# Patient Record
Sex: MALE | Race: WHITE | Employment: OTHER | URBAN - METROPOLITAN AREA
[De-identification: names, ages, dates, MRNs, and addresses within clinical notes are randomized per-mention and may not be internally consistent; named-entity substitution may affect disease eponyms.]

---

## 2018-06-04 RX ORDER — ASPIRIN 81 MG/1
81 TABLET ORAL DAILY
COMMUNITY

## 2018-06-04 NOTE — PERIOP NOTES
[facility]  Ambulatory Surgery Unit  Pre-operative Instructions    Surgery/Procedure Date  6/6/18          Tentative Arrival Time to be called      1. On the day of your surgery/procedure, please report to the Ambulatory Surgery Unit Registration Desk and sign in at your designated time. The Ambulatory Surgery Unit is located in [facility] on the [facility] side of the [facility] across from the [facility] building. Please have all of your health insurance cards and a photo ID. 2. You must have someone with you to drive you home, as you should not drive a car for 24 hours following anesthesia. Please make arrangements for a responsible adult friend or family member to stay with you for at least the first 24 hours after your surgery. 3. Do not have anything to eat or drink (including water, gum, mints, coffee, juice) after midnight. This may not apply to medications prescribed by your physician. (Please note below the special instructions with medications to take the morning of surgery, if applicable.)    4. We recommend you do not drink any alcoholic beverages for 24 hours before and after your surgery. 5. Contact your surgeons office for instructions on the following medications: non-steroidal anti-inflammatory drugs (i.e. Advil, Aleve), vitamins, and supplements. (Some surgeons will want you to stop these medications prior to surgery and others may allow you to take them)   **If you are currently taking Plavix, Coumadin, Aspirin and/or other blood-thinning agents, contact your surgeon for instructions. ** Your surgeon will partner with the physician prescribing these medications to determine if it is safe to stop or if you need to continue taking. Please do not stop taking these medications without instructions from your surgeon.     6. In an effort to help prevent surgical site infection, we ask that you shower with an anti-bacterial soap (i.e. Dial or Safeguard) on the morning of surgery, using a fresh towel after each shower. (Please begin this process with fresh bed linens.) Do not apply any lotions, powders, or deodorants after the shower on the day of your procedure. If applicable, please do not shave the operative site for 48 hours prior to surgery. 7. Wear comfortable clothes. Wear glasses instead of contacts. Do not bring any jewelry or money (other than copays or fees as instructed). Do not wear make-up, particularly mascara, the morning of your surgery. Do not wear nail polish, particularly if you are having foot /hand surgery. Wear your hair loose or down, no ponytails, buns, adan pins or clips. All body piercings must be removed. 8. You should understand that if you do not follow these instructions your surgery may be cancelled. If your physical condition changes (i.e. fever, cold or flu) please contact your surgeon as soon as possible. 9. It is important that you be on time. If a situation occurs where you may be late, or if you have any questions or problems, please call [phone].    10. Your surgery time may be subject to change. You will receive a phone call the day prior to surgery to confirm your arrival time. Special Instructions: Take all medications and inhalers, as prescribed, on the morning of surgery with a sip of water EXCEPT: none      I understand a pre-operative phone call will be made to verify my surgery time. In the event that I am not available, I give permission for a message to be left on my answering service and/or with another person?      Yes    This note was reviewed with patient during PAT phone call and patient verbalized understanding.      ___________________      ___________________      ________________  (Signature of Patient)          (Witness)                   (Date and Time)

## 2018-06-05 ENCOUNTER — ANESTHESIA EVENT (OUTPATIENT)
Dept: SURGERY | Age: 68
End: 2018-06-05
Payer: MEDICARE

## 2018-06-05 PROBLEM — H25.811 COMBINED FORMS OF AGE-RELATED CATARACT OF RIGHT EYE: Status: ACTIVE | Noted: 2018-06-05

## 2018-06-06 ENCOUNTER — HOSPITAL ENCOUNTER (OUTPATIENT)
Age: 68
Setting detail: OUTPATIENT SURGERY
Discharge: HOME | End: 2018-06-06
Attending: OPHTHALMOLOGY | Admitting: OPHTHALMOLOGY
Payer: MEDICARE

## 2018-06-06 ENCOUNTER — ANESTHESIA (OUTPATIENT)
Dept: SURGERY | Age: 68
End: 2018-06-06
Payer: MEDICARE

## 2018-06-06 VITALS
BODY MASS INDEX: 33.4 KG/M2 | HEIGHT: 73 IN | DIASTOLIC BLOOD PRESSURE: 88 MMHG | HEART RATE: 52 BPM | WEIGHT: 252 LBS | SYSTOLIC BLOOD PRESSURE: 141 MMHG | TEMPERATURE: 98.2 F | RESPIRATION RATE: 16 BRPM | OXYGEN SATURATION: 98 %

## 2018-06-06 PROCEDURE — 77030018846 HC SOL IRR STRL H20 ICUM -A: Performed by: OPHTHALMOLOGY

## 2018-06-06 PROCEDURE — 76060000073 HC AMB SURG ANES FIRST 0.5 HR: Performed by: OPHTHALMOLOGY

## 2018-06-06 PROCEDURE — V2632 POST CHMBR INTRAOCULAR LENS: HCPCS | Performed by: OPHTHALMOLOGY

## 2018-06-06 PROCEDURE — 74011250636 HC RX REV CODE- 250/636: Performed by: OPHTHALMOLOGY

## 2018-06-06 PROCEDURE — 74011250636 HC RX REV CODE- 250/636

## 2018-06-06 PROCEDURE — 74011250637 HC RX REV CODE- 250/637: Performed by: OPHTHALMOLOGY

## 2018-06-06 PROCEDURE — 77030021352 HC CBL LD SYS DISP COVD -B: Performed by: OPHTHALMOLOGY

## 2018-06-06 PROCEDURE — 74011000250 HC RX REV CODE- 250: Performed by: OPHTHALMOLOGY

## 2018-06-06 PROCEDURE — 76030000002 HC AMB SURG OR TIME FIRST 0.: Performed by: OPHTHALMOLOGY

## 2018-06-06 PROCEDURE — 74011000250 HC RX REV CODE- 250

## 2018-06-06 PROCEDURE — 76210000046 HC AMBSU PH II REC FIRST 0.5 HR: Performed by: OPHTHALMOLOGY

## 2018-06-06 DEVICE — LENS IOL POST 1-PC 6X13 19.5 -- ACRYSOF: Type: IMPLANTABLE DEVICE | Site: EYE | Status: FUNCTIONAL

## 2018-06-06 RX ORDER — SODIUM CHLORIDE 9 MG/ML
25 INJECTION, SOLUTION INTRAVENOUS CONTINUOUS
Status: DISCONTINUED | OUTPATIENT
Start: 2018-06-06 | End: 2018-06-06 | Stop reason: HOSPADM

## 2018-06-06 RX ORDER — ONDANSETRON 2 MG/ML
4 INJECTION INTRAMUSCULAR; INTRAVENOUS AS NEEDED
Status: DISCONTINUED | OUTPATIENT
Start: 2018-06-06 | End: 2018-06-06 | Stop reason: HOSPADM

## 2018-06-06 RX ORDER — LIDOCAINE HYDROCHLORIDE 10 MG/ML
0.1 INJECTION, SOLUTION EPIDURAL; INFILTRATION; INTRACAUDAL; PERINEURAL AS NEEDED
Status: DISCONTINUED | OUTPATIENT
Start: 2018-06-06 | End: 2018-06-06 | Stop reason: HOSPADM

## 2018-06-06 RX ORDER — ERYTHROMYCIN 5 MG/G
OINTMENT OPHTHALMIC
Status: COMPLETED | OUTPATIENT
Start: 2018-06-06 | End: 2018-06-06

## 2018-06-06 RX ORDER — MIDAZOLAM HYDROCHLORIDE 1 MG/ML
INJECTION, SOLUTION INTRAMUSCULAR; INTRAVENOUS AS NEEDED
Status: DISCONTINUED | OUTPATIENT
Start: 2018-06-06 | End: 2018-06-06 | Stop reason: HOSPADM

## 2018-06-06 RX ORDER — PHENYLEPHRINE HYDROCHLORIDE 25 MG/ML
1 SOLUTION/ DROPS OPHTHALMIC
Status: COMPLETED | OUTPATIENT
Start: 2018-06-06 | End: 2018-06-06

## 2018-06-06 RX ORDER — SODIUM CHLORIDE, SODIUM LACTATE, POTASSIUM CHLORIDE, CALCIUM CHLORIDE 600; 310; 30; 20 MG/100ML; MG/100ML; MG/100ML; MG/100ML
25 INJECTION, SOLUTION INTRAVENOUS CONTINUOUS
Status: DISCONTINUED | OUTPATIENT
Start: 2018-06-06 | End: 2018-06-06 | Stop reason: HOSPADM

## 2018-06-06 RX ORDER — SODIUM CHLORIDE 0.9 % (FLUSH) 0.9 %
5-10 SYRINGE (ML) INJECTION AS NEEDED
Status: DISCONTINUED | OUTPATIENT
Start: 2018-06-06 | End: 2018-06-06 | Stop reason: HOSPADM

## 2018-06-06 RX ORDER — ONDANSETRON 2 MG/ML
INJECTION INTRAMUSCULAR; INTRAVENOUS AS NEEDED
Status: DISCONTINUED | OUTPATIENT
Start: 2018-06-06 | End: 2018-06-06 | Stop reason: HOSPADM

## 2018-06-06 RX ORDER — CYCLOPENTOLATE HYDROCHLORIDE 20 MG/ML
1 SOLUTION/ DROPS OPHTHALMIC
Status: COMPLETED | OUTPATIENT
Start: 2018-06-06 | End: 2018-06-06

## 2018-06-06 RX ORDER — KETOROLAC TROMETHAMINE 5 MG/ML
SOLUTION OPHTHALMIC
Status: COMPLETED
Start: 2018-06-06 | End: 2018-06-06

## 2018-06-06 RX ORDER — SODIUM CHLORIDE 0.9 % (FLUSH) 0.9 %
5-10 SYRINGE (ML) INJECTION EVERY 8 HOURS
Status: DISCONTINUED | OUTPATIENT
Start: 2018-06-06 | End: 2018-06-06 | Stop reason: HOSPADM

## 2018-06-06 RX ORDER — PROPARACAINE HYDROCHLORIDE 5 MG/ML
1 SOLUTION/ DROPS OPHTHALMIC
Status: COMPLETED | OUTPATIENT
Start: 2018-06-06 | End: 2018-06-06

## 2018-06-06 RX ORDER — DIPHENHYDRAMINE HYDROCHLORIDE 50 MG/ML
12.5 INJECTION, SOLUTION INTRAMUSCULAR; INTRAVENOUS AS NEEDED
Status: DISCONTINUED | OUTPATIENT
Start: 2018-06-06 | End: 2018-06-06 | Stop reason: HOSPADM

## 2018-06-06 RX ORDER — LIDOCAINE HYDROCHLORIDE 10 MG/ML
1 INJECTION, SOLUTION EPIDURAL; INFILTRATION; INTRACAUDAL; PERINEURAL ONCE
Status: COMPLETED | OUTPATIENT
Start: 2018-06-06 | End: 2018-06-06

## 2018-06-06 RX ORDER — FENTANYL CITRATE 50 UG/ML
INJECTION, SOLUTION INTRAMUSCULAR; INTRAVENOUS AS NEEDED
Status: DISCONTINUED | OUTPATIENT
Start: 2018-06-06 | End: 2018-06-06 | Stop reason: HOSPADM

## 2018-06-06 RX ORDER — KETOROLAC TROMETHAMINE 5 MG/ML
1 SOLUTION OPHTHALMIC
Status: COMPLETED | OUTPATIENT
Start: 2018-06-06 | End: 2018-06-06

## 2018-06-06 RX ORDER — PREDNISOLONE ACETATE 10 MG/ML
1 SUSPENSION/ DROPS OPHTHALMIC 2 TIMES DAILY
Status: DISCONTINUED | OUTPATIENT
Start: 2018-06-06 | End: 2018-06-06 | Stop reason: HOSPADM

## 2018-06-06 RX ORDER — LIDOCAINE HYDROCHLORIDE 40 MG/ML
3 INJECTION, SOLUTION RETROBULBAR; TOPICAL ONCE
Status: COMPLETED | OUTPATIENT
Start: 2018-06-06 | End: 2018-06-06

## 2018-06-06 RX ORDER — FENTANYL CITRATE 50 UG/ML
25 INJECTION, SOLUTION INTRAMUSCULAR; INTRAVENOUS
Status: DISCONTINUED | OUTPATIENT
Start: 2018-06-06 | End: 2018-06-06 | Stop reason: HOSPADM

## 2018-06-06 RX ADMIN — PROPARACAINE HYDROCHLORIDE 1 DROP: 5 SOLUTION/ DROPS OPHTHALMIC at 07:24

## 2018-06-06 RX ADMIN — PHENYLEPHRINE HYDROCHLORIDE 1 DROP: 25 SOLUTION/ DROPS OPHTHALMIC at 07:39

## 2018-06-06 RX ADMIN — ONDANSETRON 4 MG: 2 INJECTION INTRAMUSCULAR; INTRAVENOUS at 08:54

## 2018-06-06 RX ADMIN — MIDAZOLAM HYDROCHLORIDE 1 MG: 1 INJECTION, SOLUTION INTRAMUSCULAR; INTRAVENOUS at 08:27

## 2018-06-06 RX ADMIN — KETOROLAC TROMETHAMINE 1 DROP: 5 SOLUTION OPHTHALMIC at 07:19

## 2018-06-06 RX ADMIN — PROPARACAINE HYDROCHLORIDE 1 DROP: 5 SOLUTION/ DROPS OPHTHALMIC at 07:18

## 2018-06-06 RX ADMIN — PHENYLEPHRINE HYDROCHLORIDE 1 DROP: 25 SOLUTION/ DROPS OPHTHALMIC at 07:31

## 2018-06-06 RX ADMIN — CYCLOPENTOLATE HYDROCHLORIDE 1 DROP: 20 SOLUTION/ DROPS OPHTHALMIC at 07:18

## 2018-06-06 RX ADMIN — PHENYLEPHRINE HYDROCHLORIDE 1 DROP: 25 SOLUTION/ DROPS OPHTHALMIC at 07:18

## 2018-06-06 RX ADMIN — FENTANYL CITRATE 50 MCG: 50 INJECTION, SOLUTION INTRAMUSCULAR; INTRAVENOUS at 08:54

## 2018-06-06 RX ADMIN — KETOROLAC TROMETHAMINE 1 DROP: 5 SOLUTION OPHTHALMIC at 07:31

## 2018-06-06 RX ADMIN — FENTANYL CITRATE 50 MCG: 50 INJECTION, SOLUTION INTRAMUSCULAR; INTRAVENOUS at 08:51

## 2018-06-06 RX ADMIN — CYCLOPENTOLATE HYDROCHLORIDE 1 DROP: 20 SOLUTION/ DROPS OPHTHALMIC at 07:24

## 2018-06-06 RX ADMIN — PROPARACAINE HYDROCHLORIDE 1 DROP: 5 SOLUTION/ DROPS OPHTHALMIC at 07:49

## 2018-06-06 RX ADMIN — SODIUM CHLORIDE 25 ML/HR: 900 INJECTION, SOLUTION INTRAVENOUS at 07:23

## 2018-06-06 RX ADMIN — PROPARACAINE HYDROCHLORIDE 1 DROP: 5 SOLUTION/ DROPS OPHTHALMIC at 07:31

## 2018-06-06 RX ADMIN — KETOROLAC TROMETHAMINE 1 DROP: 5 SOLUTION/ DROPS OPHTHALMIC at 07:19

## 2018-06-06 RX ADMIN — PROPARACAINE HYDROCHLORIDE 1 DROP: 5 SOLUTION/ DROPS OPHTHALMIC at 07:39

## 2018-06-06 RX ADMIN — PHENYLEPHRINE HYDROCHLORIDE 1 DROP: 25 SOLUTION/ DROPS OPHTHALMIC at 07:24

## 2018-06-06 RX ADMIN — MIDAZOLAM HYDROCHLORIDE 1 MG: 1 INJECTION, SOLUTION INTRAMUSCULAR; INTRAVENOUS at 08:32

## 2018-06-06 RX ADMIN — KETOROLAC TROMETHAMINE 1 DROP: 5 SOLUTION OPHTHALMIC at 07:39

## 2018-06-06 RX ADMIN — KETOROLAC TROMETHAMINE 1 DROP: 5 SOLUTION OPHTHALMIC at 07:24

## 2018-06-06 RX ADMIN — CYCLOPENTOLATE HYDROCHLORIDE 1 DROP: 20 SOLUTION/ DROPS OPHTHALMIC at 07:31

## 2018-06-06 RX ADMIN — CYCLOPENTOLATE HYDROCHLORIDE 1 DROP: 20 SOLUTION/ DROPS OPHTHALMIC at 07:39

## 2018-06-06 NOTE — PERIOP NOTES
Michael Woods  1950  [id number]    Situation:  Verbal report given from: Sandro Wilkes and Cara Sutton  Procedure: Procedure(s):  CATARACT EXTRACTION WITH INTRA OCULAR LENS IMPLANT RIGHT EYE    Background:    Preoperative diagnosis: Combined form of age-related cataract, right eye [H25.811]    Postoperative diagnosis: Combined form of age-related cataract, right eye [H25.811]    :  Dr. Erika Torrez    Assistant(s): Circ-1: Delicia Mart RN  Scrub Tech-1: Tammie Ewing    Specimens: * No specimens in log *    Assessment:  Intra-procedure medications         Anesthesia gave intra-procedure sedation and medications, see anesthesia flow sheet     Intravenous fluids: LR@ KVO     Vital signs stable       Recommendation:    Permission to share finding with wife, Lance Ayala

## 2018-06-06 NOTE — ANESTHESIA POSTPROCEDURE EVALUATION
Post-Anesthesia Evaluation and Assessment    Patient: Agustin Delgado MRN: [id number]  SSN: [id number]    YOB: 1950  Age: 76 y.o. Sex: male       Cardiovascular Function/Vital Signs  Visit Vitals    /88 (BP 1 Location: Right arm, BP Patient Position: At rest)    Pulse (!) 52    Temp 36.8 °C (98.2 °F)    Resp 16    Ht 6' 1\" (1.854 m)    Wt 114.3 kg (252 lb)    SpO2 98%    BMI 33.25 kg/m2       Patient is status post total IV anesthesia, MAC anesthesia for Procedure(s):  CATARACT EXTRACTION WITH INTRA OCULAR LENS IMPLANT RIGHT EYE. Nausea/Vomiting: None    Postoperative hydration reviewed and adequate. Pain:  Pain Scale 1: Numeric (0 - 10) (06/06/18 0901)  Pain Intensity 1: 0 (06/06/18 0901)   Managed    Neurological Status:   Neuro (WDL): Within Defined Limits (06/06/18 0901)   At baseline    Mental Status and Level of Consciousness: Arousable    Pulmonary Status:   O2 Device: Room air (06/06/18 0901)   Adequate oxygenation and airway patent    Complications related to anesthesia: None    Post-anesthesia assessment completed.  No concerns    Signed By: Analisa Olsen MD     June 6, 2018

## 2018-06-06 NOTE — IP AVS SNAPSHOT
[address]. [phone] Patient: Kenzie Chang MRN: [id number] CNL:5[birth year]01[birth year] About your hospitalization You were admitted on:  June 6, 2018 You last received care in the:  [facility] You were discharged on:  June 6, 2018 Why you were hospitalized Your primary diagnosis was:  Combined Forms Of Age-Related Cataract Of Right Eye Follow-up Information Follow up With Details Comments Contact Info Mayte Lopez MD   [facility] [address] 
[phone] Your Scheduled Appointments Wednesday June 06, 2018 CATARACT EXTRACTION WITH INTRA OCULAR LENS IMPLANT with Brett Sweet DO  
[facility] AMB SURGERY UNIT (RI OR PRE ASSESSMENT) [address] [facility] 83. [phone] Discharge Orders None A check john indicates which time of day the medication should be taken. My Medications ASK your doctor about these medications Instructions Each Dose to Equal  
 Morning Noon Evening Bedtime  
 allopurinol 300 mg tablet Commonly known as:  Terrence Massefraín Your last dose was: Your next dose is: Take 300 mg by mouth daily. 300 mg  
    
   
   
   
  
 aspirin delayed-release 81 mg tablet Your last dose was: Your next dose is: Take 81 mg by mouth daily. 81 mg  
    
   
   
   
  
 atenolol 50 mg tablet Commonly known as:  TENORMIN Your last dose was: Your next dose is: Take 25 mg by mouth daily. 25 mg  
    
   
   
   
  
 atorvastatin 20 mg tablet Commonly known as:  LIPITOR Your last dose was: Your next dose is: Take 10 mg by mouth daily. 10 mg Discharge Instructions Joaquina Krishnan D.O[facility] P[facility] 
[facility] 183. 
[phone] Post-Operative Instructions for Cataract Surgery ? Remove your eye shield and bandage at 12 noon the same day as surgery and start your eye drops. THROW AWAY THE GAUZE UNDER THE SHIELD. ? Place the blue eye shield back on for one week while asleep, even if napping in the recliner. Use the tape included in your bag.   
       
 
? DO NOT RUB YOUR EYE EVER! DO NOT WIPE YOUR EYE! ONLY WIPE ON YOUR CHEEK! 
 
            DO NOT WEAR EYE MAKEUP FOR 1 WEEK! 
 
 
? You may take a bath today and you can shower starting tomorrow. ? You may resume your previous diet. ? If you use glaucoma drops in the operative eye, continue them as directed. ? Common symptoms after surgery include a scratchy feeling, slight headache, red eye, and/or blurred vision. You may use Advil or Tylenol for any discomfort. ? Avoid strenuous activities and driving until you see Dr. Sonia Veras tomorrow. Please use care when walking, your depth perception may be altered. ? Bring your bag with your drops to your follow up appointment. Below are Instructions On How To Use Your Eye Drops. ON THE DAY OF SURGERY: 
 
? Use all three eye drops at 12 noon, 4pm, and 8pm.  Wait 10 minutes between drops. THE DAY AFTER SURGERY: 
 
? You will use the drops 4 times a day at 8am, 12 noon, 4pm, and 8pm. 
? Use the drops every day until bottles are empty. Vigamox (tan top) Put 1 drop in at 8am, 12 noon, 4pm, and 8pm. 
 
Pred Acetate (pink top) Put 1 drop in at 8:10am, 12:10pm, 4:10pm, and 8:10pm. Shake before using. Ketorolac Put 1 drop in at 8:20am, 12:20pm, 4:20pm, 8:20pm. 
 
CONTINUE DROPS UNTIL ALL BOTTLES ARE EMPTY! Follow-up appointment tomorrow at Dr. Wilfrido Corrales office:_______10:00_____________ Please call the office at [phone] if you experience severe pain or nausea. The following personal items collected during your admission are returned to you:  
Dental Appliance: Dental Appliances: At home Vision: Visual Aid: Glasses, With patient Hearing Aid: @FLOW DISCHARGE SUMMARY from Nurse 
(1341:LAST)@ Jewelry: Jewelry: None Clothing: Clothing: With patient Other Valuables: Other Valuables: None Valuables sent to safe:   
 
 
PATIENT INSTRUCTIONS: 
 
 
B - Balance E - Eyes F-  Face looks uneven A-  Arms unable to move or move even S-  Speech slurred or non-existent T-  Time-call 911 as soon as signs and symptoms begin-DO NOT go Back to bed or wait to see if you get better-TIME IS BRAIN. If you have not received your influenza and/or pneumococcal vaccine, please follow up with your primary care physician. The discharge information has been reviewed with the patient and family. The patient and family verbalized understanding. Introducing [facility]! [facility] introduces SemEquip patient portal. Now you can access parts of your medical record, email your doctor's office, and request medication refills online. 1. In your internet browser, go to [email or web address]€™[email or web address] 2. Click on the First Time User? Click Here link in the Sign In box. You will see the New Member Sign Up page. 3. Enter your SemEquip Access Code exactly as it appears below. You will not need to use this code after youve completed the sign-up process. If you do not sign up before the expiration date, you must request a new code. · SemEquip Access Code: [email or web address] Expires: 8/29/2018 10:12 AM 
 
4. Enter the last four digits of your Social Security Number (xxxx) and Date of Birth (mm/dd/yyyy) as indicated and click Submit. You will be taken to the next sign-up page. 5. Create a SemEquip ID. This will be your SemEquip login ID and cannot be changed, so think of one that is secure and easy to remember. 6. Create a SemEquip password. You can change your password at any time. 7. Enter your Password Reset Question and Answer. This can be used at a later time if you forget your password. 8. Enter your e-mail address. You will receive e-mail notification when new information is available in [address] 9. Click Sign Up. You can now view and download portions of your medical record. 10. Click the Download Summary menu link to download a portable copy of your medical information. If you have questions, please visit the Frequently Asked Questions section of the SemEquip website. Remember, SemEquip is NOT to be used for urgent needs. For medical emergencies, dial 911. Now available from your iPhone and Android! Introducing Allen Hinds As a [facility] patient, I wanted to make you aware of our electronic visit tool called Allen HortonIfbyphone. [employer]/7 allows you to connect within minutes with a medical provider 24 hours a day, seven days a week via a mobile device or tablet or logging into a secure website from your computer. You can access Allen FUZE Fit For A Kid!haileyfin from anywhere in the [place]. A virtual visit might be right for you when you have a simple condition and feel like you just dont want to get out of bed, or cant get away from work for an appointment, when your regular [facility] provider is not available (evenings, weekends or holidays), or when youre out of town and need minor care. Electronic visits cost only $49 and if the [employer]/7 provider determines a prescription is needed to treat your condition, one can be electronically transmitted to a nearby pharmacy*. Please take a moment to enroll today if you have not already done so. The enrollment process is free and takes just a few minutes. To enroll, please download the [employer] hossein to your tablet or phone, or visit [email or web address] to enroll on your computer. And, as an [address] [place] [address] patient with a DocsInk account, the results of your visits will be scanned into your electronic medical record and your primary care provider will be able to view the scanned results. We urge you to continue to see your regular [facility] provider for your ongoing medical care. And while your primary care provider may not be the one available when you seek a Allen Hinds virtual visit, the peace of mind you get from getting a real diagnosis real time can be priceless. For more information on Allen Hinds, view our Frequently Asked Questions (FAQs) at [email or web address]. Sincerely, 
 
Fiona Weldon MD 
Chief Medical Officer Doron8 Hanna Dominguez *:  certain medications cannot be prescribed via Allen Hinds Providers Seen During Your Hospitalization Provider Specialty Primary office phone Jayne Warner DO Ophthalmology [phone] Your Primary Care Physician (PCP) Primary Care Physician Office Phone Office Fax Cherelle Bueno [phone] You are allergic to the following Allergen Reactions Albumin Products Rash Itching Other (comments) Hypertension, tachycardia Penicillins Rash Recent Documentation Height Weight BMI Smoking Status 1.854 m 114.3 kg 33.25 kg/m2 Former Smoker Emergency Contacts Name Discharge Info Relation Home Work Mobile [address]  CAREGIVER [3] Spouse [3] [phone] Patient Belongings The following personal items are in your possession at time of discharge: 
  Dental Appliances: At home  Visual Aid: Glasses, With patient      Home Medications: None   Jewelry: None  Clothing: With patient    Other Valuables: None Please provide this summary of care documentation to your next provider. Signatures-by signing, you are acknowledging that this After Visit Summary has been reviewed with you and you have received a copy. Patient Signature:  ____________________________________________________________ Date:  ____________________________________________________________  
  
Niko Kothari Provider Signature:  ____________________________________________________________ Date:  ____________________________________________________________

## 2018-06-06 NOTE — ANESTHESIA PREPROCEDURE EVALUATION
Anesthetic History     PONV          Review of Systems / Medical History  Patient summary reviewed, nursing notes reviewed and pertinent labs reviewed    Pulmonary  Within defined limits            Pertinent negatives: No shortness of breath     Neuro/Psych   Within defined limits           Cardiovascular    Hypertension        Dysrhythmias : atrial flutter  CAD and CABG  Pertinent negatives: No angina  Exercise tolerance: >4 METS     GI/Hepatic/Renal         Renal disease: stones       Endo/Other        Arthritis     Other Findings              Physical Exam    Airway  Mallampati: II  TM Distance: 4 - 6 cm  Neck ROM: normal range of motion   Mouth opening: Normal     Cardiovascular  Regular rate and rhythm,  S1 and S2 normal,  no murmur, click, rub, or gallop             Dental    Dentition: Edentulous     Pulmonary  Breath sounds clear to auscultation               Abdominal  GI exam deferred       Other Findings            Anesthetic Plan    ASA: 2  Anesthesia type: total IV anesthesia and MAC          Induction: Intravenous  Anesthetic plan and risks discussed with: Patient

## 2018-06-06 NOTE — PERIOP NOTES
Permission received to review discharge instructions and discuss private health information with wife

## 2018-06-06 NOTE — DISCHARGE INSTRUCTIONS
Joaquina Krishnan D.O., P.CDebbie  [facility] 183.  [phone]    Post-Operative Instructions for Cataract Surgery     Remove your eye shield and bandage at 12 noon the same day as surgery and start your eye drops. THROW AWAY THE GAUZE UNDER THE SHIELD.  Place the blue eye shield back on for one week while asleep, even if napping in the recliner. Use the tape included in your bag.  DO NOT RUB YOUR EYE EVER! DO NOT WIPE YOUR EYE! ONLY WIPE ON YOUR CHEEK!                DO NOT WEAR EYE MAKEUP FOR 1 WEEK!  You may take a bath today and you can shower starting tomorrow.  You may resume your previous diet.  If you use glaucoma drops in the operative eye, continue them as directed.  Common symptoms after surgery include a scratchy feeling, slight headache, red eye, and/or blurred vision. You may use Advil or Tylenol for any discomfort.  Avoid strenuous activities and driving until you see Dr. Ridley Officer tomorrow. Please use care when walking, your depth perception may be altered.  Bring your bag with your drops to your follow up appointment. Below are Instructions On How To Use Your Eye Drops. ON THE DAY OF SURGERY:     Use all three eye drops at 12 noon, 4pm, and 8pm.  Wait 10 minutes between drops. THE DAY AFTER SURGERY:     You will use the drops 4 times a day at 8am, 12 noon, 4pm, and 8pm.   Use the drops every day until bottles are empty. Vigamox (tan top) Put 1 drop in at 8am, 12 noon, 4pm, and 8pm.    Pred Acetate (pink top) Put 1 drop in at 8:10am, 12:10pm, 4:10pm, and 8:10pm. Shake before using. Ketorolac Put 1 drop in at 8:20am, 12:20pm, 4:20pm, 8:20pm.    CONTINUE DROPS UNTIL ALL BOTTLES ARE EMPTY! Follow-up appointment tomorrow at Dr. Rianna Santos office:_______10:00_____________    Please call the office at [phone] if you experience severe pain or nausea.      The following personal items collected during your admission are returned to you:   Dental Appliance: Dental Appliances: At home  Vision: Visual Aid: Glasses, With patient  Hearing Aid: @FLOW  DISCHARGE SUMMARY from Nurse  (1341:LAST)@  Jewelry: Jewelry: None  Clothing: Clothing: With patient  Other Valuables: Other Valuables: None  Valuables sent to safe:        PATIENT INSTRUCTIONS:    After General Anesthesia or Intravenous Sedation, for 24 hours or while taking prescription Narcotics:        Someone should be with you for the next 24 hours. For your own safety, a responsible adult must drive you home. · Limit your activities  · Recommended activity: Rest today, up with assistance today. Do not climb stairs or shower unattended for the next 24 hours. · Please start with a soft bland diet and advance as tolerated (no nausea) to regular diet. · If you have a sore throat you should try the following: fluids, warm salt water gargles, or throat lozenges. If it does not improve after several days please follow up with your primary physician. · Do not drive and operate hazardous machinery  · Do not make important personal or business decisions  · Do  not drink alcoholic beverages  · If you have not urinated within 8 hours after discharge, please contact your surgeon on call. Report the following to your surgeon:  · Excessive pain, swelling, redness or odor of or around the surgical area  · Temperature over 100.5  · Any nausea or vomiting. · You will receive a Post Operative Call from one of the Recovery Room Nurses on the day after your surgery to check on you. It is very important for us to know how you are recovering after your surgery. If you have an issue or need to speak with someone, please call your surgeon, do not wait for the post operative call. · You may receive an e-mail or letter in the mail from [employer] regarding your experience with us in the Ambulatory Surgery Unit.  Your feedback is valuable to us and we appreciate your participation in the survey. · If the above instructions are not adequate, please contact Lexie Morris RN, Samira anesthesia Nurse Manager or our Anesthesiologist, at [phone]. If you are having problems after your surgery, call the physician at their office number. · We wish you a speedy recovery ? What to do at Home:      *  Please give a list of your current medications to your Primary Care Provider. *  Please update this list whenever your medications are discontinued, doses are      changed, or new medications (including over-the-counter products) are added. *  Please carry medication information at all times in case of emergency situations. These are general instructions for a healthy lifestyle:    No smoking/ No tobacco products/ Avoid exposure to second hand smoke    Surgeon General's Warning:  Quitting smoking now greatly reduces serious risk to your health. Obesity, smoking, and sedentary lifestyle greatly increases your risk for illness    A healthy diet, regular physical exercise & weight monitoring are important for maintaining a healthy lifestyle    You may be retaining fluid if you have a history of heart failure or if you experience any of the following symptoms:  Weight gain of 3 pounds or more overnight or 5 pounds in a week, increased swelling in our hands or feet or shortness of breath while lying flat in bed. Please call your doctor as soon as you notice any of these symptoms; do not wait until your next office visit. Recognize signs and symptoms of STROKE:    B - Balance  E - Eyes    F-  Face looks uneven    A-  Arms unable to move or move even    S-  Speech slurred or non-existent    T-  Time-call 911 as soon as signs and symptoms begin-DO NOT go       Back to bed or wait to see if you get better-TIME IS BRAIN. If you have not received your influenza and/or pneumococcal vaccine, please follow up with your primary care physician.       The discharge information has been reviewed with the patient and family. The patient and family verbalized understanding.

## 2018-06-06 NOTE — OP NOTES
Name: Sandy Moran MASC-4        updated 3/1/2013  Description:  Keiry Richardson with intraocular lens implant    PREOPERATIVE DIAGNOSIS: Visually impairing combined cataract, Right eye. POSTOPERATIVE DIAGNOSIS: Visually impairing combined   cataract,Right eye. OPERATION: Procedure(s):  CATARACT EXTRACTION WITH INTRA OCULAR LENS IMPLANT RIGHT EYE    ANESTHESIA: Topical with intravenous sedation    TYPE OF LENS IMPLANT USED:   Implant Name Type Inv. Item Serial No.  Lot No. LRB No. Used Action   LENS IOL POST 1-PC 6X13 19.5 -- [employer] - [id number]   LENS IOL POST 1-PC 6X13 19.5 -- [employer] [id number] [employer] N/A Right 1 Implanted       PHACO TIME:   55 seconds at an average power of 14.7%. Estimated blood loss: None    Complications:None    Specimen removed: None    DESCRIPTION OF PROCEDURE:  The patient was brought to the holding area, where an IV was begun at the keep open rate. The patient received several instillations of Mor-Synephrine 2.5%, Cyclogyl 2%, and tetracaine 0.5% until adequate pupillary dilatation was achieved. The patient was connected to cardiovascular monitoring. Prior to being brought back to the operating suite, the patient received 2 drops of Betadine 5% solution into the inferior cul-de-sac of the operated eye. The patient was then brought back to the operating suite, and prepped and draped in the usual sterile manner after being re-connnected to cardiovascular monitoring. One drop of 4% Xylocaine was then instilled onto the eye. The lid speculum was set into position and the operating microscope was focused on the patient. Two drops of 4% Xylocaine were again instilled onto the eye. Using the fixation ring, the sharp 15-degree blade was used to create a paracentesis site and 1% MPF Xylocaine was instilled into the anterior chamber for anesthesia. Viscoelastic was then instilled into the anterior chamber.  The 2.4 mm keratome was then utilized to create a clear corneal incision, and a circular capsulorrhexis was performed. BSS was utilized to perform careful hydrodissection of the lens. Viscoelastic was then instilled into the anterior chamber to protect the corneal endothelium. Phacoemulsification was then carried out. Any remaining cortical material was removed in irrigation/aspiration mode. Viscoelastic was then instilled into the capsular bag. The lens implant was inspected for any defects. After finding none, the lens was placed in its injector and inserted into the capsular bag. The lens implant was centered on the patient's visual/astigmatic axis. The viscoelastic was then removed from the eye. Miochol was instilled posterior to the iris plane to facilitate pupillary miosis. The wound was checked for any leaks, after finding none, Iopidine and Pred Forte drops were instilled into the inferior cul-de-sac, and gentamicin ointment was placed over the cornea. A semi-pressure dressing and shield were then placed for the patient. The patient tolerated the procedure very well, and was brought to the recovery room in an alert and stable and satisfactory postoperative condition. Instructions were given to the patient and their family for their immediate postoperative care.   Chely Davey DO  6/6/2018

## 2019-05-10 ENCOUNTER — HOSPITAL ENCOUNTER (OUTPATIENT)
Age: 69
Setting detail: OUTPATIENT SURGERY
Discharge: HOME | End: 2019-05-10
Attending: INTERNAL MEDICINE | Admitting: INTERNAL MEDICINE
Payer: MEDICARE

## 2019-05-10 VITALS
RESPIRATION RATE: 20 BRPM | SYSTOLIC BLOOD PRESSURE: 138 MMHG | HEIGHT: 73 IN | OXYGEN SATURATION: 98 % | TEMPERATURE: 98.4 F | WEIGHT: 260 LBS | HEART RATE: 65 BPM | BODY MASS INDEX: 34.46 KG/M2 | DIASTOLIC BLOOD PRESSURE: 79 MMHG

## 2019-05-10 DIAGNOSIS — R94.39 ABNORMAL BALLISTOCARDIOGRAM: ICD-10-CM

## 2019-05-10 LAB
CRD SYSTOLIC BP: 118
END DIASTOLIC PRESSURE: 19

## 2019-05-10 PROCEDURE — C1769 GUIDE WIRE: HCPCS | Performed by: INTERNAL MEDICINE

## 2019-05-10 PROCEDURE — 77030004538 HC CATH ANGI DX MP BSC -A: Performed by: INTERNAL MEDICINE

## 2019-05-10 PROCEDURE — 99152 MOD SED SAME PHYS/QHP 5/>YRS: CPT | Performed by: INTERNAL MEDICINE

## 2019-05-10 PROCEDURE — C1760 CLOSURE DEV, VASC: HCPCS | Performed by: INTERNAL MEDICINE

## 2019-05-10 PROCEDURE — 99153 MOD SED SAME PHYS/QHP EA: CPT | Performed by: INTERNAL MEDICINE

## 2019-05-10 PROCEDURE — 93459 L HRT ART/GRFT ANGIO: CPT | Performed by: INTERNAL MEDICINE

## 2019-05-10 PROCEDURE — 77030004532 HC CATH ANGI DX IMP BSC -A: Performed by: INTERNAL MEDICINE

## 2019-05-10 PROCEDURE — 74011636320 HC RX REV CODE- 636/320: Performed by: INTERNAL MEDICINE

## 2019-05-10 PROCEDURE — 74011250636 HC RX REV CODE- 250/636

## 2019-05-10 PROCEDURE — 74011250636 HC RX REV CODE- 250/636: Performed by: INTERNAL MEDICINE

## 2019-05-10 PROCEDURE — 77030013744: Performed by: INTERNAL MEDICINE

## 2019-05-10 RX ORDER — ACETAMINOPHEN 325 MG/1
650 TABLET ORAL
Status: DISCONTINUED | OUTPATIENT
Start: 2019-05-10 | End: 2019-05-10 | Stop reason: HOSPADM

## 2019-05-10 RX ORDER — SODIUM CHLORIDE 9 MG/ML
3 INJECTION, SOLUTION INTRAVENOUS CONTINUOUS
Status: DISPENSED | OUTPATIENT
Start: 2019-05-10 | End: 2019-05-10

## 2019-05-10 RX ORDER — SODIUM CHLORIDE 0.9 % (FLUSH) 0.9 %
SYRINGE (ML) INJECTION AS NEEDED
Status: DISCONTINUED | OUTPATIENT
Start: 2019-05-10 | End: 2019-05-10 | Stop reason: HOSPADM

## 2019-05-10 RX ORDER — FENTANYL CITRATE 50 UG/ML
INJECTION, SOLUTION INTRAMUSCULAR; INTRAVENOUS AS NEEDED
Status: DISCONTINUED | OUTPATIENT
Start: 2019-05-10 | End: 2019-05-10 | Stop reason: HOSPADM

## 2019-05-10 RX ORDER — SODIUM CHLORIDE 9 MG/ML
1.5 INJECTION, SOLUTION INTRAVENOUS CONTINUOUS
Status: DISPENSED | OUTPATIENT
Start: 2019-05-10 | End: 2019-05-10

## 2019-05-10 RX ORDER — LIDOCAINE HYDROCHLORIDE 10 MG/ML
INJECTION INFILTRATION; PERINEURAL AS NEEDED
Status: DISCONTINUED | OUTPATIENT
Start: 2019-05-10 | End: 2019-05-10 | Stop reason: HOSPADM

## 2019-05-10 RX ORDER — MIDAZOLAM HYDROCHLORIDE 1 MG/ML
INJECTION, SOLUTION INTRAMUSCULAR; INTRAVENOUS AS NEEDED
Status: DISCONTINUED | OUTPATIENT
Start: 2019-05-10 | End: 2019-05-10 | Stop reason: HOSPADM

## 2019-05-10 RX ORDER — HEPARIN SODIUM 200 [USP'U]/100ML
INJECTION, SOLUTION INTRAVENOUS
Status: COMPLETED | OUTPATIENT
Start: 2019-05-10 | End: 2019-05-10

## 2019-05-10 RX ORDER — NITROGLYCERIN 0.4 MG/1
0.4 TABLET SUBLINGUAL AS NEEDED
Status: DISCONTINUED | OUTPATIENT
Start: 2019-05-10 | End: 2019-05-10 | Stop reason: HOSPADM

## 2019-05-10 RX ADMIN — SODIUM CHLORIDE 3 ML/KG/HR: 900 INJECTION, SOLUTION INTRAVENOUS at 08:38

## 2019-05-10 RX ADMIN — SODIUM CHLORIDE 1.5 ML/KG/HR: 900 INJECTION, SOLUTION INTRAVENOUS at 09:41

## 2019-05-10 NOTE — DISCHARGE INSTRUCTIONS
CARDIAC CATHETERIZATION/ CATH STENT PLACEMENT   DISCHARGE INSTRUCTIONS    If possible, have someone stay with you for the first night. It is normal to feel tired for the first couple of days. Take it easy and follow your physicians instructions on activity. CHECK THE CATHETER INSERTION SITE DAILY:    If bleeding at the cath site occurs, take a clean washcloth and apply direct pressure just above the puncture site for at least 15 minutes. Call 911 immediately if the bleeding is not controlled. Continue to apply direct pressure until an ambulance gets to your location. Do not try to drive yourself or have someone else drive you to the hospital.  Have the ambulance bring you to the emergency room. You may shower 24 hours after your procedure. Gently remove the bandage during showering. Wash with soap and water and pat dry. To prevent infection, keep the groin area/insertion site clean and dry. Do not apply powders, creams, lotions, or ointments to the site for 5 days. You may cover the site with a fresh Band-Aid each day until well healed. You may notice a small lump at the site. This is normal and may last up to 6 weeks. CALL THE PHYSICIAN:  ? If the site becomes red, swollen, or feels warm to the touch, or is healing poorly    ? If you note any large/extending bruise, fever >101.0 or chills  ? If your extremity has numbness, tingling, discoloration, abnormal swelling, tightness or pain   ? If you have difficulty with urination or develop nausea, vomiting, or severe abdominal pain    ACTIVITY for the first 24-48 hours, or as instructed by your physician:  ? No lifting, pushing or pulling over 10 pounds and no straining the insertion site. Do not lift grocery bags or the garbage can; do not run the vacuum  or  for 7 days. ? You may start walking short distances the day of your procedure. Gradually increase as tolerated each day.   Current activity recommendations are 30 minutes of exercise at least 5 days a week. Work up to this as you recover. ? Avoid walking outside in extremes of heat or cold. Walk inside (at home, at the mall, or at a large store) when it is cold and windy or hot and humid. THINGS TO KEEP IN MIND:   ? Do not drive, operate any machinery, or sign any legal documents for 24 hours after your procedure, or as directed by your physician. You must have someone drive you home after your procedure. ? Drink plenty of fluids for 24-48 hours after your procedure to flush the contrast dye from your kidneys. ? Limit the number of times you go up and down the stairs  ? Take rests and pace yourself with activity  ? Be careful and do not strain with bowel movements    MEDICATIONS:  ? Take all medications as prescribed  ? Call your physician if you have any questions  ? Keep an updated list of your medications with you at all times and give a list to your primary physician and pharmacist  ? You may use Tylenol 325mg 1-2 tablets every 6 hours as needed for pain or discomfort, unless otherwise instructed. If you have significant discomfort more than 48 hours after your procedure, please call your physicians office. SIGNS AND SYMPTOMS:  ? Notify your physician for new or recurrent symptoms of chest discomfort, unusual shortness of breath or fatigue. These could be signs of a problem and should be discussed with your physician. ? For significant chest pain or symptoms of angina not relieved with rest:  if you have been prescribed Nitroglycerin, take as directed (taken under the tongue, one at a time 5 minutes apart for a total of 3 doses, sitting down). If the discomfort is not relieved after the 3rd Nitroglycerin, call 911. If you have not been prescribed Nitroglycerin and your chest discomfort is significant, call 911. Take the ambulance, do not try to drive yourself or have someone else drive you to the hospital.     AFTER CARE:  ?  Follow up with your physician as instructed  ? Follow a heart healthy diet with proper portion control, daily stress management, daily exercise, blood pressure and cholesterol control, and smoking cessation. The success of your stent, if you had one placed, and the prevention of future catheterizations heavily depends on your lifestyle changes you make now! ? You may start walking short distances the day of your procedure. Gradually increase as tolerated each day. Current activity recommendations are 30 minutes of exercise at least 5 days a week. Work up to this as you recover. Walk, ride a bike, or choose any other activity you enjoy to reach this activity goal.   ? Avoid walking outside in extremes of heat or cold. Walk inside (at home, at the mall, or at a large store) when it is cold and windy or hot and humid. ? If you had a stent placed, consider [facility] as a resource to help you make the needed lifestyle changes to live a heart healthy lifestyle. Discuss your candidacy with your physician. If you have questions, call your physicians office or the Cardiac Cath Lab at [phone]. The Cath Lab is operational from 6:30 a.m. to 5:00 p.m., Monday through Friday. After hours, notify your physician. [address] [place] [address] can be reached at [phone]. [facility] is operational Monday-Thursday 8:30 a.m. to 5:00 p.m. and Friday 8:30 a.m. to 12:00 p.m.       Remember:  IN CASE OF BLEEDING: KEEP FIRM PRESSURE ON THE PROCEDURE SITE AND CALL 911 IF NOT CONTROLLED

## 2019-05-10 NOTE — PROGRESS NOTES
TRANSFER - IN REPORT:    Verbal report received from Sheryl(name) on Norah Benoit  being received from cath lab procedure(unit) for routine progression of care      Report consisted of patients Situation, Background, Assessment and   Recommendations(SBAR). Information from the following report(s) Procedure Summary and MAR was reviewed with the receiving nurse. Opportunity for questions and clarification was provided. Assessment completed upon patients arrival to unit and care assumed.

## 2019-05-10 NOTE — PROGRESS NOTES
Cardiac Cath Lab Procedure Area Arrival Note:    Jillian Storey arrived to Cardiac Cath Lab, Procedure Area. Patient identifiers verified with NAME and DATE OF BIRTH. Procedure verified with patient. Consent forms verified. Allergies verified. Patient informed of procedure and plan of care. Questions answered with review. Patient voiced understanding of procedure and plan of care. Patient on cardiac monitor, non-invasive blood pressure, SPO2 monitor. On RA and placed on O2 @ 2 lpm via NC.  IV of NSS on pump at 354 ml/hr per DOLLY protocol. Patient status doing well without problems. Patient is A&Ox 4. Patient reports no complaints of pain or shortness of breath. Patient medicated during procedure with orders obtained and verified by Dr. Marisa Hartmann. Refer to patients Cardiac Cath Lab PROCEDURE REPORT for vital signs, assessment, status, and response during procedure, printed at end of case. Printed report on chart or scanned into chart. 1005 Transfer to [address] [place] [address] from Procedure Area    Verbal report given to Ezekiel Durant RN on Jillian Storey being transferred to Cardiac Cath Lab  for routine progression of care   Patient is post [facility] procedure. Patient stable upon transfer to . Report consisted of patients Situation, Background, Assessment and   Recommendations(SBAR). Information from the following report(s) SBAR, Procedure Summary and MAR was reviewed with the receiving nurse. Opportunity for questions and clarification was provided. Patient medicated during procedure with orders obtained and verified by Dr. Marisa Hartmann. Refer to patient PROCEDURE REPORT for vital signs, assessment, status, and response during procedure.

## 2019-05-10 NOTE — PROCEDURES
4Cardiac Catheterization Procedure Note   Patient: Jameel Caldera  MRN: [id number]  SSN: [id number]   YOB: 1950 Age: 71 y.o. Sex: male    Date of Procedure: 5/10/2019   Pre-procedure Diagnosis: Typical Angina, Coronary Artery Disease and +EST/nuclear  Post-procedure Diagnosis: Coronary Artery Disease  Procedure: Left Heart Cath with Bypass Grafts  :  Dr. Anamika Drake MD    Assistant(s):  None  Anesthesia: Moderate Sedation   Estimated Blood Loss: Less than 10 mL   Specimens Removed: None  Findings: Open LIMA to LAD and open SVG to distal PL with chronic occlusions of LADand Cx with diffuse disease in dominant RCA. LVEF 45%.   Complications: None   Implants:  None  Signed by:  Anamika Drake MD  5/10/2019  12:37 PM

## 2019-05-10 NOTE — ROUTINE PROCESS
Cardiac Cath Lab Procedure Area Arrival Note:    Davis Rounds arrived to Cardiac Cath Lab, Procedure Area. Patient identifiers verified with NAME and DATE OF BIRTH. Procedure verified with patient. Consent forms verified. Allergies verified. Patient informed of procedure and plan of care. Questions answered with review. Patient voiced understanding of procedure and plan of care.

## 2019-05-10 NOTE — Clinical Note
Right femoral artery. Accessed successfully. using fluoro and ultrasound guidance. Number of attempts =  3.

## 2021-01-01 ENCOUNTER — APPOINTMENT (OUTPATIENT)
Dept: GENERAL RADIOLOGY | Age: 71
DRG: 870 | End: 2021-01-01
Attending: INTERNAL MEDICINE
Payer: MEDICARE

## 2021-01-01 ENCOUNTER — HOSPITAL ENCOUNTER (INPATIENT)
Age: 71
LOS: 1 days | DRG: 951 | End: 2021-10-09
Attending: FAMILY MEDICINE | Admitting: FAMILY MEDICINE
Payer: OTHER MISCELLANEOUS

## 2021-01-01 ENCOUNTER — APPOINTMENT (OUTPATIENT)
Dept: GENERAL RADIOLOGY | Age: 71
DRG: 870 | End: 2021-01-01
Attending: EMERGENCY MEDICINE
Payer: MEDICARE

## 2021-01-01 ENCOUNTER — HOSPITAL ENCOUNTER (INPATIENT)
Age: 71
LOS: 16 days | Discharge: HOSPICE | DRG: 870 | End: 2021-10-08
Attending: STUDENT IN AN ORGANIZED HEALTH CARE EDUCATION/TRAINING PROGRAM | Admitting: FAMILY MEDICINE
Payer: MEDICARE

## 2021-01-01 ENCOUNTER — APPOINTMENT (OUTPATIENT)
Dept: ULTRASOUND IMAGING | Age: 71
DRG: 870 | End: 2021-01-01
Attending: FAMILY MEDICINE
Payer: MEDICARE

## 2021-01-01 ENCOUNTER — APPOINTMENT (OUTPATIENT)
Dept: MRI IMAGING | Age: 71
DRG: 870 | End: 2021-01-01
Attending: PHYSICIAN ASSISTANT
Payer: MEDICARE

## 2021-01-01 ENCOUNTER — APPOINTMENT (OUTPATIENT)
Dept: INTERVENTIONAL RADIOLOGY/VASCULAR | Age: 71
DRG: 870 | End: 2021-01-01
Attending: STUDENT IN AN ORGANIZED HEALTH CARE EDUCATION/TRAINING PROGRAM
Payer: MEDICARE

## 2021-01-01 ENCOUNTER — HOME CARE VISIT (OUTPATIENT)
Dept: HOSPICE | Facility: HOSPICE | Age: 71
End: 2021-01-01
Payer: MEDICARE

## 2021-01-01 ENCOUNTER — APPOINTMENT (OUTPATIENT)
Dept: GENERAL RADIOLOGY | Age: 71
DRG: 870 | End: 2021-01-01
Attending: NURSE PRACTITIONER
Payer: MEDICARE

## 2021-01-01 ENCOUNTER — APPOINTMENT (OUTPATIENT)
Dept: GENERAL RADIOLOGY | Age: 71
DRG: 870 | End: 2021-01-01
Attending: PHYSICIAN ASSISTANT
Payer: MEDICARE

## 2021-01-01 ENCOUNTER — HOSPITAL ENCOUNTER (INPATIENT)
Dept: INTERVENTIONAL RADIOLOGY/VASCULAR | Age: 71
Discharge: HOME | DRG: 870 | End: 2021-09-24
Attending: INTERNAL MEDICINE | Admitting: FAMILY MEDICINE
Payer: MEDICARE

## 2021-01-01 ENCOUNTER — APPOINTMENT (OUTPATIENT)
Dept: NON INVASIVE DIAGNOSTICS | Age: 71
DRG: 870 | End: 2021-01-01
Attending: INTERNAL MEDICINE
Payer: MEDICARE

## 2021-01-01 ENCOUNTER — APPOINTMENT (OUTPATIENT)
Dept: GENERAL RADIOLOGY | Age: 71
DRG: 870 | End: 2021-01-01
Attending: FAMILY MEDICINE
Payer: MEDICARE

## 2021-01-01 ENCOUNTER — APPOINTMENT (OUTPATIENT)
Dept: INTERVENTIONAL RADIOLOGY/VASCULAR | Age: 71
DRG: 870 | End: 2021-01-01
Attending: INTERNAL MEDICINE
Payer: MEDICARE

## 2021-01-01 ENCOUNTER — APPOINTMENT (OUTPATIENT)
Dept: GENERAL RADIOLOGY | Age: 71
DRG: 870 | End: 2021-01-01
Attending: STUDENT IN AN ORGANIZED HEALTH CARE EDUCATION/TRAINING PROGRAM
Payer: MEDICARE

## 2021-01-01 ENCOUNTER — APPOINTMENT (OUTPATIENT)
Dept: CT IMAGING | Age: 71
DRG: 870 | End: 2021-01-01
Attending: EMERGENCY MEDICINE
Payer: MEDICARE

## 2021-01-01 ENCOUNTER — APPOINTMENT (OUTPATIENT)
Dept: CT IMAGING | Age: 71
DRG: 870 | End: 2021-01-01
Attending: STUDENT IN AN ORGANIZED HEALTH CARE EDUCATION/TRAINING PROGRAM
Payer: MEDICARE

## 2021-01-01 ENCOUNTER — HOSPICE ADMISSION (OUTPATIENT)
Dept: HOSPICE | Facility: HOSPICE | Age: 71
End: 2021-01-01
Payer: MEDICARE

## 2021-01-01 VITALS
OXYGEN SATURATION: 94 % | SYSTOLIC BLOOD PRESSURE: 83 MMHG | HEIGHT: 73 IN | HEART RATE: 110 BPM | RESPIRATION RATE: 20 BRPM | BODY MASS INDEX: 36.23 KG/M2 | TEMPERATURE: 99.4 F | WEIGHT: 273.37 LBS | DIASTOLIC BLOOD PRESSURE: 52 MMHG

## 2021-01-01 VITALS
RESPIRATION RATE: 18 BRPM | HEIGHT: 73 IN | BODY MASS INDEX: 36.26 KG/M2 | SYSTOLIC BLOOD PRESSURE: 111 MMHG | OXYGEN SATURATION: 91 % | TEMPERATURE: 97.7 F | HEART RATE: 137 BPM | DIASTOLIC BLOOD PRESSURE: 50 MMHG | WEIGHT: 273.59 LBS

## 2021-01-01 DIAGNOSIS — Z51.5 PALLIATIVE CARE ENCOUNTER: ICD-10-CM

## 2021-01-01 DIAGNOSIS — K85.90 ACUTE PANCREATITIS, UNSPECIFIED COMPLICATION STATUS, UNSPECIFIED PANCREATITIS TYPE: ICD-10-CM

## 2021-01-01 DIAGNOSIS — R06.02 SHORTNESS OF BREATH: ICD-10-CM

## 2021-01-01 DIAGNOSIS — K85.90 ACUTE PANCREATITIS, UNSPECIFIED COMPLICATION STATUS, UNSPECIFIED PANCREATITIS TYPE: Primary | ICD-10-CM

## 2021-01-01 DIAGNOSIS — R10.84 GENERALIZED ABDOMINAL PAIN: ICD-10-CM

## 2021-01-01 DIAGNOSIS — R06.03 ACUTE RESPIRATORY DISTRESS: ICD-10-CM

## 2021-01-01 DIAGNOSIS — Z71.89 GOALS OF CARE, COUNSELING/DISCUSSION: ICD-10-CM

## 2021-01-01 DIAGNOSIS — Z51.5 HOSPICE CARE: ICD-10-CM

## 2021-01-01 DIAGNOSIS — Z66 DO NOT RESUSCITATE: ICD-10-CM

## 2021-01-01 LAB
25(OH)D3 SERPL-MCNC: 12.2 NG/ML (ref 30–100)
ABO + RH BLD: NORMAL
ALBUMIN SERPL-MCNC: 1.8 G/DL (ref 3.5–5)
ALBUMIN SERPL-MCNC: 1.9 G/DL (ref 3.5–5)
ALBUMIN SERPL-MCNC: 2 G/DL (ref 3.5–5)
ALBUMIN SERPL-MCNC: 2.1 G/DL (ref 3.5–5)
ALBUMIN SERPL-MCNC: 2.1 G/DL (ref 3.5–5)
ALBUMIN SERPL-MCNC: 2.2 G/DL (ref 3.5–5)
ALBUMIN SERPL-MCNC: 2.3 G/DL (ref 3.5–5)
ALBUMIN SERPL-MCNC: 2.4 G/DL (ref 3.5–5)
ALBUMIN SERPL-MCNC: 2.5 G/DL (ref 3.5–5)
ALBUMIN SERPL-MCNC: 2.5 G/DL (ref 3.5–5)
ALBUMIN SERPL-MCNC: 2.6 G/DL (ref 3.5–5)
ALBUMIN SERPL-MCNC: 2.6 G/DL (ref 3.5–5)
ALBUMIN SERPL-MCNC: 2.7 G/DL (ref 3.5–5)
ALBUMIN SERPL-MCNC: 2.8 G/DL (ref 3.5–5)
ALBUMIN SERPL-MCNC: 2.8 G/DL (ref 3.5–5)
ALBUMIN SERPL-MCNC: 2.9 G/DL (ref 3.5–5)
ALBUMIN SERPL-MCNC: 2.9 G/DL (ref 3.5–5)
ALBUMIN SERPL-MCNC: 3 G/DL (ref 3.5–5)
ALBUMIN SERPL-MCNC: 3.2 G/DL (ref 3.5–5)
ALBUMIN SERPL-MCNC: 3.5 G/DL (ref 3.5–5)
ALBUMIN SERPL-MCNC: 3.5 G/DL (ref 3.5–5)
ALBUMIN/GLOB SERPL: 0.5 {RATIO} (ref 1.1–2.2)
ALBUMIN/GLOB SERPL: 0.6 {RATIO} (ref 1.1–2.2)
ALBUMIN/GLOB SERPL: 0.7 {RATIO} (ref 1.1–2.2)
ALBUMIN/GLOB SERPL: 0.8 {RATIO} (ref 1.1–2.2)
ALBUMIN/GLOB SERPL: 0.8 {RATIO} (ref 1.1–2.2)
ALBUMIN/GLOB SERPL: 1 {RATIO} (ref 1.1–2.2)
ALBUMIN/GLOB SERPL: 1.1 {RATIO} (ref 1.1–2.2)
ALP SERPL-CCNC: 110 U/L (ref 45–117)
ALP SERPL-CCNC: 117 U/L (ref 45–117)
ALP SERPL-CCNC: 119 U/L (ref 45–117)
ALP SERPL-CCNC: 122 U/L (ref 45–117)
ALP SERPL-CCNC: 123 U/L (ref 45–117)
ALP SERPL-CCNC: 137 U/L (ref 45–117)
ALP SERPL-CCNC: 137 U/L (ref 45–117)
ALP SERPL-CCNC: 140 U/L (ref 45–117)
ALP SERPL-CCNC: 141 U/L (ref 45–117)
ALP SERPL-CCNC: 144 U/L (ref 45–117)
ALP SERPL-CCNC: 145 U/L (ref 45–117)
ALP SERPL-CCNC: 149 U/L (ref 45–117)
ALP SERPL-CCNC: 157 U/L (ref 45–117)
ALP SERPL-CCNC: 165 U/L (ref 45–117)
ALP SERPL-CCNC: 169 U/L (ref 45–117)
ALP SERPL-CCNC: 192 U/L (ref 45–117)
ALP SERPL-CCNC: 58 U/L (ref 45–117)
ALP SERPL-CCNC: 63 U/L (ref 45–117)
ALP SERPL-CCNC: 63 U/L (ref 45–117)
ALP SERPL-CCNC: 73 U/L (ref 45–117)
ALP SERPL-CCNC: 85 U/L (ref 45–117)
ALP SERPL-CCNC: 89 U/L (ref 45–117)
ALP SERPL-CCNC: 92 U/L (ref 45–117)
ALP SERPL-CCNC: 97 U/L (ref 45–117)
ALT SERPL-CCNC: 111 U/L (ref 12–78)
ALT SERPL-CCNC: 145 U/L (ref 12–78)
ALT SERPL-CCNC: 153 U/L (ref 12–78)
ALT SERPL-CCNC: 25 U/L (ref 12–78)
ALT SERPL-CCNC: 25 U/L (ref 12–78)
ALT SERPL-CCNC: 26 U/L (ref 12–78)
ALT SERPL-CCNC: 27 U/L (ref 12–78)
ALT SERPL-CCNC: 278 U/L (ref 12–78)
ALT SERPL-CCNC: 28 U/L (ref 12–78)
ALT SERPL-CCNC: 29 U/L (ref 12–78)
ALT SERPL-CCNC: 30 U/L (ref 12–78)
ALT SERPL-CCNC: 30 U/L (ref 12–78)
ALT SERPL-CCNC: 32 U/L (ref 12–78)
ALT SERPL-CCNC: 33 U/L (ref 12–78)
ALT SERPL-CCNC: 35 U/L (ref 12–78)
ALT SERPL-CCNC: 35 U/L (ref 12–78)
ALT SERPL-CCNC: 45 U/L (ref 12–78)
ALT SERPL-CCNC: 47 U/L (ref 12–78)
ALT SERPL-CCNC: 48 U/L (ref 12–78)
ALT SERPL-CCNC: 62 U/L (ref 12–78)
ALT SERPL-CCNC: 73 U/L (ref 12–78)
ALT SERPL-CCNC: 77 U/L (ref 12–78)
ANION GAP SERPL CALC-SCNC: 10 MMOL/L (ref 5–15)
ANION GAP SERPL CALC-SCNC: 10 MMOL/L (ref 5–15)
ANION GAP SERPL CALC-SCNC: 11 MMOL/L (ref 5–15)
ANION GAP SERPL CALC-SCNC: 12 MMOL/L (ref 5–15)
ANION GAP SERPL CALC-SCNC: 14 MMOL/L (ref 5–15)
ANION GAP SERPL CALC-SCNC: 5 MMOL/L (ref 5–15)
ANION GAP SERPL CALC-SCNC: 6 MMOL/L (ref 5–15)
ANION GAP SERPL CALC-SCNC: 7 MMOL/L (ref 5–15)
ANION GAP SERPL CALC-SCNC: 8 MMOL/L (ref 5–15)
ANION GAP SERPL CALC-SCNC: 9 MMOL/L (ref 5–15)
APPEARANCE UR: CLEAR
APTT PPP: 29.6 SEC (ref 22.1–31)
ARTERIAL PATENCY WRIST A: ABNORMAL
ARTERIAL PATENCY WRIST A: NEGATIVE
ARTERIAL PATENCY WRIST A: NORMAL
ARTERIAL PATENCY WRIST A: POSITIVE
AST SERPL-CCNC: 117 U/L (ref 15–37)
AST SERPL-CCNC: 127 U/L (ref 15–37)
AST SERPL-CCNC: 206 U/L (ref 15–37)
AST SERPL-CCNC: 267 U/L (ref 15–37)
AST SERPL-CCNC: 36 U/L (ref 15–37)
AST SERPL-CCNC: 38 U/L (ref 15–37)
AST SERPL-CCNC: 40 U/L (ref 15–37)
AST SERPL-CCNC: 42 U/L (ref 15–37)
AST SERPL-CCNC: 42 U/L (ref 15–37)
AST SERPL-CCNC: 43 U/L (ref 15–37)
AST SERPL-CCNC: 45 U/L (ref 15–37)
AST SERPL-CCNC: 46 U/L (ref 15–37)
AST SERPL-CCNC: 49 U/L (ref 15–37)
AST SERPL-CCNC: 52 U/L (ref 15–37)
AST SERPL-CCNC: 54 U/L (ref 15–37)
AST SERPL-CCNC: 59 U/L (ref 15–37)
AST SERPL-CCNC: 59 U/L (ref 15–37)
AST SERPL-CCNC: 72 U/L (ref 15–37)
AST SERPL-CCNC: 73 U/L (ref 15–37)
AST SERPL-CCNC: 74 U/L (ref 15–37)
AST SERPL-CCNC: 77 U/L (ref 15–37)
AST SERPL-CCNC: 84 U/L (ref 15–37)
AST SERPL-CCNC: 90 U/L (ref 15–37)
AST SERPL-CCNC: 93 U/L (ref 15–37)
ATRIAL RATE: 102 BPM
ATRIAL RATE: 110 BPM
ATRIAL RATE: 123 BPM
ATRIAL RATE: 51 BPM
ATRIAL RATE: 51 BPM
ATRIAL RATE: 81 BPM
B PERT DNA SPEC QL NAA+PROBE: NOT DETECTED
BACTERIA SPEC CULT: ABNORMAL
BACTERIA SPEC CULT: NORMAL
BACTERIA SPEC CULT: NORMAL
BACTERIA URNS QL MICRO: NEGATIVE /HPF
BASE DEFICIT BLD-SCNC: 0.3 MMOL/L
BASE DEFICIT BLD-SCNC: 0.9 MMOL/L
BASE DEFICIT BLD-SCNC: 1.1 MMOL/L
BASE DEFICIT BLD-SCNC: 1.7 MMOL/L
BASE DEFICIT BLD-SCNC: 2 MMOL/L
BASE DEFICIT BLD-SCNC: 2.7 MMOL/L
BASE DEFICIT BLD-SCNC: 5 MMOL/L
BASE DEFICIT BLD-SCNC: 5.3 MMOL/L
BASE DEFICIT BLD-SCNC: 6.4 MMOL/L
BASE DEFICIT BLD-SCNC: 6.5 MMOL/L
BASE DEFICIT BLD-SCNC: 7.8 MMOL/L
BASE DEFICIT BLDA-SCNC: 4.4 MMOL/L
BASE DEFICIT BLDV-SCNC: 4.5 MMOL/L
BASOPHILS # BLD: 0 K/UL (ref 0–0.1)
BASOPHILS # BLD: 0.1 K/UL (ref 0–0.1)
BASOPHILS NFR BLD: 0 % (ref 0–1)
BASOPHILS NFR BLD: 1 % (ref 0–1)
BDY SITE: ABNORMAL
BDY SITE: NORMAL
BDY SITE: NORMAL
BILIRUB SERPL-MCNC: 0.5 MG/DL (ref 0.2–1)
BILIRUB SERPL-MCNC: 0.6 MG/DL (ref 0.2–1)
BILIRUB SERPL-MCNC: 0.7 MG/DL (ref 0.2–1)
BILIRUB SERPL-MCNC: 0.9 MG/DL (ref 0.2–1)
BILIRUB SERPL-MCNC: 0.9 MG/DL (ref 0.2–1)
BILIRUB SERPL-MCNC: 1 MG/DL (ref 0.2–1)
BILIRUB SERPL-MCNC: 1.1 MG/DL (ref 0.2–1)
BILIRUB SERPL-MCNC: 1.2 MG/DL (ref 0.2–1)
BILIRUB SERPL-MCNC: 1.4 MG/DL (ref 0.2–1)
BILIRUB SERPL-MCNC: 1.7 MG/DL (ref 0.2–1)
BILIRUB UR QL: NEGATIVE
BLD PROD TYP BPU: NORMAL
BLOOD GROUP ANTIBODIES SERPL: NORMAL
BORDETELLA PARAPERTUSSIS PCR, BORPAR: NOT DETECTED
BPU ID: NORMAL
BUN SERPL-MCNC: 101 MG/DL (ref 6–20)
BUN SERPL-MCNC: 102 MG/DL (ref 6–20)
BUN SERPL-MCNC: 105 MG/DL (ref 6–20)
BUN SERPL-MCNC: 107 MG/DL (ref 6–20)
BUN SERPL-MCNC: 117 MG/DL (ref 6–20)
BUN SERPL-MCNC: 121 MG/DL (ref 6–20)
BUN SERPL-MCNC: 16 MG/DL (ref 6–20)
BUN SERPL-MCNC: 27 MG/DL (ref 6–20)
BUN SERPL-MCNC: 32 MG/DL (ref 6–20)
BUN SERPL-MCNC: 34 MG/DL (ref 6–20)
BUN SERPL-MCNC: 34 MG/DL (ref 6–20)
BUN SERPL-MCNC: 35 MG/DL (ref 6–20)
BUN SERPL-MCNC: 36 MG/DL (ref 6–20)
BUN SERPL-MCNC: 37 MG/DL (ref 6–20)
BUN SERPL-MCNC: 38 MG/DL (ref 6–20)
BUN SERPL-MCNC: 39 MG/DL (ref 6–20)
BUN SERPL-MCNC: 41 MG/DL (ref 6–20)
BUN SERPL-MCNC: 42 MG/DL (ref 6–20)
BUN SERPL-MCNC: 48 MG/DL (ref 6–20)
BUN SERPL-MCNC: 48 MG/DL (ref 6–20)
BUN SERPL-MCNC: 53 MG/DL (ref 6–20)
BUN SERPL-MCNC: 54 MG/DL (ref 6–20)
BUN SERPL-MCNC: 56 MG/DL (ref 6–20)
BUN SERPL-MCNC: 58 MG/DL (ref 6–20)
BUN SERPL-MCNC: 62 MG/DL (ref 6–20)
BUN SERPL-MCNC: 64 MG/DL (ref 6–20)
BUN SERPL-MCNC: 65 MG/DL (ref 6–20)
BUN SERPL-MCNC: 66 MG/DL (ref 6–20)
BUN SERPL-MCNC: 68 MG/DL (ref 6–20)
BUN SERPL-MCNC: 70 MG/DL (ref 6–20)
BUN SERPL-MCNC: 71 MG/DL (ref 6–20)
BUN SERPL-MCNC: 76 MG/DL (ref 6–20)
BUN SERPL-MCNC: 86 MG/DL (ref 6–20)
BUN SERPL-MCNC: 89 MG/DL (ref 6–20)
BUN/CREAT SERPL: 11 (ref 12–20)
BUN/CREAT SERPL: 13 (ref 12–20)
BUN/CREAT SERPL: 13 (ref 12–20)
BUN/CREAT SERPL: 14 (ref 12–20)
BUN/CREAT SERPL: 15 (ref 12–20)
BUN/CREAT SERPL: 16 (ref 12–20)
BUN/CREAT SERPL: 17 (ref 12–20)
BUN/CREAT SERPL: 17 (ref 12–20)
BUN/CREAT SERPL: 19 (ref 12–20)
BUN/CREAT SERPL: 20 (ref 12–20)
BUN/CREAT SERPL: 22 (ref 12–20)
BUN/CREAT SERPL: 22 (ref 12–20)
BUN/CREAT SERPL: 23 (ref 12–20)
BUN/CREAT SERPL: 25 (ref 12–20)
BUN/CREAT SERPL: 26 (ref 12–20)
BUN/CREAT SERPL: 27 (ref 12–20)
BUN/CREAT SERPL: 28 (ref 12–20)
BUN/CREAT SERPL: 30 (ref 12–20)
BUN/CREAT SERPL: 31 (ref 12–20)
BUN/CREAT SERPL: 31 (ref 12–20)
BUN/CREAT SERPL: 32 (ref 12–20)
C DIFF GDH STL QL: NEGATIVE
C DIFF TOX A+B STL QL IA: NEGATIVE
C PNEUM DNA SPEC QL NAA+PROBE: NOT DETECTED
CA-I BLD-MCNC: 1.08 MMOL/L (ref 1.12–1.32)
CA-I BLD-MCNC: 1.1 MMOL/L (ref 1.12–1.32)
CA-I BLD-SCNC: 0.79 MMOL/L (ref 1.12–1.32)
CA-I BLD-SCNC: 1.03 MMOL/L (ref 1.12–1.32)
CA-I BLD-SCNC: 1.09 MMOL/L (ref 1.12–1.32)
CALCIUM SERPL-MCNC: 5.3 MG/DL (ref 8.5–10.1)
CALCIUM SERPL-MCNC: 6.1 MG/DL (ref 8.5–10.1)
CALCIUM SERPL-MCNC: 6.5 MG/DL (ref 8.5–10.1)
CALCIUM SERPL-MCNC: 7 MG/DL (ref 8.5–10.1)
CALCIUM SERPL-MCNC: 7.1 MG/DL (ref 8.5–10.1)
CALCIUM SERPL-MCNC: 7.2 MG/DL (ref 8.5–10.1)
CALCIUM SERPL-MCNC: 7.4 MG/DL (ref 8.5–10.1)
CALCIUM SERPL-MCNC: 7.5 MG/DL (ref 8.5–10.1)
CALCIUM SERPL-MCNC: 7.6 MG/DL (ref 8.5–10.1)
CALCIUM SERPL-MCNC: 7.7 MG/DL (ref 8.5–10.1)
CALCIUM SERPL-MCNC: 7.8 MG/DL (ref 8.5–10.1)
CALCIUM SERPL-MCNC: 7.8 MG/DL (ref 8.5–10.1)
CALCIUM SERPL-MCNC: 7.9 MG/DL (ref 8.5–10.1)
CALCIUM SERPL-MCNC: 8 MG/DL (ref 8.5–10.1)
CALCIUM SERPL-MCNC: 8.1 MG/DL (ref 8.5–10.1)
CALCIUM SERPL-MCNC: 8.2 MG/DL (ref 8.5–10.1)
CALCIUM SERPL-MCNC: 8.3 MG/DL (ref 8.5–10.1)
CALCIUM SERPL-MCNC: 8.3 MG/DL (ref 8.5–10.1)
CALCIUM SERPL-MCNC: 8.4 MG/DL (ref 8.5–10.1)
CALCIUM SERPL-MCNC: 8.6 MG/DL (ref 8.5–10.1)
CALCIUM SERPL-MCNC: 8.8 MG/DL (ref 8.5–10.1)
CALCIUM SERPL-MCNC: 9 MG/DL (ref 8.5–10.1)
CALCULATED P AXIS, ECG09: 44 DEGREES
CALCULATED P AXIS, ECG09: 47 DEGREES
CALCULATED P AXIS, ECG09: 47 DEGREES
CALCULATED P AXIS, ECG09: 52 DEGREES
CALCULATED P AXIS, ECG09: 80 DEGREES
CALCULATED R AXIS, ECG10: 110 DEGREES
CALCULATED R AXIS, ECG10: 130 DEGREES
CALCULATED R AXIS, ECG10: 48 DEGREES
CALCULATED R AXIS, ECG10: 81 DEGREES
CALCULATED R AXIS, ECG10: 85 DEGREES
CALCULATED R AXIS, ECG10: 90 DEGREES
CALCULATED T AXIS, ECG11: -57 DEGREES
CALCULATED T AXIS, ECG11: 11 DEGREES
CALCULATED T AXIS, ECG11: 28 DEGREES
CALCULATED T AXIS, ECG11: 28 DEGREES
CALCULATED T AXIS, ECG11: 82 DEGREES
CALCULATED T AXIS, ECG11: 85 DEGREES
CHLORIDE BLD-SCNC: 105 MMOL/L (ref 100–108)
CHLORIDE BLD-SCNC: 107 MMOL/L (ref 100–108)
CHLORIDE SERPL-SCNC: 100 MMOL/L (ref 97–108)
CHLORIDE SERPL-SCNC: 101 MMOL/L (ref 97–108)
CHLORIDE SERPL-SCNC: 102 MMOL/L (ref 97–108)
CHLORIDE SERPL-SCNC: 103 MMOL/L (ref 97–108)
CHLORIDE SERPL-SCNC: 104 MMOL/L (ref 97–108)
CHLORIDE SERPL-SCNC: 105 MMOL/L (ref 97–108)
CHLORIDE SERPL-SCNC: 106 MMOL/L (ref 97–108)
CHLORIDE SERPL-SCNC: 107 MMOL/L (ref 97–108)
CHLORIDE SERPL-SCNC: 108 MMOL/L (ref 97–108)
CHLORIDE SERPL-SCNC: 109 MMOL/L (ref 97–108)
CHLORIDE SERPL-SCNC: 110 MMOL/L (ref 97–108)
CHLORIDE SERPL-SCNC: 111 MMOL/L (ref 97–108)
CHLORIDE SERPL-SCNC: 113 MMOL/L (ref 97–108)
CHLORIDE SERPL-SCNC: 99 MMOL/L (ref 97–108)
CHOLEST SERPL-MCNC: 97 MG/DL
CO2 BLD-SCNC: 22 MMOL/L (ref 19–24)
CO2 SERPL-SCNC: 17 MMOL/L (ref 21–32)
CO2 SERPL-SCNC: 18 MMOL/L (ref 21–32)
CO2 SERPL-SCNC: 19 MMOL/L (ref 21–32)
CO2 SERPL-SCNC: 20 MMOL/L (ref 21–32)
CO2 SERPL-SCNC: 20 MMOL/L (ref 21–32)
CO2 SERPL-SCNC: 21 MMOL/L (ref 21–32)
CO2 SERPL-SCNC: 22 MMOL/L (ref 21–32)
CO2 SERPL-SCNC: 23 MMOL/L (ref 21–32)
CO2 SERPL-SCNC: 24 MMOL/L (ref 21–32)
CO2 SERPL-SCNC: 25 MMOL/L (ref 21–32)
COLOR UR: ABNORMAL
COMMENT, HOLDF: NORMAL
COMMENT, HOLDF: NORMAL
COVID-19 RAPID TEST, COVR: NOT DETECTED
CREAT SERPL-MCNC: 1.47 MG/DL (ref 0.7–1.3)
CREAT SERPL-MCNC: 1.79 MG/DL (ref 0.7–1.3)
CREAT SERPL-MCNC: 1.82 MG/DL (ref 0.7–1.3)
CREAT SERPL-MCNC: 1.84 MG/DL (ref 0.7–1.3)
CREAT SERPL-MCNC: 1.9 MG/DL (ref 0.7–1.3)
CREAT SERPL-MCNC: 1.9 MG/DL (ref 0.7–1.3)
CREAT SERPL-MCNC: 1.92 MG/DL (ref 0.7–1.3)
CREAT SERPL-MCNC: 1.93 MG/DL (ref 0.7–1.3)
CREAT SERPL-MCNC: 1.97 MG/DL (ref 0.7–1.3)
CREAT SERPL-MCNC: 2 MG/DL (ref 0.7–1.3)
CREAT SERPL-MCNC: 2.07 MG/DL (ref 0.7–1.3)
CREAT SERPL-MCNC: 2.11 MG/DL (ref 0.7–1.3)
CREAT SERPL-MCNC: 2.15 MG/DL (ref 0.7–1.3)
CREAT SERPL-MCNC: 2.16 MG/DL (ref 0.7–1.3)
CREAT SERPL-MCNC: 2.19 MG/DL (ref 0.7–1.3)
CREAT SERPL-MCNC: 2.22 MG/DL (ref 0.7–1.3)
CREAT SERPL-MCNC: 2.27 MG/DL (ref 0.7–1.3)
CREAT SERPL-MCNC: 2.3 MG/DL (ref 0.7–1.3)
CREAT SERPL-MCNC: 2.36 MG/DL (ref 0.7–1.3)
CREAT SERPL-MCNC: 2.66 MG/DL (ref 0.7–1.3)
CREAT SERPL-MCNC: 2.69 MG/DL (ref 0.7–1.3)
CREAT SERPL-MCNC: 2.79 MG/DL (ref 0.7–1.3)
CREAT SERPL-MCNC: 2.89 MG/DL (ref 0.7–1.3)
CREAT SERPL-MCNC: 3.04 MG/DL (ref 0.7–1.3)
CREAT SERPL-MCNC: 3.07 MG/DL (ref 0.7–1.3)
CREAT SERPL-MCNC: 3.36 MG/DL (ref 0.7–1.3)
CREAT SERPL-MCNC: 3.78 MG/DL (ref 0.7–1.3)
CREAT SERPL-MCNC: 3.89 MG/DL (ref 0.7–1.3)
CREAT SERPL-MCNC: 3.94 MG/DL (ref 0.7–1.3)
CREAT SERPL-MCNC: 3.96 MG/DL (ref 0.7–1.3)
CREAT SERPL-MCNC: 3.96 MG/DL (ref 0.7–1.3)
CREAT SERPL-MCNC: 4.02 MG/DL (ref 0.7–1.3)
CREAT SERPL-MCNC: 4.05 MG/DL (ref 0.7–1.3)
CREAT SERPL-MCNC: 4.17 MG/DL (ref 0.7–1.3)
CREAT SERPL-MCNC: 4.46 MG/DL (ref 0.7–1.3)
CREAT SERPL-MCNC: 4.53 MG/DL (ref 0.7–1.3)
CREAT SERPL-MCNC: 4.77 MG/DL (ref 0.7–1.3)
CREAT SERPL-MCNC: 4.86 MG/DL (ref 0.7–1.3)
CREAT UR-MCNC: 1.1 MG/DL (ref 0.6–1.3)
CREAT UR-MCNC: 2.9 MG/DL (ref 0.6–1.3)
CROSSMATCH RESULT,%XM: NORMAL
DIAGNOSIS, 93000: NORMAL
DIFFERENTIAL METHOD BLD: ABNORMAL
EOSINOPHIL # BLD: 0 K/UL (ref 0–0.4)
EOSINOPHIL # BLD: 0.1 K/UL (ref 0–0.4)
EOSINOPHIL # BLD: 0.2 K/UL (ref 0–0.4)
EOSINOPHIL # BLD: 0.4 K/UL (ref 0–0.4)
EOSINOPHIL NFR BLD: 0 % (ref 0–7)
EOSINOPHIL NFR BLD: 1 % (ref 0–7)
EOSINOPHIL NFR BLD: 1 % (ref 0–7)
EOSINOPHIL NFR BLD: 2 % (ref 0–7)
EPITH CASTS URNS QL MICRO: ABNORMAL /LPF
ERYTHROCYTE [DISTWIDTH] IN BLOOD BY AUTOMATED COUNT: 13.8 % (ref 11.5–14.5)
ERYTHROCYTE [DISTWIDTH] IN BLOOD BY AUTOMATED COUNT: 13.9 % (ref 11.5–14.5)
ERYTHROCYTE [DISTWIDTH] IN BLOOD BY AUTOMATED COUNT: 14.5 % (ref 11.5–14.5)
ERYTHROCYTE [DISTWIDTH] IN BLOOD BY AUTOMATED COUNT: 14.5 % (ref 11.5–14.5)
ERYTHROCYTE [DISTWIDTH] IN BLOOD BY AUTOMATED COUNT: 14.6 % (ref 11.5–14.5)
ERYTHROCYTE [DISTWIDTH] IN BLOOD BY AUTOMATED COUNT: 14.6 % (ref 11.5–14.5)
ERYTHROCYTE [DISTWIDTH] IN BLOOD BY AUTOMATED COUNT: 14.7 % (ref 11.5–14.5)
ERYTHROCYTE [DISTWIDTH] IN BLOOD BY AUTOMATED COUNT: 14.8 % (ref 11.5–14.5)
ERYTHROCYTE [DISTWIDTH] IN BLOOD BY AUTOMATED COUNT: 14.9 % (ref 11.5–14.5)
ERYTHROCYTE [DISTWIDTH] IN BLOOD BY AUTOMATED COUNT: 14.9 % (ref 11.5–14.5)
ERYTHROCYTE [DISTWIDTH] IN BLOOD BY AUTOMATED COUNT: 15 % (ref 11.5–14.5)
ERYTHROCYTE [DISTWIDTH] IN BLOOD BY AUTOMATED COUNT: 15.1 % (ref 11.5–14.5)
ERYTHROCYTE [DISTWIDTH] IN BLOOD BY AUTOMATED COUNT: 15.2 % (ref 11.5–14.5)
ERYTHROCYTE [DISTWIDTH] IN BLOOD BY AUTOMATED COUNT: 15.2 % (ref 11.5–14.5)
ERYTHROCYTE [DISTWIDTH] IN BLOOD BY AUTOMATED COUNT: 15.3 % (ref 11.5–14.5)
ERYTHROCYTE [DISTWIDTH] IN BLOOD BY AUTOMATED COUNT: 15.3 % (ref 11.5–14.5)
ERYTHROCYTE [DISTWIDTH] IN BLOOD BY AUTOMATED COUNT: 15.4 % (ref 11.5–14.5)
ERYTHROCYTE [DISTWIDTH] IN BLOOD BY AUTOMATED COUNT: 15.5 % (ref 11.5–14.5)
ERYTHROCYTE [DISTWIDTH] IN BLOOD BY AUTOMATED COUNT: 15.5 % (ref 11.5–14.5)
EST. AVERAGE GLUCOSE BLD GHB EST-MCNC: 120 MG/DL
FIO2 ON VENT: 50 %
FLUAV H1 2009 PAND RNA SPEC QL NAA+PROBE: NOT DETECTED
FLUAV H1 RNA SPEC QL NAA+PROBE: NOT DETECTED
FLUAV H3 RNA SPEC QL NAA+PROBE: NOT DETECTED
FLUAV SUBTYP SPEC NAA+PROBE: NOT DETECTED
FLUBV RNA SPEC QL NAA+PROBE: NOT DETECTED
GAS FLOW.O2 O2 DELIVERY SYS: ABNORMAL L/MIN
GAS FLOW.O2 O2 DELIVERY SYS: NORMAL L/MIN
GAS FLOW.O2 SETTING OXYMISER: 15 BPM
GAS FLOW.O2 SETTING OXYMISER: 18 BPM
GAS FLOW.O2 SETTING OXYMISER: 18 L/MIN
GAS FLOW.O2 SETTING OXYMISER: 20 BPM
GAS FLOW.O2 SETTING OXYMISER: 24 BPM
GAS FLOW.O2 SETTING OXYMISER: 28 BPM
GAS FLOW.O2 SETTING OXYMISER: 28 BPM
GLOBULIN SER CALC-MCNC: 2.7 G/DL (ref 2–4)
GLOBULIN SER CALC-MCNC: 2.7 G/DL (ref 2–4)
GLOBULIN SER CALC-MCNC: 3.3 G/DL (ref 2–4)
GLOBULIN SER CALC-MCNC: 3.4 G/DL (ref 2–4)
GLOBULIN SER CALC-MCNC: 3.5 G/DL (ref 2–4)
GLOBULIN SER CALC-MCNC: 3.7 G/DL (ref 2–4)
GLOBULIN SER CALC-MCNC: 3.7 G/DL (ref 2–4)
GLOBULIN SER CALC-MCNC: 3.8 G/DL (ref 2–4)
GLOBULIN SER CALC-MCNC: 3.9 G/DL (ref 2–4)
GLOBULIN SER CALC-MCNC: 4 G/DL (ref 2–4)
GLOBULIN SER CALC-MCNC: 4.2 G/DL (ref 2–4)
GLOBULIN SER CALC-MCNC: 4.4 G/DL (ref 2–4)
GLUCOSE BLD STRIP.AUTO-MCNC: 114 MG/DL (ref 65–117)
GLUCOSE BLD STRIP.AUTO-MCNC: 121 MG/DL (ref 65–117)
GLUCOSE BLD STRIP.AUTO-MCNC: 130 MG/DL (ref 65–117)
GLUCOSE BLD STRIP.AUTO-MCNC: 133 MG/DL (ref 65–117)
GLUCOSE BLD STRIP.AUTO-MCNC: 140 MG/DL (ref 65–117)
GLUCOSE BLD STRIP.AUTO-MCNC: 153 MG/DL (ref 65–117)
GLUCOSE BLD STRIP.AUTO-MCNC: 153 MG/DL (ref 65–117)
GLUCOSE BLD STRIP.AUTO-MCNC: 156 MG/DL (ref 65–117)
GLUCOSE BLD STRIP.AUTO-MCNC: 158 MG/DL (ref 65–117)
GLUCOSE BLD STRIP.AUTO-MCNC: 158 MG/DL (ref 65–117)
GLUCOSE BLD STRIP.AUTO-MCNC: 160 MG/DL (ref 65–117)
GLUCOSE BLD STRIP.AUTO-MCNC: 163 MG/DL (ref 65–117)
GLUCOSE BLD STRIP.AUTO-MCNC: 163 MG/DL (ref 65–117)
GLUCOSE BLD STRIP.AUTO-MCNC: 164 MG/DL (ref 65–117)
GLUCOSE BLD STRIP.AUTO-MCNC: 165 MG/DL (ref 65–117)
GLUCOSE BLD STRIP.AUTO-MCNC: 166 MG/DL (ref 65–117)
GLUCOSE BLD STRIP.AUTO-MCNC: 166 MG/DL (ref 74–106)
GLUCOSE BLD STRIP.AUTO-MCNC: 176 MG/DL (ref 65–117)
GLUCOSE BLD STRIP.AUTO-MCNC: 182 MG/DL (ref 65–117)
GLUCOSE BLD STRIP.AUTO-MCNC: 183 MG/DL (ref 65–117)
GLUCOSE BLD STRIP.AUTO-MCNC: 183 MG/DL (ref 65–117)
GLUCOSE BLD STRIP.AUTO-MCNC: 184 MG/DL (ref 65–117)
GLUCOSE BLD STRIP.AUTO-MCNC: 186 MG/DL (ref 65–117)
GLUCOSE BLD STRIP.AUTO-MCNC: 187 MG/DL (ref 65–117)
GLUCOSE BLD STRIP.AUTO-MCNC: 192 MG/DL (ref 65–117)
GLUCOSE BLD STRIP.AUTO-MCNC: 194 MG/DL (ref 65–117)
GLUCOSE BLD STRIP.AUTO-MCNC: 195 MG/DL (ref 65–117)
GLUCOSE BLD STRIP.AUTO-MCNC: 196 MG/DL (ref 65–117)
GLUCOSE BLD STRIP.AUTO-MCNC: 197 MG/DL (ref 65–117)
GLUCOSE BLD STRIP.AUTO-MCNC: 201 MG/DL (ref 65–117)
GLUCOSE BLD STRIP.AUTO-MCNC: 203 MG/DL (ref 74–106)
GLUCOSE BLD STRIP.AUTO-MCNC: 205 MG/DL (ref 65–117)
GLUCOSE BLD STRIP.AUTO-MCNC: 226 MG/DL (ref 65–117)
GLUCOSE BLD STRIP.AUTO-MCNC: 227 MG/DL (ref 65–117)
GLUCOSE BLD STRIP.AUTO-MCNC: 227 MG/DL (ref 65–117)
GLUCOSE BLD STRIP.AUTO-MCNC: 228 MG/DL (ref 65–117)
GLUCOSE BLD STRIP.AUTO-MCNC: 229 MG/DL (ref 65–117)
GLUCOSE BLD STRIP.AUTO-MCNC: 236 MG/DL (ref 65–117)
GLUCOSE BLD STRIP.AUTO-MCNC: 253 MG/DL (ref 65–117)
GLUCOSE BLD STRIP.AUTO-MCNC: 259 MG/DL (ref 65–117)
GLUCOSE BLD STRIP.AUTO-MCNC: 262 MG/DL (ref 65–117)
GLUCOSE BLD STRIP.AUTO-MCNC: 267 MG/DL (ref 65–117)
GLUCOSE BLD STRIP.AUTO-MCNC: 293 MG/DL (ref 65–117)
GLUCOSE BLD STRIP.AUTO-MCNC: 304 MG/DL (ref 65–117)
GLUCOSE SERPL-MCNC: 101 MG/DL (ref 65–100)
GLUCOSE SERPL-MCNC: 106 MG/DL (ref 65–100)
GLUCOSE SERPL-MCNC: 115 MG/DL (ref 65–100)
GLUCOSE SERPL-MCNC: 137 MG/DL (ref 65–100)
GLUCOSE SERPL-MCNC: 141 MG/DL (ref 65–100)
GLUCOSE SERPL-MCNC: 146 MG/DL (ref 65–100)
GLUCOSE SERPL-MCNC: 161 MG/DL (ref 65–100)
GLUCOSE SERPL-MCNC: 165 MG/DL (ref 65–100)
GLUCOSE SERPL-MCNC: 167 MG/DL (ref 65–100)
GLUCOSE SERPL-MCNC: 167 MG/DL (ref 65–100)
GLUCOSE SERPL-MCNC: 168 MG/DL (ref 65–100)
GLUCOSE SERPL-MCNC: 169 MG/DL (ref 65–100)
GLUCOSE SERPL-MCNC: 173 MG/DL (ref 65–100)
GLUCOSE SERPL-MCNC: 180 MG/DL (ref 65–100)
GLUCOSE SERPL-MCNC: 185 MG/DL (ref 65–100)
GLUCOSE SERPL-MCNC: 187 MG/DL (ref 65–100)
GLUCOSE SERPL-MCNC: 187 MG/DL (ref 65–100)
GLUCOSE SERPL-MCNC: 189 MG/DL (ref 65–100)
GLUCOSE SERPL-MCNC: 190 MG/DL (ref 65–100)
GLUCOSE SERPL-MCNC: 195 MG/DL (ref 65–100)
GLUCOSE SERPL-MCNC: 200 MG/DL (ref 65–100)
GLUCOSE SERPL-MCNC: 201 MG/DL (ref 65–100)
GLUCOSE SERPL-MCNC: 202 MG/DL (ref 65–100)
GLUCOSE SERPL-MCNC: 202 MG/DL (ref 65–100)
GLUCOSE SERPL-MCNC: 203 MG/DL (ref 65–100)
GLUCOSE SERPL-MCNC: 209 MG/DL (ref 65–100)
GLUCOSE SERPL-MCNC: 217 MG/DL (ref 65–100)
GLUCOSE SERPL-MCNC: 220 MG/DL (ref 65–100)
GLUCOSE SERPL-MCNC: 221 MG/DL (ref 65–100)
GLUCOSE SERPL-MCNC: 236 MG/DL (ref 65–100)
GLUCOSE SERPL-MCNC: 240 MG/DL (ref 65–100)
GLUCOSE SERPL-MCNC: 243 MG/DL (ref 65–100)
GLUCOSE SERPL-MCNC: 249 MG/DL (ref 65–100)
GLUCOSE SERPL-MCNC: 262 MG/DL (ref 65–100)
GLUCOSE SERPL-MCNC: 281 MG/DL (ref 65–100)
GLUCOSE SERPL-MCNC: 290 MG/DL (ref 65–100)
GLUCOSE SERPL-MCNC: 87 MG/DL (ref 65–100)
GLUCOSE SERPL-MCNC: 90 MG/DL (ref 65–100)
GLUCOSE UR STRIP.AUTO-MCNC: NEGATIVE MG/DL
GRAM STN SPEC: ABNORMAL
HADV DNA SPEC QL NAA+PROBE: NOT DETECTED
HBA1C MFR BLD: 5.8 % (ref 4–5.6)
HBV SURFACE AB SER QL: NONREACTIVE
HBV SURFACE AB SER-ACNC: <3.1 MIU/ML
HBV SURFACE AG SER QL: <0.1 INDEX
HBV SURFACE AG SER QL: NEGATIVE
HCO3 BLD-SCNC: 17.9 MMOL/L (ref 22–26)
HCO3 BLD-SCNC: 19.6 MMOL/L (ref 22–26)
HCO3 BLD-SCNC: 19.9 MMOL/L (ref 22–26)
HCO3 BLD-SCNC: 20.6 MMOL/L (ref 22–26)
HCO3 BLD-SCNC: 21.5 MMOL/L (ref 22–26)
HCO3 BLD-SCNC: 22.7 MMOL/L (ref 22–26)
HCO3 BLD-SCNC: 22.9 MMOL/L (ref 22–26)
HCO3 BLD-SCNC: 23 MMOL/L (ref 22–26)
HCO3 BLD-SCNC: 23.5 MMOL/L (ref 22–26)
HCO3 BLD-SCNC: 23.8 MMOL/L (ref 22–26)
HCO3 BLDA-SCNC: 20 MMOL/L (ref 22–26)
HCO3 BLDA-SCNC: 21 MMOL/L
HCO3 BLDV-SCNC: 19.5 MMOL/L (ref 23–28)
HCOV 229E RNA SPEC QL NAA+PROBE: NOT DETECTED
HCOV HKU1 RNA SPEC QL NAA+PROBE: NOT DETECTED
HCOV NL63 RNA SPEC QL NAA+PROBE: NOT DETECTED
HCOV OC43 RNA SPEC QL NAA+PROBE: NOT DETECTED
HCT VFR BLD AUTO: 21 % (ref 36.6–50.3)
HCT VFR BLD AUTO: 21.2 % (ref 36.6–50.3)
HCT VFR BLD AUTO: 28.8 % (ref 36.6–50.3)
HCT VFR BLD AUTO: 29.1 % (ref 36.6–50.3)
HCT VFR BLD AUTO: 30.6 % (ref 36.6–50.3)
HCT VFR BLD AUTO: 30.6 % (ref 36.6–50.3)
HCT VFR BLD AUTO: 30.8 % (ref 36.6–50.3)
HCT VFR BLD AUTO: 31.4 % (ref 36.6–50.3)
HCT VFR BLD AUTO: 31.6 % (ref 36.6–50.3)
HCT VFR BLD AUTO: 31.8 % (ref 36.6–50.3)
HCT VFR BLD AUTO: 32 % (ref 36.6–50.3)
HCT VFR BLD AUTO: 32.6 % (ref 36.6–50.3)
HCT VFR BLD AUTO: 32.8 % (ref 36.6–50.3)
HCT VFR BLD AUTO: 33.6 % (ref 36.6–50.3)
HCT VFR BLD AUTO: 46.2 % (ref 36.6–50.3)
HCT VFR BLD AUTO: 48.9 % (ref 36.6–50.3)
HCT VFR BLD AUTO: 50.4 % (ref 36.6–50.3)
HCT VFR BLD AUTO: 54.7 % (ref 36.6–50.3)
HCT VFR BLD AUTO: 55.4 % (ref 36.6–50.3)
HDLC SERPL-MCNC: 39 MG/DL
HDLC SERPL: 2.5 {RATIO} (ref 0–5)
HEMOCCULT STL QL: POSITIVE
HGB BLD-MCNC: 10 G/DL (ref 12.1–17)
HGB BLD-MCNC: 10.2 G/DL (ref 12.1–17)
HGB BLD-MCNC: 10.2 G/DL (ref 12.1–17)
HGB BLD-MCNC: 10.4 G/DL (ref 12.1–17)
HGB BLD-MCNC: 11 G/DL (ref 12.1–17)
HGB BLD-MCNC: 15.3 G/DL (ref 12.1–17)
HGB BLD-MCNC: 16.8 G/DL (ref 12.1–17)
HGB BLD-MCNC: 16.8 G/DL (ref 12.1–17)
HGB BLD-MCNC: 17.9 G/DL (ref 12.1–17)
HGB BLD-MCNC: 18 G/DL (ref 12.1–17)
HGB BLD-MCNC: 6.9 G/DL (ref 12.1–17)
HGB BLD-MCNC: 7 G/DL (ref 12.1–17)
HGB BLD-MCNC: 9.2 G/DL (ref 12.1–17)
HGB BLD-MCNC: 9.5 G/DL (ref 12.1–17)
HGB BLD-MCNC: 9.8 G/DL (ref 12.1–17)
HGB BLD-MCNC: 9.8 G/DL (ref 12.1–17)
HGB BLD-MCNC: 9.9 G/DL (ref 12.1–17)
HGB UR QL STRIP: NEGATIVE
HISTORY CHECKED?,CKHIST: NORMAL
HMPV RNA SPEC QL NAA+PROBE: NOT DETECTED
HPIV1 RNA SPEC QL NAA+PROBE: NOT DETECTED
HPIV2 RNA SPEC QL NAA+PROBE: NOT DETECTED
HPIV3 RNA SPEC QL NAA+PROBE: NOT DETECTED
HPIV4 RNA SPEC QL NAA+PROBE: NOT DETECTED
HYALINE CASTS URNS QL MICRO: ABNORMAL /LPF (ref 0–5)
IMM GRANULOCYTES # BLD AUTO: 0 K/UL
IMM GRANULOCYTES # BLD AUTO: 0.1 K/UL (ref 0–0.04)
IMM GRANULOCYTES # BLD AUTO: 0.2 K/UL (ref 0–0.04)
IMM GRANULOCYTES # BLD AUTO: 0.2 K/UL (ref 0–0.04)
IMM GRANULOCYTES NFR BLD AUTO: 0 %
IMM GRANULOCYTES NFR BLD AUTO: 1 % (ref 0–0.5)
INR PPP: 1 (ref 0.9–1.1)
INR PPP: 1.2 (ref 0.9–1.1)
INTERPRETATION: NORMAL
KETONES UR QL STRIP.AUTO: NEGATIVE MG/DL
LACTATE BLD-SCNC: 1.9 MMOL/L (ref 0.4–2)
LACTATE BLD-SCNC: 2.13 MMOL/L (ref 0.4–2)
LACTATE SERPL-SCNC: 1.4 MMOL/L (ref 0.4–2)
LACTATE SERPL-SCNC: 1.5 MMOL/L (ref 0.4–2)
LACTATE SERPL-SCNC: 1.5 MMOL/L (ref 0.4–2)
LACTATE SERPL-SCNC: 1.6 MMOL/L (ref 0.4–2)
LACTATE SERPL-SCNC: 1.6 MMOL/L (ref 0.4–2)
LACTATE SERPL-SCNC: 1.7 MMOL/L (ref 0.4–2)
LACTATE SERPL-SCNC: 1.8 MMOL/L (ref 0.4–2)
LACTATE SERPL-SCNC: 1.9 MMOL/L (ref 0.4–2)
LACTATE SERPL-SCNC: 2 MMOL/L (ref 0.4–2)
LACTATE SERPL-SCNC: 2.1 MMOL/L (ref 0.4–2)
LACTATE SERPL-SCNC: 2.2 MMOL/L (ref 0.4–2)
LACTATE SERPL-SCNC: 2.4 MMOL/L (ref 0.4–2)
LACTATE SERPL-SCNC: 2.4 MMOL/L (ref 0.4–2)
LACTATE SERPL-SCNC: 2.6 MMOL/L (ref 0.4–2)
LACTATE SERPL-SCNC: 2.7 MMOL/L (ref 0.4–2)
LACTATE SERPL-SCNC: 2.8 MMOL/L (ref 0.4–2)
LACTATE SERPL-SCNC: 2.9 MMOL/L (ref 0.4–2)
LACTATE SERPL-SCNC: 2.9 MMOL/L (ref 0.4–2)
LACTATE SERPL-SCNC: 3 MMOL/L (ref 0.4–2)
LACTATE SERPL-SCNC: 3 MMOL/L (ref 0.4–2)
LACTATE SERPL-SCNC: 3.3 MMOL/L (ref 0.4–2)
LACTATE SERPL-SCNC: 3.5 MMOL/L (ref 0.4–2)
LACTATE SERPL-SCNC: 3.6 MMOL/L (ref 0.4–2)
LACTATE SERPL-SCNC: 4.1 MMOL/L (ref 0.4–2)
LACTATE SERPL-SCNC: 4.4 MMOL/L (ref 0.4–2)
LACTATE SERPL-SCNC: 5.1 MMOL/L (ref 0.4–2)
LDLC SERPL CALC-MCNC: 42.8 MG/DL (ref 0–100)
LEUKOCYTE ESTERASE UR QL STRIP.AUTO: NEGATIVE
LIPASE SERPL-CCNC: 212 U/L (ref 73–393)
LIPASE SERPL-CCNC: 369 U/L (ref 73–393)
LIPASE SERPL-CCNC: 437 U/L (ref 73–393)
LIPASE SERPL-CCNC: 508 U/L (ref 73–393)
LIPASE SERPL-CCNC: >3000 U/L (ref 73–393)
LIPASE SERPL-CCNC: >3000 U/L (ref 73–393)
LYMPHOCYTES # BLD: 0 K/UL (ref 0.8–3.5)
LYMPHOCYTES # BLD: 0.2 K/UL (ref 0.8–3.5)
LYMPHOCYTES # BLD: 0.3 K/UL (ref 0.8–3.5)
LYMPHOCYTES # BLD: 0.4 K/UL (ref 0.8–3.5)
LYMPHOCYTES # BLD: 0.4 K/UL (ref 0.8–3.5)
LYMPHOCYTES # BLD: 0.5 K/UL (ref 0.8–3.5)
LYMPHOCYTES # BLD: 0.5 K/UL (ref 0.8–3.5)
LYMPHOCYTES # BLD: 0.7 K/UL (ref 0.8–3.5)
LYMPHOCYTES # BLD: 0.8 K/UL (ref 0.8–3.5)
LYMPHOCYTES # BLD: 0.9 K/UL (ref 0.8–3.5)
LYMPHOCYTES # BLD: 1 K/UL (ref 0.8–3.5)
LYMPHOCYTES # BLD: 1.2 K/UL (ref 0.8–3.5)
LYMPHOCYTES # BLD: 1.8 K/UL (ref 0.8–3.5)
LYMPHOCYTES NFR BLD: 0 % (ref 12–49)
LYMPHOCYTES NFR BLD: 1 % (ref 12–49)
LYMPHOCYTES NFR BLD: 13 % (ref 12–49)
LYMPHOCYTES NFR BLD: 2 % (ref 12–49)
LYMPHOCYTES NFR BLD: 3 % (ref 12–49)
LYMPHOCYTES NFR BLD: 4 % (ref 12–49)
LYMPHOCYTES NFR BLD: 5 % (ref 12–49)
LYMPHOCYTES NFR BLD: 6 % (ref 12–49)
LYMPHOCYTES NFR BLD: 6 % (ref 12–49)
M PNEUMO DNA SPEC QL NAA+PROBE: NOT DETECTED
MAGNESIUM SERPL-MCNC: 1.8 MG/DL (ref 1.6–2.4)
MAGNESIUM SERPL-MCNC: 1.9 MG/DL (ref 1.6–2.4)
MAGNESIUM SERPL-MCNC: 2 MG/DL (ref 1.6–2.4)
MAGNESIUM SERPL-MCNC: 2.1 MG/DL (ref 1.6–2.4)
MAGNESIUM SERPL-MCNC: 2.3 MG/DL (ref 1.6–2.4)
MAGNESIUM SERPL-MCNC: 2.4 MG/DL (ref 1.6–2.4)
MAGNESIUM SERPL-MCNC: 2.4 MG/DL (ref 1.6–2.4)
MAGNESIUM SERPL-MCNC: 2.5 MG/DL (ref 1.6–2.4)
MAGNESIUM SERPL-MCNC: 2.5 MG/DL (ref 1.6–2.4)
MAGNESIUM SERPL-MCNC: 2.6 MG/DL (ref 1.6–2.4)
MAGNESIUM SERPL-MCNC: 2.6 MG/DL (ref 1.6–2.4)
MAGNESIUM SERPL-MCNC: 2.7 MG/DL (ref 1.6–2.4)
MAGNESIUM SERPL-MCNC: 2.8 MG/DL (ref 1.6–2.4)
MAGNESIUM SERPL-MCNC: 2.9 MG/DL (ref 1.6–2.4)
MAGNESIUM SERPL-MCNC: 2.9 MG/DL (ref 1.6–2.4)
MAGNESIUM SERPL-MCNC: 3 MG/DL (ref 1.6–2.4)
MAGNESIUM SERPL-MCNC: 3 MG/DL (ref 1.6–2.4)
MAGNESIUM SERPL-MCNC: 3.1 MG/DL (ref 1.6–2.4)
MCH RBC QN AUTO: 30.2 PG (ref 26–34)
MCH RBC QN AUTO: 30.4 PG (ref 26–34)
MCH RBC QN AUTO: 30.5 PG (ref 26–34)
MCH RBC QN AUTO: 30.5 PG (ref 26–34)
MCH RBC QN AUTO: 30.6 PG (ref 26–34)
MCH RBC QN AUTO: 30.9 PG (ref 26–34)
MCH RBC QN AUTO: 31 PG (ref 26–34)
MCH RBC QN AUTO: 31 PG (ref 26–34)
MCH RBC QN AUTO: 31.1 PG (ref 26–34)
MCH RBC QN AUTO: 31.2 PG (ref 26–34)
MCH RBC QN AUTO: 31.2 PG (ref 26–34)
MCH RBC QN AUTO: 31.5 PG (ref 26–34)
MCH RBC QN AUTO: 31.5 PG (ref 26–34)
MCHC RBC AUTO-ENTMCNC: 31.3 G/DL (ref 30–36.5)
MCHC RBC AUTO-ENTMCNC: 31.3 G/DL (ref 30–36.5)
MCHC RBC AUTO-ENTMCNC: 31.5 G/DL (ref 30–36.5)
MCHC RBC AUTO-ENTMCNC: 31.6 G/DL (ref 30–36.5)
MCHC RBC AUTO-ENTMCNC: 31.7 G/DL (ref 30–36.5)
MCHC RBC AUTO-ENTMCNC: 31.8 G/DL (ref 30–36.5)
MCHC RBC AUTO-ENTMCNC: 31.9 G/DL (ref 30–36.5)
MCHC RBC AUTO-ENTMCNC: 32 G/DL (ref 30–36.5)
MCHC RBC AUTO-ENTMCNC: 32.1 G/DL (ref 30–36.5)
MCHC RBC AUTO-ENTMCNC: 32.3 G/DL (ref 30–36.5)
MCHC RBC AUTO-ENTMCNC: 32.5 G/DL (ref 30–36.5)
MCHC RBC AUTO-ENTMCNC: 32.6 G/DL (ref 30–36.5)
MCHC RBC AUTO-ENTMCNC: 32.7 G/DL (ref 30–36.5)
MCHC RBC AUTO-ENTMCNC: 32.7 G/DL (ref 30–36.5)
MCHC RBC AUTO-ENTMCNC: 32.9 G/DL (ref 30–36.5)
MCHC RBC AUTO-ENTMCNC: 33.1 G/DL (ref 30–36.5)
MCHC RBC AUTO-ENTMCNC: 33.3 G/DL (ref 30–36.5)
MCHC RBC AUTO-ENTMCNC: 33.3 G/DL (ref 30–36.5)
MCHC RBC AUTO-ENTMCNC: 34.4 G/DL (ref 30–36.5)
MCV RBC AUTO: 91.6 FL (ref 80–99)
MCV RBC AUTO: 92.9 FL (ref 80–99)
MCV RBC AUTO: 93.3 FL (ref 80–99)
MCV RBC AUTO: 93.4 FL (ref 80–99)
MCV RBC AUTO: 93.5 FL (ref 80–99)
MCV RBC AUTO: 93.8 FL (ref 80–99)
MCV RBC AUTO: 94.4 FL (ref 80–99)
MCV RBC AUTO: 94.8 FL (ref 80–99)
MCV RBC AUTO: 95 FL (ref 80–99)
MCV RBC AUTO: 95.2 FL (ref 80–99)
MCV RBC AUTO: 96 FL (ref 80–99)
MCV RBC AUTO: 96.2 FL (ref 80–99)
MCV RBC AUTO: 96.2 FL (ref 80–99)
MCV RBC AUTO: 96.6 FL (ref 80–99)
MCV RBC AUTO: 97.5 FL (ref 80–99)
MCV RBC AUTO: 97.9 FL (ref 80–99)
MCV RBC AUTO: 98.1 FL (ref 80–99)
MCV RBC AUTO: 98.7 FL (ref 80–99)
MCV RBC AUTO: 99.7 FL (ref 80–99)
METAMYELOCYTES NFR BLD MANUAL: 1 %
METAMYELOCYTES NFR BLD MANUAL: 1 %
METAMYELOCYTES NFR BLD MANUAL: 2 %
METAMYELOCYTES NFR BLD MANUAL: 4 %
MONOCYTES # BLD: 0.2 K/UL (ref 0–1)
MONOCYTES # BLD: 0.2 K/UL (ref 0–1)
MONOCYTES # BLD: 0.3 K/UL (ref 0–1)
MONOCYTES # BLD: 0.5 K/UL (ref 0–1)
MONOCYTES # BLD: 0.6 K/UL (ref 0–1)
MONOCYTES # BLD: 0.7 K/UL (ref 0–1)
MONOCYTES # BLD: 0.9 K/UL (ref 0–1)
MONOCYTES # BLD: 1 K/UL (ref 0–1)
MONOCYTES # BLD: 1 K/UL (ref 0–1)
MONOCYTES # BLD: 1.1 K/UL (ref 0–1)
MONOCYTES # BLD: 1.2 K/UL (ref 0–1)
MONOCYTES # BLD: 1.2 K/UL (ref 0–1)
MONOCYTES # BLD: 1.3 K/UL (ref 0–1)
MONOCYTES # BLD: 1.3 K/UL (ref 0–1)
MONOCYTES NFR BLD: 1 % (ref 5–13)
MONOCYTES NFR BLD: 1 % (ref 5–13)
MONOCYTES NFR BLD: 2 % (ref 5–13)
MONOCYTES NFR BLD: 3 % (ref 5–13)
MONOCYTES NFR BLD: 4 % (ref 5–13)
MONOCYTES NFR BLD: 5 % (ref 5–13)
MONOCYTES NFR BLD: 7 % (ref 5–13)
MONOCYTES NFR BLD: 9 % (ref 5–13)
MYELOCYTES NFR BLD MANUAL: 1 %
MYELOCYTES NFR BLD MANUAL: 2 %
MYELOCYTES NFR BLD MANUAL: 3 %
MYELOCYTES NFR BLD MANUAL: 3 %
NEUTS BAND NFR BLD MANUAL: 1 % (ref 0–6)
NEUTS BAND NFR BLD MANUAL: 15 % (ref 0–6)
NEUTS BAND NFR BLD MANUAL: 18 % (ref 0–6)
NEUTS BAND NFR BLD MANUAL: 2 % (ref 0–6)
NEUTS BAND NFR BLD MANUAL: 2 % (ref 0–6)
NEUTS BAND NFR BLD MANUAL: 3 % (ref 0–6)
NEUTS BAND NFR BLD MANUAL: 4 % (ref 0–6)
NEUTS BAND NFR BLD MANUAL: 6 % (ref 0–6)
NEUTS BAND NFR BLD MANUAL: 6 % (ref 0–6)
NEUTS BAND NFR BLD MANUAL: 8 % (ref 0–6)
NEUTS SEG # BLD: 10.4 K/UL (ref 1.8–8)
NEUTS SEG # BLD: 11.3 K/UL (ref 1.8–8)
NEUTS SEG # BLD: 15.4 K/UL (ref 1.8–8)
NEUTS SEG # BLD: 15.8 K/UL (ref 1.8–8)
NEUTS SEG # BLD: 16.7 K/UL (ref 1.8–8)
NEUTS SEG # BLD: 17.3 K/UL (ref 1.8–8)
NEUTS SEG # BLD: 17.3 K/UL (ref 1.8–8)
NEUTS SEG # BLD: 20.1 K/UL (ref 1.8–8)
NEUTS SEG # BLD: 20.2 K/UL (ref 1.8–8)
NEUTS SEG # BLD: 20.6 K/UL (ref 1.8–8)
NEUTS SEG # BLD: 20.9 K/UL (ref 1.8–8)
NEUTS SEG # BLD: 21 K/UL (ref 1.8–8)
NEUTS SEG # BLD: 21.3 K/UL (ref 1.8–8)
NEUTS SEG # BLD: 21.6 K/UL (ref 1.8–8)
NEUTS SEG # BLD: 21.9 K/UL (ref 1.8–8)
NEUTS SEG # BLD: 22.6 K/UL (ref 1.8–8)
NEUTS SEG # BLD: 23.5 K/UL (ref 1.8–8)
NEUTS SEG NFR BLD: 69 % (ref 32–75)
NEUTS SEG NFR BLD: 72 % (ref 32–75)
NEUTS SEG NFR BLD: 77 % (ref 32–75)
NEUTS SEG NFR BLD: 83 % (ref 32–75)
NEUTS SEG NFR BLD: 85 % (ref 32–75)
NEUTS SEG NFR BLD: 87 % (ref 32–75)
NEUTS SEG NFR BLD: 87 % (ref 32–75)
NEUTS SEG NFR BLD: 88 % (ref 32–75)
NEUTS SEG NFR BLD: 89 % (ref 32–75)
NEUTS SEG NFR BLD: 90 % (ref 32–75)
NEUTS SEG NFR BLD: 91 % (ref 32–75)
NEUTS SEG NFR BLD: 91 % (ref 32–75)
NEUTS SEG NFR BLD: 92 % (ref 32–75)
NEUTS SEG NFR BLD: 93 % (ref 32–75)
NEUTS SEG NFR BLD: 93 % (ref 32–75)
NITRITE UR QL STRIP.AUTO: NEGATIVE
NRBC # BLD: 0 K/UL (ref 0–0.01)
NRBC # BLD: 0.02 K/UL (ref 0–0.01)
NRBC # BLD: 0.04 K/UL (ref 0–0.01)
NRBC # BLD: 0.04 K/UL (ref 0–0.01)
NRBC # BLD: 0.06 K/UL (ref 0–0.01)
NRBC # BLD: 0.06 K/UL (ref 0–0.01)
NRBC # BLD: 0.09 K/UL (ref 0–0.01)
NRBC # BLD: 0.14 K/UL (ref 0–0.01)
NRBC # BLD: 0.17 K/UL (ref 0–0.01)
NRBC # BLD: 0.17 K/UL (ref 0–0.01)
NRBC # BLD: 0.18 K/UL (ref 0–0.01)
NRBC # BLD: 0.24 K/UL (ref 0–0.01)
NRBC BLD-RTO: 0 PER 100 WBC
NRBC BLD-RTO: 0.1 PER 100 WBC
NRBC BLD-RTO: 0.2 PER 100 WBC
NRBC BLD-RTO: 0.3 PER 100 WBC
NRBC BLD-RTO: 0.4 PER 100 WBC
NRBC BLD-RTO: 0.6 PER 100 WBC
NRBC BLD-RTO: 0.7 PER 100 WBC
NRBC BLD-RTO: 0.9 PER 100 WBC
O2/TOTAL GAS SETTING VFR VENT: 100 %
O2/TOTAL GAS SETTING VFR VENT: 30 %
O2/TOTAL GAS SETTING VFR VENT: 40 %
O2/TOTAL GAS SETTING VFR VENT: 40 %
O2/TOTAL GAS SETTING VFR VENT: 50 %
O2/TOTAL GAS SETTING VFR VENT: 60 %
P-R INTERVAL, ECG05: 192 MS
P-R INTERVAL, ECG05: 194 MS
P-R INTERVAL, ECG05: 244 MS
P-R INTERVAL, ECG05: 256 MS
PCO2 BLD: 34.1 MMHG (ref 35–45)
PCO2 BLD: 35 MMHG (ref 35–45)
PCO2 BLD: 35.6 MMHG (ref 35–45)
PCO2 BLD: 36 MMHG (ref 35–45)
PCO2 BLD: 36.3 MMHG (ref 35–45)
PCO2 BLD: 36.7 MMHG (ref 35–45)
PCO2 BLD: 42.4 MMHG (ref 35–45)
PCO2 BLD: 42.5 MMHG (ref 35–45)
PCO2 BLD: 47.3 MMHG (ref 35–45)
PCO2 BLD: 52.8 MMHG (ref 35–45)
PCO2 BLDA: 37 MMHG (ref 35–45)
PCO2 BLDV: 29.5 MMHG (ref 41–51)
PCO2 BLDV: 31.4 MMHG (ref 41–51)
PEEP RESPIRATORY: 12 CMH2O
PEEP RESPIRATORY: 15 CMH2O
PEEP RESPIRATORY: 5 CMH2O
PEEP RESPIRATORY: 5 CM[H2O]
PEEP RESPIRATORY: 8 CMH2O
PH BLD: 7.22 [PH] (ref 7.35–7.45)
PH BLD: 7.25 [PH] (ref 7.35–7.45)
PH BLD: 7.3 [PH] (ref 7.35–7.45)
PH BLD: 7.34 [PH] (ref 7.35–7.45)
PH BLD: 7.35 [PH] (ref 7.35–7.45)
PH BLD: 7.36 [PH] (ref 7.35–7.45)
PH BLD: 7.36 [PH] (ref 7.35–7.45)
PH BLD: 7.41 [PH] (ref 7.35–7.45)
PH BLD: 7.43 [PH] (ref 7.35–7.45)
PH BLD: 7.44 [PH] (ref 7.35–7.45)
PH BLDA: 7.36 [PH] (ref 7.35–7.45)
PH BLDV: 7.4 [PH] (ref 7.32–7.42)
PH BLDV: 7.47 [PH] (ref 7.32–7.42)
PH UR STRIP: 5 [PH] (ref 5–8)
PHOSPHATE SERPL-MCNC: 1.8 MG/DL (ref 2.6–4.7)
PHOSPHATE SERPL-MCNC: 1.8 MG/DL (ref 2.6–4.7)
PHOSPHATE SERPL-MCNC: 1.9 MG/DL (ref 2.6–4.7)
PHOSPHATE SERPL-MCNC: 10.1 MG/DL (ref 2.6–4.7)
PHOSPHATE SERPL-MCNC: 10.2 MG/DL (ref 2.6–4.7)
PHOSPHATE SERPL-MCNC: 2 MG/DL (ref 2.6–4.7)
PHOSPHATE SERPL-MCNC: 2.1 MG/DL (ref 2.6–4.7)
PHOSPHATE SERPL-MCNC: 2.2 MG/DL (ref 2.6–4.7)
PHOSPHATE SERPL-MCNC: 2.6 MG/DL (ref 2.6–4.7)
PHOSPHATE SERPL-MCNC: 2.7 MG/DL (ref 2.6–4.7)
PHOSPHATE SERPL-MCNC: 2.8 MG/DL (ref 2.6–4.7)
PHOSPHATE SERPL-MCNC: 2.8 MG/DL (ref 2.6–4.7)
PHOSPHATE SERPL-MCNC: 3.1 MG/DL (ref 2.6–4.7)
PHOSPHATE SERPL-MCNC: 3.1 MG/DL (ref 2.6–4.7)
PHOSPHATE SERPL-MCNC: 3.2 MG/DL (ref 2.6–4.7)
PHOSPHATE SERPL-MCNC: 3.2 MG/DL (ref 2.6–4.7)
PHOSPHATE SERPL-MCNC: 3.3 MG/DL (ref 2.6–4.7)
PHOSPHATE SERPL-MCNC: 3.9 MG/DL (ref 2.6–4.7)
PHOSPHATE SERPL-MCNC: 3.9 MG/DL (ref 2.6–4.7)
PHOSPHATE SERPL-MCNC: 4 MG/DL (ref 2.6–4.7)
PHOSPHATE SERPL-MCNC: 4.2 MG/DL (ref 2.6–4.7)
PHOSPHATE SERPL-MCNC: 4.8 MG/DL (ref 2.6–4.7)
PHOSPHATE SERPL-MCNC: 5 MG/DL (ref 2.6–4.7)
PHOSPHATE SERPL-MCNC: 5.1 MG/DL (ref 2.6–4.7)
PHOSPHATE SERPL-MCNC: 5.8 MG/DL (ref 2.6–4.7)
PHOSPHATE SERPL-MCNC: 5.9 MG/DL (ref 2.6–4.7)
PHOSPHATE SERPL-MCNC: 7 MG/DL (ref 2.6–4.7)
PHOSPHATE SERPL-MCNC: 7.2 MG/DL (ref 2.6–4.7)
PHOSPHATE SERPL-MCNC: 8.2 MG/DL (ref 2.6–4.7)
PHOSPHATE SERPL-MCNC: 8.9 MG/DL (ref 2.6–4.7)
PHOSPHATE SERPL-MCNC: 9.1 MG/DL (ref 2.6–4.7)
PLATELET # BLD AUTO: 108 K/UL (ref 150–400)
PLATELET # BLD AUTO: 110 K/UL (ref 150–400)
PLATELET # BLD AUTO: 135 K/UL (ref 150–400)
PLATELET # BLD AUTO: 139 K/UL (ref 150–400)
PLATELET # BLD AUTO: 161 K/UL (ref 150–400)
PLATELET # BLD AUTO: 166 K/UL (ref 150–400)
PLATELET # BLD AUTO: 176 K/UL (ref 150–400)
PLATELET # BLD AUTO: 181 K/UL (ref 150–400)
PLATELET # BLD AUTO: 183 K/UL (ref 150–400)
PLATELET # BLD AUTO: 184 K/UL (ref 150–400)
PLATELET # BLD AUTO: 184 K/UL (ref 150–400)
PLATELET # BLD AUTO: 203 K/UL (ref 150–400)
PLATELET # BLD AUTO: 205 K/UL (ref 150–400)
PLATELET # BLD AUTO: 257 K/UL (ref 150–400)
PLATELET # BLD AUTO: 279 K/UL (ref 150–400)
PLATELET # BLD AUTO: 312 K/UL (ref 150–400)
PLATELET # BLD AUTO: 322 K/UL (ref 150–400)
PLATELET # BLD AUTO: 85 K/UL (ref 150–400)
PLATELET # BLD AUTO: 95 K/UL (ref 150–400)
PLATELET COMMENTS,PCOM: ABNORMAL
PMV BLD AUTO: 11.2 FL (ref 8.9–12.9)
PMV BLD AUTO: 11.3 FL (ref 8.9–12.9)
PMV BLD AUTO: 11.5 FL (ref 8.9–12.9)
PMV BLD AUTO: 11.8 FL (ref 8.9–12.9)
PMV BLD AUTO: 11.8 FL (ref 8.9–12.9)
PMV BLD AUTO: 11.9 FL (ref 8.9–12.9)
PMV BLD AUTO: 12.1 FL (ref 8.9–12.9)
PMV BLD AUTO: 12.1 FL (ref 8.9–12.9)
PMV BLD AUTO: 12.3 FL (ref 8.9–12.9)
PMV BLD AUTO: 12.4 FL (ref 8.9–12.9)
PMV BLD AUTO: 12.5 FL (ref 8.9–12.9)
PMV BLD AUTO: 12.6 FL (ref 8.9–12.9)
PMV BLD AUTO: 12.6 FL (ref 8.9–12.9)
PMV BLD AUTO: 12.7 FL (ref 8.9–12.9)
PO2 BLD: 100 MMHG (ref 80–100)
PO2 BLD: 126 MMHG (ref 80–100)
PO2 BLD: 164 MMHG (ref 80–100)
PO2 BLD: 329 MMHG (ref 80–100)
PO2 BLD: 427 MMHG (ref 80–100)
PO2 BLD: 73 MMHG (ref 80–100)
PO2 BLD: 82 MMHG (ref 80–100)
PO2 BLD: 83 MMHG (ref 80–100)
PO2 BLD: 87 MMHG (ref 80–100)
PO2 BLD: 95 MMHG (ref 80–100)
PO2 BLDA: 122 MMHG (ref 80–100)
PO2 BLDV: 104 MMHG (ref 25–40)
PO2 BLDV: 47 MMHG (ref 25–40)
POTASSIUM BLD-SCNC: 3.6 MMOL/L (ref 3.5–5.5)
POTASSIUM BLD-SCNC: 3.7 MMOL/L (ref 3.5–5.5)
POTASSIUM SERPL-SCNC: 3.5 MMOL/L (ref 3.5–5.1)
POTASSIUM SERPL-SCNC: 3.6 MMOL/L (ref 3.5–5.1)
POTASSIUM SERPL-SCNC: 3.7 MMOL/L (ref 3.5–5.1)
POTASSIUM SERPL-SCNC: 3.9 MMOL/L (ref 3.5–5.1)
POTASSIUM SERPL-SCNC: 4 MMOL/L (ref 3.5–5.1)
POTASSIUM SERPL-SCNC: 4.3 MMOL/L (ref 3.5–5.1)
POTASSIUM SERPL-SCNC: 4.3 MMOL/L (ref 3.5–5.1)
POTASSIUM SERPL-SCNC: 4.4 MMOL/L (ref 3.5–5.1)
POTASSIUM SERPL-SCNC: 4.5 MMOL/L (ref 3.5–5.1)
POTASSIUM SERPL-SCNC: 4.6 MMOL/L (ref 3.5–5.1)
POTASSIUM SERPL-SCNC: 4.7 MMOL/L (ref 3.5–5.1)
POTASSIUM SERPL-SCNC: 4.8 MMOL/L (ref 3.5–5.1)
POTASSIUM SERPL-SCNC: 4.8 MMOL/L (ref 3.5–5.1)
POTASSIUM SERPL-SCNC: 4.9 MMOL/L (ref 3.5–5.1)
POTASSIUM SERPL-SCNC: 5 MMOL/L (ref 3.5–5.1)
POTASSIUM SERPL-SCNC: 5.3 MMOL/L (ref 3.5–5.1)
POTASSIUM SERPL-SCNC: 5.3 MMOL/L (ref 3.5–5.1)
POTASSIUM SERPL-SCNC: 5.4 MMOL/L (ref 3.5–5.1)
POTASSIUM SERPL-SCNC: 6.3 MMOL/L (ref 3.5–5.1)
POTASSIUM SERPL-SCNC: 6.3 MMOL/L (ref 3.5–5.1)
PROCALCITONIN SERPL-MCNC: 17.3 NG/ML
PROCALCITONIN SERPL-MCNC: 4.86 NG/ML
PROT SERPL-MCNC: 5.4 G/DL (ref 6.4–8.2)
PROT SERPL-MCNC: 5.6 G/DL (ref 6.4–8.2)
PROT SERPL-MCNC: 5.6 G/DL (ref 6.4–8.2)
PROT SERPL-MCNC: 5.8 G/DL (ref 6.4–8.2)
PROT SERPL-MCNC: 5.9 G/DL (ref 6.4–8.2)
PROT SERPL-MCNC: 6 G/DL (ref 6.4–8.2)
PROT SERPL-MCNC: 6.1 G/DL (ref 6.4–8.2)
PROT SERPL-MCNC: 6.1 G/DL (ref 6.4–8.2)
PROT SERPL-MCNC: 6.2 G/DL (ref 6.4–8.2)
PROT SERPL-MCNC: 6.4 G/DL (ref 6.4–8.2)
PROT SERPL-MCNC: 6.4 G/DL (ref 6.4–8.2)
PROT SERPL-MCNC: 6.6 G/DL (ref 6.4–8.2)
PROT SERPL-MCNC: 6.8 G/DL (ref 6.4–8.2)
PROT SERPL-MCNC: 6.8 G/DL (ref 6.4–8.2)
PROT SERPL-MCNC: 7 G/DL (ref 6.4–8.2)
PROT UR STRIP-MCNC: 30 MG/DL
PROTHROMBIN TIME: 10.5 SEC (ref 9–11.1)
PROTHROMBIN TIME: 12.9 SEC (ref 9–11.1)
Q-T INTERVAL, ECG07: 346 MS
Q-T INTERVAL, ECG07: 350 MS
Q-T INTERVAL, ECG07: 424 MS
Q-T INTERVAL, ECG07: 480 MS
Q-T INTERVAL, ECG07: 486 MS
Q-T INTERVAL, ECG07: 554 MS
QRS DURATION, ECG06: 110 MS
QRS DURATION, ECG06: 110 MS
QRS DURATION, ECG06: 120 MS
QRS DURATION, ECG06: 128 MS
QRS DURATION, ECG06: 128 MS
QRS DURATION, ECG06: 94 MS
QTC CALCULATION (BEZET), ECG08: 442 MS
QTC CALCULATION (BEZET), ECG08: 447 MS
QTC CALCULATION (BEZET), ECG08: 456 MS
QTC CALCULATION (BEZET), ECG08: 483 MS
QTC CALCULATION (BEZET), ECG08: 495 MS
QTC CALCULATION (BEZET), ECG08: 647 MS
RBC # BLD AUTO: 2.26 M/UL (ref 4.1–5.7)
RBC # BLD AUTO: 2.27 M/UL (ref 4.1–5.7)
RBC # BLD AUTO: 2.98 M/UL (ref 4.1–5.7)
RBC # BLD AUTO: 3.07 M/UL (ref 4.1–5.7)
RBC # BLD AUTO: 3.18 M/UL (ref 4.1–5.7)
RBC # BLD AUTO: 3.21 M/UL (ref 4.1–5.7)
RBC # BLD AUTO: 3.21 M/UL (ref 4.1–5.7)
RBC # BLD AUTO: 3.22 M/UL (ref 4.1–5.7)
RBC # BLD AUTO: 3.24 M/UL (ref 4.1–5.7)
RBC # BLD AUTO: 3.33 M/UL (ref 4.1–5.7)
RBC # BLD AUTO: 3.41 M/UL (ref 4.1–5.7)
RBC # BLD AUTO: 3.53 M/UL (ref 4.1–5.7)
RBC # BLD AUTO: 4.94 M/UL (ref 4.1–5.7)
RBC # BLD AUTO: 5.34 M/UL (ref 4.1–5.7)
RBC # BLD AUTO: 5.4 M/UL (ref 4.1–5.7)
RBC # BLD AUTO: 5.76 M/UL (ref 4.1–5.7)
RBC # BLD AUTO: 5.83 M/UL (ref 4.1–5.7)
RBC #/AREA URNS HPF: ABNORMAL /HPF (ref 0–5)
RBC MORPH BLD: ABNORMAL
RSV RNA SPEC QL NAA+PROBE: NOT DETECTED
RV+EV RNA SPEC QL NAA+PROBE: NOT DETECTED
SAMPLES BEING HELD,HOLD: NORMAL
SAMPLES BEING HELD,HOLD: NORMAL
SAO2 % BLD: 100 % (ref 92–97)
SAO2 % BLD: 90.7 % (ref 92–97)
SAO2 % BLD: 95.7 % (ref 92–97)
SAO2 % BLD: 96.1 % (ref 92–97)
SAO2 % BLD: 96.6 % (ref 92–97)
SAO2 % BLD: 97.1 % (ref 92–97)
SAO2 % BLD: 97.8 % (ref 92–97)
SAO2 % BLD: 98 % (ref 92–97)
SAO2 % BLD: 98.7 % (ref 92–97)
SAO2 % BLD: 99.5 % (ref 92–97)
SAO2 % BLD: 99.9 % (ref 92–97)
SAO2 % BLDV: 98.1 % (ref 65–88)
SAO2% DEVICE SAO2% SENSOR NAME: ABNORMAL
SARS-COV-2 PCR, COVPCR: NOT DETECTED
SERVICE CMNT-IMP: ABNORMAL
SERVICE CMNT-IMP: NORMAL
SODIUM BLD-SCNC: 137 MMOL/L (ref 136–145)
SODIUM BLD-SCNC: 139 MMOL/L (ref 136–145)
SODIUM SERPL-SCNC: 132 MMOL/L (ref 136–145)
SODIUM SERPL-SCNC: 133 MMOL/L (ref 136–145)
SODIUM SERPL-SCNC: 134 MMOL/L (ref 136–145)
SODIUM SERPL-SCNC: 135 MMOL/L (ref 136–145)
SODIUM SERPL-SCNC: 136 MMOL/L (ref 136–145)
SODIUM SERPL-SCNC: 137 MMOL/L (ref 136–145)
SODIUM SERPL-SCNC: 138 MMOL/L (ref 136–145)
SODIUM SERPL-SCNC: 139 MMOL/L (ref 136–145)
SODIUM SERPL-SCNC: 139 MMOL/L (ref 136–145)
SODIUM SERPL-SCNC: 144 MMOL/L (ref 136–145)
SOURCE, COVRS: NORMAL
SP GR UR REFRACTOMETRY: 1.01 (ref 1–1.03)
SPECIMEN EXP DATE BLD: NORMAL
SPECIMEN SITE: ABNORMAL
SPECIMEN SITE: ABNORMAL
SPECIMEN TYPE: ABNORMAL
SPECIMEN TYPE: NORMAL
SPECIMEN TYPE: NORMAL
STATUS OF UNIT,%ST: NORMAL
THERAPEUTIC RANGE,PTTT: NORMAL SECS (ref 58–77)
TRIGL SERPL-MCNC: 329 MG/DL (ref ?–150)
TRIGL SERPL-MCNC: 76 MG/DL (ref ?–150)
TROPONIN I SERPL-MCNC: <0.05 NG/ML
TSH SERPL DL<=0.05 MIU/L-ACNC: 2.13 UIU/ML (ref 0.36–3.74)
TSH SERPL DL<=0.05 MIU/L-ACNC: 3.23 UIU/ML (ref 0.36–3.74)
UNIT DIVISION, %UDIV: 0
UROBILINOGEN UR QL STRIP.AUTO: 1 EU/DL (ref 0.2–1)
VANCOMYCIN SERPL-MCNC: 11.8 UG/ML
VANCOMYCIN SERPL-MCNC: 17.9 UG/ML
VENTILATION MODE VENT: ABNORMAL
VENTILATION MODE VENT: NORMAL
VENTILATION MODE VENT: NORMAL
VENTRICULAR RATE, ECG03: 102 BPM
VENTRICULAR RATE, ECG03: 123 BPM
VENTRICULAR RATE, ECG03: 51 BPM
VENTRICULAR RATE, ECG03: 51 BPM
VENTRICULAR RATE, ECG03: 78 BPM
VENTRICULAR RATE, ECG03: 82 BPM
VLDLC SERPL CALC-MCNC: 15.2 MG/DL
VT SETTING VENT: 450 ML
VT SETTING VENT: 450 ML
VT SETTING VENT: 480 ML
VT SETTING VENT: 500 ML
WBC # BLD AUTO: 13.3 K/UL (ref 4.1–11.1)
WBC # BLD AUTO: 13.4 K/UL (ref 4.1–11.1)
WBC # BLD AUTO: 17 K/UL (ref 4.1–11.1)
WBC # BLD AUTO: 17.1 K/UL (ref 4.1–11.1)
WBC # BLD AUTO: 17.5 K/UL (ref 4.1–11.1)
WBC # BLD AUTO: 18.4 K/UL (ref 4.1–11.1)
WBC # BLD AUTO: 18.6 K/UL (ref 4.1–11.1)
WBC # BLD AUTO: 20.1 K/UL (ref 4.1–11.1)
WBC # BLD AUTO: 21.5 K/UL (ref 4.1–11.1)
WBC # BLD AUTO: 21.6 K/UL (ref 4.1–11.1)
WBC # BLD AUTO: 21.9 K/UL (ref 4.1–11.1)
WBC # BLD AUTO: 22.6 K/UL (ref 4.1–11.1)
WBC # BLD AUTO: 23 K/UL (ref 4.1–11.1)
WBC # BLD AUTO: 23.1 K/UL (ref 4.1–11.1)
WBC # BLD AUTO: 24 K/UL (ref 4.1–11.1)
WBC # BLD AUTO: 24.1 K/UL (ref 4.1–11.1)
WBC # BLD AUTO: 24.2 K/UL (ref 4.1–11.1)
WBC # BLD AUTO: 25.3 K/UL (ref 4.1–11.1)
WBC # BLD AUTO: 25.4 K/UL (ref 4.1–11.1)
WBC URNS QL MICRO: ABNORMAL /HPF (ref 0–4)

## 2021-01-01 PROCEDURE — 82962 GLUCOSE BLOOD TEST: CPT

## 2021-01-01 PROCEDURE — 74011250636 HC RX REV CODE- 250/636: Performed by: INTERNAL MEDICINE

## 2021-01-01 PROCEDURE — 83735 ASSAY OF MAGNESIUM: CPT

## 2021-01-01 PROCEDURE — 36556 INSERT NON-TUNNEL CV CATH: CPT

## 2021-01-01 PROCEDURE — C9113 INJ PANTOPRAZOLE SODIUM, VIA: HCPCS | Performed by: STUDENT IN AN ORGANIZED HEALTH CARE EDUCATION/TRAINING PROGRAM

## 2021-01-01 PROCEDURE — 84100 ASSAY OF PHOSPHORUS: CPT

## 2021-01-01 PROCEDURE — 74011250636 HC RX REV CODE- 250/636: Performed by: FAMILY MEDICINE

## 2021-01-01 PROCEDURE — 74011000250 HC RX REV CODE- 250: Performed by: INTERNAL MEDICINE

## 2021-01-01 PROCEDURE — 74011250637 HC RX REV CODE- 250/637: Performed by: ANESTHESIOLOGY

## 2021-01-01 PROCEDURE — 36415 COLL VENOUS BLD VENIPUNCTURE: CPT

## 2021-01-01 PROCEDURE — 80053 COMPREHEN METABOLIC PANEL: CPT

## 2021-01-01 PROCEDURE — 74011636637 HC RX REV CODE- 636/637: Performed by: ANESTHESIOLOGY

## 2021-01-01 PROCEDURE — 74011250636 HC RX REV CODE- 250/636: Performed by: STUDENT IN AN ORGANIZED HEALTH CARE EDUCATION/TRAINING PROGRAM

## 2021-01-01 PROCEDURE — 74011250637 HC RX REV CODE- 250/637: Performed by: FAMILY MEDICINE

## 2021-01-01 PROCEDURE — 77010033678 HC OXYGEN DAILY

## 2021-01-01 PROCEDURE — C1752 CATH,HEMODIALYSIS,SHORT-TERM: HCPCS

## 2021-01-01 PROCEDURE — 82803 BLOOD GASES ANY COMBINATION: CPT

## 2021-01-01 PROCEDURE — P9045 ALBUMIN (HUMAN), 5%, 250 ML: HCPCS

## 2021-01-01 PROCEDURE — 80202 ASSAY OF VANCOMYCIN: CPT

## 2021-01-01 PROCEDURE — 74011250637 HC RX REV CODE- 250/637: Performed by: INTERNAL MEDICINE

## 2021-01-01 PROCEDURE — 80069 RENAL FUNCTION PANEL: CPT

## 2021-01-01 PROCEDURE — 74011250637 HC RX REV CODE- 250/637: Performed by: EMERGENCY MEDICINE

## 2021-01-01 PROCEDURE — 74011250636 HC RX REV CODE- 250/636: Performed by: NURSE PRACTITIONER

## 2021-01-01 PROCEDURE — 83605 ASSAY OF LACTIC ACID: CPT

## 2021-01-01 PROCEDURE — 85025 COMPLETE CBC W/AUTO DIFF WBC: CPT

## 2021-01-01 PROCEDURE — 74011000258 HC RX REV CODE- 258: Performed by: FAMILY MEDICINE

## 2021-01-01 PROCEDURE — 77030013797 HC KT TRNSDUC PRSSR EDWD -A

## 2021-01-01 PROCEDURE — 84443 ASSAY THYROID STIM HORMONE: CPT

## 2021-01-01 PROCEDURE — 71045 X-RAY EXAM CHEST 1 VIEW: CPT

## 2021-01-01 PROCEDURE — 83690 ASSAY OF LIPASE: CPT

## 2021-01-01 PROCEDURE — 74011000258 HC RX REV CODE- 258: Performed by: INTERNAL MEDICINE

## 2021-01-01 PROCEDURE — 51798 US URINE CAPACITY MEASURE: CPT

## 2021-01-01 PROCEDURE — 74011000250 HC RX REV CODE- 250: Performed by: EMERGENCY MEDICINE

## 2021-01-01 PROCEDURE — 85027 COMPLETE CBC AUTOMATED: CPT

## 2021-01-01 PROCEDURE — 0BH17EZ INSERTION OF ENDOTRACHEAL AIRWAY INTO TRACHEA, VIA NATURAL OR ARTIFICIAL OPENING: ICD-10-PCS | Performed by: STUDENT IN AN ORGANIZED HEALTH CARE EDUCATION/TRAINING PROGRAM

## 2021-01-01 PROCEDURE — 74011000250 HC RX REV CODE- 250: Performed by: FAMILY MEDICINE

## 2021-01-01 PROCEDURE — C8929 TTE W OR WO FOL WCON,DOPPLER: HCPCS

## 2021-01-01 PROCEDURE — 65620000000 HC RM CCU GENERAL

## 2021-01-01 PROCEDURE — 74011000250 HC RX REV CODE- 250: Performed by: STUDENT IN AN ORGANIZED HEALTH CARE EDUCATION/TRAINING PROGRAM

## 2021-01-01 PROCEDURE — 74011250636 HC RX REV CODE- 250/636

## 2021-01-01 PROCEDURE — 74011250636 HC RX REV CODE- 250/636: Performed by: ANESTHESIOLOGY

## 2021-01-01 PROCEDURE — 87040 BLOOD CULTURE FOR BACTERIA: CPT

## 2021-01-01 PROCEDURE — 36600 WITHDRAWAL OF ARTERIAL BLOOD: CPT

## 2021-01-01 PROCEDURE — 51702 INSERT TEMP BLADDER CATH: CPT

## 2021-01-01 PROCEDURE — 94003 VENT MGMT INPAT SUBQ DAY: CPT

## 2021-01-01 PROCEDURE — 84145 PROCALCITONIN (PCT): CPT

## 2021-01-01 PROCEDURE — 93005 ELECTROCARDIOGRAM TRACING: CPT

## 2021-01-01 PROCEDURE — 99223 1ST HOSP IP/OBS HIGH 75: CPT | Performed by: PHYSICAL MEDICINE & REHABILITATION

## 2021-01-01 PROCEDURE — 74011636637 HC RX REV CODE- 636/637: Performed by: EMERGENCY MEDICINE

## 2021-01-01 PROCEDURE — 74011000258 HC RX REV CODE- 258: Performed by: STUDENT IN AN ORGANIZED HEALTH CARE EDUCATION/TRAINING PROGRAM

## 2021-01-01 PROCEDURE — P9045 ALBUMIN (HUMAN), 5%, 250 ML: HCPCS | Performed by: INTERNAL MEDICINE

## 2021-01-01 PROCEDURE — 90945 DIALYSIS ONE EVALUATION: CPT

## 2021-01-01 PROCEDURE — 77030018729 HC ELECTRD DEFIB PAD CARD -B

## 2021-01-01 PROCEDURE — 84478 ASSAY OF TRIGLYCERIDES: CPT

## 2021-01-01 PROCEDURE — 65270000029 HC RM PRIVATE

## 2021-01-01 PROCEDURE — 74011000250 HC RX REV CODE- 250: Performed by: ANESTHESIOLOGY

## 2021-01-01 PROCEDURE — 74011250636 HC RX REV CODE- 250/636: Performed by: EMERGENCY MEDICINE

## 2021-01-01 PROCEDURE — 80061 LIPID PANEL: CPT

## 2021-01-01 PROCEDURE — 3336500001 HSPC ELECTION

## 2021-01-01 PROCEDURE — 84484 ASSAY OF TROPONIN QUANT: CPT

## 2021-01-01 PROCEDURE — 74011000636 HC RX REV CODE- 636: Performed by: RADIOLOGY

## 2021-01-01 PROCEDURE — 2709999900 HC NON-CHARGEABLE SUPPLY

## 2021-01-01 PROCEDURE — 74011000258 HC RX REV CODE- 258: Performed by: ANESTHESIOLOGY

## 2021-01-01 PROCEDURE — P9047 ALBUMIN (HUMAN), 25%, 50ML: HCPCS | Performed by: INTERNAL MEDICINE

## 2021-01-01 PROCEDURE — 77030008771 HC TU NG SALEM SUMP -A

## 2021-01-01 PROCEDURE — 77030040361 HC SLV COMPR DVT MDII -B

## 2021-01-01 PROCEDURE — 5A1D90Z PERFORMANCE OF URINARY FILTRATION, CONTINUOUS, GREATER THAN 18 HOURS PER DAY: ICD-10-PCS | Performed by: INTERNAL MEDICINE

## 2021-01-01 PROCEDURE — 5A1955Z RESPIRATORY VENTILATION, GREATER THAN 96 CONSECUTIVE HOURS: ICD-10-PCS | Performed by: STUDENT IN AN ORGANIZED HEALTH CARE EDUCATION/TRAINING PROGRAM

## 2021-01-01 PROCEDURE — 36580 REPLACE CVAD CATH: CPT

## 2021-01-01 PROCEDURE — 3E0436Z INTRODUCTION OF NUTRITIONAL SUBSTANCE INTO CENTRAL VEIN, PERCUTANEOUS APPROACH: ICD-10-PCS | Performed by: STUDENT IN AN ORGANIZED HEALTH CARE EDUCATION/TRAINING PROGRAM

## 2021-01-01 PROCEDURE — 85610 PROTHROMBIN TIME: CPT

## 2021-01-01 PROCEDURE — 83036 HEMOGLOBIN GLYCOSYLATED A1C: CPT

## 2021-01-01 PROCEDURE — 74011250637 HC RX REV CODE- 250/637: Performed by: PHYSICAL MEDICINE & REHABILITATION

## 2021-01-01 PROCEDURE — 90935 HEMODIALYSIS ONE EVALUATION: CPT

## 2021-01-01 PROCEDURE — 87077 CULTURE AEROBIC IDENTIFY: CPT

## 2021-01-01 PROCEDURE — 77030040922 HC BLNKT HYPOTHRM STRY -A

## 2021-01-01 PROCEDURE — 77030019905 HC CATH URETH INTMIT MDII -A

## 2021-01-01 PROCEDURE — 77030002996 HC SUT SLK J&J -A

## 2021-01-01 PROCEDURE — 0656 HSPC GENERAL INPATIENT

## 2021-01-01 PROCEDURE — C8923 2D TTE W OR W/O FOL W/CON,CO: HCPCS

## 2021-01-01 PROCEDURE — P9045 ALBUMIN (HUMAN), 5%, 250 ML: HCPCS | Performed by: STUDENT IN AN ORGANIZED HEALTH CARE EDUCATION/TRAINING PROGRAM

## 2021-01-01 PROCEDURE — 74177 CT ABD & PELVIS W/CONTRAST: CPT

## 2021-01-01 PROCEDURE — 03HY32Z INSERTION OF MONITORING DEVICE INTO UPPER ARTERY, PERCUTANEOUS APPROACH: ICD-10-PCS | Performed by: INTERNAL MEDICINE

## 2021-01-01 PROCEDURE — 74018 RADEX ABDOMEN 1 VIEW: CPT

## 2021-01-01 PROCEDURE — 87340 HEPATITIS B SURFACE AG IA: CPT

## 2021-01-01 PROCEDURE — 77030018798 HC PMP KT ENTRL FED COVD -A

## 2021-01-01 PROCEDURE — 99285 EMERGENCY DEPT VISIT HI MDM: CPT

## 2021-01-01 PROCEDURE — 94002 VENT MGMT INPAT INIT DAY: CPT

## 2021-01-01 PROCEDURE — 0202U NFCT DS 22 TRGT SARS-COV-2: CPT

## 2021-01-01 PROCEDURE — 82306 VITAMIN D 25 HYDROXY: CPT

## 2021-01-01 PROCEDURE — 76937 US GUIDE VASCULAR ACCESS: CPT

## 2021-01-01 PROCEDURE — 74011000250 HC RX REV CODE- 250: Performed by: PHYSICIAN ASSISTANT

## 2021-01-01 PROCEDURE — 36430 TRANSFUSION BLD/BLD COMPNT: CPT

## 2021-01-01 PROCEDURE — 85730 THROMBOPLASTIN TIME PARTIAL: CPT

## 2021-01-01 PROCEDURE — 77030005401 HC CATH RAD ARRO -A

## 2021-01-01 PROCEDURE — 76705 ECHO EXAM OF ABDOMEN: CPT

## 2021-01-01 PROCEDURE — 86923 COMPATIBILITY TEST ELECTRIC: CPT

## 2021-01-01 PROCEDURE — 99233 SBSQ HOSP IP/OBS HIGH 50: CPT | Performed by: PHYSICAL MEDICINE & REHABILITATION

## 2021-01-01 PROCEDURE — 74011000250 HC RX REV CODE- 250

## 2021-01-01 PROCEDURE — 02H633Z INSERTION OF INFUSION DEVICE INTO RIGHT ATRIUM, PERCUTANEOUS APPROACH: ICD-10-PCS | Performed by: STUDENT IN AN ORGANIZED HEALTH CARE EDUCATION/TRAINING PROGRAM

## 2021-01-01 PROCEDURE — 77030005513 HC CATH URETH FOL11 MDII -B

## 2021-01-01 PROCEDURE — 74011000258 HC RX REV CODE- 258: Performed by: NURSE PRACTITIONER

## 2021-01-01 PROCEDURE — 99232 SBSQ HOSP IP/OBS MODERATE 35: CPT | Performed by: PHYSICAL MEDICINE & REHABILITATION

## 2021-01-01 PROCEDURE — 65210000002 HC RM PRIVATE GYN

## 2021-01-01 PROCEDURE — 77030037878 HC DRSG MEPILEX >48IN BORD MOLN -B

## 2021-01-01 PROCEDURE — 71275 CT ANGIOGRAPHY CHEST: CPT

## 2021-01-01 PROCEDURE — 0JH63XZ INSERTION OF TUNNELED VASCULAR ACCESS DEVICE INTO CHEST SUBCUTANEOUS TISSUE AND FASCIA, PERCUTANEOUS APPROACH: ICD-10-PCS | Performed by: STUDENT IN AN ORGANIZED HEALTH CARE EDUCATION/TRAINING PROGRAM

## 2021-01-01 PROCEDURE — 02HV33Z INSERTION OF INFUSION DEVICE INTO SUPERIOR VENA CAVA, PERCUTANEOUS APPROACH: ICD-10-PCS | Performed by: INTERNAL MEDICINE

## 2021-01-01 PROCEDURE — 87324 CLOSTRIDIUM AG IA: CPT

## 2021-01-01 PROCEDURE — 0D9670Z DRAINAGE OF STOMACH WITH DRAINAGE DEVICE, VIA NATURAL OR ARTIFICIAL OPENING: ICD-10-PCS | Performed by: STUDENT IN AN ORGANIZED HEALTH CARE EDUCATION/TRAINING PROGRAM

## 2021-01-01 PROCEDURE — 99223 1ST HOSP IP/OBS HIGH 75: CPT | Performed by: FAMILY MEDICINE

## 2021-01-01 PROCEDURE — 86706 HEP B SURFACE ANTIBODY: CPT

## 2021-01-01 PROCEDURE — 74011250637 HC RX REV CODE- 250/637: Performed by: STUDENT IN AN ORGANIZED HEALTH CARE EDUCATION/TRAINING PROGRAM

## 2021-01-01 PROCEDURE — 74174 CTA ABD&PLVS W/CONTRAST: CPT

## 2021-01-01 PROCEDURE — 74011250636 HC RX REV CODE- 250/636: Performed by: PHYSICAL MEDICINE & REHABILITATION

## 2021-01-01 PROCEDURE — 74011000636 HC RX REV CODE- 636: Performed by: EMERGENCY MEDICINE

## 2021-01-01 PROCEDURE — 87070 CULTURE OTHR SPECIMN AEROBIC: CPT

## 2021-01-01 PROCEDURE — 81001 URINALYSIS AUTO W/SCOPE: CPT

## 2021-01-01 PROCEDURE — 84295 ASSAY OF SERUM SODIUM: CPT

## 2021-01-01 PROCEDURE — 96374 THER/PROPH/DIAG INJ IV PUSH: CPT

## 2021-01-01 PROCEDURE — 87205 SMEAR GRAM STAIN: CPT

## 2021-01-01 PROCEDURE — 74011000250 HC RX REV CODE- 250: Performed by: PHYSICAL MEDICINE & REHABILITATION

## 2021-01-01 PROCEDURE — 65660000000 HC RM CCU STEPDOWN

## 2021-01-01 PROCEDURE — 31500 INSERT EMERGENCY AIRWAY: CPT

## 2021-01-01 PROCEDURE — 87186 SC STD MICRODIL/AGAR DIL: CPT

## 2021-01-01 PROCEDURE — 02HV33Z INSERTION OF INFUSION DEVICE INTO SUPERIOR VENA CAVA, PERCUTANEOUS APPROACH: ICD-10-PCS | Performed by: STUDENT IN AN ORGANIZED HEALTH CARE EDUCATION/TRAINING PROGRAM

## 2021-01-01 PROCEDURE — 86901 BLOOD TYPING SEROLOGIC RH(D): CPT

## 2021-01-01 PROCEDURE — 74011250636 HC RX REV CODE- 250/636: Performed by: PHYSICIAN ASSISTANT

## 2021-01-01 PROCEDURE — 82270 OCCULT BLOOD FECES: CPT

## 2021-01-01 PROCEDURE — 87635 SARS-COV-2 COVID-19 AMP PRB: CPT

## 2021-01-01 PROCEDURE — C1751 CATH, INF, PER/CENT/MIDLINE: HCPCS

## 2021-01-01 PROCEDURE — P9016 RBC LEUKOCYTES REDUCED: HCPCS

## 2021-01-01 PROCEDURE — 80047 BASIC METABLC PNL IONIZED CA: CPT

## 2021-01-01 RX ORDER — SCOLOPAMINE TRANSDERMAL SYSTEM 1 MG/1
1 PATCH, EXTENDED RELEASE TRANSDERMAL
Status: DISCONTINUED | OUTPATIENT
Start: 2021-01-01 | End: 2021-01-01 | Stop reason: HOSPADM

## 2021-01-01 RX ORDER — EPINEPHRINE 0.1 MG/ML
INJECTION INTRACARDIAC; INTRAVENOUS
Status: DISPENSED
Start: 2021-01-01 | End: 2021-01-01

## 2021-01-01 RX ORDER — CHOLECALCIFEROL (VITAMIN D3) 10(400)/ML
100 DROPS ORAL DAILY
Status: DISCONTINUED | OUTPATIENT
Start: 2021-01-01 | End: 2021-01-01

## 2021-01-01 RX ORDER — HEPARIN SODIUM 5000 [USP'U]/ML
5000 INJECTION, SOLUTION INTRAVENOUS; SUBCUTANEOUS EVERY 8 HOURS
Status: DISCONTINUED | OUTPATIENT
Start: 2021-01-01 | End: 2021-01-01

## 2021-01-01 RX ORDER — GUAIFENESIN 100 MG/5ML
81 LIQUID (ML) ORAL
Status: DISPENSED | OUTPATIENT
Start: 2021-01-01 | End: 2021-01-01

## 2021-01-01 RX ORDER — SODIUM CHLORIDE 0.9 % (FLUSH) 0.9 %
5-40 SYRINGE (ML) INJECTION AS NEEDED
Status: DISCONTINUED | OUTPATIENT
Start: 2021-01-01 | End: 2021-01-01

## 2021-01-01 RX ORDER — LORAZEPAM 2 MG/ML
2 INJECTION INTRAMUSCULAR
Status: DISCONTINUED | OUTPATIENT
Start: 2021-01-01 | End: 2021-01-01 | Stop reason: HOSPADM

## 2021-01-01 RX ORDER — LORAZEPAM 2 MG/ML
0.5 INJECTION INTRAMUSCULAR ONCE
Status: COMPLETED | OUTPATIENT
Start: 2021-01-01 | End: 2021-01-01

## 2021-01-01 RX ORDER — PEPPERMINT OIL
SPIRIT ORAL ONCE
Status: DISCONTINUED | OUTPATIENT
Start: 2021-01-01 | End: 2021-01-01

## 2021-01-01 RX ORDER — CALCIUM GLUCONATE 20 MG/ML
1 INJECTION, SOLUTION INTRAVENOUS ONCE
Status: COMPLETED | OUTPATIENT
Start: 2021-01-01 | End: 2021-01-01

## 2021-01-01 RX ORDER — MORPHINE SULFATE 4 MG/ML
4 INJECTION INTRAVENOUS
Status: ACTIVE | OUTPATIENT
Start: 2021-01-01 | End: 2021-01-01

## 2021-01-01 RX ORDER — ALBUMIN HUMAN 50 G/1000ML
25 SOLUTION INTRAVENOUS ONCE
Status: COMPLETED | OUTPATIENT
Start: 2021-01-01 | End: 2021-01-01

## 2021-01-01 RX ORDER — MIDAZOLAM IN 0.9 % SOD.CHLORID 1 MG/ML
0-10 PLASTIC BAG, INJECTION (ML) INTRAVENOUS
Status: DISCONTINUED | OUTPATIENT
Start: 2021-01-01 | End: 2021-01-01 | Stop reason: SDUPTHER

## 2021-01-01 RX ORDER — ALBUMIN HUMAN 50 G/1000ML
12.5 SOLUTION INTRAVENOUS ONCE
Status: COMPLETED | OUTPATIENT
Start: 2021-01-01 | End: 2021-01-01

## 2021-01-01 RX ORDER — SODIUM CHLORIDE 9 MG/ML
3 INJECTION, SOLUTION INTRAVENOUS CONTINUOUS
Status: DISCONTINUED | OUTPATIENT
Start: 2021-01-01 | End: 2021-01-01

## 2021-01-01 RX ORDER — ONDANSETRON 2 MG/ML
4 INJECTION INTRAMUSCULAR; INTRAVENOUS
Status: COMPLETED | OUTPATIENT
Start: 2021-01-01 | End: 2021-01-01

## 2021-01-01 RX ORDER — DEXMEDETOMIDINE HYDROCHLORIDE 4 UG/ML
.2-1.4 INJECTION, SOLUTION INTRAVENOUS
Status: DISCONTINUED | OUTPATIENT
Start: 2021-01-01 | End: 2021-01-01

## 2021-01-01 RX ORDER — ONDANSETRON 2 MG/ML
INJECTION INTRAMUSCULAR; INTRAVENOUS
Status: COMPLETED
Start: 2021-01-01 | End: 2021-01-01

## 2021-01-01 RX ORDER — MORPHINE SULFATE 10 MG/ML
INJECTION, SOLUTION INTRAMUSCULAR; INTRAVENOUS
Status: COMPLETED
Start: 2021-01-01 | End: 2021-01-01

## 2021-01-01 RX ORDER — ATORVASTATIN CALCIUM 10 MG/1
10 TABLET, FILM COATED ORAL DAILY
Status: DISCONTINUED | OUTPATIENT
Start: 2021-01-01 | End: 2021-01-01

## 2021-01-01 RX ORDER — ALBUMIN HUMAN 50 G/1000ML
SOLUTION INTRAVENOUS
Status: COMPLETED
Start: 2021-01-01 | End: 2021-01-01

## 2021-01-01 RX ORDER — KETOROLAC TROMETHAMINE 30 MG/ML
30 INJECTION, SOLUTION INTRAMUSCULAR; INTRAVENOUS
Status: DISCONTINUED | OUTPATIENT
Start: 2021-01-01 | End: 2021-01-01 | Stop reason: HOSPADM

## 2021-01-01 RX ORDER — MIDODRINE HYDROCHLORIDE 5 MG/1
20 TABLET ORAL EVERY 8 HOURS
Status: DISCONTINUED | OUTPATIENT
Start: 2021-01-01 | End: 2021-01-01

## 2021-01-01 RX ORDER — MIDODRINE HYDROCHLORIDE 5 MG/1
10 TABLET ORAL ONCE
Status: COMPLETED | OUTPATIENT
Start: 2021-01-01 | End: 2021-01-01

## 2021-01-01 RX ORDER — MIDAZOLAM HYDROCHLORIDE 1 MG/ML
4 INJECTION, SOLUTION INTRAMUSCULAR; INTRAVENOUS ONCE
Status: COMPLETED | OUTPATIENT
Start: 2021-01-01 | End: 2021-01-01

## 2021-01-01 RX ORDER — DEXMEDETOMIDINE HYDROCHLORIDE 4 UG/ML
.1-1.5 INJECTION, SOLUTION INTRAVENOUS
Status: DISCONTINUED | OUTPATIENT
Start: 2021-01-01 | End: 2021-01-01

## 2021-01-01 RX ORDER — ONDANSETRON 2 MG/ML
4 INJECTION INTRAMUSCULAR; INTRAVENOUS
Status: DISCONTINUED | OUTPATIENT
Start: 2021-01-01 | End: 2021-01-01 | Stop reason: HOSPADM

## 2021-01-01 RX ORDER — HYDROCORTISONE SODIUM SUCCINATE 100 MG/2ML
50 INJECTION, POWDER, FOR SOLUTION INTRAMUSCULAR; INTRAVENOUS EVERY 8 HOURS
Status: DISCONTINUED | OUTPATIENT
Start: 2021-01-01 | End: 2021-01-01

## 2021-01-01 RX ORDER — HYDROMORPHONE HYDROCHLORIDE 2 MG/ML
2 INJECTION, SOLUTION INTRAMUSCULAR; INTRAVENOUS; SUBCUTANEOUS
Status: DISCONTINUED | OUTPATIENT
Start: 2021-01-01 | End: 2021-01-01 | Stop reason: HOSPADM

## 2021-01-01 RX ORDER — NITROGLYCERIN 0.4 MG/1
0.4 TABLET SUBLINGUAL
Status: COMPLETED | OUTPATIENT
Start: 2021-01-01 | End: 2021-01-01

## 2021-01-01 RX ORDER — SODIUM CHLORIDE 9 MG/ML
5 INJECTION, SOLUTION INTRAVENOUS CONTINUOUS
Status: DISCONTINUED | OUTPATIENT
Start: 2021-01-01 | End: 2021-01-01

## 2021-01-01 RX ORDER — ALBUMIN HUMAN 50 G/1000ML
25 SOLUTION INTRAVENOUS
Status: COMPLETED | OUTPATIENT
Start: 2021-01-01 | End: 2021-01-01

## 2021-01-01 RX ORDER — ALBUMIN HUMAN 50 G/1000ML
SOLUTION INTRAVENOUS
Status: DISPENSED
Start: 2021-01-01 | End: 2021-01-01

## 2021-01-01 RX ORDER — GLYCOPYRROLATE 0.2 MG/ML
0.2 INJECTION INTRAMUSCULAR; INTRAVENOUS
Status: DISCONTINUED | OUTPATIENT
Start: 2021-01-01 | End: 2021-01-01 | Stop reason: HOSPADM

## 2021-01-01 RX ORDER — KETAMINE HYDROCHLORIDE 10 MG/ML
INJECTION, SOLUTION INTRAMUSCULAR; INTRAVENOUS
Status: COMPLETED
Start: 2021-01-01 | End: 2021-01-01

## 2021-01-01 RX ORDER — HYDROMORPHONE HYDROCHLORIDE 1 MG/ML
1 INJECTION, SOLUTION INTRAMUSCULAR; INTRAVENOUS; SUBCUTANEOUS
Status: DISCONTINUED | OUTPATIENT
Start: 2021-01-01 | End: 2021-01-01 | Stop reason: HOSPADM

## 2021-01-01 RX ORDER — HEPARIN SODIUM 10000 [USP'U]/100ML
700 INJECTION, SOLUTION INTRAVENOUS
Status: DISCONTINUED | OUTPATIENT
Start: 2021-01-01 | End: 2021-01-01

## 2021-01-01 RX ORDER — CALCIUM GLUCONATE 20 MG/ML
2 INJECTION, SOLUTION INTRAVENOUS ONCE
Status: COMPLETED | OUTPATIENT
Start: 2021-01-01 | End: 2021-01-01

## 2021-01-01 RX ORDER — CHLORHEXIDINE GLUCONATE 0.12 MG/ML
15 RINSE ORAL EVERY 12 HOURS
Status: DISCONTINUED | OUTPATIENT
Start: 2021-01-01 | End: 2021-01-01

## 2021-01-01 RX ORDER — HYDROCORTISONE SODIUM SUCCINATE 100 MG/2ML
100 INJECTION, POWDER, FOR SOLUTION INTRAMUSCULAR; INTRAVENOUS EVERY 8 HOURS
Status: DISCONTINUED | OUTPATIENT
Start: 2021-01-01 | End: 2021-01-01

## 2021-01-01 RX ORDER — NITROGLYCERIN 0.4 MG/1
TABLET SUBLINGUAL
Status: DISPENSED
Start: 2021-01-01 | End: 2021-01-01

## 2021-01-01 RX ORDER — HEPARIN SODIUM 1000 [USP'U]/ML
10000 INJECTION, SOLUTION INTRAVENOUS; SUBCUTANEOUS
Status: COMPLETED | OUTPATIENT
Start: 2021-01-01 | End: 2021-01-01

## 2021-01-01 RX ORDER — POTASSIUM CHLORIDE 29.8 MG/ML
20 INJECTION INTRAVENOUS ONCE
Status: COMPLETED | OUTPATIENT
Start: 2021-01-01 | End: 2021-01-01

## 2021-01-01 RX ORDER — MORPHINE SULFATE 4 MG/ML
4 INJECTION INTRAVENOUS
Status: COMPLETED | OUTPATIENT
Start: 2021-01-01 | End: 2021-01-01

## 2021-01-01 RX ORDER — DEXTROSE 50 % IN WATER (D50W) INTRAVENOUS SYRINGE
25-50 AS NEEDED
Status: DISCONTINUED | OUTPATIENT
Start: 2021-01-01 | End: 2021-01-01 | Stop reason: HOSPADM

## 2021-01-01 RX ORDER — LORAZEPAM 2 MG/ML
2 INJECTION INTRAMUSCULAR ONCE
Status: DISCONTINUED | OUTPATIENT
Start: 2021-01-01 | End: 2021-01-01 | Stop reason: HOSPADM

## 2021-01-01 RX ORDER — MIDAZOLAM HYDROCHLORIDE 1 MG/ML
INJECTION, SOLUTION INTRAMUSCULAR; INTRAVENOUS
Status: COMPLETED
Start: 2021-01-01 | End: 2021-01-01

## 2021-01-01 RX ORDER — INSULIN GLARGINE 100 [IU]/ML
15 INJECTION, SOLUTION SUBCUTANEOUS DAILY
Status: DISCONTINUED | OUTPATIENT
Start: 2021-01-01 | End: 2021-01-01

## 2021-01-01 RX ORDER — NOREPINEPHRINE BITARTRATE/D5W 8 MG/250ML
.5-3 PLASTIC BAG, INJECTION (ML) INTRAVENOUS
Status: DISCONTINUED | OUTPATIENT
Start: 2021-01-01 | End: 2021-01-01 | Stop reason: SDUPTHER

## 2021-01-01 RX ORDER — HALOPERIDOL 5 MG/ML
5 INJECTION INTRAMUSCULAR ONCE
Status: COMPLETED | OUTPATIENT
Start: 2021-01-01 | End: 2021-01-01

## 2021-01-01 RX ORDER — MAGNESIUM SULFATE 100 %
4 CRYSTALS MISCELLANEOUS AS NEEDED
Status: DISCONTINUED | OUTPATIENT
Start: 2021-01-01 | End: 2021-01-01

## 2021-01-01 RX ORDER — METRONIDAZOLE 500 MG/100ML
500 INJECTION, SOLUTION INTRAVENOUS EVERY 12 HOURS
Status: DISCONTINUED | OUTPATIENT
Start: 2021-01-01 | End: 2021-01-01

## 2021-01-01 RX ORDER — HEPARIN SODIUM 1000 [USP'U]/ML
1400 INJECTION, SOLUTION INTRAVENOUS; SUBCUTANEOUS ONCE
Status: COMPLETED | OUTPATIENT
Start: 2021-01-01 | End: 2021-01-01

## 2021-01-01 RX ORDER — MIDODRINE HYDROCHLORIDE 5 MG/1
10 TABLET ORAL EVERY 8 HOURS
Status: DISCONTINUED | OUTPATIENT
Start: 2021-01-01 | End: 2021-01-01

## 2021-01-01 RX ORDER — HEPARIN SODIUM 1000 [USP'U]/ML
1400 INJECTION, SOLUTION INTRAVENOUS; SUBCUTANEOUS AS NEEDED
Status: DISCONTINUED | OUTPATIENT
Start: 2021-01-01 | End: 2021-01-01

## 2021-01-01 RX ORDER — SODIUM CHLORIDE 9 MG/ML
1000 INJECTION, SOLUTION INTRAVENOUS ONCE
Status: COMPLETED | OUTPATIENT
Start: 2021-01-01 | End: 2021-01-01

## 2021-01-01 RX ORDER — SODIUM CHLORIDE, SODIUM LACTATE, POTASSIUM CHLORIDE, CALCIUM CHLORIDE 600; 310; 30; 20 MG/100ML; MG/100ML; MG/100ML; MG/100ML
500 INJECTION, SOLUTION INTRAVENOUS CONTINUOUS
Status: DISCONTINUED | OUTPATIENT
Start: 2021-01-01 | End: 2021-01-01

## 2021-01-01 RX ORDER — KETAMINE HYDROCHLORIDE 10 MG/ML
200 INJECTION, SOLUTION INTRAMUSCULAR; INTRAVENOUS ONCE
Status: ACTIVE | OUTPATIENT
Start: 2021-01-01 | End: 2021-01-01

## 2021-01-01 RX ORDER — ACETAMINOPHEN 325 MG/1
650 TABLET ORAL
Status: DISCONTINUED | OUTPATIENT
Start: 2021-01-01 | End: 2021-01-01

## 2021-01-01 RX ORDER — BUMETANIDE 0.25 MG/ML
2 INJECTION INTRAMUSCULAR; INTRAVENOUS DAILY
Status: DISCONTINUED | OUTPATIENT
Start: 2021-01-01 | End: 2021-01-01

## 2021-01-01 RX ORDER — ALBUMIN HUMAN 250 G/1000ML
12.5 SOLUTION INTRAVENOUS
Status: DISCONTINUED | OUTPATIENT
Start: 2021-01-01 | End: 2021-01-01

## 2021-01-01 RX ORDER — VANCOMYCIN HYDROCHLORIDE
1250 EVERY 24 HOURS
Status: DISCONTINUED | OUTPATIENT
Start: 2021-01-01 | End: 2021-01-01

## 2021-01-01 RX ORDER — BUMETANIDE 0.25 MG/ML
2 INJECTION INTRAMUSCULAR; INTRAVENOUS EVERY 12 HOURS
Status: DISCONTINUED | OUTPATIENT
Start: 2021-01-01 | End: 2021-01-01

## 2021-01-01 RX ORDER — PROPOFOL 10 MG/ML
0-50 VIAL (ML) INTRAVENOUS
Status: DISCONTINUED | OUTPATIENT
Start: 2021-01-01 | End: 2021-01-01

## 2021-01-01 RX ORDER — HYDROMORPHONE HYDROCHLORIDE 2 MG/ML
2 INJECTION, SOLUTION INTRAMUSCULAR; INTRAVENOUS; SUBCUTANEOUS ONCE
Status: COMPLETED | OUTPATIENT
Start: 2021-01-01 | End: 2021-01-01

## 2021-01-01 RX ORDER — HEPARIN SODIUM 1000 [USP'U]/ML
1100 INJECTION, SOLUTION INTRAVENOUS; SUBCUTANEOUS ONCE
Status: COMPLETED | OUTPATIENT
Start: 2021-01-01 | End: 2021-01-01

## 2021-01-01 RX ORDER — GUAIFENESIN 100 MG/5ML
81 LIQUID (ML) ORAL DAILY
Status: DISCONTINUED | OUTPATIENT
Start: 2021-01-01 | End: 2021-01-01

## 2021-01-01 RX ORDER — PROCHLORPERAZINE EDISYLATE 5 MG/ML
10 INJECTION INTRAMUSCULAR; INTRAVENOUS
Status: DISCONTINUED | OUTPATIENT
Start: 2021-01-01 | End: 2021-01-01 | Stop reason: HOSPADM

## 2021-01-01 RX ORDER — INSULIN GLARGINE 100 [IU]/ML
25 INJECTION, SOLUTION SUBCUTANEOUS DAILY
Status: DISCONTINUED | OUTPATIENT
Start: 2021-01-01 | End: 2021-01-01

## 2021-01-01 RX ORDER — BALSAM PERU/CASTOR OIL
OINTMENT (GRAM) TOPICAL 3 TIMES DAILY
Status: DISCONTINUED | OUTPATIENT
Start: 2021-01-01 | End: 2021-01-01

## 2021-01-01 RX ORDER — GLYCOPYRROLATE 0.2 MG/ML
0.2 INJECTION INTRAMUSCULAR; INTRAVENOUS ONCE
Status: DISCONTINUED | OUTPATIENT
Start: 2021-01-01 | End: 2021-01-01 | Stop reason: HOSPADM

## 2021-01-01 RX ORDER — SODIUM CHLORIDE 9 MG/ML
250 INJECTION, SOLUTION INTRAVENOUS AS NEEDED
Status: DISCONTINUED | OUTPATIENT
Start: 2021-01-01 | End: 2021-01-01

## 2021-01-01 RX ORDER — ALBUMIN HUMAN 250 G/1000ML
25 SOLUTION INTRAVENOUS EVERY 6 HOURS
Status: DISCONTINUED | OUTPATIENT
Start: 2021-01-01 | End: 2021-01-01

## 2021-01-01 RX ORDER — SODIUM BICARBONATE IN D5W 150/1000ML
PLASTIC BAG, INJECTION (ML) INTRAVENOUS CONTINUOUS
Status: DISCONTINUED | OUTPATIENT
Start: 2021-01-01 | End: 2021-01-01

## 2021-01-01 RX ORDER — LIDOCAINE HYDROCHLORIDE 20 MG/ML
20 INJECTION, SOLUTION INFILTRATION; PERINEURAL
Status: COMPLETED | OUTPATIENT
Start: 2021-01-01 | End: 2021-01-01

## 2021-01-01 RX ORDER — HEPARIN SODIUM 1000 [USP'U]/ML
1100 INJECTION, SOLUTION INTRAVENOUS; SUBCUTANEOUS AS NEEDED
Status: DISCONTINUED | OUTPATIENT
Start: 2021-01-01 | End: 2021-01-01

## 2021-01-01 RX ORDER — SODIUM CHLORIDE, SODIUM LACTATE, POTASSIUM CHLORIDE, CALCIUM CHLORIDE 600; 310; 30; 20 MG/100ML; MG/100ML; MG/100ML; MG/100ML
125 INJECTION, SOLUTION INTRAVENOUS CONTINUOUS
Status: DISCONTINUED | OUTPATIENT
Start: 2021-01-01 | End: 2021-01-01

## 2021-01-01 RX ORDER — HEPARIN 100 UNIT/ML
300 SYRINGE INTRAVENOUS AS NEEDED
Status: DISCONTINUED | OUTPATIENT
Start: 2021-01-01 | End: 2021-01-01

## 2021-01-01 RX ORDER — ATENOLOL 25 MG/1
50 TABLET ORAL DAILY
Status: DISCONTINUED | OUTPATIENT
Start: 2021-01-01 | End: 2021-01-01

## 2021-01-01 RX ORDER — ASPIRIN 81 MG/1
81 TABLET ORAL DAILY
Status: DISCONTINUED | OUTPATIENT
Start: 2021-01-01 | End: 2021-01-01

## 2021-01-01 RX ORDER — MIDAZOLAM IN 0.9 % SOD.CHLORID 1 MG/ML
0-10 PLASTIC BAG, INJECTION (ML) INTRAVENOUS
Status: DISCONTINUED | OUTPATIENT
Start: 2021-01-01 | End: 2021-01-01 | Stop reason: HOSPADM

## 2021-01-01 RX ORDER — ROCURONIUM BROMIDE 10 MG/ML
100 INJECTION, SOLUTION INTRAVENOUS
Status: DISPENSED | OUTPATIENT
Start: 2021-01-01 | End: 2021-01-01

## 2021-01-01 RX ORDER — INSULIN LISPRO 100 [IU]/ML
INJECTION, SOLUTION INTRAVENOUS; SUBCUTANEOUS EVERY 6 HOURS
Status: DISCONTINUED | OUTPATIENT
Start: 2021-01-01 | End: 2021-01-01

## 2021-01-01 RX ORDER — SODIUM CHLORIDE 0.9 % (FLUSH) 0.9 %
5-40 SYRINGE (ML) INJECTION EVERY 8 HOURS
Status: DISCONTINUED | OUTPATIENT
Start: 2021-01-01 | End: 2021-01-01

## 2021-01-01 RX ORDER — ROCURONIUM BROMIDE 10 MG/ML
INJECTION, SOLUTION INTRAVENOUS
Status: DISPENSED
Start: 2021-01-01 | End: 2021-01-01

## 2021-01-01 RX ORDER — ALBUMIN HUMAN 50 G/1000ML
12.5 SOLUTION INTRAVENOUS
Status: COMPLETED | OUTPATIENT
Start: 2021-01-01 | End: 2021-01-01

## 2021-01-01 RX ORDER — INSULIN GLARGINE 100 [IU]/ML
10 INJECTION, SOLUTION SUBCUTANEOUS DAILY
Status: DISCONTINUED | OUTPATIENT
Start: 2021-01-01 | End: 2021-01-01

## 2021-01-01 RX ORDER — DOPAMINE HYDROCHLORIDE 320 MG/100ML
0-20 INJECTION, SOLUTION INTRAVENOUS
Status: DISCONTINUED | OUTPATIENT
Start: 2021-01-01 | End: 2021-01-01

## 2021-01-01 RX ORDER — MIDAZOLAM IN 0.9 % SOD.CHLORID 1 MG/ML
10 PLASTIC BAG, INJECTION (ML) INTRAVENOUS CONTINUOUS
Status: DISCONTINUED | OUTPATIENT
Start: 2021-01-01 | End: 2021-01-01 | Stop reason: HOSPADM

## 2021-01-01 RX ORDER — SODIUM CHLORIDE, SODIUM LACTATE, POTASSIUM CHLORIDE, CALCIUM CHLORIDE 600; 310; 30; 20 MG/100ML; MG/100ML; MG/100ML; MG/100ML
100 INJECTION, SOLUTION INTRAVENOUS CONTINUOUS
Status: DISPENSED | OUTPATIENT
Start: 2021-01-01 | End: 2021-01-01

## 2021-01-01 RX ORDER — ONDANSETRON 2 MG/ML
4 INJECTION INTRAMUSCULAR; INTRAVENOUS
Status: DISCONTINUED | OUTPATIENT
Start: 2021-01-01 | End: 2021-01-01

## 2021-01-01 RX ORDER — OLANZAPINE 5 MG/1
10 TABLET ORAL
Status: DISCONTINUED | OUTPATIENT
Start: 2021-01-01 | End: 2021-01-01 | Stop reason: HOSPADM

## 2021-01-01 RX ORDER — HEPARIN SODIUM 1000 [USP'U]/ML
INJECTION, SOLUTION INTRAVENOUS; SUBCUTANEOUS
Status: COMPLETED
Start: 2021-01-01 | End: 2021-01-01

## 2021-01-01 RX ORDER — HYDROMORPHONE HYDROCHLORIDE 1 MG/ML
1 INJECTION, SOLUTION INTRAMUSCULAR; INTRAVENOUS; SUBCUTANEOUS
Status: DISCONTINUED | OUTPATIENT
Start: 2021-01-01 | End: 2021-01-01

## 2021-01-01 RX ADMIN — HEPARIN SODIUM 5000 UNITS: 5000 INJECTION INTRAVENOUS; SUBCUTANEOUS at 22:00

## 2021-01-01 RX ADMIN — HYDROCORTISONE SODIUM SUCCINATE 100 MG: 100 INJECTION, POWDER, FOR SOLUTION INTRAMUSCULAR; INTRAVENOUS at 08:50

## 2021-01-01 RX ADMIN — CALCIUM CHLORIDE, MAGNESIUM CHLORIDE, DEXTROSE MONOHYDRATE, LACTIC ACID, SODIUM CHLORIDE, SODIUM BICARBONATE AND POTASSIUM CHLORIDE: 5.15; 2.03; 22; 5.4; 6.46; 3.09; .157 INJECTION INTRAVENOUS at 09:08

## 2021-01-01 RX ADMIN — ASPIRIN 81 MG: 81 TABLET, COATED ORAL at 08:39

## 2021-01-01 RX ADMIN — CALCIUM CHLORIDE, MAGNESIUM CHLORIDE, DEXTROSE MONOHYDRATE, LACTIC ACID, SODIUM CHLORIDE, SODIUM BICARBONATE AND POTASSIUM CHLORIDE: 3.68; 3.05; 22; 5.4; 6.46; 3.09; .314 INJECTION INTRAVENOUS at 20:39

## 2021-01-01 RX ADMIN — CALCIUM CHLORIDE, MAGNESIUM CHLORIDE, DEXTROSE MONOHYDRATE, LACTIC ACID, SODIUM CHLORIDE, SODIUM BICARBONATE AND POTASSIUM CHLORIDE: 5.15; 2.03; 22; 5.4; 6.46; 3.09; .157 INJECTION INTRAVENOUS at 15:09

## 2021-01-01 RX ADMIN — MEROPENEM 500 MG: 500 INJECTION, POWDER, FOR SOLUTION INTRAVENOUS at 02:28

## 2021-01-01 RX ADMIN — SODIUM CHLORIDE 40 MG: 9 INJECTION INTRAMUSCULAR; INTRAVENOUS; SUBCUTANEOUS at 08:06

## 2021-01-01 RX ADMIN — HYDROMORPHONE HYDROCHLORIDE 2 MG: 2 INJECTION INTRAMUSCULAR; INTRAVENOUS; SUBCUTANEOUS at 16:15

## 2021-01-01 RX ADMIN — CHLORHEXIDINE GLUCONATE 15 ML: 0.12 RINSE ORAL at 22:57

## 2021-01-01 RX ADMIN — HYDROMORPHONE HYDROCHLORIDE 1 MG: 1 INJECTION, SOLUTION INTRAMUSCULAR; INTRAVENOUS; SUBCUTANEOUS at 00:02

## 2021-01-01 RX ADMIN — GLYCOPYRROLATE 0.2 MG: 0.2 INJECTION INTRAMUSCULAR; INTRAVENOUS at 02:32

## 2021-01-01 RX ADMIN — CALCIUM CHLORIDE, MAGNESIUM CHLORIDE, DEXTROSE MONOHYDRATE, LACTIC ACID, SODIUM CHLORIDE, SODIUM BICARBONATE AND POTASSIUM CHLORIDE: 3.68; 3.05; 22; 5.4; 6.46; 3.09; .314 INJECTION INTRAVENOUS at 17:46

## 2021-01-01 RX ADMIN — HEPARIN SODIUM 2500 UNITS: 1000 INJECTION, SOLUTION INTRAVENOUS; SUBCUTANEOUS at 12:40

## 2021-01-01 RX ADMIN — SODIUM CHLORIDE 5 ML/HR: 9 INJECTION, SOLUTION INTRAVENOUS at 08:05

## 2021-01-01 RX ADMIN — SODIUM CHLORIDE 3 ML/HR: 9 INJECTION, SOLUTION INTRAVENOUS at 00:00

## 2021-01-01 RX ADMIN — Medication 10 ML: at 05:02

## 2021-01-01 RX ADMIN — HEPARIN SODIUM 5000 UNITS: 5000 INJECTION INTRAVENOUS; SUBCUTANEOUS at 14:25

## 2021-01-01 RX ADMIN — VASOPRESSIN 0.03 UNITS/MIN: 20 INJECTION INTRAVENOUS at 06:02

## 2021-01-01 RX ADMIN — SODIUM CHLORIDE 40 MG: 9 INJECTION INTRAMUSCULAR; INTRAVENOUS; SUBCUTANEOUS at 08:25

## 2021-01-01 RX ADMIN — CALCIUM CHLORIDE, MAGNESIUM CHLORIDE, DEXTROSE MONOHYDRATE, LACTIC ACID, SODIUM CHLORIDE, SODIUM BICARBONATE AND POTASSIUM CHLORIDE: 3.68; 3.05; 22; 5.4; 6.46; 3.09; .314 INJECTION INTRAVENOUS at 12:34

## 2021-01-01 RX ADMIN — PROPOFOL 25 MCG/KG/MIN: 10 INJECTION, EMULSION INTRAVENOUS at 16:26

## 2021-01-01 RX ADMIN — HEPARIN SODIUM 5000 UNITS: 5000 INJECTION INTRAVENOUS; SUBCUTANEOUS at 07:00

## 2021-01-01 RX ADMIN — CALCIUM CHLORIDE, MAGNESIUM CHLORIDE, DEXTROSE MONOHYDRATE, LACTIC ACID, SODIUM CHLORIDE, SODIUM BICARBONATE AND POTASSIUM CHLORIDE: 3.68; 3.05; 22; 5.4; 6.46; 3.09; .314 INJECTION INTRAVENOUS at 19:20

## 2021-01-01 RX ADMIN — CALCIUM CHLORIDE, MAGNESIUM CHLORIDE, DEXTROSE MONOHYDRATE, LACTIC ACID, SODIUM CHLORIDE, SODIUM BICARBONATE AND POTASSIUM CHLORIDE: 5.15; 2.03; 22; 5.4; 6.46; 3.09; .157 INJECTION INTRAVENOUS at 07:15

## 2021-01-01 RX ADMIN — ASPIRIN 81 MG: 81 TABLET, COATED ORAL at 09:08

## 2021-01-01 RX ADMIN — VASOPRESSIN 0.04 UNITS/MIN: 20 INJECTION INTRAVENOUS at 11:41

## 2021-01-01 RX ADMIN — FOLIC ACID: 5 INJECTION, SOLUTION INTRAMUSCULAR; INTRAVENOUS; SUBCUTANEOUS at 18:26

## 2021-01-01 RX ADMIN — INSULIN LISPRO 2 UNITS: 100 INJECTION, SOLUTION INTRAVENOUS; SUBCUTANEOUS at 06:30

## 2021-01-01 RX ADMIN — MIDODRINE HYDROCHLORIDE 20 MG: 5 TABLET ORAL at 14:02

## 2021-01-01 RX ADMIN — CALCIUM CHLORIDE, MAGNESIUM CHLORIDE, DEXTROSE MONOHYDRATE, LACTIC ACID, SODIUM CHLORIDE, SODIUM BICARBONATE AND POTASSIUM CHLORIDE: 3.68; 3.05; 22; 5.4; 6.46; 3.09; .314 INJECTION INTRAVENOUS at 13:07

## 2021-01-01 RX ADMIN — CALCIUM CHLORIDE, MAGNESIUM CHLORIDE, DEXTROSE MONOHYDRATE, LACTIC ACID, SODIUM CHLORIDE, SODIUM BICARBONATE AND POTASSIUM CHLORIDE: 3.68; 3.05; 22; 5.4; 6.46; 3.09; .314 INJECTION INTRAVENOUS at 00:00

## 2021-01-01 RX ADMIN — HYDROMORPHONE HYDROCHLORIDE 1 MG: 1 INJECTION, SOLUTION INTRAMUSCULAR; INTRAVENOUS; SUBCUTANEOUS at 23:09

## 2021-01-01 RX ADMIN — DEXMEDETOMIDINE HYDROCHLORIDE 1.4 MCG/KG/HR: 4 INJECTION, SOLUTION INTRAVENOUS at 22:15

## 2021-01-01 RX ADMIN — CALCIUM CHLORIDE, MAGNESIUM CHLORIDE, DEXTROSE MONOHYDRATE, LACTIC ACID, SODIUM CHLORIDE, SODIUM BICARBONATE AND POTASSIUM CHLORIDE: 5.15; 2.03; 22; 5.4; 6.46; 3.09; .157 INJECTION INTRAVENOUS at 19:05

## 2021-01-01 RX ADMIN — HEPARIN SODIUM 5000 UNITS: 5000 INJECTION INTRAVENOUS; SUBCUTANEOUS at 06:01

## 2021-01-01 RX ADMIN — CALCIUM CHLORIDE, MAGNESIUM CHLORIDE, DEXTROSE MONOHYDRATE, LACTIC ACID, SODIUM CHLORIDE, SODIUM BICARBONATE AND POTASSIUM CHLORIDE: 5.15; 2.03; 22; 5.4; 6.46; 3.09; .157 INJECTION INTRAVENOUS at 16:48

## 2021-01-01 RX ADMIN — CALCIUM CHLORIDE, MAGNESIUM CHLORIDE, DEXTROSE MONOHYDRATE, LACTIC ACID, SODIUM CHLORIDE, SODIUM BICARBONATE AND POTASSIUM CHLORIDE: 3.68; 3.05; 22; 5.4; 6.46; 3.09; .314 INJECTION INTRAVENOUS at 08:41

## 2021-01-01 RX ADMIN — FENTANYL CITRATE 150 MCG/HR: 50 INJECTION, SOLUTION INTRAMUSCULAR; INTRAVENOUS at 16:30

## 2021-01-01 RX ADMIN — Medication 10 ML: at 21:04

## 2021-01-01 RX ADMIN — CALCIUM CHLORIDE, MAGNESIUM CHLORIDE, DEXTROSE MONOHYDRATE, LACTIC ACID, SODIUM CHLORIDE, SODIUM BICARBONATE AND POTASSIUM CHLORIDE: 3.68; 3.05; 22; 5.4; 6.46; 3.09; .314 INJECTION INTRAVENOUS at 12:25

## 2021-01-01 RX ADMIN — SODIUM CHLORIDE 40 MG: 9 INJECTION INTRAMUSCULAR; INTRAVENOUS; SUBCUTANEOUS at 08:52

## 2021-01-01 RX ADMIN — HYDROCORTISONE SODIUM SUCCINATE 50 MG: 100 INJECTION, POWDER, FOR SOLUTION INTRAMUSCULAR; INTRAVENOUS at 17:57

## 2021-01-01 RX ADMIN — INSULIN LISPRO 2 UNITS: 100 INJECTION, SOLUTION INTRAVENOUS; SUBCUTANEOUS at 18:21

## 2021-01-01 RX ADMIN — HYDROMORPHONE HYDROCHLORIDE 1 MG: 1 INJECTION, SOLUTION INTRAMUSCULAR; INTRAVENOUS; SUBCUTANEOUS at 03:02

## 2021-01-01 RX ADMIN — ALTEPLASE 2 MG: 2.2 INJECTION, POWDER, LYOPHILIZED, FOR SOLUTION INTRAVENOUS at 23:37

## 2021-01-01 RX ADMIN — THIAMINE HYDROCHLORIDE: 100 INJECTION, SOLUTION INTRAMUSCULAR; INTRAVENOUS at 18:17

## 2021-01-01 RX ADMIN — INSULIN LISPRO 2 UNITS: 100 INJECTION, SOLUTION INTRAVENOUS; SUBCUTANEOUS at 18:33

## 2021-01-01 RX ADMIN — INSULIN LISPRO 2 UNITS: 100 INJECTION, SOLUTION INTRAVENOUS; SUBCUTANEOUS at 12:31

## 2021-01-01 RX ADMIN — CALCIUM GLUCONATE 4 G: 98 INJECTION, SOLUTION INTRAVENOUS at 19:42

## 2021-01-01 RX ADMIN — MIDAZOLAM HYDROCHLORIDE 4 MG: 1 INJECTION, SOLUTION INTRAMUSCULAR; INTRAVENOUS at 23:18

## 2021-01-01 RX ADMIN — MIDODRINE HYDROCHLORIDE 20 MG: 5 TABLET ORAL at 06:08

## 2021-01-01 RX ADMIN — SODIUM CHLORIDE 10 MG/HR: 900 INJECTION, SOLUTION INTRAVENOUS at 15:18

## 2021-01-01 RX ADMIN — METRONIDAZOLE 500 MG: 500 INJECTION, SOLUTION INTRAVENOUS at 09:58

## 2021-01-01 RX ADMIN — INSULIN LISPRO 5 UNITS: 100 INJECTION, SOLUTION INTRAVENOUS; SUBCUTANEOUS at 12:07

## 2021-01-01 RX ADMIN — ALBUMIN (HUMAN) 25 G: 0.25 INJECTION, SOLUTION INTRAVENOUS at 17:15

## 2021-01-01 RX ADMIN — CALCIUM CHLORIDE, MAGNESIUM CHLORIDE, DEXTROSE MONOHYDRATE, LACTIC ACID, SODIUM CHLORIDE, SODIUM BICARBONATE AND POTASSIUM CHLORIDE: 3.68; 3.05; 22; 5.4; 6.46; 3.09; .314 INJECTION INTRAVENOUS at 17:38

## 2021-01-01 RX ADMIN — CALCIUM CHLORIDE, MAGNESIUM CHLORIDE, DEXTROSE MONOHYDRATE, LACTIC ACID, SODIUM CHLORIDE, SODIUM BICARBONATE AND POTASSIUM CHLORIDE: 3.68; 3.05; 22; 5.4; 6.46; 3.09; .314 INJECTION INTRAVENOUS at 17:30

## 2021-01-01 RX ADMIN — NOREPINEPHRINE BITARTRATE 6 MCG/MIN: 1 SOLUTION INTRAVENOUS at 09:42

## 2021-01-01 RX ADMIN — INSULIN LISPRO 3 UNITS: 100 INJECTION, SOLUTION INTRAVENOUS; SUBCUTANEOUS at 00:14

## 2021-01-01 RX ADMIN — MIDODRINE HYDROCHLORIDE 20 MG: 5 TABLET ORAL at 21:17

## 2021-01-01 RX ADMIN — Medication 10 ML: at 05:05

## 2021-01-01 RX ADMIN — DEXMEDETOMIDINE HYDROCHLORIDE 0.8 MCG/KG/HR: 4 INJECTION, SOLUTION INTRAVENOUS at 18:21

## 2021-01-01 RX ADMIN — MIDODRINE HYDROCHLORIDE 20 MG: 5 TABLET ORAL at 14:25

## 2021-01-01 RX ADMIN — MIDAZOLAM HYDROCHLORIDE 9 MG/HR: 5 INJECTION, SOLUTION INTRAMUSCULAR; INTRAVENOUS at 08:51

## 2021-01-01 RX ADMIN — CHLORHEXIDINE GLUCONATE 15 ML: 0.12 RINSE ORAL at 20:20

## 2021-01-01 RX ADMIN — Medication 10 ML: at 05:22

## 2021-01-01 RX ADMIN — HEPARIN SODIUM 5000 UNITS: 5000 INJECTION INTRAVENOUS; SUBCUTANEOUS at 23:17

## 2021-01-01 RX ADMIN — INSULIN LISPRO 2 UNITS: 100 INJECTION, SOLUTION INTRAVENOUS; SUBCUTANEOUS at 05:59

## 2021-01-01 RX ADMIN — HEPARIN SODIUM 1400 UNITS: 1000 INJECTION INTRAVENOUS; SUBCUTANEOUS at 19:09

## 2021-01-01 RX ADMIN — DEXMEDETOMIDINE HYDROCHLORIDE 1.4 MCG/KG/HR: 4 INJECTION, SOLUTION INTRAVENOUS at 03:00

## 2021-01-01 RX ADMIN — HYDROCORTISONE SODIUM SUCCINATE 100 MG: 100 INJECTION, POWDER, FOR SOLUTION INTRAMUSCULAR; INTRAVENOUS at 08:41

## 2021-01-01 RX ADMIN — HYDROCORTISONE SODIUM SUCCINATE 100 MG: 100 INJECTION, POWDER, FOR SOLUTION INTRAMUSCULAR; INTRAVENOUS at 00:00

## 2021-01-01 RX ADMIN — ASPIRIN 81 MG CHEWABLE TABLET 81 MG: 81 TABLET CHEWABLE at 08:56

## 2021-01-01 RX ADMIN — VASOPRESSIN 0.04 UNITS/MIN: 20 INJECTION INTRAVENOUS at 05:32

## 2021-01-01 RX ADMIN — INSULIN LISPRO 2 UNITS: 100 INJECTION, SOLUTION INTRAVENOUS; SUBCUTANEOUS at 23:41

## 2021-01-01 RX ADMIN — CALCIUM CHLORIDE, MAGNESIUM CHLORIDE, DEXTROSE MONOHYDRATE, LACTIC ACID, SODIUM CHLORIDE, SODIUM BICARBONATE AND POTASSIUM CHLORIDE: 5.15; 2.03; 22; 5.4; 6.46; 3.09; .157 INJECTION INTRAVENOUS at 08:21

## 2021-01-01 RX ADMIN — CALCIUM CHLORIDE, MAGNESIUM CHLORIDE, DEXTROSE MONOHYDRATE, LACTIC ACID, SODIUM CHLORIDE, SODIUM BICARBONATE AND POTASSIUM CHLORIDE: 3.68; 3.05; 22; 5.4; 6.46; 3.09; .314 INJECTION INTRAVENOUS at 03:34

## 2021-01-01 RX ADMIN — DEXMEDETOMIDINE HYDROCHLORIDE 1.4 MCG/KG/HR: 4 INJECTION, SOLUTION INTRAVENOUS at 15:07

## 2021-01-01 RX ADMIN — SODIUM CHLORIDE, POTASSIUM CHLORIDE, SODIUM LACTATE AND CALCIUM CHLORIDE 500 ML/HR: 600; 310; 30; 20 INJECTION, SOLUTION INTRAVENOUS at 23:00

## 2021-01-01 RX ADMIN — FENTANYL CITRATE 150 MCG/HR: 0.05 INJECTION, SOLUTION INTRAMUSCULAR; INTRAVENOUS at 06:54

## 2021-01-01 RX ADMIN — SODIUM CHLORIDE 40 MG: 9 INJECTION INTRAMUSCULAR; INTRAVENOUS; SUBCUTANEOUS at 11:13

## 2021-01-01 RX ADMIN — Medication 10 ML: at 14:00

## 2021-01-01 RX ADMIN — SODIUM BICARBONATE: 84 INJECTION, SOLUTION INTRAVENOUS at 10:24

## 2021-01-01 RX ADMIN — CALCIUM CHLORIDE, MAGNESIUM CHLORIDE, DEXTROSE MONOHYDRATE, LACTIC ACID, SODIUM CHLORIDE, SODIUM BICARBONATE AND POTASSIUM CHLORIDE: 3.68; 3.05; 22; 5.4; 6.46; 3.09; .314 INJECTION INTRAVENOUS at 16:00

## 2021-01-01 RX ADMIN — CALCIUM CHLORIDE, MAGNESIUM CHLORIDE, DEXTROSE MONOHYDRATE, LACTIC ACID, SODIUM CHLORIDE, SODIUM BICARBONATE AND POTASSIUM CHLORIDE: 3.68; 3.05; 22; 5.4; 6.46; 3.09; .314 INJECTION INTRAVENOUS at 07:08

## 2021-01-01 RX ADMIN — CHLORHEXIDINE GLUCONATE 15 ML: 0.12 RINSE ORAL at 09:00

## 2021-01-01 RX ADMIN — INSULIN LISPRO 2 UNITS: 100 INJECTION, SOLUTION INTRAVENOUS; SUBCUTANEOUS at 23:36

## 2021-01-01 RX ADMIN — Medication 10 ML: at 05:11

## 2021-01-01 RX ADMIN — Medication 10 ML: at 16:45

## 2021-01-01 RX ADMIN — VASOPRESSIN 0.03 UNITS/MIN: 20 INJECTION INTRAVENOUS at 06:29

## 2021-01-01 RX ADMIN — CALCIUM CHLORIDE, MAGNESIUM CHLORIDE, DEXTROSE MONOHYDRATE, LACTIC ACID, SODIUM CHLORIDE, SODIUM BICARBONATE AND POTASSIUM CHLORIDE: 3.68; 3.05; 22; 5.4; 6.46; 3.09; .314 INJECTION INTRAVENOUS at 00:08

## 2021-01-01 RX ADMIN — HEPARIN SODIUM 5000 UNITS: 5000 INJECTION INTRAVENOUS; SUBCUTANEOUS at 22:59

## 2021-01-01 RX ADMIN — CALCIUM CHLORIDE, MAGNESIUM CHLORIDE, DEXTROSE MONOHYDRATE, LACTIC ACID, SODIUM CHLORIDE, SODIUM BICARBONATE AND POTASSIUM CHLORIDE: 5.15; 2.03; 22; 5.4; 6.46; 3.09; .157 INJECTION INTRAVENOUS at 07:23

## 2021-01-01 RX ADMIN — MIDODRINE HYDROCHLORIDE 20 MG: 5 TABLET ORAL at 21:01

## 2021-01-01 RX ADMIN — CHLORHEXIDINE GLUCONATE 15 ML: 0.12 RINSE ORAL at 20:41

## 2021-01-01 RX ADMIN — HEPARIN SODIUM 5000 UNITS: 5000 INJECTION INTRAVENOUS; SUBCUTANEOUS at 15:05

## 2021-01-01 RX ADMIN — HEPARIN SODIUM 5000 UNITS: 5000 INJECTION INTRAVENOUS; SUBCUTANEOUS at 06:42

## 2021-01-01 RX ADMIN — CEFEPIME HYDROCHLORIDE 2 G: 2 INJECTION, POWDER, FOR SOLUTION INTRAVENOUS at 08:45

## 2021-01-01 RX ADMIN — EPINEPHRINE 5 MCG/MIN: 1 INJECTION INTRAMUSCULAR; INTRAVENOUS; SUBCUTANEOUS at 09:42

## 2021-01-01 RX ADMIN — HYDROCORTISONE SODIUM SUCCINATE 100 MG: 100 INJECTION, POWDER, FOR SOLUTION INTRAMUSCULAR; INTRAVENOUS at 11:13

## 2021-01-01 RX ADMIN — SODIUM BICARBONATE: 84 INJECTION, SOLUTION INTRAVENOUS at 00:54

## 2021-01-01 RX ADMIN — VASOPRESSIN 0.04 UNITS/MIN: 20 INJECTION INTRAVENOUS at 09:36

## 2021-01-01 RX ADMIN — DEXMEDETOMIDINE HYDROCHLORIDE 1.4 MCG/KG/HR: 4 INJECTION, SOLUTION INTRAVENOUS at 04:40

## 2021-01-01 RX ADMIN — DEXMEDETOMIDINE HYDROCHLORIDE 1.4 MCG/KG/HR: 4 INJECTION, SOLUTION INTRAVENOUS at 05:58

## 2021-01-01 RX ADMIN — FENTANYL CITRATE 75 MCG/HR: 0.05 INJECTION, SOLUTION INTRAMUSCULAR; INTRAVENOUS at 18:08

## 2021-01-01 RX ADMIN — Medication: at 18:21

## 2021-01-01 RX ADMIN — Medication: at 17:04

## 2021-01-01 RX ADMIN — ASPIRIN 81 MG CHEWABLE TABLET 81 MG: 81 TABLET CHEWABLE at 08:12

## 2021-01-01 RX ADMIN — ALBUMIN (HUMAN) 25 G: 12.5 INJECTION, SOLUTION INTRAVENOUS at 06:40

## 2021-01-01 RX ADMIN — ONDANSETRON 4 MG: 2 INJECTION INTRAMUSCULAR; INTRAVENOUS at 08:25

## 2021-01-01 RX ADMIN — MEROPENEM 500 MG: 500 INJECTION, POWDER, FOR SOLUTION INTRAVENOUS at 19:06

## 2021-01-01 RX ADMIN — VASOPRESSIN 0.03 UNITS/MIN: 20 INJECTION INTRAVENOUS at 11:12

## 2021-01-01 RX ADMIN — Medication 10 ML: at 21:31

## 2021-01-01 RX ADMIN — HEPARIN SODIUM 5000 UNITS: 5000 INJECTION INTRAVENOUS; SUBCUTANEOUS at 06:10

## 2021-01-01 RX ADMIN — DEXMEDETOMIDINE HYDROCHLORIDE 1.4 MCG/KG/HR: 4 INJECTION, SOLUTION INTRAVENOUS at 12:01

## 2021-01-01 RX ADMIN — ONDANSETRON 4 MG: 2 INJECTION INTRAMUSCULAR; INTRAVENOUS at 10:48

## 2021-01-01 RX ADMIN — CALCIUM GLUCONATE: 94 INJECTION, SOLUTION INTRAVENOUS at 18:48

## 2021-01-01 RX ADMIN — ASPIRIN 81 MG CHEWABLE TABLET 81 MG: 81 TABLET CHEWABLE at 08:19

## 2021-01-01 RX ADMIN — HYDROMORPHONE HYDROCHLORIDE 1 MG: 1 INJECTION, SOLUTION INTRAMUSCULAR; INTRAVENOUS; SUBCUTANEOUS at 04:46

## 2021-01-01 RX ADMIN — PHENYLEPHRINE HYDROCHLORIDE 20 MCG/MIN: 10 INJECTION INTRAVENOUS at 08:17

## 2021-01-01 RX ADMIN — MEROPENEM 500 MG: 500 INJECTION, POWDER, FOR SOLUTION INTRAVENOUS at 09:08

## 2021-01-01 RX ADMIN — ALBUMIN (HUMAN) 25 G: 12.5 INJECTION, SOLUTION INTRAVENOUS at 04:20

## 2021-01-01 RX ADMIN — NOREPINEPHRINE BITARTRATE 4 MCG/MIN: 1 SOLUTION INTRAVENOUS at 17:52

## 2021-01-01 RX ADMIN — ALBUMIN (HUMAN) 25 G: 0.25 INJECTION, SOLUTION INTRAVENOUS at 06:54

## 2021-01-01 RX ADMIN — HYDROMORPHONE HYDROCHLORIDE 1 MG: 1 INJECTION, SOLUTION INTRAMUSCULAR; INTRAVENOUS; SUBCUTANEOUS at 03:27

## 2021-01-01 RX ADMIN — LORAZEPAM 0.5 MG: 2 INJECTION INTRAMUSCULAR; INTRAVENOUS at 19:33

## 2021-01-01 RX ADMIN — MIDODRINE HYDROCHLORIDE 10 MG: 5 TABLET ORAL at 08:41

## 2021-01-01 RX ADMIN — MIDODRINE HYDROCHLORIDE 20 MG: 5 TABLET ORAL at 13:38

## 2021-01-01 RX ADMIN — CALCIUM CHLORIDE, MAGNESIUM CHLORIDE, DEXTROSE MONOHYDRATE, LACTIC ACID, SODIUM CHLORIDE, SODIUM BICARBONATE AND POTASSIUM CHLORIDE: 3.68; 3.05; 22; 5.4; 6.46; 3.09; .314 INJECTION INTRAVENOUS at 22:00

## 2021-01-01 RX ADMIN — Medication 10 ML: at 17:32

## 2021-01-01 RX ADMIN — EPINEPHRINE 3 MCG/MIN: 1 INJECTION INTRAMUSCULAR; INTRAVENOUS; SUBCUTANEOUS at 01:51

## 2021-01-01 RX ADMIN — INSULIN LISPRO 2 UNITS: 100 INJECTION, SOLUTION INTRAVENOUS; SUBCUTANEOUS at 11:35

## 2021-01-01 RX ADMIN — HEPARIN SODIUM 5000 UNITS: 5000 INJECTION INTRAVENOUS; SUBCUTANEOUS at 22:54

## 2021-01-01 RX ADMIN — CALCIUM CHLORIDE, MAGNESIUM CHLORIDE, DEXTROSE MONOHYDRATE, LACTIC ACID, SODIUM CHLORIDE, SODIUM BICARBONATE AND POTASSIUM CHLORIDE: 3.68; 3.05; 22; 5.4; 6.46; 3.09; .314 INJECTION INTRAVENOUS at 07:43

## 2021-01-01 RX ADMIN — HYDROMORPHONE HYDROCHLORIDE 1 MG: 1 INJECTION, SOLUTION INTRAMUSCULAR; INTRAVENOUS; SUBCUTANEOUS at 16:10

## 2021-01-01 RX ADMIN — DEXMEDETOMIDINE HYDROCHLORIDE 1.4 MCG/KG/HR: 4 INJECTION, SOLUTION INTRAVENOUS at 07:07

## 2021-01-01 RX ADMIN — VASOPRESSIN 0.04 UNITS/MIN: 20 INJECTION INTRAVENOUS at 19:15

## 2021-01-01 RX ADMIN — HYDROMORPHONE HYDROCHLORIDE 1 MG: 1 INJECTION, SOLUTION INTRAMUSCULAR; INTRAVENOUS; SUBCUTANEOUS at 16:32

## 2021-01-01 RX ADMIN — PROPOFOL 25 MCG/KG/MIN: 10 INJECTION, EMULSION INTRAVENOUS at 20:46

## 2021-01-01 RX ADMIN — DEXMEDETOMIDINE HYDROCHLORIDE 1.3 MCG/KG/HR: 4 INJECTION, SOLUTION INTRAVENOUS at 05:13

## 2021-01-01 RX ADMIN — METRONIDAZOLE 500 MG: 500 INJECTION, SOLUTION INTRAVENOUS at 13:45

## 2021-01-01 RX ADMIN — ACETAMINOPHEN 650 MG: 325 TABLET ORAL at 18:50

## 2021-01-01 RX ADMIN — INSULIN GLARGINE 10 UNITS: 100 INJECTION, SOLUTION SUBCUTANEOUS at 08:12

## 2021-01-01 RX ADMIN — METRONIDAZOLE 500 MG: 500 INJECTION, SOLUTION INTRAVENOUS at 21:49

## 2021-01-01 RX ADMIN — EPINEPHRINE 5 MCG/MIN: 1 INJECTION INTRAMUSCULAR; INTRAVENOUS; SUBCUTANEOUS at 11:43

## 2021-01-01 RX ADMIN — FENTANYL CITRATE 200 MCG/HR: 50 INJECTION, SOLUTION INTRAMUSCULAR; INTRAVENOUS at 05:07

## 2021-01-01 RX ADMIN — CALCIUM GLUCONATE: 94 INJECTION, SOLUTION INTRAVENOUS at 19:05

## 2021-01-01 RX ADMIN — HYDROCORTISONE SODIUM SUCCINATE 100 MG: 100 INJECTION, POWDER, FOR SOLUTION INTRAMUSCULAR; INTRAVENOUS at 08:56

## 2021-01-01 RX ADMIN — MEROPENEM 500 MG: 500 INJECTION, POWDER, FOR SOLUTION INTRAVENOUS at 08:25

## 2021-01-01 RX ADMIN — CALCIUM CHLORIDE, MAGNESIUM CHLORIDE, DEXTROSE MONOHYDRATE, LACTIC ACID, SODIUM CHLORIDE, SODIUM BICARBONATE AND POTASSIUM CHLORIDE: 5.15; 2.03; 22; 5.4; 6.46; 3.09; .157 INJECTION INTRAVENOUS at 01:56

## 2021-01-01 RX ADMIN — HYDROMORPHONE HYDROCHLORIDE 1 MG: 1 INJECTION, SOLUTION INTRAMUSCULAR; INTRAVENOUS; SUBCUTANEOUS at 23:42

## 2021-01-01 RX ADMIN — INSULIN GLARGINE 10 UNITS: 100 INJECTION, SOLUTION SUBCUTANEOUS at 08:13

## 2021-01-01 RX ADMIN — MEROPENEM 500 MG: 500 INJECTION, POWDER, FOR SOLUTION INTRAVENOUS at 03:02

## 2021-01-01 RX ADMIN — HYDROCORTISONE SODIUM SUCCINATE 100 MG: 100 INJECTION, POWDER, FOR SOLUTION INTRAMUSCULAR; INTRAVENOUS at 23:38

## 2021-01-01 RX ADMIN — HEPARIN SODIUM 5000 UNITS: 5000 INJECTION INTRAVENOUS; SUBCUTANEOUS at 17:57

## 2021-01-01 RX ADMIN — MIDODRINE HYDROCHLORIDE 20 MG: 5 TABLET ORAL at 18:36

## 2021-01-01 RX ADMIN — HEPARIN SODIUM 5000 UNITS: 5000 INJECTION INTRAVENOUS; SUBCUTANEOUS at 15:00

## 2021-01-01 RX ADMIN — ALTEPLASE 2 MG: 2.2 INJECTION, POWDER, LYOPHILIZED, FOR SOLUTION INTRAVENOUS at 22:52

## 2021-01-01 RX ADMIN — ALBUMIN (HUMAN) 12.5 G: 12.5 INJECTION, SOLUTION INTRAVENOUS at 02:29

## 2021-01-01 RX ADMIN — MIDODRINE HYDROCHLORIDE 20 MG: 5 TABLET ORAL at 05:11

## 2021-01-01 RX ADMIN — CEFEPIME HYDROCHLORIDE 2 G: 2 INJECTION, POWDER, FOR SOLUTION INTRAVENOUS at 20:30

## 2021-01-01 RX ADMIN — INSULIN LISPRO 3 UNITS: 100 INJECTION, SOLUTION INTRAVENOUS; SUBCUTANEOUS at 17:11

## 2021-01-01 RX ADMIN — CHLORHEXIDINE GLUCONATE 15 ML: 0.12 RINSE ORAL at 20:33

## 2021-01-01 RX ADMIN — Medication 100 MCG: at 10:23

## 2021-01-01 RX ADMIN — HEPARIN SODIUM 5000 UNITS: 5000 INJECTION INTRAVENOUS; SUBCUTANEOUS at 14:02

## 2021-01-01 RX ADMIN — SODIUM CHLORIDE 3 ML/HR: 9 INJECTION, SOLUTION INTRAVENOUS at 00:04

## 2021-01-01 RX ADMIN — SODIUM CHLORIDE 10 MG/HR: 900 INJECTION, SOLUTION INTRAVENOUS at 05:15

## 2021-01-01 RX ADMIN — CHLORHEXIDINE GLUCONATE 15 ML: 0.12 RINSE ORAL at 21:00

## 2021-01-01 RX ADMIN — CEFTRIAXONE SODIUM 1 G: 1 INJECTION, POWDER, FOR SOLUTION INTRAMUSCULAR; INTRAVENOUS at 19:45

## 2021-01-01 RX ADMIN — MIDAZOLAM HYDROCHLORIDE 4 MG/HR: 5 INJECTION, SOLUTION INTRAMUSCULAR; INTRAVENOUS at 02:35

## 2021-01-01 RX ADMIN — Medication: at 22:54

## 2021-01-01 RX ADMIN — CALCIUM CHLORIDE, MAGNESIUM CHLORIDE, DEXTROSE MONOHYDRATE, LACTIC ACID, SODIUM CHLORIDE, SODIUM BICARBONATE AND POTASSIUM CHLORIDE: 5.15; 2.03; 22; 5.4; 6.46; 3.09; .157 INJECTION INTRAVENOUS at 17:15

## 2021-01-01 RX ADMIN — SODIUM CHLORIDE, POTASSIUM CHLORIDE, SODIUM LACTATE AND CALCIUM CHLORIDE 125 ML/HR: 600; 310; 30; 20 INJECTION, SOLUTION INTRAVENOUS at 16:08

## 2021-01-01 RX ADMIN — MEROPENEM 500 MG: 500 INJECTION, POWDER, FOR SOLUTION INTRAVENOUS at 01:30

## 2021-01-01 RX ADMIN — INSULIN LISPRO 2 UNITS: 100 INJECTION, SOLUTION INTRAVENOUS; SUBCUTANEOUS at 23:38

## 2021-01-01 RX ADMIN — Medication 10 ML: at 23:17

## 2021-01-01 RX ADMIN — Medication: at 08:12

## 2021-01-01 RX ADMIN — INSULIN LISPRO 7 UNITS: 100 INJECTION, SOLUTION INTRAVENOUS; SUBCUTANEOUS at 23:40

## 2021-01-01 RX ADMIN — HYDROMORPHONE HYDROCHLORIDE 1 MG: 1 INJECTION, SOLUTION INTRAMUSCULAR; INTRAVENOUS; SUBCUTANEOUS at 13:05

## 2021-01-01 RX ADMIN — HYDROMORPHONE HYDROCHLORIDE 1 MG: 1 INJECTION, SOLUTION INTRAMUSCULAR; INTRAVENOUS; SUBCUTANEOUS at 21:09

## 2021-01-01 RX ADMIN — ASCORBIC ACID: 500 INJECTION, SOLUTION INTRAMUSCULAR; INTRAVENOUS; SUBCUTANEOUS at 18:39

## 2021-01-01 RX ADMIN — SODIUM CHLORIDE 10 MG/HR: 900 INJECTION, SOLUTION INTRAVENOUS at 19:30

## 2021-01-01 RX ADMIN — MIDODRINE HYDROCHLORIDE 20 MG: 5 TABLET ORAL at 06:27

## 2021-01-01 RX ADMIN — CALCIUM CHLORIDE, MAGNESIUM CHLORIDE, DEXTROSE MONOHYDRATE, LACTIC ACID, SODIUM CHLORIDE, SODIUM BICARBONATE AND POTASSIUM CHLORIDE: 3.68; 3.05; 22; 5.4; 6.46; 3.09; .314 INJECTION INTRAVENOUS at 15:23

## 2021-01-01 RX ADMIN — CEFEPIME HYDROCHLORIDE 2 G: 2 INJECTION, POWDER, FOR SOLUTION INTRAVENOUS at 20:29

## 2021-01-01 RX ADMIN — INSULIN LISPRO 2 UNITS: 100 INJECTION, SOLUTION INTRAVENOUS; SUBCUTANEOUS at 05:06

## 2021-01-01 RX ADMIN — METRONIDAZOLE 500 MG: 500 INJECTION, SOLUTION INTRAVENOUS at 10:16

## 2021-01-01 RX ADMIN — INSULIN LISPRO 5 UNITS: 100 INJECTION, SOLUTION INTRAVENOUS; SUBCUTANEOUS at 05:32

## 2021-01-01 RX ADMIN — Medication 10 ML: at 22:00

## 2021-01-01 RX ADMIN — HYDROCORTISONE SODIUM SUCCINATE 50 MG: 100 INJECTION, POWDER, FOR SOLUTION INTRAMUSCULAR; INTRAVENOUS at 08:13

## 2021-01-01 RX ADMIN — Medication 10 ML: at 15:15

## 2021-01-01 RX ADMIN — DEXMEDETOMIDINE HYDROCHLORIDE 1.4 MCG/KG/HR: 4 INJECTION, SOLUTION INTRAVENOUS at 14:17

## 2021-01-01 RX ADMIN — HEPARIN SODIUM 1400 UNITS: 1000 INJECTION INTRAVENOUS; SUBCUTANEOUS at 21:18

## 2021-01-01 RX ADMIN — SODIUM CHLORIDE 5 ML/HR: 9 INJECTION, SOLUTION INTRAVENOUS at 00:00

## 2021-01-01 RX ADMIN — MIDAZOLAM HYDROCHLORIDE 5 MG/HR: 5 INJECTION, SOLUTION INTRAMUSCULAR; INTRAVENOUS at 07:00

## 2021-01-01 RX ADMIN — HEPARIN SODIUM 5000 UNITS: 5000 INJECTION INTRAVENOUS; SUBCUTANEOUS at 18:36

## 2021-01-01 RX ADMIN — CALCIUM CHLORIDE, MAGNESIUM CHLORIDE, DEXTROSE MONOHYDRATE, LACTIC ACID, SODIUM CHLORIDE, SODIUM BICARBONATE AND POTASSIUM CHLORIDE: 5.15; 2.03; 22; 5.4; 6.46; 3.09; .157 INJECTION INTRAVENOUS at 07:01

## 2021-01-01 RX ADMIN — THIAMINE HYDROCHLORIDE: 100 INJECTION, SOLUTION INTRAMUSCULAR; INTRAVENOUS at 18:51

## 2021-01-01 RX ADMIN — MIDODRINE HYDROCHLORIDE 20 MG: 5 TABLET ORAL at 05:02

## 2021-01-01 RX ADMIN — HYDROMORPHONE HYDROCHLORIDE 1 MG: 1 INJECTION, SOLUTION INTRAMUSCULAR; INTRAVENOUS; SUBCUTANEOUS at 08:25

## 2021-01-01 RX ADMIN — Medication 10 ML: at 13:39

## 2021-01-01 RX ADMIN — CALCIUM CHLORIDE, MAGNESIUM CHLORIDE, DEXTROSE MONOHYDRATE, LACTIC ACID, SODIUM CHLORIDE, SODIUM BICARBONATE AND POTASSIUM CHLORIDE: 3.68; 3.05; 22; 5.4; 6.46; 3.09; .314 INJECTION INTRAVENOUS at 06:31

## 2021-01-01 RX ADMIN — HEPARIN SODIUM 5000 UNITS: 5000 INJECTION INTRAVENOUS; SUBCUTANEOUS at 22:06

## 2021-01-01 RX ADMIN — ASPIRIN 81 MG CHEWABLE TABLET 81 MG: 81 TABLET CHEWABLE at 08:45

## 2021-01-01 RX ADMIN — CHLORHEXIDINE GLUCONATE 15 ML: 0.12 RINSE ORAL at 20:40

## 2021-01-01 RX ADMIN — PROPOFOL 25 MCG/KG/MIN: 10 INJECTION, EMULSION INTRAVENOUS at 02:21

## 2021-01-01 RX ADMIN — CALCIUM CHLORIDE, MAGNESIUM CHLORIDE, DEXTROSE MONOHYDRATE, LACTIC ACID, SODIUM CHLORIDE, SODIUM BICARBONATE AND POTASSIUM CHLORIDE: 5.15; 2.03; 22; 5.4; 6.46; 3.09; .157 INJECTION INTRAVENOUS at 22:09

## 2021-01-01 RX ADMIN — CALCIUM CHLORIDE, MAGNESIUM CHLORIDE, DEXTROSE MONOHYDRATE, LACTIC ACID, SODIUM CHLORIDE, SODIUM BICARBONATE AND POTASSIUM CHLORIDE: 3.68; 3.05; 22; 5.4; 6.46; 3.09; .314 INJECTION INTRAVENOUS at 22:31

## 2021-01-01 RX ADMIN — FENTANYL CITRATE 150 MCG/HR: 0.05 INJECTION, SOLUTION INTRAMUSCULAR; INTRAVENOUS at 23:24

## 2021-01-01 RX ADMIN — CALCIUM CHLORIDE, MAGNESIUM CHLORIDE, DEXTROSE MONOHYDRATE, LACTIC ACID, SODIUM CHLORIDE, SODIUM BICARBONATE AND POTASSIUM CHLORIDE: 3.68; 3.05; 22; 5.4; 6.46; 3.09; .314 INJECTION INTRAVENOUS at 12:15

## 2021-01-01 RX ADMIN — IOPAMIDOL 100 ML: 755 INJECTION, SOLUTION INTRAVENOUS at 11:17

## 2021-01-01 RX ADMIN — DEXMEDETOMIDINE HYDROCHLORIDE 1.4 MCG/KG/HR: 4 INJECTION, SOLUTION INTRAVENOUS at 09:54

## 2021-01-01 RX ADMIN — MIDODRINE HYDROCHLORIDE 20 MG: 5 TABLET ORAL at 17:37

## 2021-01-01 RX ADMIN — PHENYLEPHRINE HYDROCHLORIDE 75 MCG/MIN: 10 INJECTION INTRAVENOUS at 23:25

## 2021-01-01 RX ADMIN — HEPARIN SODIUM 5000 UNITS: 5000 INJECTION INTRAVENOUS; SUBCUTANEOUS at 14:01

## 2021-01-01 RX ADMIN — CHLORHEXIDINE GLUCONATE 15 ML: 0.12 RINSE ORAL at 20:12

## 2021-01-01 RX ADMIN — HEPARIN SODIUM 5000 UNITS: 5000 INJECTION INTRAVENOUS; SUBCUTANEOUS at 22:03

## 2021-01-01 RX ADMIN — CHLORHEXIDINE GLUCONATE 15 ML: 0.12 RINSE ORAL at 08:33

## 2021-01-01 RX ADMIN — INSULIN LISPRO 3 UNITS: 100 INJECTION, SOLUTION INTRAVENOUS; SUBCUTANEOUS at 18:00

## 2021-01-01 RX ADMIN — SODIUM CHLORIDE 40 MG: 9 INJECTION INTRAMUSCULAR; INTRAVENOUS; SUBCUTANEOUS at 08:45

## 2021-01-01 RX ADMIN — CHLORHEXIDINE GLUCONATE 15 ML: 0.12 RINSE ORAL at 08:35

## 2021-01-01 RX ADMIN — FENTANYL CITRATE 125 MCG/HR: 0.05 INJECTION, SOLUTION INTRAMUSCULAR; INTRAVENOUS at 06:35

## 2021-01-01 RX ADMIN — MIDODRINE HYDROCHLORIDE 20 MG: 5 TABLET ORAL at 06:00

## 2021-01-01 RX ADMIN — CALCIUM CHLORIDE, MAGNESIUM CHLORIDE, DEXTROSE MONOHYDRATE, LACTIC ACID, SODIUM CHLORIDE, SODIUM BICARBONATE AND POTASSIUM CHLORIDE: 3.68; 3.05; 22; 5.4; 6.46; 3.09; .314 INJECTION INTRAVENOUS at 20:57

## 2021-01-01 RX ADMIN — SODIUM CHLORIDE 3 ML/HR: 9 INJECTION, SOLUTION INTRAVENOUS at 20:00

## 2021-01-01 RX ADMIN — PROPOFOL 25 MCG/KG/MIN: 10 INJECTION, EMULSION INTRAVENOUS at 15:18

## 2021-01-01 RX ADMIN — CALCIUM CHLORIDE, MAGNESIUM CHLORIDE, DEXTROSE MONOHYDRATE, LACTIC ACID, SODIUM CHLORIDE, SODIUM BICARBONATE AND POTASSIUM CHLORIDE: 3.68; 3.05; 22; 5.4; 6.46; 3.09; .314 INJECTION INTRAVENOUS at 12:13

## 2021-01-01 RX ADMIN — MIDODRINE HYDROCHLORIDE 20 MG: 5 TABLET ORAL at 21:00

## 2021-01-01 RX ADMIN — SODIUM CHLORIDE 40 MG: 9 INJECTION INTRAMUSCULAR; INTRAVENOUS; SUBCUTANEOUS at 08:13

## 2021-01-01 RX ADMIN — CALCIUM CHLORIDE, MAGNESIUM CHLORIDE, DEXTROSE MONOHYDRATE, LACTIC ACID, SODIUM CHLORIDE, SODIUM BICARBONATE AND POTASSIUM CHLORIDE: 3.68; 3.05; 22; 5.4; 6.46; 3.09; .314 INJECTION INTRAVENOUS at 15:49

## 2021-01-01 RX ADMIN — HYDROCORTISONE SODIUM SUCCINATE 100 MG: 100 INJECTION, POWDER, FOR SOLUTION INTRAMUSCULAR; INTRAVENOUS at 08:25

## 2021-01-01 RX ADMIN — FENTANYL CITRATE 50 MCG/HR: 0.05 INJECTION, SOLUTION INTRAMUSCULAR; INTRAVENOUS at 00:25

## 2021-01-01 RX ADMIN — THIAMINE HYDROCHLORIDE: 100 INJECTION, SOLUTION INTRAMUSCULAR; INTRAVENOUS at 07:51

## 2021-01-01 RX ADMIN — HEPARIN SODIUM 5000 UNITS: 5000 INJECTION INTRAVENOUS; SUBCUTANEOUS at 14:36

## 2021-01-01 RX ADMIN — CALCIUM CHLORIDE, MAGNESIUM CHLORIDE, DEXTROSE MONOHYDRATE, LACTIC ACID, SODIUM CHLORIDE, SODIUM BICARBONATE AND POTASSIUM CHLORIDE: 3.68; 3.05; 22; 5.4; 6.46; 3.09; .314 INJECTION INTRAVENOUS at 04:30

## 2021-01-01 RX ADMIN — ASPIRIN 81 MG: 81 TABLET, COATED ORAL at 08:41

## 2021-01-01 RX ADMIN — Medication 10 ML: at 21:22

## 2021-01-01 RX ADMIN — INSULIN LISPRO 5 UNITS: 100 INJECTION, SOLUTION INTRAVENOUS; SUBCUTANEOUS at 11:18

## 2021-01-01 RX ADMIN — CEFEPIME HYDROCHLORIDE 2 G: 2 INJECTION, POWDER, FOR SOLUTION INTRAVENOUS at 19:06

## 2021-01-01 RX ADMIN — CEFEPIME HYDROCHLORIDE 2 G: 2 INJECTION, POWDER, FOR SOLUTION INTRAVENOUS at 08:07

## 2021-01-01 RX ADMIN — ALBUMIN (HUMAN) 25 G: 12.5 INJECTION, SOLUTION INTRAVENOUS at 02:29

## 2021-01-01 RX ADMIN — HEPARIN SODIUM 5000 UNITS: 5000 INJECTION INTRAVENOUS; SUBCUTANEOUS at 06:30

## 2021-01-01 RX ADMIN — CALCIUM CHLORIDE, MAGNESIUM CHLORIDE, DEXTROSE MONOHYDRATE, LACTIC ACID, SODIUM CHLORIDE, SODIUM BICARBONATE AND POTASSIUM CHLORIDE: 5.15; 2.03; 22; 5.4; 6.46; 3.09; .157 INJECTION INTRAVENOUS at 20:30

## 2021-01-01 RX ADMIN — CALCIUM CHLORIDE, MAGNESIUM CHLORIDE, DEXTROSE MONOHYDRATE, LACTIC ACID, SODIUM CHLORIDE, SODIUM BICARBONATE AND POTASSIUM CHLORIDE: 5.15; 2.03; 22; 5.4; 6.46; 3.09; .157 INJECTION INTRAVENOUS at 11:41

## 2021-01-01 RX ADMIN — VASOPRESSIN 0.02 UNITS/MIN: 20 INJECTION INTRAVENOUS at 03:19

## 2021-01-01 RX ADMIN — VASOPRESSIN 0.04 UNITS/MIN: 20 INJECTION INTRAVENOUS at 05:10

## 2021-01-01 RX ADMIN — CEFEPIME HYDROCHLORIDE 2 G: 2 INJECTION, POWDER, FOR SOLUTION INTRAVENOUS at 08:40

## 2021-01-01 RX ADMIN — LORAZEPAM 2 MG: 2 INJECTION INTRAMUSCULAR; INTRAVENOUS at 17:09

## 2021-01-01 RX ADMIN — Medication 10 ML: at 07:01

## 2021-01-01 RX ADMIN — FENTANYL CITRATE 150 MCG/HR: 0.05 INJECTION, SOLUTION INTRAMUSCULAR; INTRAVENOUS at 17:11

## 2021-01-01 RX ADMIN — VANCOMYCIN HYDROCHLORIDE 2250 MG: 10 INJECTION, POWDER, LYOPHILIZED, FOR SOLUTION INTRAVENOUS at 08:30

## 2021-01-01 RX ADMIN — ONDANSETRON 4 MG: 2 INJECTION INTRAMUSCULAR; INTRAVENOUS at 20:29

## 2021-01-01 RX ADMIN — HEPARIN SODIUM 5000 UNITS: 5000 INJECTION INTRAVENOUS; SUBCUTANEOUS at 23:56

## 2021-01-01 RX ADMIN — CALCIUM CHLORIDE, MAGNESIUM CHLORIDE, DEXTROSE MONOHYDRATE, LACTIC ACID, SODIUM CHLORIDE, SODIUM BICARBONATE AND POTASSIUM CHLORIDE: 5.15; 2.03; 22; 5.4; 6.46; 3.09; .157 INJECTION INTRAVENOUS at 05:07

## 2021-01-01 RX ADMIN — MORPHINE SULFATE 4 MG: 4 INJECTION, SOLUTION INTRAMUSCULAR; INTRAVENOUS at 12:33

## 2021-01-01 RX ADMIN — METRONIDAZOLE 500 MG: 500 INJECTION, SOLUTION INTRAVENOUS at 21:20

## 2021-01-01 RX ADMIN — FENTANYL CITRATE 200 MCG/HR: 50 INJECTION, SOLUTION INTRAMUSCULAR; INTRAVENOUS at 14:23

## 2021-01-01 RX ADMIN — CALCIUM CHLORIDE, MAGNESIUM CHLORIDE, DEXTROSE MONOHYDRATE, LACTIC ACID, SODIUM CHLORIDE, SODIUM BICARBONATE AND POTASSIUM CHLORIDE: 3.68; 3.05; 22; 5.4; 6.46; 3.09; .314 INJECTION INTRAVENOUS at 13:58

## 2021-01-01 RX ADMIN — ALBUMIN HUMAN 25 G: 50 SOLUTION INTRAVENOUS at 04:20

## 2021-01-01 RX ADMIN — VASOPRESSIN 0.04 UNITS/MIN: 20 INJECTION INTRAVENOUS at 12:02

## 2021-01-01 RX ADMIN — DEXMEDETOMIDINE HYDROCHLORIDE 1.4 MCG/KG/HR: 4 INJECTION, SOLUTION INTRAVENOUS at 02:58

## 2021-01-01 RX ADMIN — Medication 10 ML: at 05:53

## 2021-01-01 RX ADMIN — ASPIRIN 81 MG CHEWABLE TABLET 81 MG: 81 TABLET CHEWABLE at 08:05

## 2021-01-01 RX ADMIN — FENTANYL CITRATE 200 MCG/HR: 50 INJECTION, SOLUTION INTRAMUSCULAR; INTRAVENOUS at 15:09

## 2021-01-01 RX ADMIN — NOREPINEPHRINE BITARTRATE 6 MCG/MIN: 1 SOLUTION INTRAVENOUS at 15:14

## 2021-01-01 RX ADMIN — HEPARIN SODIUM 700 UNITS/HR: 10000 INJECTION, SOLUTION INTRAVENOUS at 09:34

## 2021-01-01 RX ADMIN — VANCOMYCIN HYDROCHLORIDE 2500 MG: 10 INJECTION, POWDER, LYOPHILIZED, FOR SOLUTION INTRAVENOUS at 08:00

## 2021-01-01 RX ADMIN — Medication: at 06:46

## 2021-01-01 RX ADMIN — HYDROCORTISONE SODIUM SUCCINATE 100 MG: 100 INJECTION, POWDER, FOR SOLUTION INTRAMUSCULAR; INTRAVENOUS at 00:06

## 2021-01-01 RX ADMIN — Medication: at 21:01

## 2021-01-01 RX ADMIN — SODIUM CHLORIDE 5 ML/HR: 9 INJECTION, SOLUTION INTRAVENOUS at 20:00

## 2021-01-01 RX ADMIN — Medication: at 08:14

## 2021-01-01 RX ADMIN — PHENYLEPHRINE HYDROCHLORIDE 20 MCG/MIN: 10 INJECTION INTRAVENOUS at 16:42

## 2021-01-01 RX ADMIN — HEPARIN SODIUM 5000 UNITS: 5000 INJECTION INTRAVENOUS; SUBCUTANEOUS at 22:02

## 2021-01-01 RX ADMIN — CALCIUM CHLORIDE, MAGNESIUM CHLORIDE, DEXTROSE MONOHYDRATE, LACTIC ACID, SODIUM CHLORIDE, SODIUM BICARBONATE AND POTASSIUM CHLORIDE: 3.68; 3.05; 22; 5.4; 6.46; 3.09; .314 INJECTION INTRAVENOUS at 04:43

## 2021-01-01 RX ADMIN — NOREPINEPHRINE BITARTRATE 5 MCG/MIN: 1 SOLUTION INTRAVENOUS at 20:48

## 2021-01-01 RX ADMIN — CALCIUM CHLORIDE, MAGNESIUM CHLORIDE, DEXTROSE MONOHYDRATE, LACTIC ACID, SODIUM CHLORIDE, SODIUM BICARBONATE AND POTASSIUM CHLORIDE: 5.15; 2.03; 22; 5.4; 6.46; 3.09; .157 INJECTION INTRAVENOUS at 11:22

## 2021-01-01 RX ADMIN — Medication 10 ML: at 06:04

## 2021-01-01 RX ADMIN — Medication 10 ML: at 08:17

## 2021-01-01 RX ADMIN — NITROGLYCERIN 0.4 MG: 0.4 TABLET, ORALLY DISINTEGRATING SUBLINGUAL at 10:44

## 2021-01-01 RX ADMIN — Medication: at 09:20

## 2021-01-01 RX ADMIN — METRONIDAZOLE 500 MG: 500 INJECTION, SOLUTION INTRAVENOUS at 09:06

## 2021-01-01 RX ADMIN — CALCIUM CHLORIDE, MAGNESIUM CHLORIDE, DEXTROSE MONOHYDRATE, LACTIC ACID, SODIUM CHLORIDE, SODIUM BICARBONATE AND POTASSIUM CHLORIDE: 5.15; 2.03; 22; 5.4; 6.46; 3.09; .157 INJECTION INTRAVENOUS at 18:31

## 2021-01-01 RX ADMIN — VANCOMYCIN HYDROCHLORIDE 1250 MG: 10 INJECTION, POWDER, LYOPHILIZED, FOR SOLUTION INTRAVENOUS at 11:01

## 2021-01-01 RX ADMIN — HYDROCORTISONE SODIUM SUCCINATE 100 MG: 100 INJECTION, POWDER, FOR SOLUTION INTRAMUSCULAR; INTRAVENOUS at 02:52

## 2021-01-01 RX ADMIN — CALCIUM CHLORIDE, MAGNESIUM CHLORIDE, DEXTROSE MONOHYDRATE, LACTIC ACID, SODIUM CHLORIDE, SODIUM BICARBONATE AND POTASSIUM CHLORIDE: 3.68; 3.05; 22; 5.4; 6.46; 3.09; .314 INJECTION INTRAVENOUS at 02:44

## 2021-01-01 RX ADMIN — MEROPENEM 500 MG: 500 INJECTION, POWDER, FOR SOLUTION INTRAVENOUS at 20:57

## 2021-01-01 RX ADMIN — FENTANYL CITRATE 150 MCG/HR: 50 INJECTION, SOLUTION INTRAMUSCULAR; INTRAVENOUS at 06:37

## 2021-01-01 RX ADMIN — HYDROMORPHONE HYDROCHLORIDE 1 MG: 1 INJECTION, SOLUTION INTRAMUSCULAR; INTRAVENOUS; SUBCUTANEOUS at 16:51

## 2021-01-01 RX ADMIN — ALBUMIN (HUMAN) 25 G: 12.5 INJECTION, SOLUTION INTRAVENOUS at 12:08

## 2021-01-01 RX ADMIN — HEPARIN SODIUM 5000 UNITS: 5000 INJECTION INTRAVENOUS; SUBCUTANEOUS at 06:16

## 2021-01-01 RX ADMIN — CEFEPIME HYDROCHLORIDE 2 G: 2 INJECTION, POWDER, FOR SOLUTION INTRAVENOUS at 07:46

## 2021-01-01 RX ADMIN — Medication 100 MCG: at 08:13

## 2021-01-01 RX ADMIN — ALTEPLASE 2 MG: 2.2 INJECTION, POWDER, LYOPHILIZED, FOR SOLUTION INTRAVENOUS at 14:39

## 2021-01-01 RX ADMIN — ASPIRIN 81 MG CHEWABLE TABLET 81 MG: 81 TABLET CHEWABLE at 11:13

## 2021-01-01 RX ADMIN — CALCIUM CHLORIDE, MAGNESIUM CHLORIDE, DEXTROSE MONOHYDRATE, LACTIC ACID, SODIUM CHLORIDE, SODIUM BICARBONATE AND POTASSIUM CHLORIDE: 3.68; 3.05; 22; 5.4; 6.46; 3.09; .314 INJECTION INTRAVENOUS at 06:08

## 2021-01-01 RX ADMIN — CALCIUM CHLORIDE, MAGNESIUM CHLORIDE, DEXTROSE MONOHYDRATE, LACTIC ACID, SODIUM CHLORIDE, SODIUM BICARBONATE AND POTASSIUM CHLORIDE: 5.15; 2.03; 22; 5.4; 6.46; 3.09; .157 INJECTION INTRAVENOUS at 13:32

## 2021-01-01 RX ADMIN — DEXMEDETOMIDINE HYDROCHLORIDE 0.4 MCG/KG/HR: 4 INJECTION, SOLUTION INTRAVENOUS at 09:06

## 2021-01-01 RX ADMIN — FENTANYL CITRATE 175 MCG/HR: 0.05 INJECTION, SOLUTION INTRAMUSCULAR; INTRAVENOUS at 17:16

## 2021-01-01 RX ADMIN — FENTANYL CITRATE 150 MCG/HR: 0.05 INJECTION, SOLUTION INTRAMUSCULAR; INTRAVENOUS at 03:45

## 2021-01-01 RX ADMIN — Medication 10 ML: at 16:09

## 2021-01-01 RX ADMIN — CHLORHEXIDINE GLUCONATE 15 ML: 0.12 RINSE ORAL at 08:14

## 2021-01-01 RX ADMIN — VASOPRESSIN 0.04 UNITS/MIN: 20 INJECTION INTRAVENOUS at 22:15

## 2021-01-01 RX ADMIN — NOREPINEPHRINE BITARTRATE 30 MCG/MIN: 1 SOLUTION INTRAVENOUS at 23:00

## 2021-01-01 RX ADMIN — HEPARIN SODIUM 5000 UNITS: 5000 INJECTION INTRAVENOUS; SUBCUTANEOUS at 22:32

## 2021-01-01 RX ADMIN — CALCIUM CHLORIDE, MAGNESIUM CHLORIDE, DEXTROSE MONOHYDRATE, LACTIC ACID, SODIUM CHLORIDE, SODIUM BICARBONATE AND POTASSIUM CHLORIDE: 3.68; 3.05; 22; 5.4; 6.46; 3.09; .314 INJECTION INTRAVENOUS at 02:45

## 2021-01-01 RX ADMIN — DEXMEDETOMIDINE HYDROCHLORIDE 1.4 MCG/KG/HR: 4 INJECTION, SOLUTION INTRAVENOUS at 19:28

## 2021-01-01 RX ADMIN — ONDANSETRON 4 MG: 2 INJECTION INTRAMUSCULAR; INTRAVENOUS at 12:44

## 2021-01-01 RX ADMIN — CHLORHEXIDINE GLUCONATE 15 ML: 0.12 RINSE ORAL at 21:06

## 2021-01-01 RX ADMIN — Medication: at 16:49

## 2021-01-01 RX ADMIN — CALCIUM CHLORIDE, MAGNESIUM CHLORIDE, DEXTROSE MONOHYDRATE, LACTIC ACID, SODIUM CHLORIDE, SODIUM BICARBONATE AND POTASSIUM CHLORIDE: 3.68; 3.05; 22; 5.4; 6.46; 3.09; .314 INJECTION INTRAVENOUS at 14:13

## 2021-01-01 RX ADMIN — Medication 10 ML: at 05:06

## 2021-01-01 RX ADMIN — FENTANYL CITRATE 125 MCG/HR: 0.05 INJECTION, SOLUTION INTRAMUSCULAR; INTRAVENOUS at 06:27

## 2021-01-01 RX ADMIN — SODIUM CHLORIDE 40 MG: 9 INJECTION INTRAMUSCULAR; INTRAVENOUS; SUBCUTANEOUS at 08:56

## 2021-01-01 RX ADMIN — METRONIDAZOLE 500 MG: 500 INJECTION, SOLUTION INTRAVENOUS at 22:02

## 2021-01-01 RX ADMIN — EPINEPHRINE 1 MCG/MIN: 1 INJECTION INTRAMUSCULAR; INTRAVENOUS; SUBCUTANEOUS at 05:55

## 2021-01-01 RX ADMIN — IOPAMIDOL 100 ML: 755 INJECTION, SOLUTION INTRAVENOUS at 16:08

## 2021-01-01 RX ADMIN — HYDROMORPHONE HYDROCHLORIDE 1 MG: 1 INJECTION, SOLUTION INTRAMUSCULAR; INTRAVENOUS; SUBCUTANEOUS at 20:38

## 2021-01-01 RX ADMIN — FENTANYL CITRATE 150 MCG/HR: 0.05 INJECTION, SOLUTION INTRAMUSCULAR; INTRAVENOUS at 13:38

## 2021-01-01 RX ADMIN — INSULIN LISPRO 2 UNITS: 100 INJECTION, SOLUTION INTRAVENOUS; SUBCUTANEOUS at 06:02

## 2021-01-01 RX ADMIN — CALCIUM CHLORIDE, MAGNESIUM CHLORIDE, DEXTROSE MONOHYDRATE, LACTIC ACID, SODIUM CHLORIDE, SODIUM BICARBONATE AND POTASSIUM CHLORIDE: 3.68; 3.05; 22; 5.4; 6.46; 3.09; .314 INJECTION INTRAVENOUS at 19:04

## 2021-01-01 RX ADMIN — INSULIN LISPRO 3 UNITS: 100 INJECTION, SOLUTION INTRAVENOUS; SUBCUTANEOUS at 05:10

## 2021-01-01 RX ADMIN — HYDROCORTISONE SODIUM SUCCINATE 100 MG: 100 INJECTION, POWDER, FOR SOLUTION INTRAMUSCULAR; INTRAVENOUS at 00:32

## 2021-01-01 RX ADMIN — ALBUMIN (HUMAN) 25 G: 12.5 INJECTION, SOLUTION INTRAVENOUS at 07:10

## 2021-01-01 RX ADMIN — INSULIN LISPRO 3 UNITS: 100 INJECTION, SOLUTION INTRAVENOUS; SUBCUTANEOUS at 05:30

## 2021-01-01 RX ADMIN — Medication 10 ML: at 21:06

## 2021-01-01 RX ADMIN — CALCIUM CHLORIDE, MAGNESIUM CHLORIDE, DEXTROSE MONOHYDRATE, LACTIC ACID, SODIUM CHLORIDE, SODIUM BICARBONATE AND POTASSIUM CHLORIDE: 3.68; 3.05; 22; 5.4; 6.46; 3.09; .314 INJECTION INTRAVENOUS at 08:00

## 2021-01-01 RX ADMIN — INSULIN LISPRO 2 UNITS: 100 INJECTION, SOLUTION INTRAVENOUS; SUBCUTANEOUS at 18:09

## 2021-01-01 RX ADMIN — FOLIC ACID: 5 INJECTION, SOLUTION INTRAMUSCULAR; INTRAVENOUS; SUBCUTANEOUS at 19:03

## 2021-01-01 RX ADMIN — ALBUMIN (HUMAN) 12.5 G: 0.25 INJECTION, SOLUTION INTRAVENOUS at 18:45

## 2021-01-01 RX ADMIN — MEROPENEM 500 MG: 500 INJECTION, POWDER, FOR SOLUTION INTRAVENOUS at 17:02

## 2021-01-01 RX ADMIN — HYDROCORTISONE SODIUM SUCCINATE 100 MG: 100 INJECTION, POWDER, FOR SOLUTION INTRAMUSCULAR; INTRAVENOUS at 16:08

## 2021-01-01 RX ADMIN — MIDODRINE HYDROCHLORIDE 10 MG: 5 TABLET ORAL at 13:39

## 2021-01-01 RX ADMIN — METRONIDAZOLE 500 MG: 500 INJECTION, SOLUTION INTRAVENOUS at 09:00

## 2021-01-01 RX ADMIN — MIDODRINE HYDROCHLORIDE 20 MG: 5 TABLET ORAL at 21:04

## 2021-01-01 RX ADMIN — DEXMEDETOMIDINE HYDROCHLORIDE 1.4 MCG/KG/HR: 4 INJECTION, SOLUTION INTRAVENOUS at 00:30

## 2021-01-01 RX ADMIN — BUMETANIDE 2 MG: 0.25 INJECTION INTRAMUSCULAR; INTRAVENOUS at 08:13

## 2021-01-01 RX ADMIN — SODIUM PHOSPHATE, MONOBASIC, MONOHYDRATE: 276; 142 INJECTION, SOLUTION INTRAVENOUS at 11:48

## 2021-01-01 RX ADMIN — SODIUM CHLORIDE 40 MG: 9 INJECTION INTRAMUSCULAR; INTRAVENOUS; SUBCUTANEOUS at 08:08

## 2021-01-01 RX ADMIN — ASPIRIN 81 MG CHEWABLE TABLET 81 MG: 81 TABLET CHEWABLE at 08:03

## 2021-01-01 RX ADMIN — ACETAMINOPHEN 650 MG: 325 TABLET ORAL at 11:13

## 2021-01-01 RX ADMIN — ONDANSETRON 4 MG: 2 INJECTION INTRAMUSCULAR; INTRAVENOUS at 13:38

## 2021-01-01 RX ADMIN — INSULIN LISPRO 2 UNITS: 100 INJECTION, SOLUTION INTRAVENOUS; SUBCUTANEOUS at 02:51

## 2021-01-01 RX ADMIN — ALBUMIN HUMAN 12.5 G: 50 SOLUTION INTRAVENOUS at 22:10

## 2021-01-01 RX ADMIN — MIDAZOLAM HYDROCHLORIDE 5 MG/HR: 5 INJECTION, SOLUTION INTRAMUSCULAR; INTRAVENOUS at 22:47

## 2021-01-01 RX ADMIN — MEROPENEM 500 MG: 500 INJECTION, POWDER, FOR SOLUTION INTRAVENOUS at 08:46

## 2021-01-01 RX ADMIN — HYDROMORPHONE HYDROCHLORIDE 1 MG: 1 INJECTION, SOLUTION INTRAMUSCULAR; INTRAVENOUS; SUBCUTANEOUS at 19:39

## 2021-01-01 RX ADMIN — ASPIRIN 81 MG CHEWABLE TABLET 81 MG: 81 TABLET CHEWABLE at 08:24

## 2021-01-01 RX ADMIN — MEROPENEM 500 MG: 500 INJECTION, POWDER, FOR SOLUTION INTRAVENOUS at 15:05

## 2021-01-01 RX ADMIN — MIDODRINE HYDROCHLORIDE 20 MG: 5 TABLET ORAL at 13:32

## 2021-01-01 RX ADMIN — PHENYLEPHRINE HYDROCHLORIDE 90 MCG/MIN: 10 INJECTION INTRAVENOUS at 19:19

## 2021-01-01 RX ADMIN — SODIUM CHLORIDE 10 MG/HR: 900 INJECTION, SOLUTION INTRAVENOUS at 10:02

## 2021-01-01 RX ADMIN — PHENYLEPHRINE HYDROCHLORIDE 30 MCG/MIN: 10 INJECTION INTRAVENOUS at 14:07

## 2021-01-01 RX ADMIN — DEXMEDETOMIDINE HYDROCHLORIDE 1.4 MCG/KG/HR: 4 INJECTION, SOLUTION INTRAVENOUS at 00:33

## 2021-01-01 RX ADMIN — CALCIUM CHLORIDE, MAGNESIUM CHLORIDE, DEXTROSE MONOHYDRATE, LACTIC ACID, SODIUM CHLORIDE, SODIUM BICARBONATE AND POTASSIUM CHLORIDE: 5.15; 2.03; 22; 5.4; 6.46; 3.09; .157 INJECTION INTRAVENOUS at 18:24

## 2021-01-01 RX ADMIN — OLANZAPINE 10 MG: 5 TABLET, FILM COATED ORAL at 23:16

## 2021-01-01 RX ADMIN — CALCIUM GLUCONATE 2 G: 20 INJECTION, SOLUTION INTRAVENOUS at 18:01

## 2021-01-01 RX ADMIN — CALCIUM CHLORIDE, MAGNESIUM CHLORIDE, DEXTROSE MONOHYDRATE, LACTIC ACID, SODIUM CHLORIDE, SODIUM BICARBONATE AND POTASSIUM CHLORIDE: 3.68; 3.05; 22; 5.4; 6.46; 3.09; .314 INJECTION INTRAVENOUS at 05:13

## 2021-01-01 RX ADMIN — CALCIUM CHLORIDE, MAGNESIUM CHLORIDE, DEXTROSE MONOHYDRATE, LACTIC ACID, SODIUM CHLORIDE, SODIUM BICARBONATE AND POTASSIUM CHLORIDE: 3.68; 3.05; 22; 5.4; 6.46; 3.09; .314 INJECTION INTRAVENOUS at 12:20

## 2021-01-01 RX ADMIN — LIDOCAINE HYDROCHLORIDE 100 MG: 20 INJECTION, SOLUTION INFILTRATION; PERINEURAL at 12:52

## 2021-01-01 RX ADMIN — VASOPRESSIN 0.04 UNITS/MIN: 20 INJECTION INTRAVENOUS at 14:52

## 2021-01-01 RX ADMIN — HYDROCORTISONE SODIUM SUCCINATE 100 MG: 100 INJECTION, POWDER, FOR SOLUTION INTRAMUSCULAR; INTRAVENOUS at 17:02

## 2021-01-01 RX ADMIN — CHLORHEXIDINE GLUCONATE 15 ML: 0.12 RINSE ORAL at 08:55

## 2021-01-01 RX ADMIN — HEPARIN SODIUM 1400 UNITS: 1000 INJECTION INTRAVENOUS; SUBCUTANEOUS at 11:28

## 2021-01-01 RX ADMIN — MIDAZOLAM HYDROCHLORIDE 2 MG/HR: 5 INJECTION, SOLUTION INTRAMUSCULAR; INTRAVENOUS at 03:46

## 2021-01-01 RX ADMIN — DOPAMINE HYDROCHLORIDE 5 MCG/KG/MIN: 320 INJECTION, SOLUTION INTRAVENOUS at 10:02

## 2021-01-01 RX ADMIN — METRONIDAZOLE 500 MG: 500 INJECTION, SOLUTION INTRAVENOUS at 21:36

## 2021-01-01 RX ADMIN — CALCIUM CHLORIDE, MAGNESIUM CHLORIDE, DEXTROSE MONOHYDRATE, LACTIC ACID, SODIUM CHLORIDE, SODIUM BICARBONATE AND POTASSIUM CHLORIDE: 3.68; 3.05; 22; 5.4; 6.46; 3.09; .314 INJECTION INTRAVENOUS at 13:40

## 2021-01-01 RX ADMIN — HYDROCORTISONE SODIUM SUCCINATE 50 MG: 100 INJECTION, POWDER, FOR SOLUTION INTRAMUSCULAR; INTRAVENOUS at 01:40

## 2021-01-01 RX ADMIN — MIDODRINE HYDROCHLORIDE 20 MG: 5 TABLET ORAL at 05:39

## 2021-01-01 RX ADMIN — HYDROMORPHONE HYDROCHLORIDE 1 MG: 1 INJECTION, SOLUTION INTRAMUSCULAR; INTRAVENOUS; SUBCUTANEOUS at 16:44

## 2021-01-01 RX ADMIN — CALCIUM CHLORIDE, MAGNESIUM CHLORIDE, DEXTROSE MONOHYDRATE, LACTIC ACID, SODIUM CHLORIDE, SODIUM BICARBONATE AND POTASSIUM CHLORIDE: 5.15; 2.03; 22; 5.4; 6.46; 3.09; .157 INJECTION INTRAVENOUS at 21:02

## 2021-01-01 RX ADMIN — VASOPRESSIN 0.02 UNITS/MIN: 20 INJECTION INTRAVENOUS at 14:28

## 2021-01-01 RX ADMIN — CALCIUM CHLORIDE, MAGNESIUM CHLORIDE, DEXTROSE MONOHYDRATE, LACTIC ACID, SODIUM CHLORIDE, SODIUM BICARBONATE AND POTASSIUM CHLORIDE: 5.15; 2.03; 22; 5.4; 6.46; 3.09; .157 INJECTION INTRAVENOUS at 03:04

## 2021-01-01 RX ADMIN — ALBUMIN (HUMAN) 12.5 G: 12.5 INJECTION, SOLUTION INTRAVENOUS at 23:27

## 2021-01-01 RX ADMIN — DEXMEDETOMIDINE HYDROCHLORIDE 1.4 MCG/KG/HR: 4 INJECTION, SOLUTION INTRAVENOUS at 17:03

## 2021-01-01 RX ADMIN — POTASSIUM CHLORIDE 20 MEQ: 29.8 INJECTION INTRAVENOUS at 08:48

## 2021-01-01 RX ADMIN — ALBUMIN (HUMAN) 25 G: 0.25 INJECTION, SOLUTION INTRAVENOUS at 12:39

## 2021-01-01 RX ADMIN — CALCIUM CHLORIDE, MAGNESIUM CHLORIDE, DEXTROSE MONOHYDRATE, LACTIC ACID, SODIUM CHLORIDE, SODIUM BICARBONATE AND POTASSIUM CHLORIDE: 3.68; 3.05; 22; 5.4; 6.46; 3.09; .314 INJECTION INTRAVENOUS at 17:33

## 2021-01-01 RX ADMIN — ALBUMIN (HUMAN) 12.5 G: 0.25 INJECTION, SOLUTION INTRAVENOUS at 19:31

## 2021-01-01 RX ADMIN — HYDROMORPHONE HYDROCHLORIDE 1 MG: 1 INJECTION, SOLUTION INTRAMUSCULAR; INTRAVENOUS; SUBCUTANEOUS at 03:41

## 2021-01-01 RX ADMIN — Medication: at 15:05

## 2021-01-01 RX ADMIN — FENTANYL CITRATE 200 MCG/HR: 50 INJECTION, SOLUTION INTRAMUSCULAR; INTRAVENOUS at 21:45

## 2021-01-01 RX ADMIN — MIDAZOLAM HYDROCHLORIDE 4 MG/HR: 5 INJECTION, SOLUTION INTRAMUSCULAR; INTRAVENOUS at 08:40

## 2021-01-01 RX ADMIN — HYDROMORPHONE HYDROCHLORIDE 1 MG: 1 INJECTION, SOLUTION INTRAMUSCULAR; INTRAVENOUS; SUBCUTANEOUS at 23:19

## 2021-01-01 RX ADMIN — VANCOMYCIN HYDROCHLORIDE 1000 MG: 1 INJECTION, POWDER, LYOPHILIZED, FOR SOLUTION INTRAVENOUS at 08:51

## 2021-01-01 RX ADMIN — CALCIUM CHLORIDE, MAGNESIUM CHLORIDE, DEXTROSE MONOHYDRATE, LACTIC ACID, SODIUM CHLORIDE, SODIUM BICARBONATE AND POTASSIUM CHLORIDE: 3.68; 3.05; 22; 5.4; 6.46; 3.09; .314 INJECTION INTRAVENOUS at 01:51

## 2021-01-01 RX ADMIN — HEPARIN SODIUM 1400 UNITS: 1000 INJECTION INTRAVENOUS; SUBCUTANEOUS at 17:52

## 2021-01-01 RX ADMIN — SODIUM CHLORIDE, POTASSIUM CHLORIDE, SODIUM LACTATE AND CALCIUM CHLORIDE 1000 ML: 600; 310; 30; 20 INJECTION, SOLUTION INTRAVENOUS at 00:05

## 2021-01-01 RX ADMIN — HEPARIN SODIUM 5000 UNITS: 5000 INJECTION INTRAVENOUS; SUBCUTANEOUS at 16:08

## 2021-01-01 RX ADMIN — ACETAMINOPHEN ORAL SOLUTION 650 MG: 650 SOLUTION ORAL at 08:20

## 2021-01-01 RX ADMIN — SODIUM CHLORIDE 5 ML/HR: 9 INJECTION, SOLUTION INTRAVENOUS at 01:30

## 2021-01-01 RX ADMIN — PROPOFOL 25 MCG/KG/MIN: 10 INJECTION, EMULSION INTRAVENOUS at 10:44

## 2021-01-01 RX ADMIN — HYDROCORTISONE SODIUM SUCCINATE 100 MG: 100 INJECTION, POWDER, FOR SOLUTION INTRAMUSCULAR; INTRAVENOUS at 16:04

## 2021-01-01 RX ADMIN — Medication 10 ML: at 14:40

## 2021-01-01 RX ADMIN — MEROPENEM 500 MG: 500 INJECTION, POWDER, FOR SOLUTION INTRAVENOUS at 21:00

## 2021-01-01 RX ADMIN — VASOPRESSIN 0.04 UNITS/MIN: 20 INJECTION INTRAVENOUS at 23:18

## 2021-01-01 RX ADMIN — CALCIUM CHLORIDE, MAGNESIUM CHLORIDE, DEXTROSE MONOHYDRATE, LACTIC ACID, SODIUM CHLORIDE, SODIUM BICARBONATE AND POTASSIUM CHLORIDE: 3.68; 3.05; 22; 5.4; 6.46; 3.09; .314 INJECTION INTRAVENOUS at 03:39

## 2021-01-01 RX ADMIN — SODIUM CHLORIDE 10 MG/HR: 900 INJECTION, SOLUTION INTRAVENOUS at 01:26

## 2021-01-01 RX ADMIN — INSULIN LISPRO 2 UNITS: 100 INJECTION, SOLUTION INTRAVENOUS; SUBCUTANEOUS at 17:25

## 2021-01-01 RX ADMIN — POTASSIUM PHOSPHATE, MONOBASIC POTASSIUM PHOSPHATE, DIBASIC: 224; 236 INJECTION, SOLUTION, CONCENTRATE INTRAVENOUS at 09:22

## 2021-01-01 RX ADMIN — HYDROCORTISONE SODIUM SUCCINATE 100 MG: 100 INJECTION, POWDER, FOR SOLUTION INTRAMUSCULAR; INTRAVENOUS at 08:45

## 2021-01-01 RX ADMIN — ALBUMIN (HUMAN) 25 G: 0.25 INJECTION, SOLUTION INTRAVENOUS at 23:29

## 2021-01-01 RX ADMIN — INSULIN GLARGINE 15 UNITS: 100 INJECTION, SOLUTION SUBCUTANEOUS at 08:49

## 2021-01-01 RX ADMIN — HYDROCORTISONE SODIUM SUCCINATE 100 MG: 100 INJECTION, POWDER, FOR SOLUTION INTRAMUSCULAR; INTRAVENOUS at 17:31

## 2021-01-01 RX ADMIN — Medication: at 08:29

## 2021-01-01 RX ADMIN — CALCIUM CHLORIDE, MAGNESIUM CHLORIDE, DEXTROSE MONOHYDRATE, LACTIC ACID, SODIUM CHLORIDE, SODIUM BICARBONATE AND POTASSIUM CHLORIDE: 3.68; 3.05; 22; 5.4; 6.46; 3.09; .314 INJECTION INTRAVENOUS at 16:32

## 2021-01-01 RX ADMIN — SODIUM CHLORIDE 3 ML/HR: 9 INJECTION, SOLUTION INTRAVENOUS at 01:30

## 2021-01-01 RX ADMIN — HEPARIN SODIUM 1400 UNITS: 1000 INJECTION INTRAVENOUS; SUBCUTANEOUS at 04:19

## 2021-01-01 RX ADMIN — CALCIUM CHLORIDE, MAGNESIUM CHLORIDE, DEXTROSE MONOHYDRATE, LACTIC ACID, SODIUM CHLORIDE, SODIUM BICARBONATE AND POTASSIUM CHLORIDE: 3.68; 3.05; 22; 5.4; 6.46; 3.09; .314 INJECTION INTRAVENOUS at 00:57

## 2021-01-01 RX ADMIN — DEXMEDETOMIDINE HYDROCHLORIDE 1.4 MCG/KG/HR: 4 INJECTION, SOLUTION INTRAVENOUS at 20:27

## 2021-01-01 RX ADMIN — THIAMINE HYDROCHLORIDE: 100 INJECTION, SOLUTION INTRAMUSCULAR; INTRAVENOUS at 19:09

## 2021-01-01 RX ADMIN — ALBUMIN (HUMAN) 12.5 G: 12.5 INJECTION, SOLUTION INTRAVENOUS at 22:10

## 2021-01-01 RX ADMIN — DEXMEDETOMIDINE HYDROCHLORIDE 0.4 MCG/KG/HR: 4 INJECTION, SOLUTION INTRAVENOUS at 22:17

## 2021-01-01 RX ADMIN — PROPOFOL 25 MCG/KG/MIN: 10 INJECTION, EMULSION INTRAVENOUS at 11:19

## 2021-01-01 RX ADMIN — CALCIUM CHLORIDE, MAGNESIUM CHLORIDE, DEXTROSE MONOHYDRATE, LACTIC ACID, SODIUM CHLORIDE, SODIUM BICARBONATE AND POTASSIUM CHLORIDE: 5.15; 2.03; 22; 5.4; 6.46; 3.09; .157 INJECTION INTRAVENOUS at 23:55

## 2021-01-01 RX ADMIN — CALCIUM CHLORIDE, MAGNESIUM CHLORIDE, DEXTROSE MONOHYDRATE, LACTIC ACID, SODIUM CHLORIDE, SODIUM BICARBONATE AND POTASSIUM CHLORIDE: 3.68; 3.05; 22; 5.4; 6.46; 3.09; .314 INJECTION INTRAVENOUS at 08:28

## 2021-01-01 RX ADMIN — FENTANYL CITRATE 200 MCG/HR: 50 INJECTION, SOLUTION INTRAMUSCULAR; INTRAVENOUS at 22:39

## 2021-01-01 RX ADMIN — INSULIN LISPRO 2 UNITS: 100 INJECTION, SOLUTION INTRAVENOUS; SUBCUTANEOUS at 11:30

## 2021-01-01 RX ADMIN — GLYCOPYRROLATE 0.2 MG: 0.2 INJECTION INTRAMUSCULAR; INTRAVENOUS at 20:10

## 2021-01-01 RX ADMIN — HEPARIN SODIUM 5000 UNITS: 5000 INJECTION INTRAVENOUS; SUBCUTANEOUS at 06:07

## 2021-01-01 RX ADMIN — ATENOLOL 50 MG: 50 TABLET ORAL at 08:39

## 2021-01-01 RX ADMIN — CALCIUM CHLORIDE, MAGNESIUM CHLORIDE, DEXTROSE MONOHYDRATE, LACTIC ACID, SODIUM CHLORIDE, SODIUM BICARBONATE AND POTASSIUM CHLORIDE: 3.68; 3.05; 22; 5.4; 6.46; 3.09; .314 INJECTION INTRAVENOUS at 01:56

## 2021-01-01 RX ADMIN — CALCIUM CHLORIDE, MAGNESIUM CHLORIDE, DEXTROSE MONOHYDRATE, LACTIC ACID, SODIUM CHLORIDE, SODIUM BICARBONATE AND POTASSIUM CHLORIDE: 3.68; 3.05; 22; 5.4; 6.46; 3.09; .314 INJECTION INTRAVENOUS at 22:40

## 2021-01-01 RX ADMIN — HYDROCORTISONE SODIUM SUCCINATE 100 MG: 100 INJECTION, POWDER, FOR SOLUTION INTRAMUSCULAR; INTRAVENOUS at 08:23

## 2021-01-01 RX ADMIN — Medication 10 ML: at 14:13

## 2021-01-01 RX ADMIN — HYDROMORPHONE HYDROCHLORIDE 1 MG: 1 INJECTION, SOLUTION INTRAMUSCULAR; INTRAVENOUS; SUBCUTANEOUS at 17:31

## 2021-01-01 RX ADMIN — CALCIUM CHLORIDE, MAGNESIUM CHLORIDE, DEXTROSE MONOHYDRATE, LACTIC ACID, SODIUM CHLORIDE, SODIUM BICARBONATE AND POTASSIUM CHLORIDE: 3.68; 3.05; 22; 5.4; 6.46; 3.09; .314 INJECTION INTRAVENOUS at 23:40

## 2021-01-01 RX ADMIN — HYDROMORPHONE HYDROCHLORIDE 2 MG: 2 INJECTION INTRAMUSCULAR; INTRAVENOUS; SUBCUTANEOUS at 02:32

## 2021-01-01 RX ADMIN — Medication: at 20:53

## 2021-01-01 RX ADMIN — FENTANYL CITRATE 150 MCG/HR: 50 INJECTION, SOLUTION INTRAMUSCULAR; INTRAVENOUS at 20:53

## 2021-01-01 RX ADMIN — SODIUM CHLORIDE 10 MG/HR: 900 INJECTION, SOLUTION INTRAVENOUS at 15:11

## 2021-01-01 RX ADMIN — CALCIUM CHLORIDE, MAGNESIUM CHLORIDE, DEXTROSE MONOHYDRATE, LACTIC ACID, SODIUM CHLORIDE, SODIUM BICARBONATE AND POTASSIUM CHLORIDE: 3.68; 3.05; 22; 5.4; 6.46; 3.09; .314 INJECTION INTRAVENOUS at 12:23

## 2021-01-01 RX ADMIN — VASOPRESSIN 0.04 UNITS/MIN: 20 INJECTION INTRAVENOUS at 02:32

## 2021-01-01 RX ADMIN — HYDROCORTISONE SODIUM SUCCINATE 100 MG: 100 INJECTION, POWDER, FOR SOLUTION INTRAMUSCULAR; INTRAVENOUS at 18:56

## 2021-01-01 RX ADMIN — HEPARIN SODIUM 5000 UNITS: 5000 INJECTION INTRAVENOUS; SUBCUTANEOUS at 06:08

## 2021-01-01 RX ADMIN — VASOPRESSIN 0.04 UNITS/MIN: 20 INJECTION INTRAVENOUS at 03:56

## 2021-01-01 RX ADMIN — CHLORHEXIDINE GLUCONATE 15 ML: 0.12 RINSE ORAL at 20:32

## 2021-01-01 RX ADMIN — ATORVASTATIN CALCIUM 10 MG: 10 TABLET, FILM COATED ORAL at 08:39

## 2021-01-01 RX ADMIN — EPINEPHRINE 5 MCG/MIN: 1 INJECTION INTRAMUSCULAR; INTRAVENOUS; SUBCUTANEOUS at 23:55

## 2021-01-01 RX ADMIN — HALOPERIDOL LACTATE 5 MG: 5 INJECTION, SOLUTION INTRAMUSCULAR at 22:17

## 2021-01-01 RX ADMIN — HYDROCORTISONE SODIUM SUCCINATE 100 MG: 100 INJECTION, POWDER, FOR SOLUTION INTRAMUSCULAR; INTRAVENOUS at 17:15

## 2021-01-01 RX ADMIN — SODIUM CHLORIDE 3 ML/HR: 9 INJECTION, SOLUTION INTRAVENOUS at 08:05

## 2021-01-01 RX ADMIN — HEPARIN SODIUM 5000 UNITS: 5000 INJECTION INTRAVENOUS; SUBCUTANEOUS at 06:04

## 2021-01-01 RX ADMIN — CALCIUM CHLORIDE, MAGNESIUM CHLORIDE, DEXTROSE MONOHYDRATE, LACTIC ACID, SODIUM CHLORIDE, SODIUM BICARBONATE AND POTASSIUM CHLORIDE: 3.68; 3.05; 22; 5.4; 6.46; 3.09; .314 INJECTION INTRAVENOUS at 05:05

## 2021-01-01 RX ADMIN — ACETAMINOPHEN 650 MG: 325 TABLET ORAL at 22:02

## 2021-01-01 RX ADMIN — MORPHINE SULFATE 4 MG: 10 INJECTION INTRAVENOUS at 10:56

## 2021-01-01 RX ADMIN — MIDODRINE HYDROCHLORIDE 20 MG: 5 TABLET ORAL at 20:59

## 2021-01-01 RX ADMIN — CALCIUM CHLORIDE, MAGNESIUM CHLORIDE, DEXTROSE MONOHYDRATE, LACTIC ACID, SODIUM CHLORIDE, SODIUM BICARBONATE AND POTASSIUM CHLORIDE: 3.68; 3.05; 22; 5.4; 6.46; 3.09; .314 INJECTION INTRAVENOUS at 13:56

## 2021-01-01 RX ADMIN — DEXMEDETOMIDINE HYDROCHLORIDE 0.8 MCG/KG/HR: 4 INJECTION, SOLUTION INTRAVENOUS at 13:38

## 2021-01-01 RX ADMIN — MEROPENEM 500 MG: 500 INJECTION, POWDER, FOR SOLUTION INTRAVENOUS at 03:00

## 2021-01-01 RX ADMIN — PERFLUTREN 1.5 ML: 6.52 INJECTION, SUSPENSION INTRAVENOUS at 11:49

## 2021-01-01 RX ADMIN — FENTANYL CITRATE 100 MCG/HR: 0.05 INJECTION, SOLUTION INTRAMUSCULAR; INTRAVENOUS at 20:18

## 2021-01-01 RX ADMIN — HYDROCORTISONE SODIUM SUCCINATE 100 MG: 100 INJECTION, POWDER, FOR SOLUTION INTRAMUSCULAR; INTRAVENOUS at 01:00

## 2021-01-01 RX ADMIN — VASOPRESSIN 0.04 UNITS/MIN: 20 INJECTION INTRAVENOUS at 12:05

## 2021-01-01 RX ADMIN — Medication: at 23:16

## 2021-01-01 RX ADMIN — ALBUMIN (HUMAN) 12.5 G: 0.25 INJECTION, SOLUTION INTRAVENOUS at 19:00

## 2021-01-01 RX ADMIN — SODIUM CHLORIDE 5 ML/HR: 9 INJECTION, SOLUTION INTRAVENOUS at 00:04

## 2021-01-01 RX ADMIN — Medication 10 ML: at 18:37

## 2021-01-01 RX ADMIN — CALCIUM CHLORIDE, MAGNESIUM CHLORIDE, DEXTROSE MONOHYDRATE, LACTIC ACID, SODIUM CHLORIDE, SODIUM BICARBONATE AND POTASSIUM CHLORIDE: 3.68; 3.05; 22; 5.4; 6.46; 3.09; .314 INJECTION INTRAVENOUS at 10:37

## 2021-01-01 RX ADMIN — PHENYLEPHRINE HYDROCHLORIDE 300 MCG/MIN: 10 INJECTION INTRAVENOUS at 22:39

## 2021-01-01 RX ADMIN — CHLORHEXIDINE GLUCONATE 15 ML: 0.12 RINSE ORAL at 08:43

## 2021-01-01 RX ADMIN — MEROPENEM 500 MG: 500 INJECTION, POWDER, FOR SOLUTION INTRAVENOUS at 14:01

## 2021-01-01 RX ADMIN — Medication 10 ML: at 21:02

## 2021-01-01 RX ADMIN — INSULIN LISPRO 2 UNITS: 100 INJECTION, SOLUTION INTRAVENOUS; SUBCUTANEOUS at 23:03

## 2021-01-01 RX ADMIN — PHENYLEPHRINE HYDROCHLORIDE 55 MCG/MIN: 10 INJECTION INTRAVENOUS at 07:48

## 2021-01-01 RX ADMIN — CHLORHEXIDINE GLUCONATE 15 ML: 0.12 RINSE ORAL at 09:21

## 2021-01-01 RX ADMIN — CALCIUM CHLORIDE, MAGNESIUM CHLORIDE, DEXTROSE MONOHYDRATE, LACTIC ACID, SODIUM CHLORIDE, SODIUM BICARBONATE AND POTASSIUM CHLORIDE: 5.15; 2.03; 22; 5.4; 6.46; 3.09; .157 INJECTION INTRAVENOUS at 00:56

## 2021-01-01 RX ADMIN — Medication 10 ML: at 14:43

## 2021-01-01 RX ADMIN — SODIUM CHLORIDE 40 MG: 9 INJECTION INTRAMUSCULAR; INTRAVENOUS; SUBCUTANEOUS at 08:50

## 2021-01-01 RX ADMIN — MIDAZOLAM 4 MG: 1 INJECTION INTRAMUSCULAR; INTRAVENOUS at 22:44

## 2021-01-01 RX ADMIN — FOLIC ACID: 5 INJECTION, SOLUTION INTRAMUSCULAR; INTRAVENOUS; SUBCUTANEOUS at 19:05

## 2021-01-01 RX ADMIN — INSULIN LISPRO 2 UNITS: 100 INJECTION, SOLUTION INTRAVENOUS; SUBCUTANEOUS at 07:00

## 2021-01-01 RX ADMIN — MIDODRINE HYDROCHLORIDE 20 MG: 5 TABLET ORAL at 21:19

## 2021-01-01 RX ADMIN — CHLORHEXIDINE GLUCONATE 15 ML: 0.12 RINSE ORAL at 21:12

## 2021-01-01 RX ADMIN — Medication: at 21:00

## 2021-01-01 RX ADMIN — HEPARIN SODIUM 5000 UNITS: 5000 INJECTION INTRAVENOUS; SUBCUTANEOUS at 14:26

## 2021-01-01 RX ADMIN — CEFEPIME HYDROCHLORIDE 2 G: 2 INJECTION, POWDER, FOR SOLUTION INTRAVENOUS at 20:38

## 2021-01-01 RX ADMIN — DEXMEDETOMIDINE HYDROCHLORIDE 1.4 MCG/KG/HR: 4 INJECTION, SOLUTION INTRAVENOUS at 08:08

## 2021-01-01 RX ADMIN — CALCIUM CHLORIDE, MAGNESIUM CHLORIDE, DEXTROSE MONOHYDRATE, LACTIC ACID, SODIUM CHLORIDE, SODIUM BICARBONATE AND POTASSIUM CHLORIDE: 3.68; 3.05; 22; 5.4; 6.46; 3.09; .314 INJECTION INTRAVENOUS at 17:21

## 2021-01-01 RX ADMIN — INSULIN LISPRO 2 UNITS: 100 INJECTION, SOLUTION INTRAVENOUS; SUBCUTANEOUS at 11:36

## 2021-01-01 RX ADMIN — INSULIN LISPRO 5 UNITS: 100 INJECTION, SOLUTION INTRAVENOUS; SUBCUTANEOUS at 12:38

## 2021-01-01 RX ADMIN — ASPIRIN 81 MG CHEWABLE TABLET 81 MG: 81 TABLET CHEWABLE at 08:49

## 2021-01-01 RX ADMIN — CALCIUM CHLORIDE, MAGNESIUM CHLORIDE, DEXTROSE MONOHYDRATE, LACTIC ACID, SODIUM CHLORIDE, SODIUM BICARBONATE AND POTASSIUM CHLORIDE: 3.68; 3.05; 22; 5.4; 6.46; 3.09; .314 INJECTION INTRAVENOUS at 23:11

## 2021-01-01 RX ADMIN — HYDROCORTISONE SODIUM SUCCINATE 100 MG: 100 INJECTION, POWDER, FOR SOLUTION INTRAMUSCULAR; INTRAVENOUS at 00:14

## 2021-01-01 RX ADMIN — MEROPENEM 500 MG: 500 INJECTION, POWDER, FOR SOLUTION INTRAVENOUS at 11:13

## 2021-01-01 RX ADMIN — INSULIN LISPRO 2 UNITS: 100 INJECTION, SOLUTION INTRAVENOUS; SUBCUTANEOUS at 19:06

## 2021-01-01 RX ADMIN — HEPARIN SODIUM 5000 UNITS: 5000 INJECTION INTRAVENOUS; SUBCUTANEOUS at 20:02

## 2021-01-01 RX ADMIN — MIDAZOLAM HYDROCHLORIDE 4 MG/HR: 5 INJECTION, SOLUTION INTRAMUSCULAR; INTRAVENOUS at 06:05

## 2021-01-01 RX ADMIN — Medication: at 16:02

## 2021-01-01 RX ADMIN — DEXMEDETOMIDINE HYDROCHLORIDE 1.4 MCG/KG/HR: 4 INJECTION, SOLUTION INTRAVENOUS at 21:47

## 2021-01-01 RX ADMIN — FENTANYL CITRATE 100 MCG/HR: 0.05 INJECTION, SOLUTION INTRAMUSCULAR; INTRAVENOUS at 15:10

## 2021-01-01 RX ADMIN — ALBUMIN (HUMAN) 25 G: 12.5 INJECTION, SOLUTION INTRAVENOUS at 21:48

## 2021-01-01 RX ADMIN — Medication 10 ML: at 13:22

## 2021-01-01 RX ADMIN — Medication 10 ML: at 13:31

## 2021-01-01 RX ADMIN — METRONIDAZOLE 500 MG: 500 INJECTION, SOLUTION INTRAVENOUS at 21:59

## 2021-01-01 RX ADMIN — CALCIUM GLUCONATE 2 G: 20 INJECTION, SOLUTION INTRAVENOUS at 00:20

## 2021-01-01 RX ADMIN — INSULIN LISPRO 2 UNITS: 100 INJECTION, SOLUTION INTRAVENOUS; SUBCUTANEOUS at 17:18

## 2021-01-01 RX ADMIN — HYDROMORPHONE HYDROCHLORIDE 2 MG: 2 INJECTION INTRAMUSCULAR; INTRAVENOUS; SUBCUTANEOUS at 21:26

## 2021-01-01 RX ADMIN — INSULIN GLARGINE 10 UNITS: 100 INJECTION, SOLUTION SUBCUTANEOUS at 08:23

## 2021-01-01 RX ADMIN — Medication 10 ML: at 22:55

## 2021-01-01 RX ADMIN — NOREPINEPHRINE BITARTRATE 14 MCG/MIN: 1 SOLUTION INTRAVENOUS at 04:14

## 2021-01-01 RX ADMIN — MIDODRINE HYDROCHLORIDE 20 MG: 5 TABLET ORAL at 21:36

## 2021-01-01 RX ADMIN — Medication: at 09:00

## 2021-01-01 RX ADMIN — FENTANYL CITRATE 175 MCG/HR: 50 INJECTION, SOLUTION INTRAMUSCULAR; INTRAVENOUS at 12:31

## 2021-01-01 RX ADMIN — CALCIUM GLUCONATE 4 G: 98 INJECTION, SOLUTION INTRAVENOUS at 08:22

## 2021-01-01 RX ADMIN — Medication 10 ML: at 06:02

## 2021-01-01 RX ADMIN — MIDODRINE HYDROCHLORIDE 20 MG: 5 TABLET ORAL at 14:30

## 2021-01-01 RX ADMIN — PHENYLEPHRINE HYDROCHLORIDE 50 MCG/MIN: 10 INJECTION INTRAVENOUS at 00:57

## 2021-01-01 RX ADMIN — INSULIN LISPRO 2 UNITS: 100 INJECTION, SOLUTION INTRAVENOUS; SUBCUTANEOUS at 12:44

## 2021-01-01 RX ADMIN — MIDAZOLAM HYDROCHLORIDE 4 MG: 1 INJECTION, SOLUTION INTRAMUSCULAR; INTRAVENOUS at 02:29

## 2021-01-01 RX ADMIN — ALBUMIN (HUMAN) 25 G: 0.25 INJECTION, SOLUTION INTRAVENOUS at 11:07

## 2021-01-01 RX ADMIN — MIDODRINE HYDROCHLORIDE 20 MG: 5 TABLET ORAL at 23:16

## 2021-01-01 RX ADMIN — CHLORHEXIDINE GLUCONATE 15 ML: 0.12 RINSE ORAL at 08:06

## 2021-01-01 RX ADMIN — LIDOCAINE HYDROCHLORIDE 200 MG: 20 INJECTION, SOLUTION INFILTRATION; PERINEURAL at 12:40

## 2021-01-01 RX ADMIN — METRONIDAZOLE 500 MG: 500 INJECTION, SOLUTION INTRAVENOUS at 23:15

## 2021-01-01 RX ADMIN — MEROPENEM 500 MG: 500 INJECTION, POWDER, FOR SOLUTION INTRAVENOUS at 10:45

## 2021-01-01 RX ADMIN — Medication 10 ML: at 21:37

## 2021-01-01 RX ADMIN — FENTANYL CITRATE 125 MCG/HR: 0.05 INJECTION, SOLUTION INTRAMUSCULAR; INTRAVENOUS at 18:30

## 2021-01-01 RX ADMIN — INSULIN LISPRO 3 UNITS: 100 INJECTION, SOLUTION INTRAVENOUS; SUBCUTANEOUS at 23:17

## 2021-01-01 RX ADMIN — Medication 10 ML: at 21:00

## 2021-01-01 RX ADMIN — MIDODRINE HYDROCHLORIDE 20 MG: 5 TABLET ORAL at 06:01

## 2021-01-01 RX ADMIN — METRONIDAZOLE 500 MG: 500 INJECTION, SOLUTION INTRAVENOUS at 13:19

## 2021-01-01 RX ADMIN — SODIUM CHLORIDE 40 MG: 9 INJECTION INTRAMUSCULAR; INTRAVENOUS; SUBCUTANEOUS at 08:46

## 2021-01-01 RX ADMIN — HEPARIN SODIUM 5000 UNITS: 5000 INJECTION INTRAVENOUS; SUBCUTANEOUS at 16:32

## 2021-01-01 RX ADMIN — HYDROMORPHONE HYDROCHLORIDE 1 MG: 1 INJECTION, SOLUTION INTRAMUSCULAR; INTRAVENOUS; SUBCUTANEOUS at 04:38

## 2021-01-01 RX ADMIN — HEPARIN SODIUM 5000 UNITS: 5000 INJECTION INTRAVENOUS; SUBCUTANEOUS at 23:27

## 2021-01-01 RX ADMIN — Medication 100 MCG: at 08:31

## 2021-01-01 RX ADMIN — CALCIUM CHLORIDE, MAGNESIUM CHLORIDE, DEXTROSE MONOHYDRATE, LACTIC ACID, SODIUM CHLORIDE, SODIUM BICARBONATE AND POTASSIUM CHLORIDE: 3.68; 3.05; 22; 5.4; 6.46; 3.09; .314 INJECTION INTRAVENOUS at 10:39

## 2021-01-01 RX ADMIN — HYDROMORPHONE HYDROCHLORIDE 1 MG: 1 INJECTION, SOLUTION INTRAMUSCULAR; INTRAVENOUS; SUBCUTANEOUS at 19:53

## 2021-01-01 RX ADMIN — MIDAZOLAM HYDROCHLORIDE 4 MG/HR: 5 INJECTION, SOLUTION INTRAMUSCULAR; INTRAVENOUS at 23:33

## 2021-01-01 RX ADMIN — CALCIUM CHLORIDE, MAGNESIUM CHLORIDE, DEXTROSE MONOHYDRATE, LACTIC ACID, SODIUM CHLORIDE, SODIUM BICARBONATE AND POTASSIUM CHLORIDE: 3.68; 3.05; 22; 5.4; 6.46; 3.09; .314 INJECTION INTRAVENOUS at 15:54

## 2021-01-01 RX ADMIN — HEPARIN SODIUM 5000 UNITS: 5000 INJECTION INTRAVENOUS; SUBCUTANEOUS at 23:37

## 2021-01-01 RX ADMIN — INSULIN LISPRO 2 UNITS: 100 INJECTION, SOLUTION INTRAVENOUS; SUBCUTANEOUS at 05:09

## 2021-01-01 RX ADMIN — CALCIUM GLUCONATE 1000 MG: 20 INJECTION, SOLUTION INTRAVENOUS at 02:04

## 2021-01-01 RX ADMIN — Medication 10 ML: at 13:32

## 2021-01-01 RX ADMIN — CALCIUM CHLORIDE, MAGNESIUM CHLORIDE, DEXTROSE MONOHYDRATE, LACTIC ACID, SODIUM CHLORIDE, SODIUM BICARBONATE AND POTASSIUM CHLORIDE: 3.68; 3.05; 22; 5.4; 6.46; 3.09; .314 INJECTION INTRAVENOUS at 22:20

## 2021-01-01 RX ADMIN — CALCIUM CHLORIDE, MAGNESIUM CHLORIDE, DEXTROSE MONOHYDRATE, LACTIC ACID, SODIUM CHLORIDE, SODIUM BICARBONATE AND POTASSIUM CHLORIDE: 5.15; 2.03; 22; 5.4; 6.46; 3.09; .157 INJECTION INTRAVENOUS at 03:41

## 2021-01-01 RX ADMIN — HYDROCORTISONE SODIUM SUCCINATE 50 MG: 100 INJECTION, POWDER, FOR SOLUTION INTRAMUSCULAR; INTRAVENOUS at 18:36

## 2021-01-01 RX ADMIN — HEPARIN SODIUM 1100 UNITS: 1000 INJECTION INTRAVENOUS; SUBCUTANEOUS at 11:28

## 2021-01-01 RX ADMIN — HEPARIN SODIUM 5000 UNITS: 5000 INJECTION INTRAVENOUS; SUBCUTANEOUS at 06:09

## 2021-01-01 RX ADMIN — HYDROCORTISONE SODIUM SUCCINATE 100 MG: 100 INJECTION, POWDER, FOR SOLUTION INTRAMUSCULAR; INTRAVENOUS at 08:46

## 2021-01-01 RX ADMIN — HEPARIN SODIUM 5000 UNITS: 5000 INJECTION INTRAVENOUS; SUBCUTANEOUS at 14:30

## 2021-01-01 RX ADMIN — HEPARIN SODIUM 5000 UNITS: 5000 INJECTION INTRAVENOUS; SUBCUTANEOUS at 13:34

## 2021-01-01 RX ADMIN — CALCIUM CHLORIDE, MAGNESIUM CHLORIDE, DEXTROSE MONOHYDRATE, LACTIC ACID, SODIUM CHLORIDE, SODIUM BICARBONATE AND POTASSIUM CHLORIDE: 3.68; 3.05; 22; 5.4; 6.46; 3.09; .314 INJECTION INTRAVENOUS at 06:43

## 2021-01-01 RX ADMIN — Medication 100 MCG: at 10:42

## 2021-01-01 RX ADMIN — HEPARIN SODIUM 5000 UNITS: 5000 INJECTION INTRAVENOUS; SUBCUTANEOUS at 07:12

## 2021-01-01 RX ADMIN — ASPIRIN 81 MG CHEWABLE TABLET 81 MG: 81 TABLET CHEWABLE at 08:30

## 2021-01-01 RX ADMIN — Medication: at 15:11

## 2021-01-01 RX ADMIN — DOPAMINE HYDROCHLORIDE 7 MCG/KG/MIN: 320 INJECTION, SOLUTION INTRAVENOUS at 20:46

## 2021-01-01 RX ADMIN — CEFEPIME HYDROCHLORIDE 2 G: 2 INJECTION, POWDER, FOR SOLUTION INTRAVENOUS at 08:38

## 2021-01-01 RX ADMIN — MIDODRINE HYDROCHLORIDE 20 MG: 5 TABLET ORAL at 07:01

## 2021-01-01 RX ADMIN — CALCIUM CHLORIDE, MAGNESIUM CHLORIDE, DEXTROSE MONOHYDRATE, LACTIC ACID, SODIUM CHLORIDE, SODIUM BICARBONATE AND POTASSIUM CHLORIDE: 3.68; 3.05; 22; 5.4; 6.46; 3.09; .314 INJECTION INTRAVENOUS at 08:55

## 2021-01-01 RX ADMIN — DEXMEDETOMIDINE HYDROCHLORIDE 0.6 MCG/KG/HR: 4 INJECTION, SOLUTION INTRAVENOUS at 09:29

## 2021-01-01 RX ADMIN — MIDODRINE HYDROCHLORIDE 20 MG: 5 TABLET ORAL at 14:41

## 2021-01-01 RX ADMIN — ALBUMIN HUMAN 25 G: 50 SOLUTION INTRAVENOUS at 07:10

## 2021-01-01 RX ADMIN — Medication 10 ML: at 06:00

## 2021-01-01 RX ADMIN — CALCIUM CHLORIDE, MAGNESIUM CHLORIDE, DEXTROSE MONOHYDRATE, LACTIC ACID, SODIUM CHLORIDE, SODIUM BICARBONATE AND POTASSIUM CHLORIDE: 5.15; 2.03; 22; 5.4; 6.46; 3.09; .157 INJECTION INTRAVENOUS at 05:38

## 2021-01-01 RX ADMIN — HEPARIN SODIUM 1100 UNITS: 1000 INJECTION INTRAVENOUS; SUBCUTANEOUS at 18:02

## 2021-01-01 RX ADMIN — ASPIRIN 81 MG CHEWABLE TABLET 81 MG: 81 TABLET CHEWABLE at 08:13

## 2021-01-01 RX ADMIN — MEROPENEM 500 MG: 500 INJECTION, POWDER, FOR SOLUTION INTRAVENOUS at 22:55

## 2021-01-01 RX ADMIN — INSULIN LISPRO 2 UNITS: 100 INJECTION, SOLUTION INTRAVENOUS; SUBCUTANEOUS at 14:02

## 2021-01-01 RX ADMIN — FENTANYL CITRATE 50 MCG/HR: 50 INJECTION, SOLUTION INTRAMUSCULAR; INTRAVENOUS at 05:35

## 2021-01-01 RX ADMIN — HYDROMORPHONE HYDROCHLORIDE 1 MG: 1 INJECTION, SOLUTION INTRAMUSCULAR; INTRAVENOUS; SUBCUTANEOUS at 13:32

## 2021-01-01 RX ADMIN — NOREPINEPHRINE BITARTRATE 4 MCG/MIN: 1 SOLUTION INTRAVENOUS at 12:43

## 2021-01-01 RX ADMIN — SODIUM CHLORIDE 1000 ML: 9 INJECTION, SOLUTION INTRAVENOUS at 17:00

## 2021-01-01 RX ADMIN — HYDROMORPHONE HYDROCHLORIDE 1 MG: 1 INJECTION, SOLUTION INTRAMUSCULAR; INTRAVENOUS; SUBCUTANEOUS at 08:14

## 2021-01-01 RX ADMIN — MIDODRINE HYDROCHLORIDE 20 MG: 5 TABLET ORAL at 14:36

## 2021-01-01 RX ADMIN — CHLORHEXIDINE GLUCONATE 15 ML: 0.12 RINSE ORAL at 08:57

## 2021-01-01 RX ADMIN — HEPARIN SODIUM 1400 UNITS: 1000 INJECTION INTRAVENOUS; SUBCUTANEOUS at 18:01

## 2021-01-01 RX ADMIN — HYDROCORTISONE SODIUM SUCCINATE 50 MG: 100 INJECTION, POWDER, FOR SOLUTION INTRAMUSCULAR; INTRAVENOUS at 08:52

## 2021-01-01 RX ADMIN — NOREPINEPHRINE BITARTRATE 30 MCG/MIN: 1 SOLUTION INTRAVENOUS at 15:22

## 2021-01-01 RX ADMIN — MIDODRINE HYDROCHLORIDE 20 MG: 5 TABLET ORAL at 05:30

## 2021-01-01 RX ADMIN — CALCIUM CHLORIDE, MAGNESIUM CHLORIDE, DEXTROSE MONOHYDRATE, LACTIC ACID, SODIUM CHLORIDE, SODIUM BICARBONATE AND POTASSIUM CHLORIDE: 3.68; 3.05; 22; 5.4; 6.46; 3.09; .314 INJECTION INTRAVENOUS at 11:14

## 2021-01-01 RX ADMIN — PHENYLEPHRINE HYDROCHLORIDE 300 MCG/MIN: 10 INJECTION INTRAVENOUS at 22:48

## 2021-01-01 RX ADMIN — MIDAZOLAM HYDROCHLORIDE 6 MG/HR: 5 INJECTION, SOLUTION INTRAMUSCULAR; INTRAVENOUS at 19:37

## 2021-01-01 RX ADMIN — FENTANYL CITRATE 150 MCG/HR: 0.05 INJECTION, SOLUTION INTRAMUSCULAR; INTRAVENOUS at 09:06

## 2021-01-01 RX ADMIN — HYDROCORTISONE SODIUM SUCCINATE 100 MG: 100 INJECTION, POWDER, FOR SOLUTION INTRAMUSCULAR; INTRAVENOUS at 08:12

## 2021-01-01 RX ADMIN — CALCIUM CHLORIDE, MAGNESIUM CHLORIDE, DEXTROSE MONOHYDRATE, LACTIC ACID, SODIUM CHLORIDE, SODIUM BICARBONATE AND POTASSIUM CHLORIDE: 5.15; 2.03; 22; 5.4; 6.46; 3.09; .157 INJECTION INTRAVENOUS at 01:39

## 2021-01-01 RX ADMIN — MIDODRINE HYDROCHLORIDE 20 MG: 5 TABLET ORAL at 05:10

## 2021-01-01 RX ADMIN — CALCIUM CHLORIDE, MAGNESIUM CHLORIDE, DEXTROSE MONOHYDRATE, LACTIC ACID, SODIUM CHLORIDE, SODIUM BICARBONATE AND POTASSIUM CHLORIDE: 3.68; 3.05; 22; 5.4; 6.46; 3.09; .314 INJECTION INTRAVENOUS at 01:58

## 2021-01-01 RX ADMIN — VASOPRESSIN 0.04 UNITS/MIN: 20 INJECTION INTRAVENOUS at 01:22

## 2021-01-01 RX ADMIN — ALBUMIN HUMAN 25 G: 50 SOLUTION INTRAVENOUS at 12:08

## 2021-01-01 RX ADMIN — SODIUM CHLORIDE 40 MG: 9 INJECTION INTRAMUSCULAR; INTRAVENOUS; SUBCUTANEOUS at 08:02

## 2021-01-01 RX ADMIN — INSULIN LISPRO 2 UNITS: 100 INJECTION, SOLUTION INTRAVENOUS; SUBCUTANEOUS at 12:27

## 2021-01-01 RX ADMIN — NOREPINEPHRINE BITARTRATE 19 MCG/MIN: 1 SOLUTION INTRAVENOUS at 04:23

## 2021-01-01 RX ADMIN — PHENYLEPHRINE HYDROCHLORIDE 55 MCG/MIN: 10 INJECTION INTRAVENOUS at 15:50

## 2021-01-01 RX ADMIN — INSULIN LISPRO 2 UNITS: 100 INJECTION, SOLUTION INTRAVENOUS; SUBCUTANEOUS at 13:15

## 2021-01-01 RX ADMIN — HYDROCORTISONE SODIUM SUCCINATE 100 MG: 100 INJECTION, POWDER, FOR SOLUTION INTRAMUSCULAR; INTRAVENOUS at 08:03

## 2021-01-01 RX ADMIN — CALCIUM CHLORIDE, MAGNESIUM CHLORIDE, DEXTROSE MONOHYDRATE, LACTIC ACID, SODIUM CHLORIDE, SODIUM BICARBONATE AND POTASSIUM CHLORIDE: 3.68; 3.05; 22; 5.4; 6.46; 3.09; .314 INJECTION INTRAVENOUS at 04:58

## 2021-01-01 RX ADMIN — CALCIUM CHLORIDE, MAGNESIUM CHLORIDE, DEXTROSE MONOHYDRATE, LACTIC ACID, SODIUM CHLORIDE, SODIUM BICARBONATE AND POTASSIUM CHLORIDE: 5.15; 2.03; 22; 5.4; 6.46; 3.09; .157 INJECTION INTRAVENOUS at 20:33

## 2021-01-01 RX ADMIN — HYDROCORTISONE SODIUM SUCCINATE 100 MG: 100 INJECTION, POWDER, FOR SOLUTION INTRAMUSCULAR; INTRAVENOUS at 19:06

## 2021-01-01 RX ADMIN — MIDODRINE HYDROCHLORIDE 20 MG: 5 TABLET ORAL at 14:13

## 2021-01-01 RX ADMIN — VASOPRESSIN 0.04 UNITS/MIN: 20 INJECTION INTRAVENOUS at 02:00

## 2021-01-01 RX ADMIN — CEFEPIME HYDROCHLORIDE 2 G: 2 INJECTION, POWDER, FOR SOLUTION INTRAVENOUS at 21:30

## 2021-01-01 RX ADMIN — CALCIUM CHLORIDE, MAGNESIUM CHLORIDE, DEXTROSE MONOHYDRATE, LACTIC ACID, SODIUM CHLORIDE, SODIUM BICARBONATE AND POTASSIUM CHLORIDE: 3.68; 3.05; 22; 5.4; 6.46; 3.09; .314 INJECTION INTRAVENOUS at 20:46

## 2021-01-01 RX ADMIN — CALCIUM CHLORIDE, MAGNESIUM CHLORIDE, DEXTROSE MONOHYDRATE, LACTIC ACID, SODIUM CHLORIDE, SODIUM BICARBONATE AND POTASSIUM CHLORIDE: 5.15; 2.03; 22; 5.4; 6.46; 3.09; .157 INJECTION INTRAVENOUS at 03:42

## 2021-01-01 RX ADMIN — MIDODRINE HYDROCHLORIDE 10 MG: 5 TABLET ORAL at 05:02

## 2021-01-01 RX ADMIN — ASPIRIN 81 MG CHEWABLE TABLET 81 MG: 81 TABLET CHEWABLE at 08:46

## 2021-01-01 RX ADMIN — NOREPINEPHRINE BITARTRATE 2 MCG/MIN: 1 SOLUTION INTRAVENOUS at 01:52

## 2021-01-01 RX ADMIN — CALCIUM CHLORIDE, MAGNESIUM CHLORIDE, DEXTROSE MONOHYDRATE, LACTIC ACID, SODIUM CHLORIDE, SODIUM BICARBONATE AND POTASSIUM CHLORIDE: 3.68; 3.05; 22; 5.4; 6.46; 3.09; .314 INJECTION INTRAVENOUS at 10:14

## 2021-01-01 RX ADMIN — Medication: at 21:02

## 2021-01-01 RX ADMIN — HEPARIN SODIUM 1100 UNITS: 1000 INJECTION INTRAVENOUS; SUBCUTANEOUS at 19:09

## 2021-01-01 RX ADMIN — PHENYLEPHRINE HYDROCHLORIDE 300 MCG/MIN: 10 INJECTION INTRAVENOUS at 17:09

## 2021-01-01 RX ADMIN — VASOPRESSIN 0.01 UNITS/MIN: 20 INJECTION INTRAVENOUS at 13:12

## 2021-01-01 RX ADMIN — Medication: at 08:57

## 2021-01-01 RX ADMIN — CALCIUM CHLORIDE, MAGNESIUM CHLORIDE, DEXTROSE MONOHYDRATE, LACTIC ACID, SODIUM CHLORIDE, SODIUM BICARBONATE AND POTASSIUM CHLORIDE: 3.68; 3.05; 22; 5.4; 6.46; 3.09; .314 INJECTION INTRAVENOUS at 23:56

## 2021-01-01 RX ADMIN — CALCIUM CHLORIDE, MAGNESIUM CHLORIDE, DEXTROSE MONOHYDRATE, LACTIC ACID, SODIUM CHLORIDE, SODIUM BICARBONATE AND POTASSIUM CHLORIDE: 5.15; 2.03; 22; 5.4; 6.46; 3.09; .157 INJECTION INTRAVENOUS at 23:04

## 2021-01-01 RX ADMIN — HEPARIN SODIUM 5000 UNITS: 5000 INJECTION INTRAVENOUS; SUBCUTANEOUS at 23:35

## 2021-01-01 RX ADMIN — HYDROMORPHONE HYDROCHLORIDE 1 MG: 1 INJECTION, SOLUTION INTRAMUSCULAR; INTRAVENOUS; SUBCUTANEOUS at 00:03

## 2021-01-01 RX ADMIN — GLYCOPYRROLATE 0.2 MG: 0.2 INJECTION INTRAMUSCULAR; INTRAVENOUS at 16:28

## 2021-01-01 RX ADMIN — MEROPENEM 500 MG: 500 INJECTION, POWDER, FOR SOLUTION INTRAVENOUS at 17:32

## 2021-01-01 RX ADMIN — ALBUMIN (HUMAN) 12.5 G: 12.5 INJECTION, SOLUTION INTRAVENOUS at 20:59

## 2021-01-01 RX ADMIN — VANCOMYCIN HYDROCHLORIDE 1250 MG: 10 INJECTION, POWDER, LYOPHILIZED, FOR SOLUTION INTRAVENOUS at 08:12

## 2021-01-01 RX ADMIN — Medication: at 09:03

## 2021-01-01 RX ADMIN — INSULIN GLARGINE 10 UNITS: 100 INJECTION, SOLUTION SUBCUTANEOUS at 09:02

## 2021-01-01 RX ADMIN — HEPARIN SODIUM 5000 UNITS: 5000 INJECTION INTRAVENOUS; SUBCUTANEOUS at 06:00

## 2021-01-01 RX ADMIN — MIDODRINE HYDROCHLORIDE 10 MG: 5 TABLET ORAL at 22:02

## 2021-01-01 RX ADMIN — SODIUM CHLORIDE, POTASSIUM CHLORIDE, SODIUM LACTATE AND CALCIUM CHLORIDE 100 ML/HR: 600; 310; 30; 20 INJECTION, SOLUTION INTRAVENOUS at 07:13

## 2021-01-01 RX ADMIN — CALCIUM CHLORIDE, MAGNESIUM CHLORIDE, DEXTROSE MONOHYDRATE, LACTIC ACID, SODIUM CHLORIDE, SODIUM BICARBONATE AND POTASSIUM CHLORIDE: 3.68; 3.05; 22; 5.4; 6.46; 3.09; .314 INJECTION INTRAVENOUS at 00:13

## 2021-01-01 RX ADMIN — SODIUM CHLORIDE 1000 ML: 9 INJECTION, SOLUTION INTRAVENOUS at 12:34

## 2021-01-01 RX ADMIN — MIDODRINE HYDROCHLORIDE 20 MG: 5 TABLET ORAL at 22:55

## 2021-01-01 RX ADMIN — MIDAZOLAM HYDROCHLORIDE 7 MG/HR: 5 INJECTION, SOLUTION INTRAMUSCULAR; INTRAVENOUS at 12:34

## 2021-01-01 RX ADMIN — INSULIN LISPRO 3 UNITS: 100 INJECTION, SOLUTION INTRAVENOUS; SUBCUTANEOUS at 23:26

## 2021-01-01 RX ADMIN — MIDODRINE HYDROCHLORIDE 10 MG: 5 TABLET ORAL at 08:45

## 2021-01-01 RX ADMIN — CALCIUM CHLORIDE, MAGNESIUM CHLORIDE, DEXTROSE MONOHYDRATE, LACTIC ACID, SODIUM CHLORIDE, SODIUM BICARBONATE AND POTASSIUM CHLORIDE: 3.68; 3.05; 22; 5.4; 6.46; 3.09; .314 INJECTION INTRAVENOUS at 03:18

## 2021-01-01 RX ADMIN — VASOPRESSIN 0.04 UNITS/MIN: 20 INJECTION INTRAVENOUS at 19:13

## 2021-01-01 RX ADMIN — HEPARIN SODIUM 1100 UNITS: 1000 INJECTION INTRAVENOUS; SUBCUTANEOUS at 21:18

## 2021-01-01 RX ADMIN — HEPARIN SODIUM 2500 UNITS: 1000 INJECTION INTRAVENOUS; SUBCUTANEOUS at 12:51

## 2021-01-01 RX ADMIN — DEXMEDETOMIDINE HYDROCHLORIDE 1.4 MCG/KG/HR: 4 INJECTION, SOLUTION INTRAVENOUS at 01:53

## 2021-01-01 RX ADMIN — Medication: at 21:05

## 2021-01-01 RX ADMIN — HYDROCORTISONE SODIUM SUCCINATE 50 MG: 100 INJECTION, POWDER, FOR SOLUTION INTRAMUSCULAR; INTRAVENOUS at 01:34

## 2021-01-01 RX ADMIN — CALCIUM CHLORIDE, MAGNESIUM CHLORIDE, DEXTROSE MONOHYDRATE, LACTIC ACID, SODIUM CHLORIDE, SODIUM BICARBONATE AND POTASSIUM CHLORIDE: 3.68; 3.05; 22; 5.4; 6.46; 3.09; .314 INJECTION INTRAVENOUS at 19:14

## 2021-01-01 RX ADMIN — HEPARIN SODIUM 1100 UNITS: 1000 INJECTION INTRAVENOUS; SUBCUTANEOUS at 17:51

## 2021-01-01 RX ADMIN — ALBUMIN HUMAN 12.5 G: 50 SOLUTION INTRAVENOUS at 21:30

## 2021-01-01 RX ADMIN — HEPARIN SODIUM 5000 UNITS: 5000 INJECTION INTRAVENOUS; SUBCUTANEOUS at 06:02

## 2021-01-01 RX ADMIN — ALBUMIN (HUMAN) 12.5 G: 12.5 INJECTION, SOLUTION INTRAVENOUS at 21:30

## 2021-01-01 RX ADMIN — FENTANYL CITRATE 200 MCG/HR: 0.05 INJECTION, SOLUTION INTRAMUSCULAR; INTRAVENOUS at 02:13

## 2021-01-01 RX ADMIN — INSULIN LISPRO 2 UNITS: 100 INJECTION, SOLUTION INTRAVENOUS; SUBCUTANEOUS at 05:10

## 2021-01-01 RX ADMIN — INSULIN LISPRO 2 UNITS: 100 INJECTION, SOLUTION INTRAVENOUS; SUBCUTANEOUS at 01:00

## 2021-01-01 RX ADMIN — ALBUMIN (HUMAN) 25 G: 0.25 INJECTION, SOLUTION INTRAVENOUS at 05:07

## 2021-01-01 RX ADMIN — FENTANYL CITRATE 200 MCG/HR: 0.05 INJECTION, SOLUTION INTRAMUSCULAR; INTRAVENOUS at 09:17

## 2021-01-01 RX ADMIN — CEFEPIME HYDROCHLORIDE 2 G: 2 INJECTION, POWDER, FOR SOLUTION INTRAVENOUS at 20:13

## 2021-01-01 RX ADMIN — PROPOFOL 25 MCG/KG/MIN: 10 INJECTION, EMULSION INTRAVENOUS at 20:24

## 2021-01-01 RX ADMIN — SODIUM CHLORIDE 2 MG/HR: 900 INJECTION, SOLUTION INTRAVENOUS at 06:57

## 2021-01-01 RX ADMIN — EPINEPHRINE 5 MCG/MIN: 1 INJECTION INTRAMUSCULAR; INTRAVENOUS; SUBCUTANEOUS at 04:39

## 2021-01-01 RX ADMIN — HYDROCORTISONE SODIUM SUCCINATE 100 MG: 100 INJECTION, POWDER, FOR SOLUTION INTRAMUSCULAR; INTRAVENOUS at 16:38

## 2021-01-01 RX ADMIN — HYDROCORTISONE SODIUM SUCCINATE 100 MG: 100 INJECTION, POWDER, FOR SOLUTION INTRAMUSCULAR; INTRAVENOUS at 16:48

## 2021-01-01 RX ADMIN — LORAZEPAM 2 MG: 2 INJECTION INTRAMUSCULAR; INTRAVENOUS at 16:15

## 2021-01-01 RX ADMIN — Medication: at 18:36

## 2021-01-01 RX ADMIN — HEPARIN SODIUM 1100 UNITS: 1000 INJECTION INTRAVENOUS; SUBCUTANEOUS at 04:18

## 2021-01-01 RX ADMIN — PROPOFOL 25 MCG/KG/MIN: 10 INJECTION, EMULSION INTRAVENOUS at 06:28

## 2021-01-01 RX ADMIN — CEFEPIME HYDROCHLORIDE 2 G: 2 INJECTION, POWDER, FOR SOLUTION INTRAVENOUS at 07:56

## 2021-01-01 RX ADMIN — INSULIN LISPRO 3 UNITS: 100 INJECTION, SOLUTION INTRAVENOUS; SUBCUTANEOUS at 06:01

## 2021-01-01 RX ADMIN — ASPIRIN 81 MG CHEWABLE TABLET 81 MG: 81 TABLET CHEWABLE at 08:52

## 2021-01-01 RX ADMIN — INSULIN LISPRO 5 UNITS: 100 INJECTION, SOLUTION INTRAVENOUS; SUBCUTANEOUS at 18:16

## 2021-01-01 RX ADMIN — DEXMEDETOMIDINE HYDROCHLORIDE 1.4 MCG/KG/HR: 4 INJECTION, SOLUTION INTRAVENOUS at 12:51

## 2021-01-01 RX ADMIN — CALCIUM CHLORIDE, MAGNESIUM CHLORIDE, DEXTROSE MONOHYDRATE, LACTIC ACID, SODIUM CHLORIDE, SODIUM BICARBONATE AND POTASSIUM CHLORIDE: 3.68; 3.05; 22; 5.4; 6.46; 3.09; .314 INJECTION INTRAVENOUS at 10:59

## 2021-01-01 RX ADMIN — HYDROMORPHONE HYDROCHLORIDE 1 MG: 1 INJECTION, SOLUTION INTRAMUSCULAR; INTRAVENOUS; SUBCUTANEOUS at 12:15

## 2021-01-01 RX ADMIN — ONDANSETRON 4 MG: 2 INJECTION INTRAMUSCULAR; INTRAVENOUS at 23:19

## 2021-01-01 RX ADMIN — FENTANYL CITRATE 200 MCG/HR: 50 INJECTION, SOLUTION INTRAMUSCULAR; INTRAVENOUS at 06:52

## 2021-01-01 RX ADMIN — LORAZEPAM 2 MG: 2 INJECTION INTRAMUSCULAR; INTRAVENOUS at 17:30

## 2021-01-01 RX ADMIN — VANCOMYCIN HYDROCHLORIDE 1250 MG: 10 INJECTION, POWDER, LYOPHILIZED, FOR SOLUTION INTRAVENOUS at 11:13

## 2021-01-01 RX ADMIN — CALCIUM CHLORIDE, MAGNESIUM CHLORIDE, DEXTROSE MONOHYDRATE, LACTIC ACID, SODIUM CHLORIDE, SODIUM BICARBONATE AND POTASSIUM CHLORIDE: 3.68; 3.05; 22; 5.4; 6.46; 3.09; .314 INJECTION INTRAVENOUS at 09:12

## 2021-01-01 RX ADMIN — SODIUM BICARBONATE: 84 INJECTION, SOLUTION INTRAVENOUS at 08:49

## 2021-01-01 RX ADMIN — Medication 10 ML: at 21:01

## 2021-01-01 RX ADMIN — HEPARIN SODIUM 5000 UNITS: 5000 INJECTION INTRAVENOUS; SUBCUTANEOUS at 04:37

## 2021-01-01 RX ADMIN — CALCIUM CHLORIDE, MAGNESIUM CHLORIDE, DEXTROSE MONOHYDRATE, LACTIC ACID, SODIUM CHLORIDE, SODIUM BICARBONATE AND POTASSIUM CHLORIDE: 3.68; 3.05; 22; 5.4; 6.46; 3.09; .314 INJECTION INTRAVENOUS at 09:44

## 2021-01-01 RX ADMIN — SODIUM CHLORIDE 1000 ML: 9 INJECTION, SOLUTION INTRAVENOUS at 10:23

## 2021-01-01 RX ADMIN — ALBUMIN (HUMAN) 25 G: 12.5 INJECTION, SOLUTION INTRAVENOUS at 03:35

## 2021-01-01 RX ADMIN — Medication 10 ML: at 21:50

## 2021-01-01 RX ADMIN — KETAMINE HYDROCHLORIDE 200 MG: 10 INJECTION, SOLUTION INTRAMUSCULAR; INTRAVENOUS at 23:00

## 2021-01-01 RX ADMIN — CALCIUM CHLORIDE, MAGNESIUM CHLORIDE, DEXTROSE MONOHYDRATE, LACTIC ACID, SODIUM CHLORIDE, SODIUM BICARBONATE AND POTASSIUM CHLORIDE: 5.15; 2.03; 22; 5.4; 6.46; 3.09; .157 INJECTION INTRAVENOUS at 09:55

## 2021-01-01 RX ADMIN — HYDROMORPHONE HYDROCHLORIDE 1 MG: 1 INJECTION, SOLUTION INTRAMUSCULAR; INTRAVENOUS; SUBCUTANEOUS at 01:34

## 2021-01-01 RX ADMIN — Medication 10 ML: at 05:59

## 2021-01-01 RX ADMIN — Medication 10 ML: at 13:40

## 2021-01-01 RX ADMIN — CALCIUM CHLORIDE, MAGNESIUM CHLORIDE, DEXTROSE MONOHYDRATE, LACTIC ACID, SODIUM CHLORIDE, SODIUM BICARBONATE AND POTASSIUM CHLORIDE: 5.15; 2.03; 22; 5.4; 6.46; 3.09; .157 INJECTION INTRAVENOUS at 13:25

## 2021-09-22 PROBLEM — K85.90 PANCREATITIS: Status: ACTIVE | Noted: 2021-01-01

## 2021-09-22 NOTE — ED TRIAGE NOTES
Pt arrives via EMS from home after having chest pain that started about an hour and a half ago. EMS reports that the pt stated the pain starts in the epigastric area and radiates to his back. Pt reports to EMS taking 325 of aspirin and a baby aspirin before their arrival. EMS gave the pt 4mg of zofran.  EMS reports the pt having no hx of MI.

## 2021-09-22 NOTE — PROGRESS NOTES
Admission Medication Reconciliation:    Information obtained from:  patient and his wife  RxQuery data available¹:  YES    Comments/Recommendations: Updated PTA meds/reviewed patient's allergies. 1)  Medication review done with patient and family member. Adjusted atenolol dose from 25mg. No other changes. Allergies and preferred pharmacy reviewed. ¹RxQuery pharmacy benefit data reflects medications filled and processed through the patient's insurance, however   this data does NOT capture whether the medication was picked up or is currently being taken by the patient. Allergies: Albumin products and Penicillins    Significant PMH/Disease States:   Past Medical History:   Diagnosis Date    Arthritis     back hips knees and arms    CAD (coronary artery disease)     Hypertension     Kidney stones     Nausea & vomiting     after hip surgery    Pancreatitis     In 2/2011, resolved    Psoriasis      Chief Complaint for this Admission:    Chief Complaint   Patient presents with    Chest Pain     Prior to Admission Medications:   Prior to Admission Medications   Prescriptions Last Dose Informant Taking?   allopurinol (ZYLOPRIM) 300 mg tablet 9/21/2021 at Unknown time  Yes   Sig: Take 300 mg by mouth daily. aspirin delayed-release 81 mg tablet 9/22/2021 at Unknown time  Yes   Sig: Take 81 mg by mouth daily. atenolol (TENORMIN) 50 mg tablet 9/22/2021 at Unknown time  Yes   Sig: Take 50 mg by mouth daily. atorvastatin (LIPITOR) 20 mg tablet 9/21/2021 at Unknown time  Yes   Sig: Take 10 mg by mouth daily. Facility-Administered Medications: None     Please contact the main inpatient pharmacy with any questions or concerns at [phone] and we will direct you to the clinical pharmacist covering this patient's care while in-house.    Clay Carson, [facility]

## 2021-09-22 NOTE — ED PROVIDER NOTES
[facility] Internal Medicine Acute Visit      FACILITY: [facility]    CHIEF COMPLAINT:  Follow up labs    HISTORY OF PRESENT ILLNESS:  Resident seen yesterday for initial visit she was recently hospitalized 05/25/2020 through 05/29/2020 with urinary sepsis due to Escherichia coli not catheter related.  Micrococcus luteus bacteremia-1 of 2 admission blood cultures positive after 28 hours.  This was felt to be a contaminant.  Did receive vancomycin and then transition to oral Vantin for a 10 day course this will complete 06/05/2020.  Repeat blood cultures have been negative.  History of multiple sclerosis with weakness and falls.  She has been ambulating with walker and physical therapy.  She continues to feel weak and deconditioned.  Hypothyroidism.  History of seizures.    Resident had lab work drawn this morning per U mL:  WBC 12.4 hemoglobin 11.4 platelet count 485 absolute neutrophil count 8928  Bands 4%  Sodium 142 potassium 4.1 chloride 106 CO2 27 GFR greater than 90 creatinine 0.64 BUN 12 glucose 76 liver enzymes within normal limits  TSH 3.63  Valproic acid 83    Resident is up ambulating in the muniz.  She states that she feels fine.  No headache, dizziness or lightheadedness.  Does feel weak.  Denies chest pain, increased shortness of breath or cough.  No abdominal pain.  No nausea or vomiting.  She denies any diarrhea.  Denies urinary symptoms.  She has been eating and drinking.  No fever chilling.  Did speak to her that her white count was slightly elevated today, will continue to monitor she was instructed if she has any symptoms she is to report to the nursing staff so as to be further assessed.  Will repeat CBC on Thursday        CODE STATUS:  Full code      REVIEW OF SYMPTOMS:       GENERAL:  Denies weight gain or loss.  Denies fever or chills.  Denies lightheadedness or dizziness.  Denies unusual weakness or fatigue.  RESPIRATORY:  Denies  Mikala Saeed is a 70 y.o. male with past medical history notable for cad htn sp cabg-- 2019 cabg \" LIMA to LAD and open SVG to distal PL with chronic occlusions of LADand Cx with diffuse disease in dominant RCA. LVEF 45 \" presenting with chest pain/epigastric abdominal pain, vomiting, nausea,  vomiting and shortness of breath . No fevers or chills. Has not had a cough recently. No sick contacts. States that he had onset of severe epigastric/chest pain while he was driving in his car yesterday, has been unrelenting since onset. Does not take anything for symptom so far. Unsure if he had similar symptoms in the past when he had coronary disease.            Past Medical History:   Diagnosis Date    Arthritis     back hips knees and arms    CAD (coronary artery disease)     Hypertension     Kidney stones     Nausea & vomiting     after hip surgery    Pancreatitis     In 2/2011, resolved    Psoriasis        Past Surgical History:   Procedure Laterality Date    CARDIAC SURG PROCEDURE UNLIST      cardiac cath 7/20/2011    CARDIAC SURG PROCEDURE UNLIST      CABG WITH 2 GRAPHS    HX CHOLECYSTECTOMY  7/2009    Dr. Mara Amaro - Open Cholecystectomy    HX GI      GALLBLADDER 09    HX GI      perforated colon    HX HEENT      LEFT EYE MUSCLE SURGERY    [address]    right and left    HX ORTHOPAEDIC      bilat hip replacement, left hip replaced 2 x    HX ORTHOPAEDIC      RIGHT ELBOW  SURGERY ON NERVE    LITHOTRIPSY  ~2001    [place] EYE SURG ANT SGMT PROC UNLISTED  as child         Family History:   Problem Relation Age of Onset    Eczema Mother     Cancer Sister         breast       Social History     Socioeconomic History    Marital status:      Spouse name: Not on file    Number of children: Not on file    Years of education: Not on file    Highest education level: Not on file   Occupational History    Not on file   Tobacco Use    Smoking status: Former Smoker     Years: 0.00 Types: Cigars    Smokeless tobacco: Never Used    Tobacco comment: used to smoke cigars quit   Substance and Sexual Activity    Alcohol use: Yes     Alcohol/week: 0.0 standard drinks     Comment: 2 drinks weekly    Drug use: No    Sexual activity: Yes     Partners: Female   Other Topics Concern    Not on file   Social History Narrative    Not on file     Social Determinants of Health     Financial Resource Strain:     Difficulty of Paying Living Expenses:    Food Insecurity:     Worried About Running Out of Food in the Last Year:     [address] in the Last Year:    Transportation Needs:     Lack of Transportation (Medical):  Lack of Transportation (Non-Medical):    Physical Activity:     Days of Exercise per Week:     Minutes of Exercise per Session:    Stress:     Feeling of Stress :    Social Connections:     Frequency of Communication with Friends and Family:     Frequency of Social Gatherings with Friends and Family:     Attends Muslim Services:     Active Member of Clubs or Organizations:     Attends Club or Organization Meetings:     Marital Status:    Intimate Partner Violence:     Fear of Current or Ex-Partner:     Emotionally Abused:     Physically Abused:     Sexually Abused: ALLERGIES: Albumin products and Penicillins    Review of Systems   Constitutional: Negative for chills, fatigue and fever. HENT: Negative for ear pain, sore throat and trouble swallowing. Eyes: Negative for visual disturbance. Respiratory: Negative for cough and shortness of breath. Cardiovascular: Negative for chest pain. Gastrointestinal: Negative for abdominal pain. Genitourinary: Negative for dysuria. Musculoskeletal: Negative for back pain. Skin: Negative for rash. Neurological: Negative for light-headedness and headaches. Psychiatric/Behavioral: Negative for confusion. All other systems reviewed and are negative.       There were no vitals filed for this shortness of breath, wheezing, cough or sputum production.  CARDIOVASCULAR:  Denies chest pain or pressure.  Denies palpitations.  Denies orthopnea or paroxysmal nocturnal dyspnea.  Denies unusual shortness of breath or chest pain on exertion.  GASTROINTESTINAL:  Denies abdominal pain.  Denies nausea and vomiting, diarrhea, constipation.  Denies melena, black or bloody stools.  GENITOURINARY:  Denies dysuria or foul smelling urine.  Denies hematuria.  Denies excessive urination  MUSCULOSKELETAL:  Denies unusual joint swelling, stiffness or pain.  Denies unusual muscular pain.  Denies loss of strength or range of motion in any limb.  Denies unusual neck or back stiffness. Denies changes in appearance of limbs or digits.  SKIN:  Denies new rashes.  Denies bruising or petechia.  Denies change in moles or new moles.  Denies ulcers.  Denies new growths or lumps.    The remainder of the patients ROS were evaluated and determined to be negative.    PHYSICAL EXAM:  VITALS:   97.8-70-/70    SaO2 96% on room air  GENERAL:  Pale, warm and dry.  No acute distress up ambulating in muniz with walker  HEENT:  Head negative.  Ears negative.  Eyes negative.  Nares negative.  Throat not examined  NECK:  No JVD  RESPIRATORY:  Clear A&P.  Respirations are easy nonlabored.  No wheeze, rhonchi, rales.  No cough  CARDIAC:  Regular S1-S2.  No murmur  ABDOMEN:  Obese, soft, nontender.  Bowel sounds present.  MUSCULOSKELETAL:  Range of motion to upper extremities within normal limits.  No redness, warmth or swelling to any upper extremity joints.  Range of motion to hips, knees and ankles within normal limits.  No redness, warmth or swelling to any lower extremity joints.  GENITOURINARY:  Continent of urine  EXTREMITIES:  Trace of edema  NEUROLOGICAL :  Oriented to person place and time.  Speech is clear.  Follows all commands appropriately  RECTAL:  Deferred      LABS:  See above    RADIOLOGY:  None    ASSESSMENT/PLAN:  Leukocytosis,  unspecified type  (primary encounter diagnosis)  Plan:  Monitor.  Repeat CBC on Thursday        ORDERS  PLACED THIS VISIT:  Repeat CBC Thursday    Maranda BURRIS  Internal Medicine  Pager: [phone]  Office: [phone]  Fax: [phone]  NPI:  [id number]       visit.         Physical Exam  Vitals and nursing note reviewed. Constitutional:       General: He is not in acute distress. Appearance: He is not toxic-appearing. HENT:      Head: Normocephalic and atraumatic. Mouth/Throat:      Mouth: Mucous membranes are moist.      Pharynx: Oropharynx is clear. Eyes:      Extraocular Movements: Extraocular movements intact. Cardiovascular:      Rate and Rhythm: Normal rate and regular rhythm. Heart sounds: Normal heart sounds. Pulmonary:      Effort: Pulmonary effort is normal. No respiratory distress. Breath sounds: Normal breath sounds. Abdominal:      Palpations: Abdomen is soft. Tenderness: There is abdominal tenderness ( epigastric). There is no guarding or rebound. Musculoskeletal:         General: Normal range of motion. Cervical back: Normal range of motion. Right lower leg: No edema. Left lower leg: No edema. Skin:     General: Skin is warm and dry. Capillary Refill: Capillary refill takes less than 2 seconds. Neurological:      General: No focal deficit present. Mental Status: He is alert and oriented to person, place, and time. Psychiatric:         Mood and Affect: Mood normal.          [facility]  ED Course as of Sep 22 1127   Wed Sep 22, 2021   1025 EKG: Sinus bradycardia, first-degree AV block, right axis deviation, ventricular rate 51, anterior T wave inversion lead V1 and V2.    [NS]   1117 Improved after morphine, no response to ntg    [NS]      ED Course User Index  [NS] Mark Miranda MD       Procedures    MEDICAL DECISION MAKIN y.o. male presents with Chest Pain    Differential diagnosis includes but not limited to:  Somewhat atypical presentation of ACS versus GI etiology such as gastritis, pancreatitis, GERD    LABORATORY TESTS:  Labs Reviewed   CBC WITH AUTOMATED DIFF - Abnormal; Notable for the following components:       Result Value    WBC 13.4 (*)     NEUTROPHILS 77 (*)     IMMATURE GRANULOCYTES 1 (*)     ABS. NEUTROPHILS 10.4 (*)     ABS. IMM. GRANS. 0.1 (*)     All other components within normal limits   METABOLIC PANEL, COMPREHENSIVE - Abnormal; Notable for the following components:    Glucose 168 (*)     Creatinine 1.47 (*)     BUN/Creatinine ratio 11 (*)     GFR est AA 57 (*)     GFR est non-AA 47 (*)     Bilirubin, total 1.7 (*)     ALT (SGPT) 153 (*)     AST (SGOT) 267 (*)     Alk. phosphatase 122 (*)     All other components within normal limits   LIPASE - Abnormal; Notable for the following components:    Lipase >3,000 (*)     All other components within normal limits   BLOOD GAS,CHEM8,LACTIC ACID POC - Abnormal; Notable for the following components:    Calcium, ionized (POC) 1.08 (*)     Glucose,  (*)     Lactic Acid (POC) 2.13 (*)     pH, venous (POC) 7.47 (*)     pCO2, venous (POC) 29.5 (*)     pO2, venous (POC) 47 (*)     All other components within normal limits   URINE CULTURE HOLD SAMPLE   SAMPLES BEING HELD   MAGNESIUM   PROTHROMBIN TIME + INR   TROPONIN I   URINALYSIS W/MICROSCOPIC   POC LACTIC ACID   POC CHEM8   SAMPLE TO BLOOD BANK       IMAGING RESULTS:  CTA CHEST W OR W WO CONT    (Results Pending)   CTA ABDOMEN PELV W CONT    (Results Pending)       MEDICATIONS GIVEN:  Medications   morphine injection 4 mg (has no administration in time range)   morphine injection 4 mg (has no administration in time range)   nitroglycerin (NITROSTAT) tablet 0.4 mg (0.4 mg SubLINGual Given 9/22/21 1044)   morphine 10 mg/mL injection (4 mg  Given 9/22/21 1056)   ondansetron (ZOFRAN) injection 4 mg (4 mg IntraVENous Given 9/22/21 1048)   iopamidoL (ISOVUE-370) 76 % injection 100 mL (100 mL IntraVENous Given 9/22/21 1117)       PROGRESS NOTE:   11:57 AM Patient's symptoms have improved after treatment    EKG:  Reviewed     CONSULTS:  Hospitalist Consult: Perfect Serve Consult for Admission  11:57 AM    ED Room Number: ER18/18  Patient Name and age:  Inna Preston 70 y.o. male  Working Diagnosis:   1. Acute pancreatitis, unspecified complication status, unspecified pancreatitis type        COVID-19 Suspicion:  no  Sepsis present:  no  Reassessment needed: no  Code Status:  Full Code  Readmission: no  Isolation Requirements:  no  Recommended Level of Care:  telemetry  Department:[facility] Adult ED - 21   Other:  Acute epigastric pain, found to have pancreatitis, lactic slightly elevated will recheck    IMPRESSION:  1. Acute pancreatitis, unspecified complication status, unspecified pancreatitis type        PLAN:  - Admit to hospitalist    Total critical care time spent exclusive of procedures:  55 minutes    Albert Leblanc MD          Please note that this dictation was completed with [employer], the computer voice recognition software. Quite often unanticipated grammatical, syntax, homophones, and other interpretive errors are inadvertently transcribed by the computer software. Please disregard these errors. Please excuse any errors that have escaped final proofreading.

## 2021-09-22 NOTE — H&P
History & Physical    Primary Care Provider: Isabella Durán MD  Source of Information: Patient     History of Presenting Illness:   Estephania August is a 70 y.o. male who presents with abdominal pain    History is primary obtained from the patient    Patient with history of coronary artery disease, presents today with chest pain/epigastric abdominal pain vomiting, nausea, shortness of breath. Patient reports her symptoms started while driving his car yesterday and has been significant and severe since then. Patient reports the symptoms even got worse today, he got concerned and decided to come to the hospital.  Patient reports he does not drink alcohol on a regular basis, reports that he has not been vaccinated for Covid. Patient came to the ER and was requested to be admitted for further management and evaluation, was found to have pancreatitis    The patient denies any Headache, blurry vision, sore throat, trouble swallowing, trouble with speech, cough, fever, chills,, urinary symptoms, constipation, recent travels, sick contacts, focal or generalized neurological symptoms,  falls, injuries, rashes, contact with COVID-19 diagnosed patients, hematemesis, melena, hemoptysis, hematuria, rashes, denies starting any new medications and denies any other concerns or problems besides as mentioned above. Review of Systems:  A comprehensive review of systems was negative except for that written in the History of Present Illness.    All other systems reviewed, pertinent positives and negatives noted in HPI    Past Medical History:   Diagnosis Date    Arthritis     back hips knees and arms    CAD (coronary artery disease)     Hypertension     Kidney stones     Nausea & vomiting     after hip surgery    Pancreatitis     In 2/2011, resolved    Psoriasis       Past Surgical History:   Procedure Laterality Date    CARDIAC SURG PROCEDURE UNLIST      cardiac cath 7/20/2011   [facility] CARDIAC SURG PROCEDURE UNLIST      CABG WITH 2 GRAPHS    HX CHOLECYSTECTOMY  7/2009    Dr. Gallo Dears - Open Cholecystectomy    HX GI      GALLBLADDER 09    HX GI      perforated colon    HX HEENT      LEFT EYE MUSCLE SURGERY    HX HIP REPLACEMENT  [address] [facility] [facility] [address]    right and left    HX ORTHOPAEDIC      bilat hip replacement, left hip replaced 2 x    HX ORTHOPAEDIC      RIGHT ELBOW  SURGERY ON NERVE    LITHOTRIPSY  ~2001    [place] EYE SURG ANT SGMT PROC UNLISTED  as child     Prior to Admission medications    Medication Sig Start Date End Date Taking? Authorizing Provider   aspirin delayed-release 81 mg tablet Take 81 mg by mouth daily. Yes Provider, Historical   atorvastatin (LIPITOR) 20 mg tablet Take 10 mg by mouth daily. Yes Other, MD Samia   allopurinol (ZYLOPRIM) 300 mg tablet Take 300 mg by mouth daily. Yes Other, MD Samia   atenolol (TENORMIN) 50 mg tablet Take 50 mg by mouth daily. Yes Provider, Historical     Allergies   Allergen Reactions    Albumin Products Rash, Itching and Other (comments)     Hypertension, tachycardia    Penicillins Rash      Family History   Problem Relation Age of Onset    Eczema Mother     Cancer Sister         breast      Family history was discussed with the patient, all pertinent and relevant details are mentioned as above, no other pertinent and relevant family history was noted on my discussion with the patient.   Patient specifically denies any history of Gaucher disease in the family  SOCIAL HISTORY:  Patient resides:  Independently x   Assisted Living    SNF    With family care       Smoking history:   None    Former x   Chronic      Alcohol history:   None    Social x   Chronic      Ambulates:   Independently x   w/cane    w/walker    w/wc    CODE STATUS:  DNR    Full x   Other      Objective:     Physical Exam:     Visit Vitals  /75   Pulse (!) 58   Temp 97.5 °F (36.4 °C)   Resp 15   SpO2 97%      O2 Device: None    General : alert x 3, awake, moderately distressed, pleasant male  HEENT: PEERL, moist mucus membrane, TM clear  Neck: supple,   Chest: Clear to auscultation bilaterally   CVS: S1 S2 heard, Capillary refill less than 2 seconds  Abd: soft/tender to palpation diffusely/no rebound/no guarding  Ext: no clubbing, no cyanosis,   Neuro/Psych: pleasant mood and affect, CN 2-12 grossly intact,   Skin: warm     EKG:  {Sinus bradycardia with nonspecific ST changes      Data Review:     Recent Days:  Recent Labs     09/22/21  1028   WBC 13.4*   HGB 16.8   HCT 48.9        Recent Labs     09/22/21  1028      K 3.6      CO2 25   *   BUN 16   CREA 1.47*   CA 9.0   MG 1.9   ALB 3.5   *   INR 1.0     Recent Labs     09/22/21  1051   HCO3 21       24 Hour Results:  Recent Results (from the past 24 hour(s))   EKG, 12 LEAD, INITIAL    Collection Time: 09/22/21 10:18 AM   Result Value Ref Range    Ventricular Rate 51 BPM    Atrial Rate 51 BPM    P-R Interval 244 ms    QRS Duration 128 ms    Q-T Interval 480 ms    QTC Calculation (Bezet) 442 ms    Calculated P Axis 47 degrees    Calculated R Axis 130 degrees    Calculated T Axis 85 degrees    Diagnosis       Sinus bradycardia with 1st degree AV block  Right axis deviation  Nonspecific intraventricular block  Abnormal ECG  When compared with ECG of 31-JUL-2011 06:36,  OR interval has increased  Vent.  rate has decreased BY  87 BPM  Nonspecific intraventricular block has replaced Right bundle branch block     CBC WITH AUTOMATED DIFF    Collection Time: 09/22/21 10:28 AM   Result Value Ref Range    WBC 13.4 (H) 4.1 - 11.1 K/uL    RBC 5.34 4.10 - 5.70 M/uL    HGB 16.8 12.1 - 17.0 g/dL    HCT 48.9 36.6 - 50.3 %    MCV 91.6 80.0 - 99.0 FL    MCH 31.5 26.0 - 34.0 PG    MCHC 34.4 30.0 - 36.5 g/dL    RDW 13.8 11.5 - 14.5 %    PLATELET 201 537 - 439 K/uL    MPV 11.2 8.9 - 12.9 FL    NRBC 0.0 0  WBC    ABSOLUTE NRBC 0.00 0.00 - 0.01 K/uL    NEUTROPHILS 77 (H) 32 - 75 %    LYMPHOCYTES 13 12 - 49 % MONOCYTES 7 5 - 13 %    EOSINOPHILS 1 0 - 7 %    BASOPHILS 1 0 - 1 %    IMMATURE GRANULOCYTES 1 (H) 0.0 - 0.5 %    ABS. NEUTROPHILS 10.4 (H) 1.8 - 8.0 K/UL    ABS. LYMPHOCYTES 1.8 0.8 - 3.5 K/UL    ABS. MONOCYTES 1.0 0.0 - 1.0 K/UL    ABS. EOSINOPHILS 0.1 0.0 - 0.4 K/UL    ABS. BASOPHILS 0.1 0.0 - 0.1 K/UL    ABS. IMM. GRANS. 0.1 (H) 0.00 - 0.04 K/UL    DF AUTOMATED     METABOLIC PANEL, COMPREHENSIVE    Collection Time: 09/22/21 10:28 AM   Result Value Ref Range    Sodium 138 136 - 145 mmol/L    Potassium 3.6 3.5 - 5.1 mmol/L    Chloride 107 97 - 108 mmol/L    CO2 25 21 - 32 mmol/L    Anion gap 6 5 - 15 mmol/L    Glucose 168 (H) 65 - 100 mg/dL    BUN 16 6 - 20 MG/DL    Creatinine 1.47 (H) 0.70 - 1.30 MG/DL    BUN/Creatinine ratio 11 (L) 12 - 20      GFR est AA 57 (L) >60 ml/min/1.73m2    GFR est non-AA 47 (L) >60 ml/min/1.73m2    Calcium 9.0 8.5 - 10.1 MG/DL    Bilirubin, total 1.7 (H) 0.2 - 1.0 MG/DL    ALT (SGPT) 153 (H) 12 - 78 U/L    AST (SGOT) 267 (H) 15 - 37 U/L    Alk. phosphatase 122 (H) 45 - 117 U/L    Protein, total 6.8 6.4 - 8.2 g/dL    Albumin 3.5 3.5 - 5.0 g/dL    Globulin 3.3 2.0 - 4.0 g/dL    A-G Ratio 1.1 1.1 - 2.2     SAMPLES BEING HELD    Collection Time: 09/22/21 10:28 AM   Result Value Ref Range    SAMPLES BEING HELD 1RED     COMMENT        Add-on orders for these samples will be processed based on acceptable specimen integrity and analyte stability, which may vary by analyte.    LIPASE    Collection Time: 09/22/21 10:28 AM   Result Value Ref Range    Lipase >3,000 (H) 73 - 393 U/L   MAGNESIUM    Collection Time: 09/22/21 10:28 AM   Result Value Ref Range    Magnesium 1.9 1.6 - 2.4 mg/dL   PROTHROMBIN TIME + INR    Collection Time: 09/22/21 10:28 AM   Result Value Ref Range    INR 1.0 0.9 - 1.1      Prothrombin time 10.5 9.0 - 11.1 sec   TROPONIN I    Collection Time: 09/22/21 10:28 AM   Result Value Ref Range    Troponin-I, Qt. <0.05 <0.05 ng/mL   EKG, 12 LEAD, INITIAL    Collection Time: 09/22/21 10:29 AM   Result Value Ref Range    Ventricular Rate 51 BPM    Atrial Rate 51 BPM    P-R Interval 256 ms    QRS Duration 128 ms    Q-T Interval 486 ms    QTC Calculation (Bezet) 447 ms    Calculated P Axis 52 degrees    Calculated R Axis 110 degrees    Calculated T Axis 82 degrees    Diagnosis       Sinus bradycardia with 1st degree AV block  Right axis deviation  Nonspecific intraventricular block  Abnormal ECG  When compared with ECG of 22-SEP-2021 10:18,  MANUAL COMPARISON REQUIRED, DATA IS UNCONFIRMED     BLOOD GAS,CHEM8,LACTIC ACID POC    Collection Time: 09/22/21 10:51 AM   Result Value Ref Range    Calcium, ionized (POC) 1.08 (L) 1.12 - 1.32 mmol/L    BICARBONATE 21 mmol/L    Base deficit (POC) 0.9 mmol/L    Sample source VENOUS BLOOD      CO2, POC 22 19 - 24 MMOL/L    Sodium,  136 - 145 MMOL/L    Potassium, POC 3.6 3.5 - 5.5 MMOL/L    Chloride,  100 - 108 MMOL/L    Glucose,  (H) 74 - 106 MG/DL    Creatinine, POC 1.1 0.6 - 1.3 MG/DL    Lactic Acid (POC) 2.13 (HH) 0.40 - 2.00 mmol/L    Critical value read back SRESHTA     pH, venous (POC) 7.47 (H) 7.32 - 7.42      pCO2, venous (POC) 29.5 (L) 41 - 51 MMHG    pO2, venous (POC) 47 (H) 25 - 40 mmHg   LACTIC ACID    Collection Time: 09/22/21  2:18 PM   Result Value Ref Range    Lactic acid 3.0 (HH) 0.4 - 2.0 MMOL/L         Imaging:   CTA CHEST W OR W WO CONT    Result Date: 9/22/2021  1. Acute, uncomplicated pancreatitis. May be secondary to debris filled duodenal diverticulum. 2. 4.6 cm ascending thoracic aortic aneurysm, without dissection. 3. 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 4. 50% ostial stenosis of the bilateral main renal arteries, with cortical medullary thinning of the kidneys bilaterally. 5. Prostatomegaly, with findings suspicious for chronic bladder outlet obstruction. 6. Nonobstructing calyceal renal calculi bilaterally.  7. Enlarged main pulmonary artery suggestive of pulmonary hypertension. 8. Other incidental and postoperative findings as detailed above. CTA ABDOMEN PELV W CONT    Result Date: 9/22/2021  1. Acute, uncomplicated pancreatitis. May be secondary to debris filled duodenal diverticulum. 2. 4.6 cm ascending thoracic aortic aneurysm, without dissection. 3. 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 4. 50% ostial stenosis of the bilateral main renal arteries, with cortical medullary thinning of the kidneys bilaterally. 5. Prostatomegaly, with findings suspicious for chronic bladder outlet obstruction. 6. Nonobstructing calyceal renal calculi bilaterally. 7. Enlarged main pulmonary artery suggestive of pulmonary hypertension. 8. Other incidental and postoperative findings as detailed above. Assessment/Plan     Acute pancreatitis  [place]  Lactic acidosis  Leukocytosis  Elevated LFTs  Chest pain  Hypertension  Bradycardia    Patient will be admitted on telemetry  Start patient on IV fluids, n.p.o., pain control, unclear etiology,? Gallstone pancreatitis, right upper quadrant ultrasound, GI consult, supportive care and close monitoring, further intervention per hospitalist, reassess as needed  Likely secondary to above, avoid nephrotoxic medication, renally dose all medication, IV hydration, trend creatinine, patient with prostamegaly, insert Smith catheter, nephrology consult, monitor and reassess as needed  Likely secondary to above, versus underlying infection, empirical antibiotics, blood cultures, UA, chest x-ray, supportive care and close monitoring, further intervention per hospital course, reassess as needed  Leukocytosis unknown etiology, ? reactive, empirical antibiotics, monitor  Concern for gallstone pancreatitis, right upper quadrant ultrasound pending, trend LFTs, continue to monitor  Chest pain likely referred, in light of extensive cardiac history, provide aspirin, cycle troponins, telemetry, supportive care and further intervention per hospital course, echo and cardiology consult if needed  Optimize blood pressure control  Unclear etiology for bradycardia, appears to be symptomatic, monitor, avoid AV katiana agents, reassess as needed, may consider echo and cardiology consult    GI DVT prophylaxis: Patient will be on heparin             Please note that this dictation was completed with Cue[employer], the computer voice recognition software. Quite often unanticipated grammatical, syntax, homophones, and other interpretive errors are inadvertently transcribed by the computer software. Please disregard these errors. Please excuse any errors that have escaped final proofreading.          Signed By: Kristy Herron MD     September 22, 2021

## 2021-09-23 NOTE — PROGRESS NOTES
Patient visited by [facility] Volunteer on 9/23/2021 in room 360. Rev.  Ling Casillas MDiv, [facility], [facility] paging service: [phone])

## 2021-09-23 NOTE — CONSULTS
Infectious Disease Consult    Today's Date: 9/23/2021   Admit Date: 9/22/2021    Impression:   Leukocytosis  Elevated LFT  Acute pancreatitis - GI following; ? MRI/MRCP of abd later  - WBC 21.9K, afebrile    Lipase > 3k    U/A (-)    Blood cx (9/22) no growth so far    CTA chest, CT of abd/pel: 4.6 cm ascending thoracic aortic aneurysm, without dissection. 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 Pancreas with diffuse peripancreatic fat stranding without discrete fluid  collection, no evidence of necrosis or infection. Prostatomegaly, with findings suspicious for chronic bladder outlet obstruction. US ABD (9/22) Pancreas obscured by bowel gas. [place]  Hx of bilateral nonobstructive calyceal calculi and bilateral renal cysts  - creat 1.8; could be secondary to IV contrast    Ascending thoracic aortic aneurysm & partially tomboys left internal iliac artery   - further evaluation per primary team    Plan:   ·  Pancreatitis without necrosis or infection doesn't treated with IV ABX. However, pt presented with WBC 13K, now up to 21k with hx of distant complicated GI histories. We will continue with IV cefepime and flagyl (was added 9/23) for now. We will adjust ABX PRN as following outstanding culture result  ·  Pt has severe right CVA, but U/A was negative, CT of abd revealed Multiple nonobstructive calyceal calculi bilaterally and bilateral renal cysts which showed in 2016. Cret 1.8  · Fever work up if temp >= 100.4  Above plan d/w and agreed by Dr. Millan Sons:   · IV cefepime 9/22    Subjective:   Date of Consultation:  September 23, 2021  Referring Physician: Adrienne Lock    Patient is a 70 y.o. male presented to ER on 9/22 with abdominal pain, nausea, vomiting, and SOB, thought he was having a 'heart attack.' Troponin was negative. No associated fever or chills. COVID 19 test (-) on admission.  Pt has not been vaccinated for COVID 19. CTA of chest revealed 4.6 cm ascending thoracic aortic aneurysm without dissection. This is new findings. CT of abd/pel revealed Pancreas with diffuse peripancreatic fat stranding without discrete fluid collection, no evidence of necrosis or infection. Prostatomegaly, with findings suspicious for chronic bladder outlet obstruction. During visit, pt still experiencing severe abd pain and right CVA tenderness. His U/A was (-) on admission. NO dysuria or diarrhea. No active nausea, but just not feeling well. Pt was seen by GI for elevated LFT and pancreatitis (lipase >3K). No fever, chills, chest pain, or pain in low extremities. No hip pain. Pt has chronic low back pain. Pt's past medical hx include CAD (s/p CABG), hypertension, S/p right hemicolectomy, cholecystectomy,   SBO in 2015 treated with IV hydration and bowel rest, S/p laparotomy exploratory right colectomy, URIAH due to pneumoperitoneum in 2011. Pt has bilateral hip surgeries; right hip about 20 years ago, left hip with total revision in 2012. Allergic to PCN which causes hives and rashes. ID team was consulted for leukocytosis.   Patient Active Problem List   Diagnosis Code    Coronary atherosclerosis of native coronary artery I25.10    Pneumoperitoneum - abdomen     Atrial flutter (HCC) I48.92    Perforated bowel ([address] K63.1    Small bowel obstruction ([address] K56.609    Combined forms of age-related cataract of right eye H25.811    Pancreatitis K85.90     Past Medical History:   Diagnosis Date    Arthritis     back hips knees and arms    CAD (coronary artery disease)     Hypertension     Kidney stones     Nausea & vomiting     after hip surgery    Pancreatitis     In 2/2011, resolved    Psoriasis       Family History   Problem Relation Age of Onset    Eczema Mother     Cancer Sister         breast      Social History     Tobacco Use    Smoking status: Former Smoker     Years: 0.00     Types: Cigars  Smokeless tobacco: Never Used    Tobacco comment: used to smoke cigars quit   Substance Use Topics    Alcohol use: Yes     Alcohol/week: 0.0 standard drinks     Comment: 2 drinks weekly     Past Surgical History:   Procedure Laterality Date    CARDIAC SURG PROCEDURE UNLIST      cardiac cath 7/20/2011    CARDIAC SURG PROCEDURE UNLIST      CABG WITH 2 GRAPHS    HX CHOLECYSTECTOMY  7/2009    Dr. Yocasta Barnse - Open Cholecystectomy    HX GI      GALLBLADDER 09    HX GI      perforated colon    HX HEENT      LEFT EYE MUSCLE SURGERY    [address], [id number]    right and left    HX ORTHOPAEDIC      bilat hip replacement, left hip replaced 2 x    HX ORTHOPAEDIC      RIGHT ELBOW  SURGERY ON NERVE    LITHOTRIPSY  ~2001    [place] EYE SURG ANT SGMT PROC UNLISTED  as child      Prior to Admission medications    Medication Sig Start Date End Date Taking? Authorizing Provider   aspirin delayed-release 81 mg tablet Take 81 mg by mouth daily. Yes Provider, Historical   atorvastatin (LIPITOR) 20 mg tablet Take 10 mg by mouth daily. Yes Other, MD Samia   allopurinol (ZYLOPRIM) 300 mg tablet Take 300 mg by mouth daily. Yes Other, MD Samia   atenolol (TENORMIN) 50 mg tablet Take 50 mg by mouth daily. Yes Provider, Historical     a  Allergies   Allergen Reactions    Albumin Products Rash, Itching and Other (comments)     Hypertension, tachycardia    Penicillins Rash        REVIEW OF SYSTEMS:     Total of 12 systems reviewed as follows:   I am not able to complete the review of systems because:    The patient is intubated and sedated    The patient has altered mental status due to his acute medical problems    The patient has baseline aphasia from prior stroke(s)    The patient has baseline dementia and is not reliable historian                 POSITIVE= underlined text  Negative = text not underlined  General:  fever, chills, sweats, generalized weakness, weight loss/gain,      loss of appetite   Eyes:    blurred vision, eye pain, loss of vision, double vision  ENT:    rhinorrhea, pharyngitis   Respiratory:   cough, sputum production, SOB, wheezing, COATES, pleuritic pain   Cardiology:   chest pain, palpitations, orthopnea, PND, edema, syncope   Gastrointestinal:  abdominal pain , N/V, dysphagia, diarrhea, constipation, bleeding   Genitourinary:  frequency, urgency, dysuria, hematuria, incontinence  Muskuloskeletal :  arthralgia, myalgia   Hematology:  easy bruising, nose or gum bleeding, lymphadenopathy   Dermatological: rash, ulceration, pruritis   Endocrine:   hot flashes or polydipsia   Neurological:  headache, dizziness, confusion, focal weakness, paresthesia,     Speech difficulties, memory loss, gait disturbance  Psychological: Feelings of anxiety, depression, agitation    Objective:     Visit Vitals  /82 (BP 1 Location: Right arm, BP Patient Position: At rest)   Pulse 64   Temp 98 °F (36.7 °C)   Resp 18   Wt 117.9 kg (260 lb)   SpO2 94%   BMI 34.30 kg/m²     Temp (24hrs), Av.8 °F (36.6 °C), Min:97.5 °F (36.4 °C), Max:98 °F (36.7 °C)       Lines: peripheral line    PHYSICAL EXAM:   General:    morbidly obese, Alert, cooperative, no distress, appears stated age. HEENT: Atraumatic, anicteric sclerae, pink conjunctivae     No oral ulcers, mucosa moist, throat clear  Neck:  Supple, symmetrical,  thyroid: non tender  Lungs:   Clear to auscultation bilaterally. No Wheezing or Rhonchi. No rales. Chest wall:  No tenderness  No Accessory muscle use. Heart:   Regular  rhythm,  No  murmur   No edema  Abdomen:   Soft, distended, mid epigastric and bilateral upper/mid quadrant pain. Bowel sounds normal, Right CVA tenderness  Extremities: No cyanosis. No clubbing  Skin:     Not pale. Not Jaundiced  No rashes   Psych:  Fair insight. Not depressed. Not anxious or agitated. Neurologic: EOMs intact. No facial asymmetry. No aphasia or slurred speech, Alert and oriented X 4.        Data Review:     CBC:  Recent Labs 09/23/21  0442 09/22/21  1028   WBC 21.9* 13.4*   GRANS 92* 77*   MONOS 5 7   EOS 0 1   ANEU 20.2* 10.4*   ABL 0.4* 1.8   HGB 18.0* 16.8   HCT 54.7* 48.9    184       BMP:  Recent Labs     09/23/21  0442 09/22/21  1028   CREA 1.84* 1.47*   BUN 27* 16    138   K 4.3 3.6   * 107   CO2 18* 25   AGAP 11 6   * 168*       LFTS:  Recent Labs     09/23/21  0442 09/22/21  1028   TBILI 1.1* 1.7*   * 153*   * 122*   TP 7.0 6.8   ALB 3.5 3.5       Microbiology:     All Micro Results     Procedure Component Value Units Date/Time    CULTURE, BLOOD, PAIRED [[id number]] Collected: 09/22/21 1943    Order Status: Completed Specimen: Blood Updated: 09/23/21 0506     Special Requests: NO SPECIAL REQUESTS        Culture result: NO GROWTH AFTER 8 HOURS       COVID-19 RAPID TEST [[id number]] Collected: 09/22/21 2002    Order Status: Completed Specimen: Nasopharyngeal Updated: 09/22/21 2040     Specimen source Nasopharyngeal        COVID-19 rapid test Not detected        Comment: Rapid [employer] ID Now       Rapid NAAT:  The specimen is NEGATIVE for SARS-CoV-2, the novel coronavirus associated with COVID-19. Negative results should be treated as presumptive and, if inconsistent with clinical signs and symptoms or necessary for patient management, should be tested with an alternative molecular assay. Negative results do not preclude SARS-CoV-2 infection and should not be used as the sole basis for patient management decisions. This test has been authorized by the FDA under an Emergency Use Authorization (EUA) for use by authorized laboratories.    Fact sheet for Healthcare Providers: ConventionUpdate.co.nz  Fact sheet for Patients: ConventionUpdate.co.nz       Methodology: Isothermal Nucleic Acid Amplification         URINE CULTURE HOLD SAMPLE [[id number]]     Order Status: Sent Specimen: Urine             Signed By: Ashley Duong NP     September 23, 2021

## 2021-09-23 NOTE — CONSULTS
[address] [facility].   [address] [facility] [address]        GASTROENTEROLOGY CONSULTATION NOTE  Will Candace Austin  [phone] office  [phone] NP/PA in-hospital cell phone M-F until 4:30PM  After 5PM or on weekends, please call  for physician on call        NAME:  Concetta Arango   :   1950   MRN:   [id number]       Referring Physician: Dr. Wang Eng Date: 2021 9:24 AM    Chief Complaint: abdominal pain     History of Present Illness:  Patient is a 70 y.o. who is seen in consultation at the request of Dr. Alyce Henry for abdominal pain. Patient has a past medical history significant for coronary artery disease. He presented to the ED with complaints of chest pain/epigastric abdominal pain with nausea, vomiting, and shortness of breath. Patient was admitted to the hospital on 21 for acute pancreatitis, acute kidney injury, lactic acidosis, leukocytosis, chest pain, hypertension, and bradycardia. Patient complains of an onset of upper abdominal pain on Tuesday morning while driving his car. Pain has progressively worsened. Pain is concentrated to the upper abdomen. He is unable to describe the pain. Pain is worse with coughing and movement. He has not had anything to eat or drink since the onset. There has been associated nausea, dry heaves, and vomiting. No hematemesis. No change in bowel habits, melena, or hematochezia. No fevers. Denies previous history of pancreatitis (although, documented from ) or similar symptoms. Denies any new medications or adjustments to his current medications.      + NSAID use (most days). Denies anticoagulation. Patient is on aspirin 81 mg daily. Rare alcohol use (patient reports having 1 beer on Monday night). Denies tobacco use. History of cholecystectomy, right hemicolectomy, and appendectomy. Remote history of colonoscopy. Denies history of EGD.     I have reviewed the emergency room note, hospital admission note, notes by all other clinicians who have seen the patient during this hospitalization to date. I have reviewed the problem list and the reason for this hospitalization. I have reviewed the allergies and the medications the patient was taking at home prior to this hospitalization. PMH:  Past Medical History:   Diagnosis Date    Arthritis     back hips knees and arms    CAD (coronary artery disease)     Hypertension     Kidney stones     Nausea & vomiting     after hip surgery    Pancreatitis     In 2/2011, resolved    Psoriasis        PSH:  Past Surgical History:   Procedure Laterality Date    CARDIAC SURG PROCEDURE UNLIST      cardiac cath 7/20/2011    CARDIAC SURG PROCEDURE UNLIST      CABG WITH 2 GRAPHS    HX CHOLECYSTECTOMY  7/2009    Dr. Maureen Springer - Open Cholecystectomy    HX GI      GALLBLADDER 09    HX GI      perforated colon    HX HEENT      LEFT EYE MUSCLE SURGERY    [id number]    right and left    HX ORTHOPAEDIC      bilat hip replacement, left hip replaced 2 x    HX ORTHOPAEDIC      RIGHT ELBOW  SURGERY ON NERVE    LITHOTRIPSY  ~2001    [place] EYE SURG ANT SGMT PROC UNLISTED  as child       Allergies: Allergies   Allergen Reactions    Albumin Products Rash, Itching and Other (comments)     Hypertension, tachycardia    Penicillins Rash       Home Medications:  Prior to Admission Medications   Prescriptions Last Dose Informant Patient Reported? Taking?   allopurinol (ZYLOPRIM) 300 mg tablet 9/21/2021 at Unknown time  Yes Yes   Sig: Take 300 mg by mouth daily. aspirin delayed-release 81 mg tablet 9/22/2021 at Unknown time  Yes Yes   Sig: Take 81 mg by mouth daily. atenolol (TENORMIN) 50 mg tablet 9/22/2021 at Unknown time  Yes Yes   Sig: Take 50 mg by mouth daily. atorvastatin (LIPITOR) 20 mg tablet 9/21/2021 at Unknown time  Yes Yes   Sig: Take 10 mg by mouth daily.       Facility-Administered Medications: None       Hospital Medications:  Current Facility-Administered Medications   Medication Dose Route Frequency    cefepime (MAXIPIME) 2 g in 0.9% sodium chloride (MBP/ADV) 100 mL MBP  2 g IntraVENous Q12H    sodium bicarbonate (8.4%) 100 mEq in 0.45% sodium chloride 1,000 mL infusion   IntraVENous CONTINUOUS    metroNIDAZOLE (FLAGYL) IVPB premix 500 mg  500 mg IntraVENous Q12H    aspirin delayed-release tablet 81 mg  81 mg Oral DAILY    atenoloL (TENORMIN) tablet 50 mg  50 mg Oral DAILY    atorvastatin (LIPITOR) tablet 10 mg  10 mg Oral DAILY    sodium chloride (NS) flush 5-40 mL  5-40 mL IntraVENous Q8H    sodium chloride (NS) flush 5-40 mL  5-40 mL IntraVENous PRN    HYDROmorphone (DILAUDID) injection 1 mg  1 mg IntraVENous Q4H PRN    ondansetron (ZOFRAN) injection 4 mg  4 mg IntraVENous Q4H PRN    heparin (porcine) injection 5,000 Units  5,000 Units SubCUTAneous Q8H       Social History:  Social History     Tobacco Use    Smoking status: Former Smoker     Years: 0.00     Types: Cigars    Smokeless tobacco: Never Used    Tobacco comment: used to smoke cigars quit   Substance Use Topics    Alcohol use:  Yes     Alcohol/week: 0.0 standard drinks     Comment: 2 drinks weekly       Family History:  Family History   Problem Relation Age of Onset    Eczema Mother     Cancer Sister         breast       Review of Systems:  Constitutional: negative fever, negative chills, negative weight loss  Eyes:   negative visual changes  ENT:   negative sore throat, tongue or lip swelling  Respiratory:  + cough, + dyspnea  Cards:  negative for chest pain, palpitations, lower extremity edema  GI:   See HPI  :  negative for frequency, dysuria  Integument:  negative for rash and pruritus  Heme:  + for easy bruising, negative gum/nose bleeding  Musculoskeletal:negative for myalgias, back pain and muscle weakness  Neuro:    negative for headaches, dizziness  Psych: negative for feelings of anxiety, depression       Objective:     Patient Vitals for the past 8 hrs:   BP Temp Pulse Resp SpO2 Weight   09/23/21 0830 117/82 98 °F (36.7 °C) 64 18 94 %    09/23/21 0600   65      09/23/21 0325      117.9 kg (260 lb)   09/23/21 0320 (!) 144/93 97.8 °F (36.6 °C)  17 97 %    09/23/21 0130 129/79  63  93 %      No intake/output data recorded. 09/21 1901 - 09/23 0700  In: 1000 [I.V.:1000]  Out: -     EXAM:     CONST:  Pleasant male lying in bed, no acute distress   NEURO:  Alert and oriented x 3   HEENT: EOMI, no scleral icterus   LUNGS: No acute respiratory distress   CARD:  S1 S2   ABD:  Soft, protuberant, generalized tenderness, no guarding, no rebound. Hypoactive bowel sounds. EXT:  Warm   PSYCH: Full, not anxious or agitated       Data Review     Recent Labs     09/23/21  0442 09/22/21  1028   WBC 21.9* 13.4*   HGB 18.0* 16.8   HCT 54.7* 48.9    184     Recent Labs     09/23/21  0442 09/22/21  1943 09/22/21  1028     --  138   K 4.3  --  3.6   *  --  107   CO2 18*  --  25   BUN 27*  --  16   CREA 1.84*  --  1.47*   *  --  168*   PHOS  --  2.8  --    CA 8.4*  --  9.0     Recent Labs     09/23/21  0442 09/22/21  1028   * 122*   TP 7.0 6.8   ALB 3.5 3.5   GLOB 3.5 3.3   LPSE >3,000* >3,000*     Recent Labs     09/22/21  1028   INR 1.0   PTP 10.5     IMPRESSION  1. Acute, uncomplicated pancreatitis. May be secondary to debris filled duodenal  diverticulum. 2. 4.6 cm ascending thoracic aortic aneurysm, without dissection. 3. 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 4. 50% ostial stenosis of the bilateral main renal arteries, with cortical  medullary thinning of the kidneys bilaterally. 5. Prostatomegaly, with findings suspicious for chronic bladder outlet  obstruction. 6. Nonobstructing calyceal renal calculi bilaterally. 7. Enlarged main pulmonary artery suggestive of pulmonary hypertension. 8. Other incidental and postoperative findings as detailed above.      Assessment:   · Acute pancreatitis: rare alcohol use. Elevated LFTs as below, history of cholecystectomy. Triglycerides normal. Medications were reviewed - atorvastatin and naproxen (per UpToDate, class III). Differential includes possible gallstone, autoimmune, and idiopathic. · Epigastric abdominal pain: WBC 21.9 (13.4), Hgb 18.0, , , alkaline phosphatase 123, total bilirubin 1.1 (1.7 yesterday), lipase > 3,000, lactic acid 1.8 (3.0 on presentation). CTA chest/abdomen/pelvis (9/01/39): acute, uncomplicated pancreatitis - may be secondary to debris filled duodenal diverticulum; 4.6 cm ascending thoracic aortic aneurysm without dissection; Celiac and superior/inferior mesenteric arteries patent; CBD not dilated; status post cholecystectomy; right hemicolectomy; other findings as above. RUQ ultrasound (9/22/21): pancreas obscured by gas; prior cholecystectomy; echogenic liver suggests steatosis; common bile duct not visualized due to bowel gas. 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 · Leukocytosis: ID following. Blood culture (9/22/21): no growth 8h. · Acute kidney injury  · Chest pain  · Bradycardia  · Hypertension  · History of coronary artery disease status post CABG     Patient Active Problem List   Diagnosis Code    Coronary atherosclerosis of native coronary artery I25.10    Pneumoperitoneum - abdomen     Atrial flutter (HCC) I48.92    Perforated bowel ([address] K63.1    Small bowel obstruction ([address] K56.609    Combined forms of age-related cataract of right eye H25.811    Pancreatitis K85.90     Plan:     · NPO  · IVF  · On antibiotics. ID following. · Trend LFTs  · Will obtain MRI/MRCP  · Cardiology following. Vascular surgery consulted.    · Patient was discussed with Dr. Cory Whitfield  · Thank you for allowing me to participate in care of Jose C Chen     Signed By: WARREN Jose     9/23/2021  9:24 AM Gastroenterology Attending Physician attestation statement and comments. This patient was seen and examined by me in a face-to-face visit today. I reviewed the medical record including lab work, imaging and other provider notes. I confirmed the history as described above. I spoke to the patient, reviewed the medical record including lab work, imaging and other provider notes. I discussed this case in detail with Parmjit KELLEY. I formulated an  assessment of this patient and developed a treatment plan. I agree with the above consultation note. I agree with the history, exam and assessment and plan as outlined in the note. I would like to add the following:    Abd: normoactive BS, nd, generalized tenderness, no rebound or involuntary guarding. NPO after midnight in case procedure needed. Possible MRI today. Abx. Concern for pancreatitis due to CBD stone. No significant EtOH use. Normal triglycerides. Lower suspicion for autoimmune pancreatitis.     Dr. Alla Fox

## 2021-09-23 NOTE — CONSULTS
Consult    Patient: Reddy Carrera MRN: [id number]  SSN: [id number]    YOB: 1950  Age: 70 y.o. Sex: male      Subjective:      Reddy Carrera is a 70 y.o. male who is being seen for incidental finding of iliac aneurysm. Of note he does also have an ascending thoracic aneurysm which I discussed with Dr. Rosario Bazan to be evaluated by CT surgery. The patient is admitted for unrelated reasons with severe abdominal pain    Past Medical History:   Diagnosis Date    Arthritis     back hips knees and arms    CAD (coronary artery disease)     Hypertension     Kidney stones     Nausea & vomiting     after hip surgery    Pancreatitis     In 2/2011, resolved    Psoriasis      Past Surgical History:   Procedure Laterality Date    CARDIAC SURG PROCEDURE UNLIST      cardiac cath 7/20/2011    CARDIAC SURG PROCEDURE UNLIST      CABG WITH 2 GRAPHS    HX CHOLECYSTECTOMY  7/2009    Dr. Wagner Juarez - Open Cholecystectomy    HX GI      GALLBLADDER 09    HX GI      perforated colon    HX HEENT      LEFT EYE MUSCLE SURGERY    [address]    right and left    HX ORTHOPAEDIC      bilat hip replacement, left hip replaced 2 x    HX ORTHOPAEDIC      RIGHT ELBOW  SURGERY ON NERVE    LITHOTRIPSY  ~2001    [place] EYE SURG ANT SGMT PROC UNLISTED  as child      Family History   Problem Relation Age of Onset    Eczema Mother     Cancer Sister         breast     Social History     Tobacco Use    Smoking status: Former Smoker     Years: 0.00     Types: Cigars    Smokeless tobacco: Never Used    Tobacco comment: used to smoke cigars quit   Substance Use Topics    Alcohol use:  Yes     Alcohol/week: 0.0 standard drinks     Comment: 2 drinks weekly      Current Facility-Administered Medications   Medication Dose Route Frequency Provider Last Rate Last Admin    cefepime (MAXIPIME) 2 g in 0.9% sodium chloride (MBP/ADV) 100 mL MBP  2 g IntraVENous Q12H Beth Francisco  mL/hr at 09/23/21 0838 2 g at 09/23/21 0838    sodium bicarbonate (8.4%) 100 mEq in 0.45% sodium chloride 1,000 mL infusion   IntraVENous CONTINUOUS Amanda Burch  mL/hr at 09/23/21 1024 New Bag at 09/23/21 1024    metroNIDAZOLE (FLAGYL) IVPB premix 500 mg  500 mg IntraVENous Q12H Ash Tirado, NP        aspirin delayed-release tablet 81 mg  81 mg Oral DAILY Amanda Burch MD   81 mg at 09/23/21 0839    atenoloL (TENORMIN) tablet 50 mg  50 mg Oral DAILY Amanda Burch MD   50 mg at 09/23/21 0839    atorvastatin (LIPITOR) tablet 10 mg  10 mg Oral DAILY Amanda Burch MD   10 mg at 09/23/21 0839    sodium chloride (NS) flush 5-40 mL  5-40 mL IntraVENous Q8H Amanda Burch MD   10 mL at 09/22/21 2200    sodium chloride (NS) flush 5-40 mL  5-40 mL IntraVENous PRN Amanda Burch MD        HYDROmorphone (DILAUDID) injection 1 mg  1 mg IntraVENous Q4H PRN Aamnda Burch MD   1 mg at 09/23/21 0825    ondansetron (ZOFRAN) injection 4 mg  4 mg IntraVENous Q4H PRN Amanda Burch MD   4 mg at 09/23/21 0825    heparin (porcine) injection 5,000 Units  5,000 Units SubCUTAneous Q8H Amanda Burch MD   5,000 Units at 09/23/21 8801        Allergies   Allergen Reactions    Albumin Products Rash, Itching and Other (comments)     Hypertension, tachycardia    Penicillins Rash       Review of Systems:  A comprehensive review of systems was negative except for that written in the History of Present Illness. Objective:     Vitals:    09/23/21 1025 09/23/21 1027 09/23/21 1222 09/23/21 1308   BP:    (!) 128/93   Pulse: 90  70 72   Resp:    20   Temp:    97.6 °F (36.4 °C)   SpO2:       Weight:       Height:  6' 1\" (1.854 m)          Physical Exam:  General: Patient is pleasant and cooperative. He appears to be in moderate distress. HEENT: Normocephalic atraumatic. Appropriate tracking. Nares patent. Trachea is midline. Chest: Unlabored respirations. Symmetrical chest expansion. Cardiac: Regular rate and rhythm.  Acyanotic  Abdomen: Obese abdomen. Quite protruberant. TTP   Extremities: Moves all extremities. Vascular: Palp BL fem radial pulses    Assessment:     Hospital Problems  Date Reviewed: 6/5/2018        Codes Class Noted POA    Pancreatitis ICD-10-CM: K85.90  ICD-9-CM: 159.2  9/22/2021 Unknown              Plan:     Incidental finding of bilateral iliac artery aneurysm. The R BEAU has a segment dilated up to 1.7cm  On the L the BEAU aneurysm measures 2.3cm and IIA aneurysm measures 2.5    None of these aneurysms yet meet size criteria for repair but patient would benefit from surveillance. --Will arrange for FU in one year with a repeat CTA pelvis    Thank you very much for allowing me to participate in the care of this patient. If there are any questions or concerns please do not hesitate to call or re-consult. Office number: [phone]    Vascular surgery will sign-off at this time.     Signed By: Anastasia Lazaro MD     September 23, 2021

## 2021-09-23 NOTE — PROGRESS NOTES
[address] [facility]  Hospitalist Group                                                                                          Hospitalist Progress Note  Nancy Gaines MD  Answering service: [phone] OR [phone] from in house phone        Date of Service:  2021  NAME:  Teresa Ramsay  :  1950  MRN:  [id number]      Admission Summary:   Patient with history of coronary artery disease, presents today with chest pain/epigastric abdominal pain vomiting, nausea, shortness of breath. Patient reports her symptoms started while driving his car yesterday and has been significant and severe since then. Patient reports the symptoms even got worse today, he got concerned and decided to come to the hospital.  Patient reports he does not drink alcohol on a regular basis, reports that he has not been vaccinated for Covid. Patient came to the ER and was requested to be admitted for further management and evaluation, was found to have pancreatitis       Interval history / Subjective:   Patient continues to have significant pain and discomfort, reports nausea, denies any other complaints or problems, leukocytosis trending up     Assessment & Plan:     Acute pancreatitis  [place]  Metabolic acidosis  Leukocytosis  Elevated LFTs  Chest pain  Hypertension  Bradycardia    Patient continues to have pain and discomfort, continue patient on IV fluid, switch from LR to normal saline with bicarb, pain control, appreciate GI input, patient for MRCP today, right upper quadrant ultrasound with no signs of cholecystitis or CBD dilation, supportive care, close monitoring, further intervention per hospital course  Likely prerenal, IV hydration, avoid nephrotoxic medication, renally dose all medication, creatinine trending up, obtain nephrology consult, patient on bicarb gtt. , continue to monitor  Unclear etiology for leukocytosis, appreciate ID input, cultures pending, broad-spectrum IV antibiotics, trend, monitor  LFTs trending up, MRCP pending, GI on board, monitor, avoid hepatotoxic medication, optimize blood pressure control,  Appreciate cardiology input, monitor, bradycardia resolving            Code status: Full  DVT prophylaxis: Heparin    Care Plan discussed with: Patient/Family  Anticipated Disposition: Home w/Family  Anticipated Discharge: 24 hours to 48 hours     Hospital Problems  Date Reviewed: 6/5/2018        Codes Class Noted POA    Pancreatitis ICD-10-CM: K85.90  ICD-9-CM: 801.7  9/22/2021 Unknown                Review of Systems:   A comprehensive review of systems was negative except for that written in the HPI. Vital Signs:    Last 24hrs VS reviewed since prior progress note.  Most recent are:  Visit Vitals  BP (!) 128/93 (BP 1 Location: Left upper arm, BP Patient Position: At rest)   Pulse 72   Temp 97.6 °F (36.4 °C)   Resp 20   Ht 6' 1\" (1.854 m)   Wt 117.9 kg (260 lb)   SpO2 94%   BMI 34.30 kg/m²       No intake or output data in the 24 hours ending 09/23/21 1359     Physical Examination:     I had a face to face encounter with this patient and independently examined them on 9/23/2021 as outlined below:    General : alert x 3, awake, moderately distressed, pleasant male  HEENT: PEERL, moist mucus membrane, TM clear  Neck: supple,   Chest: Clear to auscultation bilaterally   CVS: S1 S2 heard, Capillary refill less than 2 seconds  Abd: soft/tender to palpation diffusely/no rebound/no guarding  Ext: no clubbing, no cyanosis,   Neuro/Psych: pleasant mood and affect, CN 2-12 grossly intact,   Skin: warm         Data Review:    Review and/or order of clinical lab test      Labs:     Recent Labs     09/23/21 0442 09/22/21  1028   WBC 21.9* 13.4*   HGB 18.0* 16.8   HCT 54.7* 48.9    184     Recent Labs     09/23/21 0442 09/22/21 1943 09/22/21  1028     --  138   K 4.3  --  3.6   *  --  107   CO2 18*  --  25   BUN 27*  --  16   CREA 1.84*  --  1.47*   *  --  168*   CA 8.4*  --  9.0 MG  --  1.8 1.9   PHOS  --  2.8  --      Recent Labs     09/23/21  0442 09/22/21  1028   * 153*   * 122*   TBILI 1.1* 1.7*   TP 7.0 6.8   ALB 3.5 3.5   GLOB 3.5 3.3   LPSE >3,000* >3,000*     Recent Labs     09/22/21  1028   INR 1.0   PTP 10.5      No results for input(s): FE, TIBC, PSAT, FERR in the last 72 hours. No results found for: FOL, RBCF   No results for input(s): PH, PCO2, PO2 in the last 72 hours.   Recent Labs     09/22/21 2115 09/22/21  1943 09/22/21  1028   TROIQ <0.05 <0.05 <0.05     Lab Results   Component Value Date/Time    Cholesterol, total 97 09/22/2021 07:43 PM    HDL Cholesterol 39 09/22/2021 07:43 PM    LDL, calculated 42.8 09/22/2021 07:43 PM    Triglyceride 76 09/22/2021 07:43 PM    CHOL/HDL Ratio 2.5 09/22/2021 07:43 PM     Lab Results   Component Value Date/Time    Glucose (POC) 106 06/09/2016 05:20 PM    Glucose (POC) 143 (H) 08/02/2011 01:17 PM    Glucose (POC) 199 (H) 08/02/2011 08:45 AM    Glucose (POC) 101 08/01/2011 10:14 PM    Glucose (POC) 112 (H) 07/30/2011 10:30 PM    Glucose (POC) 114 (H) 07/30/2011 12:00 PM    Glucose,  (H) 09/22/2021 10:51 AM     Lab Results   Component Value Date/Time    Color DARK YELLOW 09/22/2021 08:02 PM    Appearance CLEAR 09/22/2021 08:02 PM    Specific gravity 1.010 09/22/2021 08:02 PM    Specific gravity 1.024 02/08/2015 04:51 PM    pH (UA) 5.0 09/22/2021 08:02 PM    Protein 30 (A) 09/22/2021 08:02 PM    Glucose Negative 09/22/2021 08:02 PM    Ketone Negative 09/22/2021 08:02 PM    Bilirubin Negative 09/22/2021 08:02 PM    Urobilinogen 1.0 09/22/2021 08:02 PM    Nitrites Negative 09/22/2021 08:02 PM    Leukocyte Esterase Negative 09/22/2021 08:02 PM    Epithelial cells FEW 09/22/2021 08:02 PM    Bacteria Negative 09/22/2021 08:02 PM    WBC 0-4 09/22/2021 08:02 PM    RBC 0-5 09/22/2021 08:02 PM         Medications Reviewed:     Current Facility-Administered Medications   Medication Dose Route Frequency    cefepime (MAXIPIME) 2 g in 0.9% sodium chloride (MBP/ADV) 100 mL MBP  2 g IntraVENous Q12H    sodium bicarbonate (8.4%) 100 mEq in 0.45% sodium chloride 1,000 mL infusion   IntraVENous CONTINUOUS    metroNIDAZOLE (FLAGYL) IVPB premix 500 mg  500 mg IntraVENous Q12H    aspirin delayed-release tablet 81 mg  81 mg Oral DAILY    atenoloL (TENORMIN) tablet 50 mg  50 mg Oral DAILY    atorvastatin (LIPITOR) tablet 10 mg  10 mg Oral DAILY    sodium chloride (NS) flush 5-40 mL  5-40 mL IntraVENous Q8H    sodium chloride (NS) flush 5-40 mL  5-40 mL IntraVENous PRN    HYDROmorphone (DILAUDID) injection 1 mg  1 mg IntraVENous Q4H PRN    ondansetron (ZOFRAN) injection 4 mg  4 mg IntraVENous Q4H PRN    heparin (porcine) injection 5,000 Units  5,000 Units SubCUTAneous Q8H     ______________________________________________________________________  EXPECTED LENGTH OF STAY: 3d 2h  ACTUAL LENGTH OF STAY:          1      Please note that this dictation was completed with [employer], the computer voice recognition software. Quite often unanticipated grammatical, syntax, homophones, and other interpretive errors are inadvertently transcribed by the computer software. Please disregard these errors. Please excuse any errors that have escaped final proofreading.                 Solo Dasilva MD

## 2021-09-23 NOTE — CONSULTS
Cardiology Consult Note    CC: Bradycardia  Reason for consult:  bradycardia  Requesting MD:  Dr. Ann Marie Novoa     Subjective:      Date of  Admission: 9/22/2021 10:12 AM     Admission type:Emergency    Enedina Isaacs is a 70 y.o. male admitted for Pancreatitis [K85.90]. Patient complains of epigastric to lower substernal pain with nausea and vomiting. He was diagnosed with acute pancreatitis due to ETOH. His rhythm on POA was sinus bradycardia with HR in 50s while he was feeling nauseated. This after regimen his stomach and antiemetic he feels better and bradycardia is resolved. He is s/p 2 vs CABG in 2/11 and patent grafts and underlying 3 vs CAD by cath in 5/19. He also has a remote history of Aflutter which I feel was related to his ETOH. It was not well documented in our chart and pt has no palpitations or SOB.     Patient Active Problem List    Diagnosis Date Noted    Pancreatitis 09/22/2021    Combined forms of age-related cataract of right eye 06/05/2018    Small bowel obstruction ([address] 02/08/2015    Perforated bowel ([address] 08/02/2011    Atrial flutter ([address] 08/01/2011    Pneumoperitoneum - abdomen 07/27/2011    Coronary atherosclerosis of native coronary artery 07/20/2011      Stiven Gonzales MD  Past Medical History:   Diagnosis Date    Arthritis     back hips knees and arms    CAD (coronary artery disease)     Hypertension     Kidney stones     Nausea & vomiting     after hip surgery    Pancreatitis     In 2/2011, resolved    Psoriasis       Past Surgical History:   Procedure Laterality Date    CARDIAC SURG PROCEDURE UNLIST      cardiac cath 7/20/2011    CARDIAC SURG PROCEDURE UNLIST      CABG WITH 2 GRAPHS    HX CHOLECYSTECTOMY  7/2009    Dr. Herrera [place] - Open Cholecystectomy    HX GI      GALLBLADDER 09    HX GI      perforated colon    HX HEENT      LEFT EYE MUSCLE SURGERY    [facility] [address] [facility], [id number]    right and left    HX ORTHOPAEDIC      bilat hip replacement, left hip replaced 2 x  HX ORTHOPAEDIC      RIGHT ELBOW  SURGERY ON NERVE    LITHOTRIPSY  ~2001    PA EYE SURG ANT SGMT PROC UNLISTED  as child     Allergies   Allergen Reactions    Albumin Products Rash, Itching and Other (comments)     Hypertension, tachycardia    Penicillins Rash      Family History   Problem Relation Age of Onset    Eczema Mother     Cancer Sister         breast      Current Facility-Administered Medications   Medication Dose Route Frequency    cefepime (MAXIPIME) 2 g in 0.9% sodium chloride (MBP/ADV) 100 mL MBP  2 g IntraVENous Q12H    sodium bicarbonate (8.4%) 100 mEq in 0.45% sodium chloride 1,000 mL infusion   IntraVENous CONTINUOUS    metroNIDAZOLE (FLAGYL) IVPB premix 500 mg  500 mg IntraVENous Q12H    HYDROmorphone (DILAUDID) injection 1 mg  1 mg IntraVENous Q3H PRN    LORazepam (ATIVAN) injection 0.5 mg  0.5 mg IntraVENous ONCE    aspirin delayed-release tablet 81 mg  81 mg Oral DAILY    atenoloL (TENORMIN) tablet 50 mg  50 mg Oral DAILY    atorvastatin (LIPITOR) tablet 10 mg  10 mg Oral DAILY    sodium chloride (NS) flush 5-40 mL  5-40 mL IntraVENous Q8H    sodium chloride (NS) flush 5-40 mL  5-40 mL IntraVENous PRN    ondansetron (ZOFRAN) injection 4 mg  4 mg IntraVENous Q4H PRN    heparin (porcine) injection 5,000 Units  5,000 Units SubCUTAneous Q8H        Prior to Admission Medications:  Prior to Admission medications    Medication Sig Start Date End Date Taking? Authorizing Provider   aspirin delayed-release 81 mg tablet Take 81 mg by mouth daily. Yes Provider, Historical   atorvastatin (LIPITOR) 20 mg tablet Take 10 mg by mouth daily. Yes Other, MD Samia   allopurinol (ZYLOPRIM) 300 mg tablet Take 300 mg by mouth daily. Yes Ant, MD Samia   atenolol (TENORMIN) 50 mg tablet Take 50 mg by mouth daily.    Yes Provider, Historical        Review of Symptoms:  Except as noted in HPI, patient denies recent fever or chills, nausea, vomiting, diarrhea, hemoptysis, hematemesis, dysuria, myalgias, focal neurologic symptoms, ecchymosis, angioedema, odynophagia, dysphagia, sore throat, earache,rash, melena, hematochezia, depression, GERD, cold intolerance, petechia, bleeding gums, or significant weight loss. A comprehensive review of systems was negative except for that written in the HPI. Subjective:    24 hr VS reviewed, overall VSSAF  Temp (24hrs), Av.8 °F (36.6 °C), Min:97.6 °F (36.4 °C), Max:98 °F (36.7 °C)    Patient Vitals for the past 8 hrs:   Pulse   21 1427 74   21 1308 72   21 1222 70   21 1025 90   21 0830 64    Patient Vitals for the past 8 hrs:   Resp   21 1308 20   21 0830 18    Patient Vitals for the past 8 hrs:   BP   21 1308 (!) 128/93   21 0830 117/82        No intake or output data in the 24 hours ending 21 1429      Physical Exam (complete single organ system exam)        Visit Vitals  BP (!) 128/93 (BP 1 Location: Left upper arm, BP Patient Position: At rest)   Pulse 74   Temp 97.6 °F (36.4 °C)   Resp 20   Ht 6' 1\" (1.854 m)   Wt 260 lb (117.9 kg) Comment: Per pt in ER   SpO2 94%   BMI 34.30 kg/m²     General Appearance:  Well developed, well nourished,alert and oriented x 3, and individual in no acute distress. Ears/Nose/Mouth/Throat:   Hearing grossly normal.         Neck: Supple. Chest:   Lungs clear to auscultation bilaterally. Cardiovascular:  Regular rate and rhythm, S1, S2 normal, no murmur. Abdomen:   Soft,  bowel sounds are active, epigastric tenderness   Extremities: 1+  edema bilaterally. Skin: Warm and dry.                Cardiographics    Telemetry: normal sinus rhythm  ECG: normal EKG, normal sinus rhythm, unchanged from previous tracings  Echocardiogram: Not done    Labs:   Recent Results (from the past 24 hour(s))   TROPONIN I    Collection Time: 21  7:43 PM   Result Value Ref Range    Troponin-I, Qt. <0.05 <0.05 ng/mL   CULTURE, BLOOD, PAIRED    Collection Time: 21  7:43 PM    Specimen: Blood   Result Value Ref Range    Special Requests: NO SPECIAL REQUESTS      Culture result: NO GROWTH AFTER 8 HOURS     LIPID PANEL    Collection Time: 09/22/21  7:43 PM   Result Value Ref Range    Cholesterol, total 97 <200 MG/DL    Triglyceride 76 <150 MG/DL    HDL Cholesterol 39 MG/DL    LDL, calculated 42.8 0 - 100 MG/DL    VLDL, calculated 15.2 MG/DL    CHOL/HDL Ratio 2.5 0.0 - 5.0     MAGNESIUM    Collection Time: 09/22/21  7:43 PM   Result Value Ref Range    Magnesium 1.8 1.6 - 2.4 mg/dL   PHOSPHORUS    Collection Time: 09/22/21  7:43 PM   Result Value Ref Range    Phosphorus 2.8 2.6 - 4.7 MG/DL   LACTIC ACID    Collection Time: 09/22/21  7:43 PM   Result Value Ref Range    Lactic acid 1.8 0.4 - 2.0 MMOL/L   URINALYSIS W/MICROSCOPIC    Collection Time: 09/22/21  8:02 PM   Result Value Ref Range    Color DARK YELLOW      Appearance CLEAR CLEAR      Specific gravity 1.010 1.003 - 1.030      pH (UA) 5.0 5.0 - 8.0      Protein 30 (A) NEG mg/dL    Glucose Negative NEG mg/dL    Ketone Negative NEG mg/dL    Bilirubin Negative NEG      Blood Negative NEG      Urobilinogen 1.0 0.2 - 1.0 EU/dL    Nitrites Negative NEG      Leukocyte Esterase Negative NEG      WBC 0-4 0 - 4 /hpf    RBC 0-5 0 - 5 /hpf    Epithelial cells FEW FEW /lpf    Bacteria Negative NEG /hpf    Hyaline cast 2-5 0 - 5 /lpf   COVID-19 RAPID TEST    Collection Time: 09/22/21  8:02 PM   Result Value Ref Range    Specimen source Nasopharyngeal      COVID-19 rapid test Not detected NOTD     TROPONIN I    Collection Time: 09/22/21  9:15 PM   Result Value Ref Range    Troponin-I, Qt. <0.05 <6.71 ng/mL   METABOLIC PANEL, COMPREHENSIVE    Collection Time: 09/23/21  4:42 AM   Result Value Ref Range    Sodium 139 136 - 145 mmol/L    Potassium 4.3 3.5 - 5.1 mmol/L    Chloride 110 (H) 97 - 108 mmol/L    CO2 18 (L) 21 - 32 mmol/L    Anion gap 11 5 - 15 mmol/L    Glucose 137 (H) 65 - 100 mg/dL    BUN 27 (H) 6 - 20 MG/DL    Creatinine 1.84 (H) 0.70 - 1.30 MG/DL    BUN/Creatinine ratio 15 12 - 20      GFR est AA 44 (L) >60 ml/min/1.73m2    GFR est non-AA 36 (L) >60 ml/min/1.73m2    Calcium 8.4 (L) 8.5 - 10.1 MG/DL    Bilirubin, total 1.1 (H) 0.2 - 1.0 MG/DL    ALT (SGPT) 278 (H) 12 - 78 U/L    AST (SGOT) 206 (H) 15 - 37 U/L    Alk. phosphatase 123 (H) 45 - 117 U/L    Protein, total 7.0 6.4 - 8.2 g/dL    Albumin 3.5 3.5 - 5.0 g/dL    Globulin 3.5 2.0 - 4.0 g/dL    A-G Ratio 1.0 (L) 1.1 - 2.2     LIPASE    Collection Time: 09/23/21  4:42 AM   Result Value Ref Range    Lipase >3,000 (H) 73 - 393 U/L   CBC WITH AUTOMATED DIFF    Collection Time: 09/23/21  4:42 AM   Result Value Ref Range    WBC 21.9 (H) 4.1 - 11.1 K/uL    RBC 5.83 (H) 4.10 - 5.70 M/uL    HGB 18.0 (H) 12.1 - 17.0 g/dL    HCT 54.7 (H) 36.6 - 50.3 %    MCV 93.8 80.0 - 99.0 FL    MCH 30.9 26.0 - 34.0 PG    MCHC 32.9 30.0 - 36.5 g/dL    RDW 13.9 11.5 - 14.5 %    PLATELET 037 804 - 663 K/uL    MPV 11.3 8.9 - 12.9 FL    NRBC 0.0 0  WBC    ABSOLUTE NRBC 0.00 0.00 - 0.01 K/uL    NEUTROPHILS 92 (H) 32 - 75 %    LYMPHOCYTES 2 (L) 12 - 49 %    MONOCYTES 5 5 - 13 %    EOSINOPHILS 0 0 - 7 %    BASOPHILS 0 0 - 1 %    IMMATURE GRANULOCYTES 1 (H) 0.0 - 0.5 %    ABS. NEUTROPHILS 20.2 (H) 1.8 - 8.0 K/UL    ABS. LYMPHOCYTES 0.4 (L) 0.8 - 3.5 K/UL    ABS. MONOCYTES 1.1 (H) 0.0 - 1.0 K/UL    ABS. EOSINOPHILS 0.0 0.0 - 0.4 K/UL    ABS. BASOPHILS 0.0 0.0 - 0.1 K/UL    ABS. IMM.  GRANS. 0.2 (H) 0.00 - 0.04 K/UL    DF SMEAR SCANNED      RBC COMMENTS NORMOCYTIC, NORMOCHROMIC     TSH 3RD GENERATION    Collection Time: 09/23/21  4:42 AM   Result Value Ref Range    TSH 2.13 0.36 - 3.74 uIU/mL        Assessment:     Assessment:   Epigastric pain; due to pancreatitis  Bradycardia; due to vagal when he felt sick but also to BB on board  CAD; s/p remote CABG; stable and  patent grafts by cardiac cath in 2019  HTN; stable  HLD; on therapy     Plan:   Tele  No need for further evaluation other than echo ordered  TSH  May continue BB  Hold statin due to elevated LFTs    Swapna Chamberlain MD

## 2021-09-23 NOTE — PROGRESS NOTES
Clinical Pharmacy Note: Antibiotic Renal Dosing    Medication: Cefepime 2 grams every 8 hours  Indication:  sepsis of unknown etiology    Recent Labs     21  0442 21  1028   WBC 21.9* 13.4*   CREA 1.84* 1.47*   BUN 27* 16     Temp (24hrs), Av.7 °F (36.5 °C), Min:97.5 °F (36.4 °C), Max:97.9 °F (36.6 °C)    Est CrCl: ~45-50 ml/min     Plan: Change to 2 grams every 12 hours per [facility] P&T approved renal dosing protocol. Pharmacy will monitor daily and will make adjustments as necessary for changes in renal function.

## 2021-09-23 NOTE — ROUTINE PROCESS
TRANSFER - OUT REPORT:    Verbal report given to Elaine (name) on Estephania Gave  being transferred to [facility] (unit) for routine progression of care       Report consisted of patients Situation, Background, Assessment and   Recommendations(SBAR). Information from the following report(s) SBAR, ED Summary and Recent Results was reviewed with the receiving nurse. Lines:   Peripheral IV 09/22/21 Right Forearm (Active)   Site Assessment Clean, dry, & intact 09/22/21 1025   Phlebitis Assessment 0 09/22/21 1025   Infiltration Assessment 0 09/22/21 1025   Dressing Status Clean, dry, & intact 09/22/21 1025   Dressing Type Transparent 09/22/21 1025   Hub Color/Line Status Green 09/22/21 1025   Action Taken Blood drawn 09/22/21 1025       Peripheral IV 09/22/21 Left Hand (Active)   Site Assessment Clean, dry, & intact 09/22/21 1032   Phlebitis Assessment 0 09/22/21 1032   Dressing Status Clean, dry, & intact 09/22/21 1032   Dressing Type Transparent 09/22/21 1032   Hub Color/Line Status Pink 09/22/21 1032        Opportunity for questions and clarification was provided.       Patient transported with:   [employer]

## 2021-09-23 NOTE — PROGRESS NOTES
Problem: Falls - Risk of  Goal: *Absence of Falls  Description: Document Nemo Jose Antonioeliseo Fall Risk and appropriate interventions in the flowsheet.   Outcome: Progressing Towards Goal  Note: Fall Risk Interventions:            Medication Interventions: Teach patient to arise slowly, Patient to call before getting OOB                   Problem: Patient Education: Go to Patient Education Activity  Goal: Patient/Family Education  Outcome: Progressing Towards Goal     Problem: Pain  Goal: *Control of Pain  Outcome: Progressing Towards Goal     Problem: Patient Education: Go to Patient Education Activity  Goal: Patient/Family Education  Outcome: Progressing Towards Goal

## 2021-09-23 NOTE — PROGRESS NOTES
Reason for Admission:  Chest pain, abdominal pain                     RUR Score:     13% Low                Plan for utilizing home health:  No anticipate needs        PCP: First and Last name:  Leopold Novel, MD     Name of Practice:    Are you a current patient: Yes/No: Yes   Approximate date of last visit: \"Monday\" 9/20/21   Can you participate in a virtual visit with your PCP: PADMINI                    Current Advanced Directive/Advance Care Plan: Full Code      Healthcare Decision Maker:  NA  Click here to complete [address] including selection of the Healthcare Decision Maker Relationship (ie \"Primary\")                             Transition of Care Plan:  CM met with patient to inform of CM role and to assess needs. Patient lives at home with his wife and is normally independent with DME use-wife does assist with dressing. Preferred pharmacy is [facility]. CM to monitor for any transitional care planning needs.      Madison Kruger MS

## 2021-09-23 NOTE — CONSULTS
CSS   Consult    Subjective:      Jose C Chen is a 70 y.o. male who was referred for cardiac evaluation by Dr Steven Fisher. Mr Felisa Priest has a PMH of CAD, s/p CABG x2 by Dr Bernadette Rock in 2009 [facility], SVF->RAJ), HTN, Pancreatitis, s/p colon perf repair and s/p KENNY. He was admitted to [facility] 9/22/21 with chest pain, abdominal pain, nausea, vomiting and found to have pancreatitis as well as an [place]. A CTA of the chest incidentally found a 4.6 cm ascending aorta. He also has bilateral iliac artery aneurysms and bilateral renal artery stenoses. CV Surgery was called to evaluate thoracic aorta findings. I saw Mr Felisa Priest in his room. He was resting in bed and appears slightly uncomfortable. Since his CABG, he states that he has had no angina. He does have some COATES arter walking ~ 200 yards round-trip to his barn. Review of records show no earlier CT scans or Echocardiograms for comparison. No mention of aorta on cath below. .. Cardiac cath in 2019:  1. Severe 3 vessel CAD with chronic total occlusions of mid-LAD and proximal Cx with distal         RCA disease. LIMA to LAD is open with good distal vessel and SVG to distal PL is open     with good distal vessel.     2.  Normal LV size with mild hypokinesis of inferior and posterolateral walls with mild LV     systolic dysfunction with LVEF 45-50%.     3.  Mild elevation of Lt heart filling pressure. No significant transaortic gradient.       Cardiac Testing    Cardiac catheterization:n/a    ECHO:n/a    Past Medical History:   Diagnosis Date    Arthritis     back hips knees and arms    CAD (coronary artery disease)     Hypertension     Kidney stones     Nausea & vomiting     after hip surgery    Pancreatitis     In 2/2011, resolved    Psoriasis      Past Surgical History:   Procedure Laterality Date    CARDIAC SURG PROCEDURE UNLIST      cardiac cath 7/20/2011    CARDIAC SURG PROCEDURE UNLIST      CABG WITH 2 GRAPHS    HX CHOLECYSTECTOMY  7/2009    Dr. Travis Renner Cholecystectomy    HX GI      GALLBLADDER 09    HX GI      perforated colon    HX HEENT      LEFT EYE MUSCLE SURGERY    HX HIP REPLACEMENT  1996, [id number]    right and left    HX ORTHOPAEDIC      bilat hip replacement, left hip replaced 2 x    HX ORTHOPAEDIC      RIGHT ELBOW  SURGERY ON NERVE    LITHOTRIPSY  ~2001    CO EYE SURG ANT SGMT PROC UNLISTED  as child      Social History     Tobacco Use    Smoking status: Former Smoker     Years: 0.00     Types: Cigars    Smokeless tobacco: Never Used    Tobacco comment: used to smoke cigars quit   Substance Use Topics    Alcohol use: Yes     Alcohol/week: 0.0 standard drinks     Comment: 2 drinks weekly      Family History   Problem Relation Age of Onset    Eczema Mother     Cancer Sister         breast     Prior to Admission medications    Medication Sig Start Date End Date Taking? Authorizing Provider   aspirin delayed-release 81 mg tablet Take 81 mg by mouth daily. Yes Provider, Historical   atorvastatin (LIPITOR) 20 mg tablet Take 10 mg by mouth daily. Yes Other, MD Samia   allopurinol (ZYLOPRIM) 300 mg tablet Take 300 mg by mouth daily. Yes Other, MD Samia   atenolol (TENORMIN) 50 mg tablet Take 50 mg by mouth daily. Yes Provider, Historical       Allergies   Allergen Reactions    Albumin Products Rash, Itching and Other (comments)     Hypertension, tachycardia    Penicillins Rash         Review of Systems:   Consititutional: Denies fever or chills. CV: + CP as above, +HTN. Resp: + COATES at ~ 200 yards  : Denies dialysis. + renal artery stenoses bilaterally. GI: + epigastric pain, nausea, vomiting  M/S: Denies joint or bone problems, or implanted artificial hardware. Skin: Denies varicose veins, edema. Neuro: Denies strokes    Objective:     VS:     Physical Exam:    General appearance: alert, cooperative, mild distress from abdominal symptoms.   Head: normocephalic, without obvious abnormality; atraumatic  Eyes: no jaundice  Neck: no JVD  Lungs: clear to auscultation bilaterally  Heart: regular rate and rhythm; no murmur  Abdomen: soft, + epigastric tenderness, + guarding. Hypoactive BS  Extremities: moves all extremities; no weakness. UE & LE distal pulses 2+ throughout  Skin: Skin color normal; No varicose veins or edema. Neurologic: Alert & oriented. CN grossly intact. Moves all 4 extremities equally. Labs:   Recent Labs     09/23/21  0442 09/22/21  1051 09/22/21  1028 09/22/21  1028   WBC 21.9*  --    < > 13.4*   HGB 18.0*  --    < > 16.8   HCT 54.7*  --    < > 48.9     --    < > 184     --    < > 138   K 4.3  --    < > 3.6   BUN 27*  --    < > 16   CREA 1.84*  --    < > 1.47*   *  --    < > 168*   GLUCPOC  --  203*  --   --    INR  --   --   --  1.0    < > = values in this interval not displayed. Diagnostics:     CAT Chest / Abdomen:   IMPRESSION  1. Acute, uncomplicated pancreatitis. May be secondary to debris filled duodenal  diverticulum. 2. 4.6 cm ascending thoracic aortic aneurysm, without dissection. 3. 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 4. 50% ostial stenosis of the bilateral main renal arteries, with cortical  medullary thinning of the kidneys bilaterally. 5. Prostatomegaly, with findings suspicious for chronic bladder outlet  obstruction. 6. Nonobstructing calyceal renal calculi bilaterally. 7. Enlarged main pulmonary artery suggestive of pulmonary hypertension. 8. Other incidental and postoperative findings as detailed above. PA and lateral:   FINDINGS: AP portable imaging of the chest performed at 3:46 PM demonstrates a  stable cardiomediastinal silhouette. The patient is status post median  sternotomy and CABG. The thoracic aorta remains tortuous and atherosclerotic. The lungs are clear bilaterally.  No significant osseous abnormalities are seen.     IMPRESSION  No evidence of acute cardiopulmonary process.       Carotid doppler: n/a    PFTS-FEV1: n/a    EKG:   Assessment:     Active Problems:    Pancreatitis (9/22/2021)        Plan:       Treatment Plan:    1. Recurrent, Acute pancreatitis: cause still TBD. Pt acutely ill and managed by Primary team and GI  2. [place]: management per primary team.  3. CAD, S/P CABG with patent grafts seen in cath 2019. No anginal symptoms  4. HTN: per Primary team  5. Incidental finding of 4.6 cm dilated ascending aorta. No comparison studies found to establish a time line. No c/o chest pain before this admission which was related to his acute pancreatitis. - No need for intervention at this time as aneurysm is <5cm, no acute dissection and patient is otherwise acutely ill with pancreatitis.     -Will need to see Dr Jae Reeves in the office for a f/u once he has recovered from this acute illness. Appt tentatively scheduled for October 11, 2021.       Signed By: Lara Feng PA-C     September 23, 2021

## 2021-09-23 NOTE — CONSULTS
NEPHROLOGY CONSULT NOTE     Patient: Chaz Francis MRN: [id number]  PCP: Roxy Carrillo MD   :     1950  Age:   70 y.o. Sex:  male      Referring physician: Fuad Marin MD  Reason for consultation: 70 y.o. male with Pancreatitis [O57.45] complicated by CHINA   Admission Date: 2021 10:12 AM  LOS: 1 day      ASSESSMENT and PLAN :   CHINA:  - suspect IVVD from acute pancreatitis vs ATN from contrast  - no obstruction on CT scan  - cont IVF  - keep roldan  - daily labs and I/Os    NG acidosis:  - cont w/ bicarb drip that was ordered    Pancreatitis:  - per GI    Bradycardia  TAA, b/l BEAU aneurysms  Leukocytosis     Active Problems / Assessment AAActive  : Active Problems:    Pancreatitis (2021)         Subjective:   HPI: Chaz Francis is a 70 y.o.  male who has been admitted to the hospital for chest and epigastric pain. He has a hx of obesity, HTN, CAD, prior CHINA in the past.  CTA of C/A/P on  neg for PE, + for acute pancreatitis. Lipase > 3000, normal TG level. Cr was 1.4 on admission on , up to 1.8 today. Incidental finding of TAA, R and L BEAU aneurysms. He is voiding well. C/o pain, no cp or sob.      Past Medical Hx:   Past Medical History:   Diagnosis Date    Arthritis     back hips knees and arms    CAD (coronary artery disease)     Hypertension     Kidney stones     Nausea & vomiting     after hip surgery    Pancreatitis     In 2011, resolved    Psoriasis         Past Surgical Hx:     Past Surgical History:   Procedure Laterality Date    CARDIAC SURG PROCEDURE UNLIST      cardiac cath 2011    CARDIAC SURG PROCEDURE UNLIST      CABG WITH 2 GRAPHS    HX CHOLECYSTECTOMY  2009    Dr. Yosef Martel - Open Cholecystectomy    HX GI      GALLBLADDER 09    HX GI      perforated colon    HX HEENT      LEFT EYE MUSCLE SURGERY    [facility], [id number]    right and left    HX ORTHOPAEDIC      bilat hip replacement, left hip replaced 2 x    HX ORTHOPAEDIC      RIGHT ELBOW  SURGERY ON NERVE    LITHOTRIPSY  ~2001    AK EYE SURG ANT SGMT PROC UNLISTED  as child       Medications:  Prior to Admission medications    Medication Sig Start Date End Date Taking? Authorizing Provider   aspirin delayed-release 81 mg tablet Take 81 mg by mouth daily. Yes Provider, Historical   atorvastatin (LIPITOR) 20 mg tablet Take 10 mg by mouth daily. Yes Other, MD Samia   allopurinol (ZYLOPRIM) 300 mg tablet Take 300 mg by mouth daily. Yes Other, MD Samia   atenolol (TENORMIN) 50 mg tablet Take 50 mg by mouth daily. Yes Provider, Historical       Allergies   Allergen Reactions    Albumin Products Rash, Itching and Other (comments)     Hypertension, tachycardia    Penicillins Rash       Social Hx:  reports that he has quit smoking. His smoking use included cigars. He quit after 0.00 years of use. He has never used smokeless tobacco. He reports current alcohol use. He reports that he does not use drugs. Family History   Problem Relation Age of Onset    Eczema Mother     Cancer Sister         breast       Review of Systems:  A twelve point review of system was performed today. Pertinent positives and negatives are mentioned in the HPI. The reminder of the ROS is negative and noncontributory. Objective:    Vitals:    Vitals:    09/23/21 1222 09/23/21 1308 09/23/21 1427 09/23/21 1606   BP:  (!) 128/93  123/87   Pulse: 70 72 74 76   Resp:  20  20   Temp:  97.6 °F (36.4 °C)  98 °F (36.7 °C)   SpO2:       Weight:       Height:         I&O's:  09/22 0701 - 09/23 0700  In: 1000 [I.V.:1000]  Out: -   Visit Vitals  /87 (BP 1 Location: Right upper arm, BP Patient Position: At rest)   Pulse 76   Temp 98 °F (36.7 °C)   Resp 20   Ht 6' 1\" (1.854 m)   Wt 117.9 kg (260 lb) Comment: Per pt in ER   SpO2 94%   BMI 34.30 kg/m²       Physical Exam:  General:Alert, No distress,   HEENT: Eyes are PERRL. Conjunctiva without pallor ,erythema. The sclerae without icterus. Archie Mo Neck:Supple,no mass palpable  Lungs : Clears to auscultation Bilaterally, Normal respiratory effort  CVS: RRR, S1 S2 normal, No rub, no LE edema  Abdomen: obese, distended, diffuse epigastric tenderness  Extremities: No cyanosis, No clubbing  Skin: No rash or lesions. Lymph nodes: No palpable nodes  MS: No joint swelling, erythema, warmth  Neurologic: non focal, AAO x 3  Psych: normal affect    Laboratory Results:    Lab Results   Component Value Date    BUN 27 (H) 09/23/2021     09/23/2021    K 4.3 09/23/2021     (H) 09/23/2021    CO2 18 (L) 09/23/2021       Lab Results   Component Value Date    BUN 27 (H) 09/23/2021    BUN 16 09/22/2021    BUN 22 (H) 02/12/2015    BUN 24 (H) 02/11/2015    BUN 34 (H) 02/09/2015    K 4.3 09/23/2021    K 3.6 09/22/2021    K 3.7 02/12/2015    K 3.0 (L) 02/11/2015    K 3.6 02/09/2015       Lab Results   Component Value Date    WBC 21.9 (H) 09/23/2021    RBC 5.83 (H) 09/23/2021    HGB 18.0 (H) 09/23/2021    HCT 54.7 (H) 09/23/2021    MCV 93.8 09/23/2021    MCH 30.9 09/23/2021    RDW 13.9 09/23/2021     09/23/2021       Lab Results   Component Value Date    PHOS 2.8 09/22/2021       Urine dipstick:   Lab Results   Component Value Date/Time    Color DARK YELLOW 09/22/2021 08:02 PM    Appearance CLEAR 09/22/2021 08:02 PM    Specific gravity 1.010 09/22/2021 08:02 PM    Specific gravity 1.024 02/08/2015 04:51 PM    pH (UA) 5.0 09/22/2021 08:02 PM    Protein 30 (A) 09/22/2021 08:02 PM    Glucose Negative 09/22/2021 08:02 PM    Ketone Negative 09/22/2021 08:02 PM    Bilirubin Negative 09/22/2021 08:02 PM    Urobilinogen 1.0 09/22/2021 08:02 PM    Nitrites Negative 09/22/2021 08:02 PM    Leukocyte Esterase Negative 09/22/2021 08:02 PM    Epithelial cells FEW 09/22/2021 08:02 PM    Bacteria Negative 09/22/2021 08:02 PM    WBC 0-4 09/22/2021 08:02 PM    RBC 0-5 09/22/2021 08:02 PM             Thank you for allowing us to participate in the care of this patient.    We will follow patient. Please dont hesitate to call with any questions    Nicole Calderon MD  9/23/2021      [facility]   [address] [facility] Keenan Rivera [address] [place] [address]  Phone - [phone]   Fax - [phone]  [email or web address]

## 2021-09-23 NOTE — ED NOTES
Verbal shift change report given to Gallito Knapp RN (oncoming nurse) by [facility]). Report included the following information SBAR, ED Summary, MAR and Med Rec Status.

## 2021-09-23 NOTE — PROGRESS NOTES
As per Dr. Aman Esteban order I called the lab to add on TSH and also put the order in. lab acknowledged it.

## 2021-09-23 NOTE — CONSULTS
I was asked to see him for bradycardia which is resolved. His CP is very atypical and troponins are negatvie. I will come back and write a full note   Ordered TSH.

## 2021-09-24 NOTE — PROGRESS NOTES
Nephrology Progress Note  Mayuri Kasper  Date of Admission : 9/22/2021    CC: Follow up for CHINA       Assessment and Plan     CHINA:  - multifactorial: ATN from hypotension/severe pancreatitis + IV contrast on admission  - no obstruction on CT scan  - IV Bolus NS x 1  - would transfer to ICU given hypotension  - repeat labs this afternoon  - will likely need CRRT unless his BP and UOP     NG acidosis:  - improving    Lactic acidosis     Pancreatitis:  - per GI     Bradycardia  TAA, b/l BEAU aneurysms  Leukocytosis       Interval History:  Seen and examined. In pain, lethargic. Low BP. Steep increase in Cr this AM.  UOP only 500cc recorded. Lactic acid and WBC up, ABD more distended. Current Medications: all current  Medications have been eviewed in EPIC  Review of Systems: Pertinent items are noted in HPI. Objective:  Vitals:    Vitals:    09/24/21 0427 09/24/21 0600 09/24/21 0809 09/24/21 0843   BP: 93/62  (!) 84/62 (!) 86/67   Pulse: 74 71 77    Resp: 20 22    Temp: 97.2 °F (36.2 °C)  97.6 °F (36.4 °C)    SpO2: 94%  92%    Weight:       Height:         Intake and Output:  No intake/output data recorded. 09/22 1901 - 09/24 0700  In: -   Out: 500 [Urine:500]    Physical Examination:  Pt intubated     No  General: Acutely ill appearing  Neck:  Supple, no mass  Resp:  Decreased BS b/l  CV:  RRR,  no murmur or rub, ++ LE edema  GI:  Distended, reduced bowel sounds, diffuse tenderness  Neurologic:  Non focal  Psych:             AAO x 3 appropriate affect   Skin:  No Rash  :  Smith in place    []    High complexity decision making was performed  []    Patient is at high-risk of decompensation with multiple organ involvement    Lab Data Personally Reviewed: I have reviewed all the pertinent labs, microbiology data and radiology studies during assessment.     Recent Labs     09/24/21  0214 09/23/21  0442 09/22/21  1943 09/22/21  1028    139  --  138   K 4.5 4.3  --  3.6   * 110*  --  107 CO2 19* 18*  --  25   * 137*  --  168*   BUN 53* 27*  --  16   CREA 3.96* 1.84*  --  1.47*   CA 7.1* 8.4*  --  9.0   MG  --   --  1.8 1.9   PHOS  --   --  2.8  --    ALB 2.8* 3.5  --  3.5   * 278*  --  153*   INR  --   --   --  1.0     Recent Labs     09/24/21  0214 09/23/21  0442 09/22/21  1028   WBC 22.6* 21.9* 13.4*   HGB 17.9* 18.0* 16.8   HCT 55.4* 54.7* 48.9    181 184     Lab Results   Component Value Date/Time    Specimen Description: HIP LEFT JOINT 01/17/2012 03:00 PM    Specimen Description: HIP LEFT JOINT 01/17/2012 03:00 PM    Specimen Description: JAKE 07/28/2011 12:12 AM     Lab Results   Component Value Date/Time    Culture result: NO GROWTH 2 DAYS 09/22/2021 07:43 PM    Culture result: NO SIGNIFICANT GROWTH 02/08/2015 04:51 PM    Culture result: NO GROWTH 14 DAYS 01/17/2012 03:00 PM    Culture result: NO GROWTH 14 DAYS 01/17/2012 03:00 PM     Recent Results (from the past 24 hour(s))   LACTIC ACID    Collection Time: 09/24/21  2:14 AM   Result Value Ref Range    Lactic acid 4.1 (HH) 0.4 - 2.0 MMOL/L   METABOLIC PANEL, COMPREHENSIVE    Collection Time: 09/24/21  2:14 AM   Result Value Ref Range    Sodium 138 136 - 145 mmol/L    Potassium 4.5 3.5 - 5.1 mmol/L    Chloride 111 (H) 97 - 108 mmol/L    CO2 19 (L) 21 - 32 mmol/L    Anion gap 8 5 - 15 mmol/L    Glucose 115 (H) 65 - 100 mg/dL    BUN 53 (H) 6 - 20 MG/DL    Creatinine 3.96 (H) 0.70 - 1.30 MG/DL    BUN/Creatinine ratio 13 12 - 20      GFR est AA 18 (L) >60 ml/min/1.73m2    GFR est non-AA 15 (L) >60 ml/min/1.73m2    Calcium 7.1 (L) 8.5 - 10.1 MG/DL    Bilirubin, total 1.2 (H) 0.2 - 1.0 MG/DL    ALT (SGPT) 145 (H) 12 - 78 U/L    AST (SGOT) 93 (H) 15 - 37 U/L    Alk.  phosphatase 89 45 - 117 U/L    Protein, total 6.8 6.4 - 8.2 g/dL    Albumin 2.8 (L) 3.5 - 5.0 g/dL    Globulin 4.0 2.0 - 4.0 g/dL    A-G Ratio 0.7 (L) 1.1 - 2.2     CBC WITH AUTOMATED DIFF    Collection Time: 09/24/21  2:14 AM   Result Value Ref Range    WBC 22.6 (H) 4.1 - 11.1 K/uL    RBC 5.76 (H) 4.10 - 5.70 M/uL    HGB 17.9 (H) 12.1 - 17.0 g/dL    HCT 55.4 (H) 36.6 - 50.3 %    MCV 96.2 80.0 - 99.0 FL    MCH 31.1 26.0 - 34.0 PG    MCHC 32.3 30.0 - 36.5 g/dL    RDW 14.6 (H) 11.5 - 14.5 %    PLATELET 970 254 - 095 K/uL    MPV 11.8 8.9 - 12.9 FL    NRBC 0.0 0  WBC    ABSOLUTE NRBC 0.00 0.00 - 0.01 K/uL    NEUTROPHILS 87 (H) 32 - 75 %    BAND NEUTROPHILS 6 0 - 6 %    LYMPHOCYTES 4 (L) 12 - 49 %    MONOCYTES 3 (L) 5 - 13 %    EOSINOPHILS 0 0 - 7 %    BASOPHILS 0 0 - 1 %    IMMATURE GRANULOCYTES 0 %    ABS. NEUTROPHILS 21.0 (H) 1.8 - 8.0 K/UL    ABS. LYMPHOCYTES 0.9 0.8 - 3.5 K/UL    ABS. MONOCYTES 0.7 0.0 - 1.0 K/UL    ABS. EOSINOPHILS 0.0 0.0 - 0.4 K/UL    ABS. BASOPHILS 0.0 0.0 - 0.1 K/UL    ABS. IMM. GRANS. 0.0 K/UL    DF SMEAR SCANNED      RBC COMMENTS NORMOCYTIC, NORMOCHROMIC     TSH 3RD GENERATION    Collection Time: 09/24/21  2:14 AM   Result Value Ref Range    TSH 3.23 0.36 - 3.74 uIU/mL                   Kristy Herron MD  [facility]   [id number] [place] [address]  Phone - [phone]   Fax - [phone]  [email or web address]

## 2021-09-24 NOTE — PROGRESS NOTES
ICU TEAM Progress Note    Name: Layla Novoa   : 1950   MRN: [id number]   Date: 2021      Assessment:   Reason for ICU Admission: ARF 2/2 severe pancreatitis     Brief HPI: 69 y/o M w hx of CAD, HTN, OA, and kidney stones presents to the hospital with CC of SOB, abd pain, N/V. He was found to have severe pancreatitis and acute renal injury with associated lactic acidosis and elevated LFTs. He was transferred to the CCU for initiation of CRRT given borderline low blood pressures. - Acute Pancreatitis  - ARF  - CAD  - HTN  - OA      Plan:     Neuro: Avoid sedatives  Pulm: Supplemental O2 prn. HOB >30 degrees. Cardiac: MAP goal >65. Phenylephrine gtt. Renal: CRRT per nephro  GI: NGT to LCS. NPO now. ID: cefepime, flagyl, vanc. Follow cultures. Heme: Hold SQH. Cont asa. Endo: NIYAH  MSK:  PT/OT    F - Feeding:  No   A - Analgesia: None  S - Sedation: None  T - DVT Prophylaxis: SCD's or Sequential Compression Device   C - Code Status: Full Code  H - Head of Bed: > 30 Degrees  U - Ulcer Prophylaxis: Not at this time   G - Glycemic Control: Insulin  S - Spontaneous Breathing Trial: N/A  B - Bowel Regimen: None needed at this time  I - Indwelling Catheter:   Tubes: Nasogastric Tube  Lines: Peripheral IV and Teddy  Drains: Smith Catheter  D - De-escalation of Antibiotics: n/a    Subjective:   Overnight Events:   : transferred into ICU. Jess Kettle placed and CRRT started.      Objective:     Visit Vitals  /77   Pulse 77   Temp 97.6 °F (36.4 °C)   Resp 28   Ht 6' 1\" (1.854 m)   Wt 119.3 kg (263 lb 0.1 oz)   SpO2 91%   BMI 34.70 kg/m²      O2 Device: None (Room air) Temp (24hrs), Av.9 °F (36.6 °C), Min:97.2 °F (36.2 °C), Max:98.9 °F (37.2 °C)    Intake/Output:     Intake/Output Summary (Last 24 hours) at 2021 1118  Last data filed at 2021 1652  Gross per 24 hour   Intake    Output 500 ml   Net -500 ml     Physical Exam:  General:  alert, cooperative, no distress, slowed mentation, appears older than stated age  Eye:  negative  Neurologic:  Oriented x1  Neck:  normal and no erythema or exudates noted. Lungs:  Distant breath sounds  Heart:  regular rate and rhythm  Abdomen:  Soft, distended  Skin:  Normal.    24h Labs & Data: Reviewed    Medications: Reviewed    Chest X-Ray 9/24: Tip of the dialysis catheter in the right atrium. No pneumothorax. TTE 9/24:  · LVEF is 55 - 60%. Small left ventricle. Moderate concentric hypertrophy. · LA: Mildly dilated left atrium. · RV: Mildly dilated right ventricle. Mildly reduced systolic function. · IAS: No atrial septal defect present. · MV: Mitral valve non-specific thickening. Multidisciplinary Rounds Completed:  No    ABCDEF Bundle/Checklist Completed: Yes    SPECIAL EQUIPMENT: CRRT    DISPOSITION: Stay in ICU    CRITICAL CARE CONSULTANT NOTE  I had a face to face encounter with the patient, reviewed and interpreted patient data including clinical events, labs, images, vital signs, I/O's, and examined patient. I have discussed the case and the plan and management of the patient's care with the consulting services, the bedside nurses and the respiratory therapist.      NOTE OF PERSONAL INVOLVEMENT IN CARE   This patient has a high probability of imminent, clinically significant deterioration, which requires the highest level of preparedness to intervene urgently. I participated in the decision-making and personally managed or directed the management of the following life and organ supporting interventions that required my frequent assessment to treat or prevent imminent deterioration. I personally spent 50 minutes of critical care time. This is time spent at this critically ill patient's bedside actively involved in patient care as well as the coordination of care and discussions with the patient's family. This does not include any procedural time which has been billed separately.     Charlee Wall MD  Staff Intensivist/Anesthesiologist  [facility]  9/24/2021

## 2021-09-24 NOTE — PROGRESS NOTES
ID Progress Note  2021    Subjective:     C/o chest discomfort during visit. Was getting ready to transfer to ICU. Review of Systems:            Symptom Y/N Comments   Symptom Y/N Comments   Fever/Chills  n     Chest Pain  y      Poor Appetite       Edema        Cough       Abdominal Pain y       Sputum       Joint Pain        SOB/COATES  y     Pruritis/Rash        Nausea/vomit  n     Tolerating PT/OT        Diarrhea       Tolerating Diet        Constipation       Other           Could NOT obtain due to:       Objective:     Vitals:   Visit Vitals  BP 93/62   Pulse 71   Temp 97.2 °F (36.2 °C)   Resp 20   Ht 6' 1\" (1.854 m)   Wt 117.9 kg (260 lb) Comment: Per pt in ER   SpO2 94%   BMI 34.30 kg/m²        Tmax:  Temp (24hrs), Av °F (36.7 °C), Min:97.2 °F (36.2 °C), Max:98.9 °F (37.2 °C)      PHYSICAL EXAM:  General: Obese, WD, WN. Alert, cooperative, no acute distress    EENT:  EOMI. Anicteric sclerae. MMM  Resp:  CTA bilaterally, no wheezing or rales. No accessory muscle use  CV:  Regular  rhythm,  No edema  GI:  Soft, Non distended, Non tender. +Bowel sounds  Neurologic:  Alert and oriented X 3, normal speech,   Psych:   Good insight. Not anxious nor agitated  Skin:  No rashes.   No jaundice    Labs:   Lab Results   Component Value Date/Time    WBC 22.6 (H) 2021 02:14 AM    Hemoglobin (POC) 15.0 2016 05:20 PM    HGB 17.9 (H) 2021 02:14 AM    Hematocrit (POC) 44 2016 05:20 PM    HCT 55.4 (H) 2021 02:14 AM    PLATELET 457  02:14 AM    MCV 96.2 2021 02:14 AM     Lab Results   Component Value Date/Time    Sodium 138 2021 02:14 AM    Potassium 4.5 2021 02:14 AM    Chloride 111 (H) 2021 02:14 AM    CO2 19 (L) 2021 02:14 AM    Anion gap 8 2021 02:14 AM    Glucose 115 (H) 2021 02:14 AM    BUN 53 (H) 2021 02:14 AM    Creatinine 3.96 (H) 2021 02:14 AM    BUN/Creatinine ratio 13 2021 02:14 AM    GFR est AA 18 (L) 09/24/2021 02:14 AM    GFR est non-AA 15 (L) 09/24/2021 02:14 AM    Calcium 7.1 (L) 09/24/2021 02:14 AM    Bilirubin, total 1.2 (H) 09/24/2021 02:14 AM    Alk. phosphatase 89 09/24/2021 02:14 AM    Protein, total 6.8 09/24/2021 02:14 AM    Albumin 2.8 (L) 09/24/2021 02:14 AM    Globulin 4.0 09/24/2021 02:14 AM    A-G Ratio 0.7 (L) 09/24/2021 02:14 AM    ALT (SGPT) 145 (H) 09/24/2021 02:14 AM       Transfer to ICU on 9/24 due to hypotension. Assessment and Plan   Leukocytosis  Elevated LFT; trending down  Acute pancreatitis - GI following; MRI/MRCP of abd ordered, indicted that testing was not done due to pt's refusal. Pt claims that he has not even left the room  - WBC 22.6k, afebrile, lactic acid 4.1    Lipase > 3k (9/23)    U/A (-)    Blood cx (9/22) no growth so far    CTA chest, CT of abd/pel: 4.6 cm ascending thoracic aortic aneurysm, without dissection. 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 Pancreas with diffuse peripancreatic fat stranding without discrete fluid  collection, no evidence of necrosis or infection. Prostatomegaly, with findings suspicious for chronic bladder outlet obstruction. US ABD (9/22) Pancreas obscured by bowel gas. Pancreatitis without necrosis or infection doesn't treated with IV ABX. However, pt presented with WBC 13K, now up to 21k with hx of distant complicated GI histories. Continue with IV cefepime and flagyl.  We will adjust ABX PRN as following outstanding culture result       Fever work up if temp >= 100.4    CHINA  Hx of bilateral nonobstructive calyceal calculi and bilateral renal cysts  - creat 4.8; could be secondary to IV contrast    Ready to start on CRRT    Nephrology following     Ascending thoracic aortic aneurysm & partially trombosis in left internal iliac artery   - further evaluation per primary team    Ash Child NP

## 2021-09-24 NOTE — PROGRESS NOTES
Cardiology Progress Note                                        Admit Date: 9/22/2021    Assessment/Plan:     Bradycardia; resolved  Hypotension; will hold Atenolol  ARF/CKD; volume depletion  CAD; s/p remote CABG; asymptomatic    Estephania August is a 70 y.o. male with     PROBLEM LIST:  Patient Active Problem List    Diagnosis Date Noted    Pancreatitis 09/22/2021    Combined forms of age-related cataract of right eye 06/05/2018    Small bowel obstruction ([facility] [address] 02/08/2015    Perforated bowel ([facility] [address] 08/02/2011    Atrial flutter ([facility] [address] 08/01/2011    Pneumoperitoneum - abdomen 07/27/2011    Coronary atherosclerosis of native coronary artery 07/20/2011         Subjective:     Estephania August reports none. Visit Vitals  BP (!) 84/62 (BP 1 Location: Right upper arm, BP Patient Position: At rest)   Pulse 77   Temp 97.6 °F (36.4 °C)   Resp 22   Ht 6' 1\" (1.854 m)   Wt 260 lb (117.9 kg) Comment: Per pt in ER   SpO2 92%   BMI 34.30 kg/m²       Intake/Output Summary (Last 24 hours) at 9/24/2021 0837  Last data filed at 9/23/2021 1652  Gross per 24 hour   Intake    Output 500 ml   Net -500 ml       Objective:      Physical Exam:  HEENT: [place], EOMI  Neck: No JVD,  No thyroidmegaly  Resp: CTA bilaterally;  No wheezes or rales  CV: RRR s1s2 No murmur no s3  Abd:Soft, Nontender  Ext: No edema  Neuro: Alert and oriented; Nonfocal  Skin: Warm, Dry, Intact  Pulses: 2+ DP/PT/Rad      Telemetry: normal sinus rhythm    Current Facility-Administered Medications   Medication Dose Route Frequency    cefepime (MAXIPIME) 2 g in 0.9% sodium chloride (MBP/ADV) 100 mL MBP  2 g IntraVENous Q12H    sodium bicarbonate (8.4%) 100 mEq in 0.45% sodium chloride 1,000 mL infusion   IntraVENous CONTINUOUS    metroNIDAZOLE (FLAGYL) IVPB premix 500 mg  500 mg IntraVENous Q12H    HYDROmorphone (DILAUDID) injection 1 mg  1 mg IntraVENous Q3H PRN    aspirin delayed-release tablet 81 mg  81 mg Oral DAILY    atenoloL (TENORMIN) tablet 50 mg  50 mg Oral DAILY    [Held by provider] atorvastatin (LIPITOR) tablet 10 mg  10 mg Oral DAILY    sodium chloride (NS) flush 5-40 mL  5-40 mL IntraVENous Q8H    sodium chloride (NS) flush 5-40 mL  5-40 mL IntraVENous PRN    ondansetron (ZOFRAN) injection 4 mg  4 mg IntraVENous Q4H PRN    [Held by provider] heparin (porcine) injection 5,000 Units  5,000 Units SubCUTAneous Q8H         Data Review:   Labs:    Recent Results (from the past 24 hour(s))   LACTIC ACID    Collection Time: 09/24/21  2:14 AM   Result Value Ref Range    Lactic acid 4.1 (HH) 0.4 - 2.0 MMOL/L   METABOLIC PANEL, COMPREHENSIVE    Collection Time: 09/24/21  2:14 AM   Result Value Ref Range    Sodium 138 136 - 145 mmol/L    Potassium 4.5 3.5 - 5.1 mmol/L    Chloride 111 (H) 97 - 108 mmol/L    CO2 19 (L) 21 - 32 mmol/L    Anion gap 8 5 - 15 mmol/L    Glucose 115 (H) 65 - 100 mg/dL    BUN 53 (H) 6 - 20 MG/DL    Creatinine 3.96 (H) 0.70 - 1.30 MG/DL    BUN/Creatinine ratio 13 12 - 20      GFR est AA 18 (L) >60 ml/min/1.73m2    GFR est non-AA 15 (L) >60 ml/min/1.73m2    Calcium 7.1 (L) 8.5 - 10.1 MG/DL    Bilirubin, total 1.2 (H) 0.2 - 1.0 MG/DL    ALT (SGPT) 145 (H) 12 - 78 U/L    AST (SGOT) 93 (H) 15 - 37 U/L    Alk.  phosphatase 89 45 - 117 U/L    Protein, total 6.8 6.4 - 8.2 g/dL    Albumin 2.8 (L) 3.5 - 5.0 g/dL    Globulin 4.0 2.0 - 4.0 g/dL    A-G Ratio 0.7 (L) 1.1 - 2.2     CBC WITH AUTOMATED DIFF    Collection Time: 09/24/21  2:14 AM   Result Value Ref Range    WBC 22.6 (H) 4.1 - 11.1 K/uL    RBC 5.76 (H) 4.10 - 5.70 M/uL    HGB 17.9 (H) 12.1 - 17.0 g/dL    HCT 55.4 (H) 36.6 - 50.3 %    MCV 96.2 80.0 - 99.0 FL    MCH 31.1 26.0 - 34.0 PG    MCHC 32.3 30.0 - 36.5 g/dL    RDW 14.6 (H) 11.5 - 14.5 %    PLATELET 086 898 - 819 K/uL    MPV 11.8 8.9 - 12.9 FL    NRBC 0.0 0  WBC    ABSOLUTE NRBC 0.00 0.00 - 0.01 K/uL    NEUTROPHILS 87 (H) 32 - 75 %    BAND NEUTROPHILS 6 0 - 6 %    LYMPHOCYTES 4 (L) 12 - 49 %    MONOCYTES 3 (L) 5 - 13 % EOSINOPHILS 0 0 - 7 %    BASOPHILS 0 0 - 1 %    IMMATURE GRANULOCYTES 0 %    ABS. NEUTROPHILS 21.0 (H) 1.8 - 8.0 K/UL    ABS. LYMPHOCYTES 0.9 0.8 - 3.5 K/UL    ABS. MONOCYTES 0.7 0.0 - 1.0 K/UL    ABS. EOSINOPHILS 0.0 0.0 - 0.4 K/UL    ABS. BASOPHILS 0.0 0.0 - 0.1 K/UL    ABS. IMM.  GRANS. 0.0 K/UL    DF SMEAR SCANNED      RBC COMMENTS NORMOCYTIC, NORMOCHROMIC     TSH 3RD GENERATION    Collection Time: 09/24/21  2:14 AM   Result Value Ref Range    TSH 3.23 0.36 - 3.74 uIU/mL

## 2021-09-24 NOTE — PROGRESS NOTES
- TRANSFER - IN REPORT:    Verbal report received from Vonty RN(name) on Teresa Ramsay  being received from [address] [place] [address](unit) for urgent transfer      Report consisted of patients Situation, Background, Assessment and   Recommendations(SBAR). Information from the following report(s) SBAR, Kardex and MAR was reviewed with the receiving nurse. Opportunity for questions and clarification was provided. Assessment completed upon patients arrival to unit and care assumed. 1047-Arrival to CCU.    1124- New IV established. Labs sent now. 1140- ECHO now. 1300- NG tube inserted. 550 mL of green fluid aspirated. 1428- Noted xray report of NG tube. Advanced from 65 cm to 70 cm now. [address] [facility]. Confirmed tech is aware of need to start dialysis. New Nataliia Mendoza is here for treatment. 1620- Dialysis is started. Labs drawn and sent. Pain medications given as able. 1650- Multiple issues with dialysis access noted. Frequent negative pressure alarms. Vicky Burkett) is still on site. Lines switched without much improvement. Call placed to IR and they recommend pulling the line out 1 cm. Will notify Dr. Tinajero Settler. 1705- Increasing Mor to help blood pressure. 1718- Dialysis machine stopped now. Self test failure #1. Vicky Mendoza still on site. 1930- Dialysis remains on standby. Lines packed with Heparin. Report given to [employer].

## 2021-09-24 NOTE — PROGRESS NOTES
TRANSFER - OUT REPORT:    Verbal report given to Eunice Aguiar RN on Enedina Isaacs  being transferred to CCU for routine progression of care       Report consisted of patients Situation, Background, Assessment and   Recommendations(SBAR). Information from the following report(s) SBAR, Kardex, Procedure Summary, Intake/Output, MAR, Recent Results and Med Rec Status was reviewed with the receiving nurse. Lines:   Peripheral IV 09/22/21 Right Forearm (Active)   Site Assessment Intact 09/23/21 2030   Phlebitis Assessment 0 09/23/21 2030   Infiltration Assessment 0 09/23/21 2030   Dressing Status Intact 09/23/21 2030   Dressing Type Transparent;Tape 09/23/21 2030   Hub Color/Line Status Green;Flushed; Infusing 09/23/21 2030   Action Taken Open ports on tubing capped 09/23/21 2030   Alcohol Cap Used Yes 09/23/21 2030        Opportunity for questions and clarification was provided.       Patient transported with:   Registered Nurse

## 2021-09-24 NOTE — PROGRESS NOTES
Patient transferred to CCU  Anuric, worsening resp distress and hypotension  Plan to initiate CRRT  Wife and patient consented  Risks and benefits explained  IR to place rigoberto Ramírez notified          Polo Ibrahim MD  [address]  Phone - [phone]   Fax - [phone]  www. [email or web address]

## 2021-09-24 NOTE — PROGRESS NOTES
[address] [place] [address] Dr [facility]       GI PROGRESS NOTE  [facility]  [phone] NP in-hospital cell phone M-F until 4:30  After 5pm or on weekends, please call  for physician on call      NAME: Dick Ramirez   :  1950   MRN:  [id number]       Subjective:   He was transferred to the ICU for increased WOB, hypotension, volume overload and worsening renal function for CRRT. Per documentation he was taken to MRI and refused test, he denies this and says he is/has been agreeable for MRI. He's having abdominal pain, back pain, and nausea. No BM. Objective:     VITALS:   Last 24hrs VS reviewed since prior progress note. Most recent are:  Visit Vitals  /77   Pulse 72   Temp 97.6 °F (36.4 °C)   Resp (!) 36   Ht 6' 1\" (1.854 m)   Wt 119.3 kg (263 lb)   SpO2 93%   BMI 34.70 kg/m²       PHYSICAL EXAM:  General: Cooperative, no acute distress    Neurologic:  Alert and oriented X 3. HEENT: EOMI, no scleral icterus   Lungs:  increased WOB  Heart:  Regular rate  Abdomen: Soft, distended, +tenderness. Extremities: warm  Psych:   Fair insight. Not anxious or agitated.     Lab Data Reviewed:     Recent Results (from the past 24 hour(s))   LACTIC ACID    Collection Time: 21  2:14 AM   Result Value Ref Range    Lactic acid 4.1 (HH) 0.4 - 2.0 MMOL/L   METABOLIC PANEL, COMPREHENSIVE    Collection Time: 21  2:14 AM   Result Value Ref Range    Sodium 138 136 - 145 mmol/L    Potassium 4.5 3.5 - 5.1 mmol/L    Chloride 111 (H) 97 - 108 mmol/L    CO2 19 (L) 21 - 32 mmol/L    Anion gap 8 5 - 15 mmol/L    Glucose 115 (H) 65 - 100 mg/dL    BUN 53 (H) 6 - 20 MG/DL    Creatinine 3.96 (H) 0.70 - 1.30 MG/DL    BUN/Creatinine ratio 13 12 - 20      GFR est AA 18 (L) >60 ml/min/1.73m2    GFR est non-AA 15 (L) >60 ml/min/1.73m2    Calcium 7.1 (L) 8.5 - 10.1 MG/DL    Bilirubin, total 1.2 (H) 0.2 - 1.0 MG/DL    ALT (SGPT) 145 (H) 12 - 78 U/L AST (SGOT) 93 (H) 15 - 37 U/L    Alk. phosphatase 89 45 - 117 U/L    Protein, total 6.8 6.4 - 8.2 g/dL    Albumin 2.8 (L) 3.5 - 5.0 g/dL    Globulin 4.0 2.0 - 4.0 g/dL    A-G Ratio 0.7 (L) 1.1 - 2.2     CBC WITH AUTOMATED DIFF    Collection Time: 09/24/21  2:14 AM   Result Value Ref Range    WBC 22.6 (H) 4.1 - 11.1 K/uL    RBC 5.76 (H) 4.10 - 5.70 M/uL    HGB 17.9 (H) 12.1 - 17.0 g/dL    HCT 55.4 (H) 36.6 - 50.3 %    MCV 96.2 80.0 - 99.0 FL    MCH 31.1 26.0 - 34.0 PG    MCHC 32.3 30.0 - 36.5 g/dL    RDW 14.6 (H) 11.5 - 14.5 %    PLATELET 957 765 - 433 K/uL    MPV 11.8 8.9 - 12.9 FL    NRBC 0.0 0  WBC    ABSOLUTE NRBC 0.00 0.00 - 0.01 K/uL    NEUTROPHILS 87 (H) 32 - 75 %    BAND NEUTROPHILS 6 0 - 6 %    LYMPHOCYTES 4 (L) 12 - 49 %    MONOCYTES 3 (L) 5 - 13 %    EOSINOPHILS 0 0 - 7 %    BASOPHILS 0 0 - 1 %    IMMATURE GRANULOCYTES 0 %    ABS. NEUTROPHILS 21.0 (H) 1.8 - 8.0 K/UL    ABS. LYMPHOCYTES 0.9 0.8 - 3.5 K/UL    ABS. MONOCYTES 0.7 0.0 - 1.0 K/UL    ABS. EOSINOPHILS 0.0 0.0 - 0.4 K/UL    ABS. BASOPHILS 0.0 0.0 - 0.1 K/UL    ABS. IMM. GRANS. 0.0 K/UL    DF SMEAR SCANNED      RBC COMMENTS NORMOCYTIC, NORMOCHROMIC     TSH 3RD GENERATION    Collection Time: 09/24/21  2:14 AM   Result Value Ref Range    TSH 3.23 0.36 - 8.04 uIU/mL   METABOLIC PANEL, COMPREHENSIVE    Collection Time: 09/24/21 11:24 AM   Result Value Ref Range    Sodium 139 136 - 145 mmol/L    Potassium 4.9 3.5 - 5.1 mmol/L    Chloride 109 (H) 97 - 108 mmol/L    CO2 21 21 - 32 mmol/L    Anion gap 9 5 - 15 mmol/L    Glucose 106 (H) 65 - 100 mg/dL    BUN 64 (H) 6 - 20 MG/DL    Creatinine 4.86 (H) 0.70 - 1.30 MG/DL    BUN/Creatinine ratio 13 12 - 20      GFR est AA 14 (L) >60 ml/min/1.73m2    GFR est non-AA 12 (L) >60 ml/min/1.73m2    Calcium 6.5 (L) 8.5 - 10.1 MG/DL    Bilirubin, total 1.1 (H) 0.2 - 1.0 MG/DL    ALT (SGPT) 111 (H) 12 - 78 U/L    AST (SGOT) 84 (H) 15 - 37 U/L    Alk.  phosphatase 73 45 - 117 U/L    Protein, total 6.6 6.4 - 8.2 g/dL Albumin 2.8 (L) 3.5 - 5.0 g/dL    Globulin 3.8 2.0 - 4.0 g/dL    A-G Ratio 0.7 (L) 1.1 - 2.2     PROTHROMBIN TIME + INR    Collection Time: 09/24/21 11:24 AM   Result Value Ref Range    INR 1.2 (H) 0.9 - 1.1      Prothrombin time 12.9 (H) 9.0 - 11.1 sec   CBC W/O DIFF    Collection Time: 09/24/21 11:24 AM   Result Value Ref Range    WBC 17.5 (H) 4.1 - 11.1 K/uL    RBC 5.40 4. 10 - 5.70 M/uL    HGB 16.8 12.1 - 17.0 g/dL    HCT 50.4 (H) 36.6 - 50.3 %    MCV 93.3 80.0 - 99.0 FL    MCH 31.1 26.0 - 34.0 PG    MCHC 33.3 30.0 - 36.5 g/dL    RDW 14.5 11.5 - 14.5 %    PLATELET 016 959 - 174 K/uL    MPV 11.5 8.9 - 12.9 FL    NRBC 0.0 0  WBC    ABSOLUTE NRBC 0.00 0.00 - 0.01 K/uL   TROPONIN I    Collection Time: 09/24/21 11:24 AM   Result Value Ref Range    Troponin-I, Qt. <0.05 <0.05 ng/mL            Assessment:     · Acute pancreatitis: rare alcohol use. Elevated LFTs, history of cholecystectomy. Triglycerides normal.   · Epigastric abdominal pain:  CTA chest/abdomen/pelvis (2/95/84): acute, uncomplicated pancreatitis - may be secondary to debris filled duodenal diverticulum; 4.6 cm ascending thoracic aortic aneurysm without dissection; Celiac and superior/inferior mesenteric arteries patent; CBD not dilated; status post cholecystectomy; right hemicolectomy; other findings as above. RUQ ultrasound (9/22/21): pancreas obscured by gas; prior cholecystectomy; echogenic liver suggests steatosis; common bile duct not visualized due to bowel gas. 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 · Leukocytosis: ID following. Blood culture (9/22/21): no growth 8h.    · Acute kidney injury  · Bradycardia  · Hypertension  · History of coronary artery disease status post CABG     Patient Active Problem List   Diagnosis Code    Coronary atherosclerosis of native coronary artery I25.10    Pneumoperitoneum - abdomen     Atrial flutter ([address] I48.92    Perforated bowel (H[facility]) K63.1    Small bowel obstruction ([address] K56.609    Combined forms of age-related cataract of right eye H25.811    Pancreatitis K85.90     Plan:     · Antibiotics per ID  · Monitor LFT's  · MRI/MRCP when medically stable  · Nephrology and cardiology following     Signed By: Jony Solo NP     9/24/2021  12:15 PM        This patient was seen and examined by me in a face-to-face visit today. I reviewed the medical record including lab work, imaging and other provider notes. I confirmed the interval history as described above. I spoke to the patient, discussing our findings and plans. I discussed this case in detail with Marisa MART. I formulated an updated  assessment of this patient and guided our treatment plan. I agree with the above progress note. I agree with the history, exam and assessment and plan as outlined in the note. I would like to add the following:     Abd: hypoactive BS, improved generalized tenderness, mild distention, no rebound/involuntary guarding. Acute pancreatitis-possible CBD stone versus food debris at a duodenal diverticulum. Slight improvement in liver tests. Abx. NGT to ILWS. Monitor liver tests. MRI/MRCP when more stable. Weekend coverage to follow.     Dr. Gertrude Hdz

## 2021-09-24 NOTE — DIALYSIS
CRRT / [phone]    Orders   Mode: CVVHD started @ 1620   Blood Flow Rate: 180ml/min (@ 150ml/min due to pressures)   Prismasol Dose: 25ml/kg/hr   Prismasol Concentrate: 4K/2.5Ca   Blood Warmer Temp: 37C   Net Fluid Removal: 50ml/hr     Metrics   BP: 92/67   HR: 62   Access Pressure: -97   Filter Pressure: 145   Return Pressure: 89   TMP: 23   Pressure Drop: 29     Access   Type & Location: RIJ non-tunneled CVC   Comments: Transparent dressing with biopatch in place. Hubs and limbs of CVC cleansed per policy. Labs   HBsAg (Antigen) / date: Pending              HBsAb (Antibody) / date: Pending   Source: EPIC     Safety:   Time Out Done:   (Time) 1600   Consent obtained/signed: Verified   Education: Infection prevention   Primary Nurse Rpt Pre: ABILIO Santos RN   Primary Nurse Rpt Post: ABILIO Santos RN     Comments / Plan:   CVVHD initiated per MD orders. [id number] filter primed and tested. Lines visible/secure with blood warmer attached to the return line and set to 37*C. Prior to leaving the unit multiple access alarms, all troubleshooting tried. Per IR line needs to retracted ~1cm. Multiple alarms continued include sellf test fail 1.  Due to these failure unable to return the patients blood EBL 165ml

## 2021-09-24 NOTE — PROGRESS NOTES
2030-Bedside shift change report given to [facility] (oncoming nurse) by PAULA Valencia (offgoing nurse). Report included the following information SBAR, Kardex, Procedure Summary, MAR and Recent Results.

## 2021-09-24 NOTE — PROGRESS NOTES
[facility]  Hospitalist Group                                                                                          Hospitalist Progress Note  Rosalino Long MD  Answering service: [phone] OR [phone] from in house phone        Date of Service:  2021  NAME:  Oralia Ellison  :  1950  MRN:  [id number]      Admission Summary:   Patient with history of coronary artery disease, presents today with chest pain/epigastric abdominal pain vomiting, nausea, shortness of breath. Patient reports her symptoms started while driving his car yesterday and has been significant and severe since then. Patient reports the symptoms even got worse today, he got concerned and decided to come to the hospital.  Patient reports he does not drink alcohol on a regular basis, reports that he has not been vaccinated for Covid. Patient came to the ER and was requested to be admitted for further management and evaluation, was found to have pancreatitis       Interval history / Subjective:   Patient not doing well this morning, confused, abdomen more distended, significant rising creatinine, lactate trending up, hypotensive       Assessment & Plan:     Acute pancreatitis  Hypotension  Lactic acidosis  Metabolic encephalopathy  Acute renal failure   Metabolic acidosis  Leukocytosis  Elevated LFTs  Chest pain  Hypertension  Bradycardia    Spoke to the ICU team, patient hypotensive, may need CRRT, patient will be transferred to ICU,  on IV fluid, MRCP pending   right upper quadrant ultrasound with no signs of cholecystitis or CBD dilation, supportive care, close monitoring, further intervention per hospital course  May need CRRT,, IV hydration, avoid nephrotoxic medication, renally dose all medication, creatinine trending up, obtain nephrology consult, patient on bicarb gtt. , continue to monitor  Unclear etiology for leukocytosis, appreciate ID input, cultures pending, broad-spectrum IV antibiotics, trend, monitor  LFTs trending up, MRCP pending, GI on board, monitor, avoid hepatotoxic medication, optimize blood pressure control,  Appreciate cardiology input, monitor, bradycardia resolving  CT head and CT of the abdomen ordered stat          Code status: Full  DVT prophylaxis: Heparin    Care Plan discussed with: Patient/Family  Anticipated Disposition: Home w/Family  Anticipated Discharge: 24 hours to 48 hours     Hospital Problems  Date Reviewed: 6/5/2018        Codes Class Noted POA    Pancreatitis ICD-10-CM: K85.90  ICD-9-CM: 278.6  9/22/2021 Unknown                Review of Systems:   A comprehensive review of systems was negative except for that written in the HPI. Vital Signs:    Last 24hrs VS reviewed since prior progress note.  Most recent are:  Visit Vitals  /77   Pulse 77   Temp 97.6 °F (36.4 °C)   Resp 22   Ht 6' 1\" (1.854 m)   Wt 119.3 kg (263 lb 0.1 oz)   SpO2 91%   BMI 34.70 kg/m²         Intake/Output Summary (Last 24 hours) at 9/24/2021 1056  Last data filed at 9/23/2021 1652  Gross per 24 hour   Intake    Output 500 ml   Net -500 ml        Physical Examination:     I had a face to face encounter with this patient and independently examined them on 9/24/2021 as outlined below:    General : alert x 1, confused  HEENT: PEERL, moist mucus membrane, TM clear  Neck: supple,   Chest: Decreased basal breath sounds  CVS: S1 S2 heard, Capillary refill less than 2 seconds  Abd: soft distended/bowel sounds hypoactive  Ext: no clubbing, no cyanosis,   Neuro/Psych: Quite confused,   Skin: warm         Data Review:    Review and/or order of clinical lab test      Labs:     Recent Labs     09/24/21 0214 09/23/21 0442   WBC 22.6* 21.9*   HGB 17.9* 18.0*   HCT 55.4* 54.7*    181     Recent Labs     09/24/21 0214 09/23/21 0442 09/22/21 1943 09/22/21  1028    139  --  138   K 4.5 4.3  --  3.6   * 110*  --  107   CO2 19* 18*  --  25   BUN 53* 27*  --  16   CREA 3.96* 1.84*  --  1.47* * 137*  --  168*   CA 7.1* 8.4*  --  9.0   MG  --   --  1.8 1.9   PHOS  --   --  2.8  --      Recent Labs     09/24/21  0214 09/23/21  0442 09/22/21  1028   * 278* 153*   AP 89 123* 122*   TBILI 1.2* 1.1* 1.7*   TP 6.8 7.0 6.8   ALB 2.8* 3.5 3.5   GLOB 4.0 3.5 3.3   LPSE  --  >3,000* >3,000*     Recent Labs     09/22/21  1028   INR 1.0   PTP 10.5      No results for input(s): FE, TIBC, PSAT, FERR in the last 72 hours. No results found for: FOL, RBCF   No results for input(s): PH, PCO2, PO2 in the last 72 hours.   Recent Labs     09/22/21 2115 09/22/21 1943 09/22/21  1028   TROIQ <0.05 <0.05 <0.05     Lab Results   Component Value Date/Time    Cholesterol, total 97 09/22/2021 07:43 PM    HDL Cholesterol 39 09/22/2021 07:43 PM    LDL, calculated 42.8 09/22/2021 07:43 PM    Triglyceride 76 09/22/2021 07:43 PM    CHOL/HDL Ratio 2.5 09/22/2021 07:43 PM     Lab Results   Component Value Date/Time    Glucose (POC) 106 06/09/2016 05:20 PM    Glucose (POC) 143 (H) 08/02/2011 01:17 PM    Glucose (POC) 199 (H) 08/02/2011 08:45 AM    Glucose (POC) 101 08/01/2011 10:14 PM    Glucose (POC) 112 (H) 07/30/2011 10:30 PM    Glucose (POC) 114 (H) 07/30/2011 12:00 PM    Glucose,  (H) 09/22/2021 10:51 AM     Lab Results   Component Value Date/Time    Color DARK YELLOW 09/22/2021 08:02 PM    Appearance CLEAR 09/22/2021 08:02 PM    Specific gravity 1.010 09/22/2021 08:02 PM    Specific gravity 1.024 02/08/2015 04:51 PM    pH (UA) 5.0 09/22/2021 08:02 PM    Protein 30 (A) 09/22/2021 08:02 PM    Glucose Negative 09/22/2021 08:02 PM    Ketone Negative 09/22/2021 08:02 PM    Bilirubin Negative 09/22/2021 08:02 PM    Urobilinogen 1.0 09/22/2021 08:02 PM    Nitrites Negative 09/22/2021 08:02 PM    Leukocyte Esterase Negative 09/22/2021 08:02 PM    Epithelial cells FEW 09/22/2021 08:02 PM    Bacteria Negative 09/22/2021 08:02 PM    WBC 0-4 09/22/2021 08:02 PM    RBC 0-5 09/22/2021 08:02 PM         Medications Reviewed: Current Facility-Administered Medications   Medication Dose Route Frequency    sodium chloride 0.9 % bolus infusion 500 mL  500 mL IntraVENous ONCE    cefepime (MAXIPIME) 2 g in 0.9% sodium chloride (MBP/ADV) 100 mL MBP  2 g IntraVENous Q12H    sodium bicarbonate (8.4%) 100 mEq in 0.45% sodium chloride 1,000 mL infusion   IntraVENous CONTINUOUS    metroNIDAZOLE (FLAGYL) IVPB premix 500 mg  500 mg IntraVENous Q12H    HYDROmorphone (DILAUDID) injection 1 mg  1 mg IntraVENous Q3H PRN    aspirin delayed-release tablet 81 mg  81 mg Oral DAILY    [Held by provider] atenoloL (TENORMIN) tablet 50 mg  50 mg Oral DAILY    [Held by provider] atorvastatin (LIPITOR) tablet 10 mg  10 mg Oral DAILY    sodium chloride (NS) flush 5-40 mL  5-40 mL IntraVENous Q8H    sodium chloride (NS) flush 5-40 mL  5-40 mL IntraVENous PRN    ondansetron (ZOFRAN) injection 4 mg  4 mg IntraVENous Q4H PRN    [Held by provider] heparin (porcine) injection 5,000 Units  5,000 Units SubCUTAneous Q8H     ______________________________________________________________________  EXPECTED LENGTH OF STAY: 3d 2h  ACTUAL LENGTH OF STAY:          2  CRITICAL CARE ATTESTATION:  I had a face to face encounter with the patient, reviewed and interpreted patient data including clinical events, labs, images, vital signs, I/O's, and examined patient. I have discussed the case and the plan and management of the patient's care with the consulting services, the bedside nurses and necessary ancillary providers. NOTE OF PERSONAL INVOLVEMENT IN CARE   This patient has a high probability of imminent, clinically significant deterioration, which requires the highest level of preparedness to intervene urgently. I participated in the decision-making and personally managed or directed the management of the following life and organ supporting interventions that required my frequent assessment to treat or prevent imminent deterioration.      I personally spent 55 minutes of critical care time. This is time spent at this critically ill patient's bedside actively involved in patient care as well as the coordination of care and discussions with the patient's family. This does not include any procedural time which has been billed separately. Please note that this dictation was completed with [employer], the computer voice recognition software. Quite often unanticipated grammatical, syntax, homophones, and other interpretive errors are inadvertently transcribed by the computer software. Please disregard these errors. Please excuse any errors that have escaped final proofreading.                 Toyin Sher MD

## 2021-09-24 NOTE — PROGRESS NOTES
Bedside and Verbal shift change report given to EWA Ham (oncoming nurse) by Misty Tijerina RN (offgoing nurse). Report included the following information SBAR, Kardex, ED Summary, Procedure Summary, Intake/Output, MAR, Recent Results and Cardiac Rhythm SR/ST.

## 2021-09-25 NOTE — DIALYSIS
[facility] / [phone]           Orders   Mode: CVVHD restarted @ 7898   Blood Flow Rate: 180 ml/min    Prismasol Dose: 25 ml/kg/hr x 119 kg = rounded 3,000 ml/hr  PBP: 0 ml/hr   Prismasol Concentrate: 4K / 3.5Ca    Blood Warmer Temp: 37*C   Net Fluid Removal: 50 ml/hr            Metrics   BP: 109/75   HR: 69   Access Pressure: -88   Filter Pressure: 202   Return Pressure: 128   TMP: 31   Pressure Drop: 56            Access   Type & Location: RIJ temporary non-tunneled CVC    Comments: Placement confirmed by x-ray. Tegaderm dressing with biopatch C/D/I, dated 9/24/21. Prepped per hospital P&P: each catheter limb disinfected for 60 seconds with alcohol swabs, caps removed & hubs scrubbed with Pre[employer] for 15 seconds, followed by 5 second dry time, +asp/+flush x2 ports. Line remains not optimal, positional with pt movement & lines reversed due to jumps in access pressures.            Labs   HBsAg (Antigen) / date: Pending                                  HBsAb (Antibody) / date: Pending    Source: [employer]            Safety:   Time Out Done:    4002   Consent obtained/signed: Verified   Primary Nurse Rpt Pre: PALAK Huynh RN   Primary Nurse Rpt Post: PALAK Huynh RN      Comments / Plan:       Patient, orders, line placement and consent verified. Labs, notes and code status reviewed. Filter changed & CVVHD restarted due to CVC issues & alarms, RN unable to return blood  mls. S/p CVC manipulation, placement confirmed by x-ray. New [id number] set-up, primed 1L NS, tested, and running well. Lines reversed, visible and secure with blood warmer attached & supported on return line, set at 37*C. Education & pre/post report with primary RN.

## 2021-09-25 NOTE — PROGRESS NOTES
0730- Report obtained from [facility]. CVVH continues. Line is sensitive to any movement from patient. 5260- Reports chronic arthritis pain in back. Pain medications given. Difficulty getting axillary temperature. Appears to be below 95 degrees. Coretta hugger applied now. 1000- Issues with extreme negative pressures now. Blood rinsed back. Will notify intensivist. These issues appear to be line related. 1030- Patient is anuric. Smith removed per Dr. Melanie Domingo. 1100- IR is here to replace the line. 1130- Exchange of the line completed by Dr. Sylvain Tijerina. Wire exchange. No xray needed per MD. David Bran called to restart. 1330- Dialysis continues but patient has to be in a somewhat trendelenburg position for machine to operate optimally. 1745- Dialysis has come to a stop. Line continues to have a high negative pressure alarm. Ports flushed, Lines switched, and patient placed in trendelenburg position without resolution. Blood is returned and treatment placed on hold. Line appears to be the possible problem. Intensivist Manisha Garza) wants to try changing the machine to see if that is the issue before obtaining another line. David Bran called. Lines packed with heparin. [address] David Bran is here to change the machine. Blood pressure acutely low. Increased Mor now. 1930- Report given to [facility].

## 2021-09-25 NOTE — DIALYSIS
[facility] / [phone]    Orders   Mode: CVVHD restarted @ 1205   Blood Flow Rate: 180ml/min   Prismasol Dose: 25ml/kg/hr   Prismasol Concentrate: 4K/2.5Ca   Blood Warmer Temp: 37C   Net Fluid Removal: 0ml/hr     Metrics   BP: 95/59   HR: 75   Access Pressure: -106   Filter Pressure: 123   Return Pressure: 67   TMP: 21   Pressure Drop: 23     Access   Type & Location: RIJ non-tunneled CVC   Comments: CDI dated 9/25/21 with transparent dressing and biopatch in place. Hubs and limbs of CVC cleansed per policy, +aspiration/+flushx2. Labs   HBsAg (Antigen) / date: Negative 9/25/21   HBsAb (Antibody) / date: Susceptible 9/25/21   Source: [facility]     Safety:   Time Out Done:   (Time) 1150   Consent obtained/signed: Verified   Education: Access/Infection   Primary Nurse Rpt Pre: ABILIO Cisneros RN   Primary Nurse Rpt Post: Bryan Goncalves RN     Comments / Plan: At patient bedside to restart CRRT following replacement of CVC. New [id number] filter primed and tested. Lines visible/secure with blood warmer attached to the return line and set to 37C.

## 2021-09-25 NOTE — PROGRESS NOTES
Nephrology Progress Note  Chino Bundy  Date of Admission : 9/22/2021    CC: Follow up for [place]       Assessment and Plan     [place]:  - multifactorial: ATN from hypotension/severe pancreatitis + IV contrast on admission  - no obstruction on CT scan  - CRRT complicated by access issues   - could be intravascularly dry --> try 1L LR. Allergic to albumin. Check CVP   - NO FACTOR w/ CRRT in the setting of acute pancreatitis   - anuric and ok to remove roldan if pt agrees  - labs Q12     NG acidosis:    Lactic acidosis     Pancreatitis:  - per GI     Bradycardia  TAA, b/l BEAU aneurysms  Leukocytosis       Interval History:  Seen and examined. Anuric now. Needing Mor gtt. BP stable. Abdominal pain much better after NGT placement. Current Medications: all current  Medications have been eviewed in EPIC  Review of Systems: Pertinent items are noted in HPI. Objective:  Vitals:    Vitals:    09/25/21 0301 09/25/21 0312 09/25/21 0400 09/25/21 0600   BP: (!) 66/51 100/60 100/74 91/71   Pulse: 76 77 75 76   Resp: 20 20 16 18   Temp:   97.1 °F (36.2 °C)    SpO2:  97% 96%    Weight:    118.9 kg (262 lb 2 oz)   Height:         Intake and Output:  09/24 1901 - 09/25 0700  In: 615 [I. V.:615]  Out: 1410   09/23 0701 - 09/24 1900  In: 185 [I.V.:185]  Out: 1744 [Urine:510]    Physical Examination:  Pt intubated     No  General: Acutely ill appearing  Neck:  Supple, no mass  Resp:  Decreased BS b/l  CV:  RRR,  no murmur or rub, ++ LE edema  GI:  Non distended, reduced bowel sounds, mild LUQ tenderness  Neurologic:  Non focal  Psych:             AAO x 3 appropriate affect   Skin:  No Rash  :  Roldan in place    []    High complexity decision making was performed  []    Patient is at high-risk of decompensation with multiple organ involvement    Lab Data Personally Reviewed: I have reviewed all the pertinent labs, microbiology data and radiology studies during assessment.     Recent Labs     09/25/21  0548 09/24/21  1604 09/24/21  1124 09/24/21  0214 09/24/21  0214 09/23/21 0442 09/23/21  0442 09/22/21  1943 09/22/21  1028 09/22/21  1028    144 139   < > 138   < > 139  --    < > 138   K 4.3 3.7 4.9   < > 4.5   < > 4.3  --    < > 3.6    113* 109*   < > 111*   < > 110*  --    < > 107   CO2 21 21 21   < > 19*   < > 18*  --    < > 25   GLU 90 87 106*   < > 115*   < > 137*  --    < > 168*   BUN 58* 66* 64*   < > 53*   < > 27*  --    < > 16   CREA 4.17* 4.77* 4.86*   < > 3.96*   < > 1.84*  --    < > 1.47*   CA 7.2* 5.3* 6.5*   < > 7.1*   < > 8.4*  --    < > 9.0   MG 2.1  --   --   --   --   --   --  1.8  --  1.9   PHOS 3.2 2.0*  --   --   --   --   --  2.8  --   --    ALB 2.5* 2.0* 2.8*   < > 2.8*   < > 3.5  --    < > 3.5   ALT  --   --  111*  --  145*  --  278*  --    < > 153*   INR  --   --  1.2*  --   --   --   --   --   --  1.0    < > = values in this interval not displayed.      Recent Labs     09/24/21  1124 09/24/21 0214 09/23/21 0442   WBC 17.5* 22.6* 21.9*   HGB 16.8 17.9* 18.0*   HCT 50.4* 55.4* 54.7*    184 181     Lab Results   Component Value Date/Time    Specimen Description: HIP LEFT JOINT 01/17/2012 03:00 PM    Specimen Description: HIP LEFT JOINT 01/17/2012 03:00 PM    Specimen Description: JAKE 07/28/2011 12:12 AM     Lab Results   Component Value Date/Time    Culture result: NO GROWTH 3 DAYS 09/22/2021 07:43 PM    Culture result: NO SIGNIFICANT GROWTH 02/08/2015 04:51 PM    Culture result: NO GROWTH 14 DAYS 01/17/2012 03:00 PM    Culture result: NO GROWTH 14 DAYS 01/17/2012 03:00 PM     Recent Results (from the past 24 hour(s))   LACTIC ACID    Collection Time: 09/24/21 11:24 AM   Result Value Ref Range    Lactic acid 3.5 (HH) 0.4 - 2.0 MMOL/L   METABOLIC PANEL, COMPREHENSIVE    Collection Time: 09/24/21 11:24 AM   Result Value Ref Range    Sodium 139 136 - 145 mmol/L    Potassium 4.9 3.5 - 5.1 mmol/L    Chloride 109 (H) 97 - 108 mmol/L    CO2 21 21 - 32 mmol/L    Anion gap 9 5 - 15 mmol/L Glucose 106 (H) 65 - 100 mg/dL    BUN 64 (H) 6 - 20 MG/DL    Creatinine 4.86 (H) 0.70 - 1.30 MG/DL    BUN/Creatinine ratio 13 12 - 20      GFR est AA 14 (L) >60 ml/min/1.73m2    GFR est non-AA 12 (L) >60 ml/min/1.73m2    Calcium 6.5 (L) 8.5 - 10.1 MG/DL    Bilirubin, total 1.1 (H) 0.2 - 1.0 MG/DL    ALT (SGPT) 111 (H) 12 - 78 U/L    AST (SGOT) 84 (H) 15 - 37 U/L    Alk. phosphatase 73 45 - 117 U/L    Protein, total 6.6 6.4 - 8.2 g/dL    Albumin 2.8 (L) 3.5 - 5.0 g/dL    Globulin 3.8 2.0 - 4.0 g/dL    A-G Ratio 0.7 (L) 1.1 - 2.2     PROTHROMBIN TIME + INR    Collection Time: 09/24/21 11:24 AM   Result Value Ref Range    INR 1.2 (H) 0.9 - 1.1      Prothrombin time 12.9 (H) 9.0 - 11.1 sec   CBC W/O DIFF    Collection Time: 09/24/21 11:24 AM   Result Value Ref Range    WBC 17.5 (H) 4.1 - 11.1 K/uL    RBC 5.40 4. 10 - 5.70 M/uL    HGB 16.8 12.1 - 17.0 g/dL    HCT 50.4 (H) 36.6 - 50.3 %    MCV 93.3 80.0 - 99.0 FL    MCH 31.1 26.0 - 34.0 PG    MCHC 33.3 30.0 - 36.5 g/dL    RDW 14.5 11.5 - 14.5 %    PLATELET 027 355 - 693 K/uL    MPV 11.5 8.9 - 12.9 FL    NRBC 0.0 0  WBC    ABSOLUTE NRBC 0.00 0.00 - 0.01 K/uL   TROPONIN I    Collection Time: 09/24/21 11:24 AM   Result Value Ref Range    Troponin-I, Qt. <0.05 <0.05 ng/mL   LACTIC ACID    Collection Time: 09/24/21  4:04 PM   Result Value Ref Range    Lactic acid 3.0 (HH) 0.4 - 2.0 MMOL/L   RENAL FUNCTION PANEL    Collection Time: 09/24/21  4:04 PM   Result Value Ref Range    Sodium 144 136 - 145 mmol/L    Potassium 3.7 3.5 - 5.1 mmol/L    Chloride 113 (H) 97 - 108 mmol/L    CO2 21 21 - 32 mmol/L    Anion gap 10 5 - 15 mmol/L    Glucose 87 65 - 100 mg/dL    BUN 66 (H) 6 - 20 MG/DL    Creatinine 4.77 (H) 0.70 - 1.30 MG/DL    BUN/Creatinine ratio 14 12 - 20      GFR est AA 15 (L) >60 ml/min/1.73m2    GFR est non-AA 12 (L) >60 ml/min/1.73m2    Calcium 5.3 (LL) 8.5 - 10.1 MG/DL    Phosphorus 2.0 (L) 2.6 - 4.7 MG/DL    Albumin 2.0 (L) 3.5 - 5.0 g/dL   LACTIC ACID Collection Time: 09/25/21  5:48 AM   Result Value Ref Range    Lactic acid 3.6 (HH) 0.4 - 2.0 MMOL/L   RENAL FUNCTION PANEL    Collection Time: 09/25/21  5:48 AM   Result Value Ref Range    Sodium 137 136 - 145 mmol/L    Potassium 4.3 3.5 - 5.1 mmol/L    Chloride 107 97 - 108 mmol/L    CO2 21 21 - 32 mmol/L    Anion gap 9 5 - 15 mmol/L    Glucose 90 65 - 100 mg/dL    BUN 58 (H) 6 - 20 MG/DL    Creatinine 4.17 (H) 0.70 - 1.30 MG/DL    BUN/Creatinine ratio 14 12 - 20      GFR est AA 17 (L) >60 ml/min/1.73m2    GFR est non-AA 14 (L) >60 ml/min/1.73m2    Calcium 7.2 (L) 8.5 - 10.1 MG/DL    Phosphorus 3.2 2.6 - 4.7 MG/DL    Albumin 2.5 (L) 3.5 - 5.0 g/dL   MAGNESIUM    Collection Time: 09/25/21  5:48 AM   Result Value Ref Range    Magnesium 2.1 1.6 - 2.4 mg/dL                   Estrella Whittington MD  [address]  Phone - [phone]   Fax - [phone]  [email or web address] [email or web address]

## 2021-09-25 NOTE — DIALYSIS
[facility] / [phone]           Orders   Mode: CVVHD    Blood Flow Rate: 180 ml/min    Prismasol Dose: 25 ml/kg/hr x 119 kg = rounded 3,000 ml/hr  PBP: 0 ml/hr   Prismasol Concentrate: 4K / 3.5Ca    Blood Warmer Temp: 37*C   Net Fluid Removal: 50 ml/hr            Metrics   BP: 87/67   HR: 67   Access Pressure: -120   Filter Pressure: 297   Return Pressure: 142   TMP: 33   Pressure Drop: 134            Access   Type & Location: RIJ temporary non-tunneled CVC    Comments: Transparent dressing with biopatch C/D/I, dated 9/24/21. Line remains not optimal, positional with pt movement & lines reversed due to jumps in access pressures.            Labs   HBsAg (Antigen) / date: Negative 9/25/21                         HBsAb (Antibody) / date: Susceptible 9/25/21   Source: [email or web address]            Safety:   Time Out Done:    0900   Consent obtained/signed: Verified   Primary Nurse Rpt Pre: Bebeto Dickey RN   Primary Nurse Rpt Post: Bebeto Dickey RN      Comments / Plan:       Patient, orders, line placement and consent verified. Labs, notes and code status reviewed. [id number] filter running well with no indication for change at this time. Lines reversed, visible and secure with blood warmer attached & supported on return line, set at 37*C. Education & pre/post report with primary RN.

## 2021-09-25 NOTE — PROGRESS NOTES
ICU TEAM Progress Note    Name: Dao Ferreira   : 1950   MRN: [id number]   Date: 2021      Assessment:   Reason for ICU Admission: ARF 2/2 severe pancreatitis     Brief HPI: 71 y/o M w hx of CAD, HTN, OA, and kidney stones presents to the hospital with CC of SOB, abd pain, N/V. He was found to have severe pancreatitis and acute renal injury with associated lactic acidosis and elevated LFTs. He was transferred to the CCU for initiation of CRRT given borderline low blood pressures. - Acute Pancreatitis  - ARF on CRRT  - CAD  - HTN  - OA      Plan:     Neuro: Avoid sedatives  Pulm: Supplemental O2 prn. HOB >30 degrees. Cardiac: MAP goal >65. Phenylephrine gtt. Renal: CRRT per nephro. Smith  GI: NGT to LCS. NPO now. Start ppn/tpn this weekend  ID: cefepime, flagyl, vanc. Follow cultures. Heme: resume SQH. Cont asa. Endo: NIYAH  MSK:  PT/OT     F - Feeding:  No   A - Analgesia: None  S - Sedation: None  T - DVT Prophylaxis: SCD's or Sequential Compression Device   C - Code Status: Full Code  H - Head of Bed: > 30 Degrees  U - Ulcer Prophylaxis: Not at this time   G - Glycemic Control: Insulin  S - Spontaneous Breathing Trial: N/A  B - Bowel Regimen: None needed at this time  I - Indwelling Catheter:   Tubes: Nasogastric Tube  Lines: Peripheral IV and Teddy  Drains: Smith Catheter  D - De-escalation of Antibiotics: n/a    Subjective:   Overnight Events:   : Flori Spar pulled back - remains positional.   : transferred into ICU. Tanvi Au placed and CRRT started.      Objective:     Visit Vitals  BP 91/71   Pulse 76   Temp 97.1 °F (36.2 °C)   Resp 18   Ht 6' 1\" (1.854 m)   Wt 118.9 kg (262 lb 2 oz)   SpO2 96%   BMI 34.58 kg/m²    O2 Flow Rate (L/min): 3 l/min O2 Device: Nasal cannula Temp (24hrs), Av.4 °F (36.3 °C), Min:96.9 °F (36.1 °C), Max:97.9 °F (36.6 °C)    Intake/Output:     Intake/Output Summary (Last 24 hours) at 2021 0650  Last data filed at 2021  Gross per 24 hour   Intake 800.04 ml   Output 2654 ml   Net -1853.96 ml     Physical Exam:  General:  alert, cooperative, no distress, slowed mentation, appears older than stated age  Eye:  negative  Neurologic:  Oriented x4   Neck:  normal and no erythema or exudates noted. Lungs:  Distant breath sounds  Heart:  regular rate and rhythm  Abdomen:  Soft, distended  Skin:  Normal.    24h Labs & Data: Reviewed    Medications: Reviewed    Chest X-Ray 9/24: Tip of the dialysis catheter in the right atrium. No pneumothorax. TTE 9/24:  · LVEF is 55 - 60%. Small left ventricle. Moderate concentric hypertrophy. · LA: Mildly dilated left atrium. · RV: Mildly dilated right ventricle. Mildly reduced systolic function. · IAS: No atrial septal defect present. · MV: Mitral valve non-specific thickening. Multidisciplinary Rounds Completed:  No    ABCDEF Bundle/Checklist Completed: Yes    SPECIAL EQUIPMENT: CRRT    DISPOSITION: Stay in ICU    CRITICAL CARE CONSULTANT NOTE  I had a face to face encounter with the patient, reviewed and interpreted patient data including clinical events, labs, images, vital signs, I/O's, and examined patient. I have discussed the case and the plan and management of the patient's care with the consulting services, the bedside nurses and the respiratory therapist.      NOTE OF PERSONAL INVOLVEMENT IN CARE   This patient has a high probability of imminent, clinically significant deterioration, which requires the highest level of preparedness to intervene urgently. I participated in the decision-making and personally managed or directed the management of the following life and organ supporting interventions that required my frequent assessment to treat or prevent imminent deterioration. I personally spent 40 minutes of critical care time.   This is time spent at this critically ill patient's bedside actively involved in patient care as well as the coordination of care and discussions with the patient's family. This does not include any procedural time which has been billed separately.     Kayy Kamara MD  Staff Intensivist/Anesthesiologist  [facility]  9/25/2021

## 2021-09-25 NOTE — PROGRESS NOTES
Cardiology Progress Note                                          Admit Date: 9/22/2021    Assessment/Plan: [place]     Lactic acidosis      Hypocalcemia     Acute resp failure :  - intubated 9/25     Shock, likely septic/distributive   - on multiple pressors      Pancreatitis:     Bradycardia  TAA, b/l BEAU aneurysms  Leukocytosis    CAD s/p remote CABG, asymptomatic    Cardiac issues do not seem to be contributing to current status; supportive care and treatment for pancreatitis per ICU; Candace Fernando to resume CV care on Monday    Total critical care time - 30 minutes (CPT 86507)    MDM:  High  Risk of decompensation:  High    I personally spent the above critical care time. This is time spent at this critically ill patient's bedside / unit / floor actively involved in patient care as well as the coordination of care and discussions with the patient's family. This does not include any procedural time which has been billed separately. Gavino Amaya is a 70 y.o. male with     PROBLEM LIST:  Patient Active Problem List    Diagnosis Date Noted    Pancreatitis 09/22/2021    Combined forms of age-related cataract of right eye 06/05/2018    Small bowel obstruction ([address] 02/08/2015    Perforated bowel ([address] 08/02/2011    Atrial flutter ([address] 08/01/2011    Pneumoperitoneum - abdomen 07/27/2011    Coronary atherosclerosis of native coronary artery 07/20/2011         Subjective:     Gavino Amaya is intubated.     Visit Vitals  BP (!) 87/67   Pulse 67   Temp (!) 94.5 °F (34.7 °C)   Resp 19   Ht 6' 1\" (1.854 m)   Wt 118.9 kg (262 lb 2 oz)   SpO2 97%   BMI 34.58 kg/m²       Intake/Output Summary (Last 24 hours) at 9/25/2021 0931  Last data filed at 9/25/2021 0900  Gross per 24 hour   Intake 1034.66 ml   Output 2946 ml   Net -1911.34 ml       Objective:     Physical Exam  GEN: NAD, appears stated age  [de-identified]: Intubated  NECK: Normal JVP  CV: RRR, normal S1 and S2, no M/R/G  LUNGS: Coarse BS b/l  ABD: NABS, soft, NT/ND  EXT: No edema, ext WWP  PSYCH: Sedated  NEURO: Sedated    Telemetry: normal sinus rhythm, occasional PVCs, PACs    Current Facility-Administered Medications   Medication Dose Route Frequency    lactated Ringers infusion  100 mL/hr IntraVENous CONTINUOUS    bicarbonate dialysis (PRISMASOL) BG K 4/Ca 2.5 5000 ml solution   Extracorporeal DIALYSIS CONTINUOUS    PHENYLephrine (ANGELIKA-SYNEPHRINE) 30 mg in 0.9% sodium chloride 250 mL infusion   mcg/min IntraVENous TITRATE    cefepime (MAXIPIME) 2 g in 0.9% sodium chloride (MBP/ADV) 100 mL MBP  2 g IntraVENous Q12H    metroNIDAZOLE (FLAGYL) IVPB premix 500 mg  500 mg IntraVENous Q12H    HYDROmorphone (DILAUDID) injection 1 mg  1 mg IntraVENous Q3H PRN    aspirin delayed-release tablet 81 mg  81 mg Oral DAILY    [Held by provider] atenoloL (TENORMIN) tablet 50 mg  50 mg Oral DAILY    [Held by provider] atorvastatin (LIPITOR) tablet 10 mg  10 mg Oral DAILY    sodium chloride (NS) flush 5-40 mL  5-40 mL IntraVENous Q8H    sodium chloride (NS) flush 5-40 mL  5-40 mL IntraVENous PRN    ondansetron (ZOFRAN) injection 4 mg  4 mg IntraVENous Q4H PRN    heparin (porcine) injection 5,000 Units  5,000 Units SubCUTAneous Q8H         Data Review:   Labs:    Recent Results (from the past 24 hour(s))   LACTIC ACID    Collection Time: 09/24/21 11:24 AM   Result Value Ref Range    Lactic acid 3.5 (HH) 0.4 - 2.0 MMOL/L   METABOLIC PANEL, COMPREHENSIVE    Collection Time: 09/24/21 11:24 AM   Result Value Ref Range    Sodium 139 136 - 145 mmol/L    Potassium 4.9 3.5 - 5.1 mmol/L    Chloride 109 (H) 97 - 108 mmol/L    CO2 21 21 - 32 mmol/L    Anion gap 9 5 - 15 mmol/L    Glucose 106 (H) 65 - 100 mg/dL    BUN 64 (H) 6 - 20 MG/DL    Creatinine 4.86 (H) 0.70 - 1.30 MG/DL    BUN/Creatinine ratio 13 12 - 20      GFR est AA 14 (L) >60 ml/min/1.73m2    GFR est non-AA 12 (L) >60 ml/min/1.73m2    Calcium 6.5 (L) 8.5 - 10.1 MG/DL    Bilirubin, total 1.1 (H) 0.2 - 1.0 MG/DL    ALT (SGPT) 111 (H) 12 - 78 U/L    AST (SGOT) 84 (H) 15 - 37 U/L    Alk. phosphatase 73 45 - 117 U/L    Protein, total 6.6 6.4 - 8.2 g/dL    Albumin 2.8 (L) 3.5 - 5.0 g/dL    Globulin 3.8 2.0 - 4.0 g/dL    A-G Ratio 0.7 (L) 1.1 - 2.2     PROTHROMBIN TIME + INR    Collection Time: 09/24/21 11:24 AM   Result Value Ref Range    INR 1.2 (H) 0.9 - 1.1      Prothrombin time 12.9 (H) 9.0 - 11.1 sec   CBC W/O DIFF    Collection Time: 09/24/21 11:24 AM   Result Value Ref Range    WBC 17.5 (H) 4.1 - 11.1 K/uL    RBC 5.40 4. 10 - 5.70 M/uL    HGB 16.8 12.1 - 17.0 g/dL    HCT 50.4 (H) 36.6 - 50.3 %    MCV 93.3 80.0 - 99.0 FL    MCH 31.1 26.0 - 34.0 PG    MCHC 33.3 30.0 - 36.5 g/dL    RDW 14.5 11.5 - 14.5 %    PLATELET 833 028 - 781 K/uL    MPV 11.5 8.9 - 12.9 FL    NRBC 0.0 0  WBC    ABSOLUTE NRBC 0.00 0.00 - 0.01 K/uL   TROPONIN I    Collection Time: 09/24/21 11:24 AM   Result Value Ref Range    Troponin-I, Qt. <0.05 <0.05 ng/mL   LACTIC ACID    Collection Time: 09/24/21  4:04 PM   Result Value Ref Range    Lactic acid 3.0 (HH) 0.4 - 2.0 MMOL/L   RENAL FUNCTION PANEL    Collection Time: 09/24/21  4:04 PM   Result Value Ref Range    Sodium 144 136 - 145 mmol/L    Potassium 3.7 3.5 - 5.1 mmol/L    Chloride 113 (H) 97 - 108 mmol/L    CO2 21 21 - 32 mmol/L    Anion gap 10 5 - 15 mmol/L    Glucose 87 65 - 100 mg/dL    BUN 66 (H) 6 - 20 MG/DL    Creatinine 4.77 (H) 0.70 - 1.30 MG/DL    BUN/Creatinine ratio 14 12 - 20      GFR est AA 15 (L) >60 ml/min/1.73m2    GFR est non-AA 12 (L) >60 ml/min/1.73m2    Calcium 5.3 (LL) 8.5 - 10.1 MG/DL    Phosphorus 2.0 (L) 2.6 - 4.7 MG/DL    Albumin 2.0 (L) 3.5 - 5.0 g/dL   LACTIC ACID    Collection Time: 09/25/21  5:48 AM   Result Value Ref Range    Lactic acid 3.6 (HH) 0.4 - 2.0 MMOL/L   RENAL FUNCTION PANEL    Collection Time: 09/25/21  5:48 AM   Result Value Ref Range    Sodium 137 136 - 145 mmol/L    Potassium 4.3 3.5 - 5.1 mmol/L    Chloride 107 97 - 108 mmol/L    CO2 21 21 - 32 mmol/L Anion gap 9 5 - 15 mmol/L    Glucose 90 65 - 100 mg/dL    BUN 58 (H) 6 - 20 MG/DL    Creatinine 4.17 (H) 0.70 - 1.30 MG/DL    BUN/Creatinine ratio 14 12 - 20      GFR est AA 17 (L) >60 ml/min/1.73m2    GFR est non-AA 14 (L) >60 ml/min/1.73m2    Calcium 7.2 (L) 8.5 - 10.1 MG/DL    Phosphorus 3.2 2.6 - 4.7 MG/DL    Albumin 2.5 (L) 3.5 - 5.0 g/dL   MAGNESIUM    Collection Time: 09/25/21  5:48 AM   Result Value Ref Range    Magnesium 2.1 1.6 - 2.4 mg/dL   HEP B SURFACE AG    Collection Time: 09/25/21  5:48 AM   Result Value Ref Range    Hepatitis B surface Ag <0.10 Index    Hep B surface Ag Interp. Negative NEG     HEP B SURFACE AB    Collection Time: 09/25/21  5:49 AM   Result Value Ref Range    Hepatitis B surface Ab <3.10 mIU/mL    Hep B surface Ab Interp.  NONREACTIVE NR

## 2021-09-25 NOTE — DIALYSIS
CRRT / [phone]           Orders   Mode: CVVHD restarted @ 6491  TX STOPPED @ 2005   Blood Flow Rate: 180 ml/min   Prismasol Dose: 25 ml/kg/hr x 119 kg = rounded 3,000 ml/hr  PBP: 0 ml/hr   Prismasol Concentrate: 4K / 3.5Ca    Blood Warmer Temp: 37*C   Net Fluid Removal: 0 ml/hr            Metrics   BP: 93/81   HR: 69   Access Pressure: -51   Filter Pressure: 145   Return Pressure: 101   TMP: 24   Pressure Drop: 11            Access   Type & Location: RIJ temporary non-tunneled CVC    Comments: Placement confirmed by x-ray. Tegaderm dressing with biopatch C/D/I, dated 9/25/21. Prepped per hospital P&P: each catheter limb disinfected for 60 seconds with alcohol swabs, caps removed & hubs scrubbed with Prevantics for 15 seconds, followed by 5 second dry time, +asp/+flush x2 ports, both sluggish. **Line remains not optimal, continues to have access alarms, immediate alarms at initiation of therapy - lines reversed & BFR lowered. Trouble shooting access alarms for >30 mins & unable to resolve, tx stopped, after blood returned, flushed x2 ports with NS & sterile caps applied per hospital P&P.            Labs   HBsAg (Antigen) / date: Negative 9/25/21                                  HBsAb (Antibody) / date: Susceptible 9/25/21    Source: [facility]            Safety:   Time Out Done:    5625   Consent obtained/signed: Verified   Primary Nurse Rpt Pre: ABILIO Oliva RN   Primary Nurse Rpt Post: PALAK Busby RN      Comments / Plan:       Patient, orders, line placement and consent verified. Labs, notes and code status reviewed. Per nursing request new machine set-up & CVVHD restarted due to CVC issues & alarms, s/p 3rd line intervention. Primary RN returned all possible blood 165 mls. Old set discarded in biohazard. Old machine returned to store room after disinfection.  New [id number] set-up, primed 1L NS, tested, and running poorly, > 30 mins of troubleshooting access issues by dialysis RN were unsuccessful, tx stopped and all blood returned to pt 165 mls. Set discarded in biohazard bin. Primary RN to contact intensivist regarding new line to be placed in different location & to page when ready for restart. Education & pre/post report with primary RN.        9/25/21 @ 2300 - In to check on status of pt new line placement - spoke with Dr. Pedro Madison about line issues and trouble shooting attempts. No line placed yet as pt decompensated & required intubation and other interventions. 9/26/21 @ 0100 - In to check status of line placement and per primary RN no intervention regarding dialysis catheter replacement has been done at this time. Instructed RN to page when line is ready for use.

## 2021-09-25 NOTE — ROUTINE PROCESS
1930: Bedside shift change report given to Simone Child (oncoming nurse) by Veronika Chacon (offgoing nurse). Report included the following information SBAR, Kardex, MAR and Cardiac Rhythm NSR.     2000: Bedside assessment. Calcium gluconate   2130: MD repositioning Teddy dialysis line due to high access pressures during CCRT today. MD pulls back line approx 3 cm. 2145: CXR for placement confirmation  2230: Restart CRRT. Hourly factor 50mL if patient tolerates. Goal MAP>65. Attempt to wean phenylephrine  0100: Patient not tolerating phyenylephrine wean. Return to 50 mcg/min.  0300: Stopped pulling factor due to unstable BP  0400: AM labs drawn. Phenylephrine gtt switched to left arm IV  0600:  0730: Bedside shift change report given to Veronika Chacon (oncoming nurse) by Simone Child (offgoing nurse). Report included the following information SBAR, Kardex, MAR and Cardiac Rhythm NSR.

## 2021-09-25 NOTE — PROGRESS NOTES
[address]  [address] [facility]    GI PROGRESS NOTE    NAME: Gela Yeh   :  1950   MRN:  [id number]       Subjective:     Less abdominal pain, getting CVVHD, has NGT  Review of Systems    Constitutional: negative fever, negative chills, negative weight loss  Eyes:   negative visual changes  ENT:   negative sore throat, tongue or lip swelling  Respiratory:  negative cough, negative dyspnea  Cards:  negative for chest pain, palpitations, lower extremity edema  GI:   See HPI  :  negative for frequency, dysuria  Integument:  negative for rash and pruritus  Heme:  negative for easy bruising and gum/nose bleeding  Musculoskel: negative for myalgias,  back pain and muscle weakness  Neuro: negative for headaches, dizziness, vertigo  Psych:  negative for feelings of anxiety, depression         Objective:     VITALS:   Last 24hrs VS reviewed since prior progress note. Most recent are:  Visit Vitals  BP 96/68   Pulse 76   Temp (!) 96.5 °F (35.8 °C)   Resp 25   Ht 6' 1\" (1.854 m)   Wt 118.9 kg (262 lb 2 oz)   SpO2 91%   BMI 34.58 kg/m²       Intake/Output Summary (Last 24 hours) at 2021 1607  Last data filed at 2021 1500  Gross per 24 hour   Intake 1898.91 ml   Output 2797 ml   Net -898.09 ml     PHYSICAL EXAM:  General: WD, WN. Alert, cooperative, no acute distress    HEENT: NC, Atraumatic. PERRLA, EOMI. Anicteric sclerae. Lungs:  CTA Bilaterally. No Wheezing/Rhonchi/Rales. Heart:  Regular  rhythm,  No murmur (), No Rubs, No Gallops  Abdomen: Soft, Non distended, Non tender.  +Bowel sounds, no HSM  Extremities: No c/c/e  Neurologic:  CN 2-12 gi, Alert and oriented X 3. No acute neurological distress   Psych:   Good insight. Not anxious nor agitated.     Lab Data Reviewed:   Recent Labs     21  1538 21  1124   WBC 13.3* 17.5*   HGB 15.3 16.8   HCT 46.2 50.4*   * 166     Recent Labs     21  0548 21  1604    144   K 4.3 3.7  113*   CO2 21 21   BUN 58* 66*   CREA 4.17* 4.77*   GLU 90 87   PHOS 3.2 2.0*   CA 7.2* 5.3*     Recent Labs     09/25/21  0548 09/24/21  1604 09/24/21  1124 09/24/21  1124 09/24/21  0214 09/24/21  0214 09/23/21  0442 09/23/21  0442   AP  --   --   --  73  --  89   < > 123*   TP  --   --   --  6.6  --  6.8   < > 7.0   ALB 2.5* 2.0*   < > 2.8*   < > 2.8*   < > 3.5   GLOB  --   --   --  3.8  --  4.0   < > 3.5   LPSE  --   --   --   --   --   --   --  >3,000*    < > = values in this interval not displayed.        ________________________________________________________________________       Assessment:   · ACUTE PANCREATITIS of unclear cause      Patient Active Problem List   Diagnosis Code    Coronary atherosclerosis of native coronary artery I25.10    Pneumoperitoneum - abdomen     Atrial flutter (HCC) I48.92    Perforated bowel ([address] K63.1    Small bowel obstruction ([address] K56.609    Combined forms of age-related cataract of right eye H25.811    Pancreatitis K85.90     Plan:   · Check lipase and LFT in am   · Consider d/c NGT in am and clear liquids  · Will follow  · Discussed status with wife at bedside     Signed By: Rafal Laureano MD     9/25/2021  4:07 PM

## 2021-09-26 NOTE — PROCEDURES
[facility]      Procedure Note - Intubation:   Performed by Flores Denis MD .     Immediately prior to the procedure, the patient was reevaluated and found suitable for the planned procedure and any planned medications. Immediately prior to the procedure a time out was called to verify the correct patient, procedure, equipment, staff, and marking as appropriate. Medications given were ketamine. A number 8.0 cuffed   ETT was placed to 24 cm at the teeth. Placement was evaluated by noting bilateral, symmetric breath sounds, good end-tidal CO2 detector color change , no breath sounds over stomach, bulb aspirator expands promptly and chest x-ray visualization. Attempts required: 1. Complications: none. The procedure was tolerated well.

## 2021-09-26 NOTE — DIALYSIS
CRRT / [phone]           Orders   Mode: CVVHD restarted @ 0200   Blood Flow Rate: 180 ml/min    Prismasol Dose: 25 ml/kg/hr x 119 kg = rounded 3,000 ml/hr  PBP: 0 ml/hr   Prismasol Concentrate: 4K / 3.5Ca    Blood Warmer Temp: 37*C   Net Fluid Removal: 0 ml/hr            Metrics   BP: 82/53   HR: 80   Access Pressure: -64   Filter Pressure: 123   Return Pressure: 81   TMP: 17   Pressure Drop: 15            Access   Type & Location: RIJ temporary non-tunneled CVC    Comments: Placement confirmed by x-ray. Tegaderm dressing with biopatch C/D/I, dated 9/26/21. Prepped per hospital P&P: each catheter limb disinfected for 60 seconds with alcohol swabs, caps removed & hubs scrubbed with Prevantics for 15 seconds, followed by 5 second dry time, +asp/+flush x2 ports. S/p line manipulation attempt.            Labs   HBsAg (Antigen) / date: Negative 9/25/21                                  HBsAb (Antibody) / date: Susceptible 9/25/21    Source: [employer]            Safety:   Time Out Done:    9354   Consent obtained/signed: Verified   Primary Nurse Rpt Pre: PALAK Rojas RN   Primary Nurse Rpt Post: PALAK Rojas RN      Comments / Plan:       Patient, orders, line placement and consent verified. Labs, notes and code status reviewed. Filter changed & CVVHD restarted due to CVC issues & alarms, S/p CVC manipulation, placement confirmed by x-ray. New [id number] set-up, primed 1L NS, tested, and running well at this time. Lines visible and secure with blood warmer attached & supported on return line, set at 37*C. If any access alarms new line will need to be placed before restarting treatment. Education & pre/post report with primary RN.

## 2021-09-26 NOTE — PROGRESS NOTES
0730- Report obtained from [facility].    0740- Blood flow rate increased to 200 by Dr. Daniela Perez. 0800- Negative pressure alarms more increased now. Blood flow rate decreased to 180.    0815- Aspirin order changed from [facility] to [facility] so that it can be crushed and put down NG tube. 7174- Blood flow rate to 200. Will see if patient can tolerate. 1247- Tolerating dialysis. Adjusting medications as able. Continuous rectal temp placed now due to difficulty getting axillary temp. Noted 34.4 degrees now. Coretta hugger applied. 1400- Core temp continues to be low despite high setting on [facility]. Will continue to monitor. 1415- Bladder scan completed. No urine noted. 1600- No changes. Labs drawn. 46- Dr. Leonela Perez meeting with family in the waiting room. 1800- Patient limited in turning. Creates negative pressure alarms with any movement. 1930- Report given to Robyn MCNEIL.

## 2021-09-26 NOTE — PROGRESS NOTES
ICU TEAM Progress Note    Name: Lisa Avendano   : 1950   MRN: [id number]   Date: 2021      Assessment:   Reason for ICU Admission: ARF 2/2 severe pancreatitis     Brief HPI: 71 y/o M w hx of CAD, HTN, OA, and kidney stones presents to the hospital with CC of SOB, abd pain, N/V. He was found to have severe pancreatitis and acute renal injury with associated lactic acidosis and elevated LFTs. He was transferred to the CCU for initiation of CRRT given borderline low blood pressures. - Acute Pancreatitis  - ARF on CRRT  - CAD  - HTN  - OA      Plan:     Neuro: versed, SATs  Pulm: mechanical ventilation, wean fio2 as able. Chlorhexidine. HOB >30 degrees. Cardiac: MAP goal >65. Norepi, epi, vaso  Renal: CRRT per nephro. Smith. LR at 500. GI: NGT to LCS. Protonix. Start tpn this weekend  ID: cefepime, flagyl, vanc. Follow cultures. Heme: resume SQH. Cont asa. Endo: NIYAH  MSK:  PT/OT     F - Feeding:  No - tpn  A - Analgesia: Fentanyl  S - Sedation: Versed  T - DVT Prophylaxis: SCD's or Sequential Compression Device, SQH  C - Code Status: Full Code  H - Head of Bed: > 30 Degrees  U - Ulcer Prophylaxis: Protonix (pantoprazole)   G - Glycemic Control: Insulin  S - Spontaneous Breathing Trial: N/A  B - Bowel Regimen: None needed at this time  I - Indwelling Catheter:   Tubes: Nasogastric Tube, ETT  Lines: Peripheral IV, Arterial Line, Central Line and Teddy  Drains: Smith Catheter  D - De-escalation of Antibiotics: n/a    Subjective:   Overnight Events:   : increasing agitation and hypoxia. Intubated. HDL repositioned again. Up on vasopressors  : Cuong Hel[place] pulled back - remains positional.   : transferred into ICU. Sarahi Cordon placed and CRRT started.      Objective:     Visit Vitals  BP 98/66 (BP 1 Location: Left lower arm, BP Patient Position: At rest)   Pulse 77   Temp 97.7 °F (36.5 °C)   Resp 15   Ht 6' 1\" (1.854 m)   Wt 120.6 kg (265 lb 14 oz)   SpO2 97%   BMI 35.08 kg/m²    O2 Flow Rate (L/min): 3 l/min O2 Device: Ventilator Temp (24hrs), Av.9 °F (36.1 °C), Min:94.5 °F (34.7 °C), Max:98.3 °F (36.8 °C)    Intake/Output:     Intake/Output Summary (Last 24 hours) at 2021 0650  Last data filed at 2021 0600  Gross per 24 hour   Intake 5398.76 ml   Output 1991 ml   Net 3407.76 ml     Physical Exam:  General:  Intubated and sedated  Eye:  negative  Neurologic: sedated  Neck:  RIJ HDL, LIJ CVC  Lungs:  Distant breath sounds  Heart:  regular rate and rhythm  Abdomen:  Soft, distended  Skin:  Normal.    24h Labs & Data: Reviewed    Medications: Reviewed    Chest X-Ray : Tip of the dialysis catheter in the right atrium. No pneumothorax. TTE :  · LVEF is 55 - 60%. Small left ventricle. Moderate concentric hypertrophy. · LA: Mildly dilated left atrium. · RV: Mildly dilated right ventricle. Mildly reduced systolic function. · IAS: No atrial septal defect present. · MV: Mitral valve non-specific thickening. Multidisciplinary Rounds Completed:  No    ABCDEF Bundle/Checklist Completed: Yes    SPECIAL EQUIPMENT: CRRT    DISPOSITION: Stay in ICU    CRITICAL CARE CONSULTANT NOTE  I had a face to face encounter with the patient, reviewed and interpreted patient data including clinical events, labs, images, vital signs, I/O's, and examined patient. I have discussed the case and the plan and management of the patient's care with the consulting services, the bedside nurses and the respiratory therapist.      NOTE OF PERSONAL INVOLVEMENT IN CARE   This patient has a high probability of imminent, clinically significant deterioration, which requires the highest level of preparedness to intervene urgently. I participated in the decision-making and personally managed or directed the management of the following life and organ supporting interventions that required my frequent assessment to treat or prevent imminent deterioration. I personally spent 60 minutes of critical care time.   This is time spent at this critically ill patient's bedside actively involved in patient care as well as the coordination of care and discussions with the patient's family. This does not include any procedural time which has been billed separately.     Isabela Bhatt MD  Staff Intensivist/Anesthesiologist  [facility]  9/26/2021

## 2021-09-26 NOTE — PROCEDURES
[facility]      Procedure Note - Arterial Access:   Performed by Ivan Crouch MD.  Diagnosis: Acute hypoxic respiratory failure  Insertion Date: 09/26/21   Time: 12:14 AM   Obtained Consent? no; emergent   Procedure Location:  CCU. Immediately prior to the procedure, the patient was reevaluated and found suitable for the planned procedure and any planned medications. Immediately prior to the procedure a time out was called to verify the correct patient, procedure, equipment, staff, and marking as appropriate. Central line Bundle:  Full sterile barrier precautions used. 5 mL 1% Lidocaine placed at insertion site. Method: Seldinger technique. Site Prep: ChloraPrep and Sterile draping. Procedure: Arterial Catheter Insertion in Left, Radial Artery   Catheter inserted into a new site. Number of Attempts:  1 Indication: Monitoring and Blood Drawing. There was bright red, pulsatile blood return. Femoral Site? no. If Yes, reason femoral site was chosen:   Catheter secured. Biopatch in place? yes. Sterile Bio-occlusive dressing placed. Complication None. The procedure was tolerated well.     Ivan Crouch MD   Critical Care Medicine  [facility]

## 2021-09-26 NOTE — PROGRESS NOTES
Nephrology Progress Note  Enedina Isaacs  Date of Admission : 9/22/2021    CC: Follow up for [place]       Assessment and Plan     [place]:  - multifactorial: ATN from hypotension/severe pancreatitis + IV contrast on admission  - no obstruction on CT scan  - continue CVVHD w/ no factor and aim for net +ve   - change to 2K bags   - daily labs     Lactic acidosis     Hypocalcemia  - check Vitamin D, ionized Ca and replete PRN     Acute resp failure :  - intubated 9/25    Shock   - on multiple pressors      Pancreatitis:  - per GI     Bradycardia  TAA, b/l BEAU aneurysms  Leukocytosis       Interval History:  Seen and examined, intubated overnight. On 3 pressors. 8 hrs downtime on CRRT. Line adjusted and CRRT working better     Current Medications: all current  Medications have been eviewed in EPIC  Review of Systems: Review of systems not obtained due to patient factors. Objective:  Vitals:    Vitals:    09/26/21 0500 09/26/21 0600 09/26/21 0645 09/26/21 0700   BP:       Pulse: 86 77  69   Resp: 19 15  17   Temp:   97.7 °F (36.5 °C)    SpO2:       Weight:       Height:         Intake and Output:  No intake/output data recorded. 09/24 1901 - 09/26 0700  In: 6013.8 [I.V.:6013.8]  Out: 3401     Physical Examination:  Pt intubated     yes  General: On vent   Neck:  Teddy +  Resp:  Decreased BS b/l  CV:  RRR,  no murmur or rub, ++ LE edema  GI:  Non distended, reduced bowel sounds, mild LUQ tenderness  Neurologic:  Sedated on vent   :  No roldan     []    High complexity decision making was performed  []    Patient is at high-risk of decompensation with multiple organ involvement    Lab Data Personally Reviewed: I have reviewed all the pertinent labs, microbiology data and radiology studies during assessment.     Recent Labs     09/26/21  0431 09/25/21  1533 09/25/21  0548 09/24/21  1604 09/24/21  1124 09/24/21  0214 09/24/21  0214    138 137   < > 139   < > 138   K 5.4* 4.5 4.3   < > 4.9   < > 4.5    106 107 < > 109*   < > 111*   CO2 19* 24 21   < > 21   < > 19*   * 101* 90   < > 106*   < > 115*   BUN 56* 54* 58*   < > 64*   < > 53*   CREA 4.05* 3.94* 4.17*   < > 4.86*   < > 3.96*   CA 6.1* 7.0* 7.2*   < > 6.5*   < > 7.1*   MG 2.3  --  2.1  --   --   --   --    PHOS 5.1* 3.2 3.2   < >  --   --   --    ALB 2.3* 2.3* 2.5*   < > 2.8*   < > 2.8*   ALT  --   --   --   --  111*  --  145*   INR  --   --   --   --  1.2*  --   --     < > = values in this interval not displayed.      Recent Labs     09/25/21  1538 09/24/21  1124 09/24/21  0214   WBC 13.3* 17.5* 22.6*   HGB 15.3 16.8 17.9*   HCT 46.2 50.4* 55.4*   * 166 184     Lab Results   Component Value Date/Time    Specimen Description: HIP LEFT JOINT 01/17/2012 03:00 PM    Specimen Description: HIP LEFT JOINT 01/17/2012 03:00 PM    Specimen Description: JAKE 07/28/2011 12:12 AM     Lab Results   Component Value Date/Time    Culture result: NO GROWTH 4 DAYS 09/22/2021 07:43 PM    Culture result: NO SIGNIFICANT GROWTH 02/08/2015 04:51 PM    Culture result: NO GROWTH 14 DAYS 01/17/2012 03:00 PM    Culture result: NO GROWTH 14 DAYS 01/17/2012 03:00 PM     Recent Results (from the past 24 hour(s))   LACTIC ACID    Collection Time: 09/25/21  3:33 PM   Result Value Ref Range    Lactic acid 4.4 (HH) 0.4 - 2.0 MMOL/L   RENAL FUNCTION PANEL    Collection Time: 09/25/21  3:33 PM   Result Value Ref Range    Sodium 138 136 - 145 mmol/L    Potassium 4.5 3.5 - 5.1 mmol/L    Chloride 106 97 - 108 mmol/L    CO2 24 21 - 32 mmol/L    Anion gap 8 5 - 15 mmol/L    Glucose 101 (H) 65 - 100 mg/dL    BUN 54 (H) 6 - 20 MG/DL    Creatinine 3.94 (H) 0.70 - 1.30 MG/DL    BUN/Creatinine ratio 14 12 - 20      GFR est AA 18 (L) >60 ml/min/1.73m2    GFR est non-AA 15 (L) >60 ml/min/1.73m2    Calcium 7.0 (L) 8.5 - 10.1 MG/DL    Phosphorus 3.2 2.6 - 4.7 MG/DL    Albumin 2.3 (L) 3.5 - 5.0 g/dL   CBC WITH AUTOMATED DIFF    Collection Time: 09/25/21  3:38 PM   Result Value Ref Range    WBC 13.3 (H) 4.1 - 11.1 K/uL    RBC 4.94 4.10 - 5.70 M/uL    HGB 15.3 12.1 - 17.0 g/dL    HCT 46.2 36.6 - 50.3 %    MCV 93.5 80.0 - 99.0 FL    MCH 31.0 26.0 - 34.0 PG    MCHC 33.1 30.0 - 36.5 g/dL    RDW 14.9 (H) 11.5 - 14.5 %    PLATELET 278 (L) 301 - 400 K/uL    MPV 12.4 8.9 - 12.9 FL    NRBC 0.0 0  WBC    ABSOLUTE NRBC 0.00 0.00 - 0.01 K/uL    NEUTROPHILS 85 (H) 32 - 75 %    LYMPHOCYTES 6 (L) 12 - 49 %    MONOCYTES 9 5 - 13 %    EOSINOPHILS 0 0 - 7 %    BASOPHILS 0 0 - 1 %    IMMATURE GRANULOCYTES 0 %    ABS. NEUTROPHILS 11.3 (H) 1.8 - 8.0 K/UL    ABS. LYMPHOCYTES 0.8 0.8 - 3.5 K/UL    ABS. MONOCYTES 1.2 (H) 0.0 - 1.0 K/UL    ABS. EOSINOPHILS 0.0 0.0 - 0.4 K/UL    ABS. BASOPHILS 0.0 0.0 - 0.1 K/UL    ABS. IMM. GRANS. 0.0 K/UL    DF MANUAL      RBC COMMENTS SHAVON CELLS  PRESENT        RBC COMMENTS TEARDROP CELLS  PRESENT       POC EG7    Collection Time: 09/25/21 11:05 PM   Result Value Ref Range    Calcium, ionized (POC) 0.79 (LL) 1.12 - 1.32 mmol/L    FIO2 (POC) 100 %    pH (POC) 7.30 (L) 7.35 - 7.45      pCO2 (POC) 36.7 35.0 - 45.0 MMHG    pO2 (POC) 329 (H) 80 - 100 MMHG    HCO3 (POC) 17.9 (L) 22 - 26 MMOL/L    Base deficit (POC) 7.8 mmol/L    sO2 (POC) 99.9 (H) 92 - 97 %    Site RIGHT RADIAL      Device: ADULT VENT      Mode ASSIST CONTROL      Tidal volume 480 ml    Set Rate 18 bpm    PEEP/CPAP (POC) 8 cmH2O    Allens test (POC) Positive      Specimen type (POC) ARTERIAL      Critical value read back ROYCE WHITEHEAD MD    LIPASE    Collection Time: 09/26/21  4:29 AM   Result Value Ref Range    Lipase 369 73 - 393 U/L   LACTIC ACID    Collection Time: 09/26/21  4:29 AM   Result Value Ref Range    Lactic acid 5.1 (HH) 0.4 - 2.0 MMOL/L   RENAL FUNCTION PANEL    Collection Time: 09/26/21  4:31 AM   Result Value Ref Range    Sodium 137 136 - 145 mmol/L    Potassium 5.4 (H) 3.5 - 5.1 mmol/L    Chloride 107 97 - 108 mmol/L    CO2 19 (L) 21 - 32 mmol/L    Anion gap 11 5 - 15 mmol/L    Glucose 173 (H) 65 - 100 mg/dL    BUN 56 (H) 6 - 20 MG/DL    Creatinine 4.05 (H) 0.70 - 1.30 MG/DL    BUN/Creatinine ratio 14 12 - 20      GFR est AA 18 (L) >60 ml/min/1.73m2    GFR est non-AA 15 (L) >60 ml/min/1.73m2    Calcium 6.1 (LL) 8.5 - 10.1 MG/DL    Phosphorus 5.1 (H) 2.6 - 4.7 MG/DL    Albumin 2.3 (L) 3.5 - 5.0 g/dL   MAGNESIUM    Collection Time: 09/26/21  4:31 AM   Result Value Ref Range    Magnesium 2.3 1.6 - 2.4 mg/dL                   Joe Goddard MD  [address]  Phone - [phone]   Fax - [phone]  [email or web address]

## 2021-09-26 NOTE — PROGRESS NOTES
ICU TEAM Progress Note    Name: Cory Hernández   : 1950   MRN: [id number]   Date: 2021      Assessment:   Reason for ICU Admission: ARF 2/2 severe pancreatitis     Brief HPI: 69 y/o M w hx of CAD, HTN, OA, and kidney stones presents to the hospital with CC of SOB, abd pain, N/V. He was found to have severe pancreatitis and acute renal injury with associated lactic acidosis and elevated LFTs. He was transferred to the CCU for initiation of CRRT given borderline low blood pressures. - Acute Pancreatitis  - Septic Shock  - [place] on CRRT  - CAD  - HTN  - OA      Plan:     Neuro: versed, SATs  Pulm: mechanical ventilation, wean fio2 as able. Chlorhexidine. HOB >30 degrees. Cardiac: MAP goal >65. Norepi, epi, vaso. Hydrocort, midodrine  Renal: CRRT per nephro. Smith. LR at 500. GI: NGT to LCS. Protonix. Start tpn this weekend  ID: cefepime, flagyl, vanc. Follow cultures. Heme: resume SQH. Cont asa. Endo: NIYAH  MSK:  PT/OT     F - Feeding:  No - tpn  A - Analgesia: Fentanyl  S - Sedation: Versed  T - DVT Prophylaxis: SCD's or Sequential Compression Device, SQH  C - Code Status: Full Code  H - Head of Bed: > 30 Degrees  U - Ulcer Prophylaxis: Protonix (pantoprazole)   G - Glycemic Control: Insulin  S - Spontaneous Breathing Trial: N/A  B - Bowel Regimen: None needed at this time  I - Indwelling Catheter:   Tubes: Nasogastric Tube, ETT  Lines: Peripheral IV, Arterial Line, Central Line and Teddy  Drains: Smith Catheter  D - De-escalation of Antibiotics: n/a    Subjective:   Overnight Events:   : CRRT; MV; Pressors  : increasing agitation and hypoxia. Intubated. HDL repositioned again. Up on vasopressors  : [place] pulled back - remains positional.   : transferred into ICU. Noemi Going placed and CRRT started.      Objective:     Visit Vitals  BP 96/61   Pulse 86   Temp 99.1 °F (37.3 °C)   Resp 19   Ht 6' 1\" (1.854 m)   Wt 121.5 kg (267 lb 13.7 oz)   SpO2 100%   BMI 35.34 kg/m²    O2 Flow Rate (L/min): 3 l/min O2 Device: Endotracheal tube, Ventilator Temp (24hrs), Av.2 °F (35.7 °C), Min:93.7 °F (34.3 °C), Max:99.5 °F (37.5 °C)    Intake/Output:     Intake/Output Summary (Last 24 hours) at 2021 0732  Last data filed at 2021 0700  Gross per 24 hour   Intake 3408.83 ml   Output 2632 ml   Net 776.83 ml     Physical Exam:  General:  Intubated and sedated  Eye:  negative  Neurologic: sedated  Neck:  RIJ HDL, LIJ CVC  Lungs:  Distant breath sounds  Heart:  regular rate and rhythm  Abdomen:  Soft, distended  Skin:  Normal.    24h Labs & Data: Reviewed    Medications: Reviewed    Chest X-Ray : Tip of the dialysis catheter in the right atrium. No pneumothorax. TTE :  · LVEF is 55 - 60%. Small left ventricle. Moderate concentric hypertrophy. · LA: Mildly dilated left atrium. · RV: Mildly dilated right ventricle. Mildly reduced systolic function. · IAS: No atrial septal defect present. · MV: Mitral valve non-specific thickening. Multidisciplinary Rounds Completed:  No    ABCDEF Bundle/Checklist Completed: Yes    SPECIAL EQUIPMENT: CRRT    DISPOSITION: Stay in ICU    CRITICAL CARE CONSULTANT NOTE  I had a face to face encounter with the patient, reviewed and interpreted patient data including clinical events, labs, images, vital signs, I/O's, and examined patient. I have discussed the case and the plan and management of the patient's care with the consulting services, the bedside nurses and the respiratory therapist.      NOTE OF PERSONAL INVOLVEMENT IN CARE   This patient has a high probability of imminent, clinically significant deterioration, which requires the highest level of preparedness to intervene urgently. I participated in the decision-making and personally managed or directed the management of the following life and organ supporting interventions that required my frequent assessment to treat or prevent imminent deterioration.     I personally spent 95 minutes of critical care time. This is time spent at this critically ill patient's bedside actively involved in patient care as well as the coordination of care and discussions with the patient's family. This does not include any procedural time which has been billed separately.     Dre Cota DO  Staff Intensivist/Anesthesiologist  [facility]  9/27/2021

## 2021-09-26 NOTE — ROUTINE PROCESS
1930: Bedside shift change report given to Claude Danielle (oncoming nurse) by Bartolome Rodriguez (offgoing nurse). Report included the following information SBAR, Kardex, MAR and Cardiac Rhythm NSR.     2000:Bedside assessment. 2100: Adjust phenylephrine gtt to support MAP. 65. Xray at bedside. 2115: Patient sitting upright in bed, calling for help, claiming he's falling out of bed. RN staff repositioned. Patient expresses desire to get out of bed - RN explains patient BP too unstable at this time to get up.    2230: Patient emergently intubated for declining O2 sats, inability to protect aireway  2240: Vasopressors, sedation for vent compliance, BP support. 2250: LR bolus x2 running for volume support  2300: Central line placement left IJ  0001: Titrating pressors and sedation to keep patient stable MAP>65  0130: R Teddy adjusted to 14 cm depth per MD. RN update Nichelle nurse. 0210: Initiate CRRT.  0400: AM labs drawn  0600: Continue to wean down vasopressors as patient tolerates. 0730: Bedside shift change report given to Bartolome Rodriguez (oncoming nurse) by Claude Danielle (offgoing nurse). Report included the following information SBAR, Kardex, MAR and Cardiac Rhythm NSR.

## 2021-09-26 NOTE — PROCEDURES
[facility]      Procedure Note - Central Venous Access:   Performed by Jean Farnsworth MD  Diagnosis: Acute hypoxic respiratory failure   Insertion Date: 09/26/21  Time:12:08 AM  Obtained Consent? no; emergent   Procedure Location:  CCU. Immediately prior to the procedure, the patient was reevaluated and found suitable for the planned procedure and any planned medications. Immediately prior to the procedure a time out was called to verify the correct patient, procedure, equipment, staff, and marking as appropriate. Central line Bundle:  Full sterile barrier precautions used. 7-Step Sterility Protocol followed. (cap, mask sterile gown, sterile gloves, large sterile sheet, hand hygiene, 2% chlorhexidine for cutaneous antisepsis)  5 mL 1% Lidocaine placed at insertion site. Patient positioned in Trendelenburg?yes   The site was prepped with ChloraPrep and Sterile draping. Catheter inserted into a new site. Using Seldinger technique a Arrow Quad Lumen CVC was placed in the Left, Internal Jugular Vein via direct cannulation with 1 number of attempts for Monitoring, Blood Drawing and IV Access. Ultrasound Guidance was utilized. There was good dark, non-pulsatile blood return in all ports. Femoral Site? no. If Yes, reason femoral site was chosen:   Catheter secured. Biopatch in place? yes. Sterile Bio-occlusive dressing placed. The following complications were encountered: None. A follow-up chest x-ray was ordered post procedure. The procedure was tolerated well.         Jean Farnsworth MD  Critical Care Medicine  [facility]

## 2021-09-27 NOTE — PROGRESS NOTES
Nephrology Progress Note  Enedina Isaacs  Date of Admission : 9/22/2021    CC: Follow up for CHINA       Assessment and Plan     CHINA:  - multifactorial: ATN from hypotension/severe pancreatitis + IV contrast on admission  - no obstruction on CT scan  - continue CVVHD  - no factor  - BID labs    Hypok/phos:  - IV k phos  - change to 4K bags    Lactic acidosis:  - improving    Acute resp failure :  - intubated 9/25    Shock   - on multiple pressors      Pancreatitis:  - per GI     Bradycardia  TAA, b/l BEAU aneurysms  Leukocytosis       Interval History:  Seen and examined. On CRRT, no factor. On 3 pressors. stablized overnight. On heavy sedation. Current Medications: all current  Medications have been eviewed in EPIC  Review of Systems: Review of systems not obtained due to patient factors. Objective:  Vitals:    Vitals:    09/27/21 0400 09/27/21 0500 09/27/21 0600 09/27/21 0800   BP:    100/66   Pulse: 100 91 86 (!) 104   Resp: 16 17 19 18   Temp: 99.1 °F (37.3 °C)   97.3 °F (36.3 °C)   SpO2: 100% 100% 100% 100%   Weight: 121.5 kg (267 lb 13.7 oz)      Height:         Intake and Output:  09/27 0701 - 09/27 1900  In: 50   Out: 100   09/25 1901 - 09/27 0700  In: 7368.8 [I.V.:7368.8]  Out: 3572     Physical Examination:  Pt intubated     yes  General: On vent   Neck:  Teddy +  Resp:  Decreased BS b/l  CV:  RRR,  no murmur or rub, ++ LE edema  GI:  Non distended, reduced bowel sounds, mild LUQ tenderness  Neurologic:  Sedated on vent   :  No roldan     []    High complexity decision making was performed  []    Patient is at high-risk of decompensation with multiple organ involvement    Lab Data Personally Reviewed: I have reviewed all the pertinent labs, microbiology data and radiology studies during assessment.     Recent Labs     09/27/21  0402 09/27/21  0401 09/26/21  1518 09/26/21  0431 09/26/21  0431 09/25/21  1533 09/25/21  0548 09/24/21  1604 09/24/21  1124    136 137   < > 137   < > 137   < > 139   K 3.5 3.5 3.5   < > 5.4*   < > 4.3   < > 4.9    107 107   < > 107   < > 107   < > 109*   CO2 22 21 21   < > 19*   < > 21   < > 21   * 169* 167*   < > 173*   < > 90   < > 106*   BUN 38* 38* 41*   < > 56*   < > 58*   < > 64*   CREA 2.79* 2.69* 2.89*   < > 4.05*   < > 4.17*   < > 4.86*   CA 8.0* 8.1* 8.0*   < > 6.1*   < > 7.2*   < > 6.5*   MG  --  2.0  --   --  2.3  --  2.1  --   --    PHOS 2.0*  --  4.2  --  5.1*   < > 3.2   < >  --    ALB 1.8* 2.0* 2.3*   < > 2.3*   < > 2.5*   < > 2.8*   ALT  --  35  --   --   --   --   --   --  111*   INR  --   --   --   --   --   --   --   --  1.2*    < > = values in this interval not displayed.      Recent Labs     09/25/21  1538 09/24/21  1124   WBC 13.3* 17.5*   HGB 15.3 16.8   HCT 46.2 50.4*   * 166     Lab Results   Component Value Date/Time    Specimen Description: HIP LEFT JOINT 01/17/2012 03:00 PM    Specimen Description: HIP LEFT JOINT 01/17/2012 03:00 PM    Specimen Description: JAKE 07/28/2011 12:12 AM     Lab Results   Component Value Date/Time    Culture result: NO GROWTH 5 DAYS 09/22/2021 07:43 PM    Culture result: NO SIGNIFICANT GROWTH 02/08/2015 04:51 PM    Culture result: NO GROWTH 14 DAYS 01/17/2012 03:00 PM    Culture result: NO GROWTH 14 DAYS 01/17/2012 03:00 PM     Recent Results (from the past 24 hour(s))   VITAMIN D, 25 HYDROXY    Collection Time: 09/26/21  9:15 AM   Result Value Ref Range    Vitamin D 25-Hydroxy 12.2 (L) 30 - 100 ng/mL   GLUCOSE, POC    Collection Time: 09/26/21  9:17 AM   Result Value Ref Range    Glucose (POC) 158 (H) 65 - 117 mg/dL    Performed by Elizabeth PELAEZ    LACTIC ACID    Collection Time: 09/26/21  3:16 PM   Result Value Ref Range    Lactic acid 3.3 (HH) 0.4 - 2.0 MMOL/L   RENAL FUNCTION PANEL    Collection Time: 09/26/21  3:18 PM   Result Value Ref Range    Sodium 137 136 - 145 mmol/L    Potassium 3.5 3.5 - 5.1 mmol/L    Chloride 107 97 - 108 mmol/L    CO2 21 21 - 32 mmol/L    Anion gap 9 5 - 15 mmol/L    Glucose 167 (H) 65 - 100 mg/dL    BUN 41 (H) 6 - 20 MG/DL    Creatinine 2.89 (H) 0.70 - 1.30 MG/DL    BUN/Creatinine ratio 14 12 - 20      GFR est AA 26 (L) >60 ml/min/1.73m2    GFR est non-AA 22 (L) >60 ml/min/1.73m2    Calcium 8.0 (L) 8.5 - 10.1 MG/DL    Phosphorus 4.2 2.6 - 4.7 MG/DL    Albumin 2.3 (L) 3.5 - 5.0 g/dL   POC CHEM8    Collection Time: 09/27/21  2:05 AM   Result Value Ref Range    Glucose,  (H) 74 - 106 MG/DL    Creatinine, POC 2.9 (H) 0.6 - 1.3 MG/DL    GFRAA, POC 26 (L) >60 ml/min/1.73m2    GFRNA, POC 22 (L) >60 ml/min/1.73m2    Sodium,  136 - 145 MMOL/L    Potassium, POC 3.7 3.5 - 5.5 MMOL/L    Calcium, ionized (POC) 1.10 (L) 1.12 - 1.32 mmol/L    Chloride,  100 - 108 MMOL/L   POC LACTIC ACID    Collection Time: 09/27/21  2:05 AM   Result Value Ref Range    Lactic Acid (POC) 1.90 0.40 - 2.00 mmol/L   MAGNESIUM    Collection Time: 09/27/21  4:01 AM   Result Value Ref Range    Magnesium 2.0 1.6 - 2.4 mg/dL   METABOLIC PANEL, COMPREHENSIVE    Collection Time: 09/27/21  4:01 AM   Result Value Ref Range    Sodium 136 136 - 145 mmol/L    Potassium 3.5 3.5 - 5.1 mmol/L    Chloride 107 97 - 108 mmol/L    CO2 21 21 - 32 mmol/L    Anion gap 8 5 - 15 mmol/L    Glucose 169 (H) 65 - 100 mg/dL    BUN 38 (H) 6 - 20 MG/DL    Creatinine 2.69 (H) 0.70 - 1.30 MG/DL    BUN/Creatinine ratio 14 12 - 20      GFR est AA 29 (L) >60 ml/min/1.73m2    GFR est non-AA 24 (L) >60 ml/min/1.73m2    Calcium 8.1 (L) 8.5 - 10.1 MG/DL    Bilirubin, total 0.7 0.2 - 1.0 MG/DL    ALT (SGPT) 35 12 - 78 U/L    AST (SGOT) 49 (H) 15 - 37 U/L    Alk.  phosphatase 63 45 - 117 U/L    Protein, total 5.4 (L) 6.4 - 8.2 g/dL    Albumin 2.0 (L) 3.5 - 5.0 g/dL    Globulin 3.4 2.0 - 4.0 g/dL    A-G Ratio 0.6 (L) 1.1 - 2.2     LACTIC ACID    Collection Time: 09/27/21  4:01 AM   Result Value Ref Range    Lactic acid 2.8 (HH) 0.4 - 2.0 MMOL/L   RENAL FUNCTION PANEL    Collection Time: 09/27/21  4:02 AM   Result Value Ref Range    Sodium 136 136 - 145 mmol/L    Potassium 3.5 3.5 - 5.1 mmol/L    Chloride 107 97 - 108 mmol/L    CO2 22 21 - 32 mmol/L    Anion gap 7 5 - 15 mmol/L    Glucose 165 (H) 65 - 100 mg/dL    BUN 38 (H) 6 - 20 MG/DL    Creatinine 2.79 (H) 0.70 - 1.30 MG/DL    BUN/Creatinine ratio 14 12 - 20      GFR est AA 27 (L) >60 ml/min/1.73m2    GFR est non-AA 23 (L) >60 ml/min/1.73m2    Calcium 8.0 (L) 8.5 - 10.1 MG/DL    Phosphorus 2.0 (L) 2.6 - 4.7 MG/DL    Albumin 1.8 (L) 3.5 - 5.0 g/dL                   Issac Dubois MD  [address]  Phone - [phone]   Fax - [phone]  [email or web address]

## 2021-09-27 NOTE — PROGRESS NOTES
Physician Progress Note      Tata Beasley  [facility] #:                  [id number]  :                       1950  ADMIT DATE:       2021 10:12 AM  DISCH DATE:  RESPONDING  PROVIDER #:        KAREN MINOR DO          QUERY TEXT:    Pt admitted with pancreatitis. Pt noted to have 2 or more SIRS criteria (leukocytosis, decreased temp, tachycardia, tachypnea, elev lactate, CHINA). If possible, please document in the progress notes and discharge summary if you are evaluating and /or treating any of the following: The medical record reflects the following:  Risk Factors: acute pancreatitis    Clinical Indicators:    WBC 13.4-> 21.9-> 22.6-> 17.5-> 13.3  Lactate  3.0-> 1.8-> 4.1-> 3.5-> 3.0-> 3.6-> 4.4-> 5.1-> 3.3-> 2.8  Cr 1.47-> 1.84-> 3.96-> 4.86-> 4.77-> 4.17-> 3.94-> 4.05-> 2.89-> 2.69-> 2.79  Temp 93.9-> 93.7-> 94.5-> 95-> 95.7  HR 68 - 129  RR 14 - 32    Treatment: Cefepime 2g IV BID; Rocephin 1g IV daily; Levophed titrated drip; Vasopressin titrated drip; Thank you,  Josefa Carrillo, RN, BSN, [facility], [place], [place]  Clinical Documentation  [phone] or [phone]  Options provided:  -- Sepsis, present on admission  -- Sepsis, present on admission now resolved  -- Sepsis, not present on admission  -- Pancreatitis without Sepsis  -- Sepsis was ruled out  -- Other - I will add my own diagnosis  -- Disagree - Not applicable / Not valid  -- Disagree - Clinically unable to determine / Unknown  -- Refer to Clinical Documentation Reviewer    PROVIDER RESPONSE TEXT:    This patient has sepsis which was present on admission.     Query created by: Beauty Shake on 2021 11:05 AM      Electronically signed by:  Deo Walker DO 2021 4:00 PM

## 2021-09-27 NOTE — PROGRESS NOTES
1930: SBAR received from [facility]. 0730: Bedside and Verbal shift change report given to Enrique Renner  (oncoming nurse) by Lala Koyanagi (offgoing nurse). Report included the following information SBAR, Kardex, Intake/Output, MAR and Recent Results.

## 2021-09-27 NOTE — PROGRESS NOTES
0730: Bedside and Verbal shift change report given to Jeremiah Craig RN (oncoming nurse) by Janis Laura RN (offgoing nurse). Report included the following information SBAR, Kardex, ED Summary, Procedure Summary, Intake/Output, MAR, Recent Results, Med Rec Status, Cardiac Rhythm NSR, Alarm Parameters  and Dual Neuro Assessment. Drips: Epi @ 5, Fent @ 150, Versed @ 4, Levo @ 6, TPN @ 75, Vaso @ 0.04     0800: Resumed pt care, VSS, no evidence of pain. W/d all 4 extremities. Consult placed to pharmacy for concentrated pressors. 0815: Dr. Ochoa Dumas @ bedside, morning labs reviewed - orders to switch to 4K dialysate bags & PO k phos     0900: Dr. Jaswinder Angulo @ bedside, midodrine added, orders for daily CBC & STAT ABG     1045: ABG:  pO2 126; pCO2 42.4; pH 7.34;  HCO3 23, %O2 Sat 98.7. FiO2 decreased to 30%     1100: Temp 35.0, dom hugger back on. Bladder scan 83cc    1200: Orders for SQ insulin coverage    1530: Q12 labs drawn and sent     1700: MAP 50's despite max pressor support, MD aware, orders for 1L NS bolus & start Mor @ 300. Temp 37.4, dom hugger off     1930: Bedside and Verbal shift change report given to Janis Laura RN (oncoming nurse) by Jeremiah Craig RN (offgoing nurse). Report included the following information SBAR, Kardex, ED Summary, Procedure Summary, Intake/Output, MAR, Recent Results, Med Rec Status, Cardiac Rhythm NSR/PVC's, Alarm Parameters  and Dual Neuro Assessment.      Drips: Epi @ 5, Fent @ 150, Versed @ 4, TPN @ 75, Vaso @ 0.04, Mor @ 190

## 2021-09-27 NOTE — PROGRESS NOTES
Julia Wilcox [address]  [address] [facility], [address]       GI PROGRESS NOTE  Raymundo Alston, [facility] office  [phone] NP in-hospital cell phone M-F until 4:30  After 5pm or on weekends, please call  for physician on call      NAME: Kalee Renee   :  1950   MRN:  [id number]       Subjective:    Remains critically ill on mechanical ventilation, pressors, and CRRT. Objective:     VITALS:   Last 24hrs VS reviewed since prior progress note.  Most recent are:  Visit Vitals  BP (!) 97/59   Pulse 86   Temp (!) 96.6 °F (35.9 °C)   Resp 19   Ht 6' 1\" (1.854 m)   Wt 121.5 kg (267 lb 13.7 oz)   SpO2 100%   BMI 35.34 kg/m²       PHYSICAL EXAM:  General: no acute distress    Neurologic:  sedated  HEENT: intubated   Lungs:  No increased WOB  Heart:  Regular rate  Abdomen: Soft, distended     Extremities: Warm/dom hugger  Psych:   PHILLIP    Lab Data Reviewed:     Recent Results (from the past 24 hour(s))   LACTIC ACID    Collection Time: 21  3:16 PM   Result Value Ref Range    Lactic acid 3.3 (HH) 0.4 - 2.0 MMOL/L   RENAL FUNCTION PANEL    Collection Time: 21  3:18 PM   Result Value Ref Range    Sodium 137 136 - 145 mmol/L    Potassium 3.5 3.5 - 5.1 mmol/L    Chloride 107 97 - 108 mmol/L    CO2 21 21 - 32 mmol/L    Anion gap 9 5 - 15 mmol/L    Glucose 167 (H) 65 - 100 mg/dL    BUN 41 (H) 6 - 20 MG/DL    Creatinine 2.89 (H) 0.70 - 1.30 MG/DL    BUN/Creatinine ratio 14 12 - 20      GFR est AA 26 (L) >60 ml/min/1.73m2    GFR est non-AA 22 (L) >60 ml/min/1.73m2    Calcium 8.0 (L) 8.5 - 10.1 MG/DL    Phosphorus 4.2 2.6 - 4.7 MG/DL    Albumin 2.3 (L) 3.5 - 5.0 g/dL   POC CHEM8    Collection Time: 21  2:05 AM   Result Value Ref Range    Glucose,  (H) 74 - 106 MG/DL    Creatinine, POC 2.9 (H) 0.6 - 1.3 MG/DL    GFRAA, POC 26 (L) >60 ml/min/1.73m2    GFRNA, POC 22 (L) >60 ml/min/1.73m2    Sodium,  136 - 145 MMOL/L    Potassium, POC 3.7 3.5 - 5.5 MMOL/L Calcium, ionized (POC) 1.10 (L) 1.12 - 1.32 mmol/L    Chloride,  100 - 108 MMOL/L   POC LACTIC ACID    Collection Time: 09/27/21  2:05 AM   Result Value Ref Range    Lactic Acid (POC) 1.90 0.40 - 2.00 mmol/L   MAGNESIUM    Collection Time: 09/27/21  4:01 AM   Result Value Ref Range    Magnesium 2.0 1.6 - 2.4 mg/dL   METABOLIC PANEL, COMPREHENSIVE    Collection Time: 09/27/21  4:01 AM   Result Value Ref Range    Sodium 136 136 - 145 mmol/L    Potassium 3.5 3.5 - 5.1 mmol/L    Chloride 107 97 - 108 mmol/L    CO2 21 21 - 32 mmol/L    Anion gap 8 5 - 15 mmol/L    Glucose 169 (H) 65 - 100 mg/dL    BUN 38 (H) 6 - 20 MG/DL    Creatinine 2.69 (H) 0.70 - 1.30 MG/DL    BUN/Creatinine ratio 14 12 - 20      GFR est AA 29 (L) >60 ml/min/1.73m2    GFR est non-AA 24 (L) >60 ml/min/1.73m2    Calcium 8.1 (L) 8.5 - 10.1 MG/DL    Bilirubin, total 0.7 0.2 - 1.0 MG/DL    ALT (SGPT) 35 12 - 78 U/L    AST (SGOT) 49 (H) 15 - 37 U/L    Alk.  phosphatase 63 45 - 117 U/L    Protein, total 5.4 (L) 6.4 - 8.2 g/dL    Albumin 2.0 (L) 3.5 - 5.0 g/dL    Globulin 3.4 2.0 - 4.0 g/dL    A-G Ratio 0.6 (L) 1.1 - 2.2     LACTIC ACID    Collection Time: 09/27/21  4:01 AM   Result Value Ref Range    Lactic acid 2.8 (HH) 0.4 - 2.0 MMOL/L   RENAL FUNCTION PANEL    Collection Time: 09/27/21  4:02 AM   Result Value Ref Range    Sodium 136 136 - 145 mmol/L    Potassium 3.5 3.5 - 5.1 mmol/L    Chloride 107 97 - 108 mmol/L    CO2 22 21 - 32 mmol/L    Anion gap 7 5 - 15 mmol/L    Glucose 165 (H) 65 - 100 mg/dL    BUN 38 (H) 6 - 20 MG/DL    Creatinine 2.79 (H) 0.70 - 1.30 MG/DL    BUN/Creatinine ratio 14 12 - 20      GFR est AA 27 (L) >60 ml/min/1.73m2    GFR est non-AA 23 (L) >60 ml/min/1.73m2    Calcium 8.0 (L) 8.5 - 10.1 MG/DL    Phosphorus 2.0 (L) 2.6 - 4.7 MG/DL    Albumin 1.8 (L) 3.5 - 5.0 g/dL   POC EG7    Collection Time: 09/27/21 10:44 AM   Result Value Ref Range    Calcium, ionized (POC) 1.09 (L) 1.12 - 1.32 mmol/L    FIO2 (POC) 50 %    pH (POC) 7.34 (L) 7.35 - 7.45      pCO2 (POC) 42.4 35.0 - 45.0 MMHG    pO2 (POC) 126 (H) 80 - 100 MMHG    HCO3 (POC) 23.0 22 - 26 MMOL/L    Base deficit (POC) 2.7 mmol/L    sO2 (POC) 98.7 (H) 92 - 97 %    Site DRAWN FROM ARTERIAL LINE      Device: ADULT VENT      Mode ASSIST CONTROL      Tidal volume 480 ml    Set Rate 18 bpm    PEEP/CPAP (POC) 5 cmH2O    Allens test (POC) NOT APPLICABLE      Specimen type (POC) ARTERIAL     GLUCOSE, POC    Collection Time: 09/27/21 11:39 AM   Result Value Ref Range    Glucose (POC) 156 (H) 65 - 117 mg/dL    Performed by Ama Cadena    CTA chest/abdomen/pelvis (2/10/25): acute, uncomplicated pancreatitis - may be secondary to debris filled duodenal diverticulum; 4.6 cm ascending thoracic aortic aneurysm without dissection; Celiac and superior/inferior mesenteric arteries patent; CBD not dilated; status post cholecystectomy; right hemicolectomy; other findings as above. RUQ ultrasound (9/22/21): pancreas obscured by gas; prior cholecystectomy; echogenic liver suggests steatosis; common bile duct not visualized due to bowel gas. Assessment:     · Acute pancreatitis: rare alcohol use. LFTs, normalizing. history of cholecystectomy. Triglycerides normal. 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

## 2021-09-27 NOTE — DIALYSIS
CRRT / [phone]    Orders   Mode: CVVHD restarted @ 1640  14hr runtime   Blood Flow Rate: 180ml/min   Prismasol Dose: 25ml/kg/hr  3000ml/hr   Prismasol Concentrate: 4K/2.5Ca   Blood Warmer Temp: 37*C   Net Fluid Removal: 0ml/hr     Metrics   BP: 83/52   HR: 93   Access Pressure: -69   Filter Pressure: 125   Return Pressure: 70   TMP: 19   Pressure Drop: 10     Access   Type & Location: RIJ non-tunneled CVC   Comments: CDI dated 9/25/21 with transparent dressing and biopatch in place. Hubs and limbs of CVC cleansed per policy, +aspiration/+flushx2. Labs   HBsAg (Antigen) / date: Negative 9/25/21   HBsAb (Antibody) / date: Susceptible 9/25/21   Source: [employer]     Safety:   Time Out Done:   (Time) 1620   Consent obtained/signed: Verified   Education: Access/Infection   Primary Nurse Rpt Pre: Lenwood Merlin, RN   Primary Nurse Rpt Post: Lenwood Merlin, RN     Comments / Plan:   Pt orders, notes, labs, code status and consent reviewed. Time out complete. CVVHD restarted due to increased filter pressures. All possible blood returned by CRRT RN. Luther Cruz primed and tested. Lines are visible and secure with blood warmer attached to return line and set at 37*C. Education pre/post with primary RN.

## 2021-09-27 NOTE — DIALYSIS
CRRT / [phone]           Orders   Mode: CVVHD restarted @ 0200   Blood Flow Rate: 200 ml/min    Prismasol Dose: 25 ml/kg/hr x 119 kg = rounded 3,000 ml/hr  PBP: 0 ml/hr   Prismasol Concentrate: 2K / 3.5Ca    Blood Warmer Temp: 37*C   Net Fluid Removal: 50 ml/hr            Metrics   BP: 96/61   HR: 114   Access Pressure: -59   Filter Pressure: 117   Return Pressure: 75   TMP: 17   Pressure Drop: 7            Access   Type & Location: RIJ temporary non-tunneled CVC    Comments: Tegaderm dressing with biopatch C/D/I, dated 9/26/21. Prepped per hospital P&P: each catheter limb disinfected for 60 seconds with alcohol swabs, caps removed & hubs scrubbed with Prevantics for 15 seconds, followed by 5 second dry time, +asp/+flush x2 ports.             Labs   HBsAg (Antigen) / date: Negative 9/25/21                                  HBsAb (Antibody) / date: Susceptible 9/25/21    Source: Epic            Safety:   Time Out Done:    9706   Consent obtained/signed: Verified   Primary Nurse Rpt Pre: OCTAVIA Tapia RN   Primary Nurse Rpt Post: OCTAVIA Tapia RN      Comments / Plan:       Patient, orders, line placement and consent verified. Labs, notes and code status reviewed. Filter changed & CVVHD restarted due to elevated pressures & clotting in filter & deaeration chamber, reina RN returned all possible blood 165 mls. Old set discarded in biohazard bin. New [id number] set-up, primed 1L NS, tested, and running well at this time. Lines visible and secure with blood warmer attached & supported on return line, set at 37*C. Education & pre/post report with primary RN.

## 2021-09-27 NOTE — PROGRESS NOTES
Comprehensive Nutrition Assessment    Type and Reason for Visit: Initial    Nutrition Recommendations/Plan:    1. Consider adjusting TPN to better meet nutrition needs:   D20%, 7% AA's @ 83 ml/hr + 250 ml 20% lipids 3x/week   Initiate Gen Parenteral Nutrition Focused order set     2. Replete Phos, 2,0 mg/dl on AM labs. 3. Pending medical progress- transition to EN feeds to small bowel. Nutrition Assessment:    Past Medical History:   Diagnosis Date    Arthritis     back hips knees and arms    CAD (coronary artery disease)     Hypertension     Kidney stones     Nausea & vomiting     after hip surgery    Pancreatitis     In 2/2011, resolved    Psoriasis    Pt admitted for acute onset chest pain/epigastric abd pain with N/V and SOB. GI, Nephrology, and Cardiology consulted. CTA chest, abd/pelvis 9/22 notes:   1. Acute, uncomplicated pancreatitis. May be secondary to debris filled duodenal diverticulum. 2. 4.6 cm ascending thoracic aortic aneurysm, without dissection. 3. 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 4. 50% ostial stenosis of the bilateral main renal arteries, with cortical medullary thinning of the kidneys bilaterally. 5. Prostatomegaly, with findings suspicious for chronic bladder outlet obstruction. 6. Nonobstructing calyceal renal calculi bilaterally. 7. Enlarged main pulmonary artery suggestive of pulmonary hypertension. Pt required transfer to ICU for shock, requiring pressor support and CRRT for [place]. TPN initiated given multiple pressors and NG tube to suction, still unclear cause of pancreatitis. Spoke to dtr at bedside, reports no known acute loss of appetite or wt given relatively quick onset of symptoms. Dtr is an RN. TPN as presently ordered providing 1420 kcal and 126 g protein, which meets ~64% upper end kcal needs, 74% upper end protein needs.  Recommend increasing dextrose concentration, rate, and 250 ml 20% lipids 3x/week as above to provide on average 2130 kcal, 139 g protein, with 398 g CHO (GIR 2.3 mg/kg/min), and volume 3111-7753 ml. AM labs reviewed- Phos 2.0, BUN 38, Cr 2.79    Malnutrition Assessment:  Malnutrition Status: At risk for malnutrition    Context:  Acute illness      Findings of the 6 clinical characteristics of malnutrition:   Energy Intake:  Mild decrease in energy intake (specify)  Weight Loss:  No significant weight loss     Body Fat Loss:  No significant body fat loss,     Muscle Mass Loss:  No significant muscle mass loss,    Fluid Accumulation:  No significant fluid accumulation,     Strength:  Not performed     Nutritionally Significant Medications: Humalog, Protonix, Solu-cortef, IV Cefepime and Zosyn; Current drips - Fentanyl, Versed, Levophed, Vasopressin    Estimated Daily Nutrient Needs:  Energy (kcal): 9213-2064 ; Weight Used for Energy Requirements: Current  Protein (g): 120-170 (1.5-2 g/kg IBW); Weight Used for Protein Requirements: Ideal  Fluid (ml/day): Per Renal; Method Used for Fluid Requirements: Other (comment)    Nutrition Related Findings:       BM: +loose, watery BM 9/25  Edema: None noted  Wounds:  None       Current Nutrition Therapies:   Diet: NPO  Nutrition Support: TPN - D15%, 7% AA's @ 75 ml/hr, no lipids  Additional Caloric Sources: TPN      Anthropometric Measures:  · Height:  6' 1\" (185.4 cm)  · Current Body Wt:  121.5 kg (267 lb 13.7 oz)   · Admission Body Wt:  267 lb 13.7 oz    · Ideal Body Wt:  184:  145.6 %   · BMI Categories:  Obese class 2 (BMI 35.0-39. 9)     Wt Readings from Last 10 Encounters:   09/27/21 121.5 kg (267 lb 13.7 oz)   05/10/19 117.9 kg (260 lb)   06/06/18 114.3 kg (252 lb)   06/09/16 122.5 kg (270 lb)   02/08/15 122.5 kg (270 lb)   01/17/12 115.7 kg (255 lb)   01/03/12 115.7 kg (255 lb)   08/31/11 113.4 kg (250 lb)   08/15/11 113.4 kg (250 lb)   07/27/11 113.4 kg (250 lb)       Nutrition Diagnosis:   · Inadequate oral intake related to altered GI function, increased demand for energy/nutrients as evidenced by NPO or clear liquid status due to medical condition    · Inadequate oral intake related to renal dysfunction as evidenced by  (CRRT)      Nutrition Interventions:   Food and/or Nutrient Delivery: Continue NPO, Modify parenteral nutrition  Nutrition Education and Counseling: No recommendations at this time  Coordination of Nutrition Care: Continue to monitor while inpatient    Goals:  PN to meet at least 85% kcal/protein needs over next 5-7 days       Nutrition Monitoring and Evaluation:   Behavioral-Environmental Outcomes: None identified  Food/Nutrient Intake Outcomes: Parenteral nutrition intake/tolerance  Physical Signs/Symptoms Outcomes: Biochemical data, Fluid status or edema, Hemodynamic status    Discharge Planning:     Too soon to determine     Glenda Palmer RD     Contact via [facility]

## 2021-09-27 NOTE — PROGRESS NOTES
ID Progress Note  2021    Subjective: Intubated it  Review of Systems:            Symptom Y/N Comments   Symptom Y/N Comments   Fever/Chills       Chest Pain        Poor Appetite       Edema        Cough       Abdominal Pain        Sputum       Joint Pain        SOB/COATES       Pruritis/Rash        Nausea/vomit       Tolerating PT/OT        Diarrhea       Tolerating Diet        Constipation       Other           Could NOT obtain due to: Intubated it      Objective:     Vitals:   Visit Vitals  /66 (BP 1 Location: Left lower arm, BP Patient Position: At rest)   Pulse (!) 104   Temp 97.3 °F (36.3 °C)   Resp 18   Ht 6' 1\" (1.854 m)   Wt 121.5 kg (267 lb 13.7 oz)   SpO2 100%   BMI 35.34 kg/m²        Tmax:  Temp (24hrs), Av.3 °F (35.7 °C), Min:93.7 °F (34.3 °C), Max:99.5 °F (37.5 °C)      PHYSICAL EXAM:  General: Obese, chronically ill appearing, no acute distress    EENT:  EOMI. Anicteric sclerae. Dry mucous membrane  Resp:  Clear in apex with decreased breath sounds at bases,  No accessory muscle use  CV:  Regular  rhythm,  No edema  GI:  Soft, distended, hypoactive Bowel sounds  Neurologic:  Intubated it, not following commends   Psych:   Unable to assess insight. Skin:  No rashes.   No jaundice    Labs:   Lab Results   Component Value Date/Time    WBC 13.3 (H) 2021 03:38 PM    Hemoglobin (POC) 15.0 2016 05:20 PM    HGB 15.3 2021 03:38 PM    Hematocrit (POC) 44 2016 05:20 PM    HCT 46.2 2021 03:38 PM    PLATELET 873 (L)  03:38 PM    MCV 93.5 2021 03:38 PM     Lab Results   Component Value Date/Time    Sodium 136 2021 04:02 AM    Potassium 3.5 2021 04:02 AM    Chloride 107 2021 04:02 AM    CO2 22 2021 04:02 AM    Anion gap 7 2021 04:02 AM    Glucose 165 (H) 2021 04:02 AM    BUN 38 (H) 2021 04:02 AM    Creatinine 2.79 (H) 2021 04:02 AM    BUN/Creatinine ratio 14 2021 04:02 AM    GFR est AA 27 (L) 09/27/2021 04:02 AM    GFR est non-AA 23 (L) 09/27/2021 04:02 AM    Calcium 8.0 (L) 09/27/2021 04:02 AM    Bilirubin, total 0.7 09/27/2021 04:01 AM    Alk. phosphatase 63 09/27/2021 04:01 AM    Protein, total 5.4 (L) 09/27/2021 04:01 AM    Albumin 1.8 (L) 09/27/2021 04:02 AM    Globulin 3.4 09/27/2021 04:01 AM    A-G Ratio 0.6 (L) 09/27/2021 04:01 AM    ALT (SGPT) 35 09/27/2021 04:01 AM       Transfer to ICU on 9/24 due to hypotension. Intubated it 9/26    Assessment and Plan   Leukocytosis  Elevated LFT; trending down  Acute pancreatitis - GI following; MRI/MRCP of abd ordered, indicted that testing was not done due to pt's refusal. Pt claims that he has not even left the room  - WBC 22.6k->13.3, afebrile, lactic acid 2.8    Lipase > 3k (9/23), lipase 369    U/A (-)    Blood cx (9/22) no growth so far    CTA chest, CT of abd/pel: 4.6 cm ascending thoracic aortic aneurysm, without dissection. 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 Pancreas with diffuse peripancreatic fat stranding without discrete fluid  collection, no evidence of necrosis or infection. Prostatomegaly, with findings suspicious for chronic bladder outlet obstruction. US ABD (9/22) Pancreas obscured by bowel gas.      Continue with IV cefepime and flagyl for now     Fever work up if temp >= 100.4    [place]  Hx of bilateral nonobstructive calyceal calculi and bilateral renal cysts  - creat 4.8->2.79    Nephrology following     Ascending thoracic aortic aneurysm & partially trombosis in left internal iliac artery   - further evaluation per primary team    Ash Simon NP

## 2021-09-28 NOTE — PROGRESS NOTES
1930: SBAR received from [employer]. Drip rates verified. Pressors: Epi @ 5 / Vaso @ 0.04 / Mor @ 190   Sedation: Versed @ 4 / Fentanyl @ 150.       2000: Patient assessed. Appears comfortable. 0400: Q12H labs drawn and sent. 0730: Shift Summary: On and off dom hugger throughout shift as needed to maintain appropriate temp. Albumin bolus given x1 with improvement in blood pressure. Vasopressors titrated as tolerated to maintain MAP >65. Versed decreased slightly. Diaylsis changed CRRT filter at 0700. Pressors: Levo @ 2 / Epi @ 5 / Vaso @ 0.04 / Mor stopped at 0532. Sedation: Versed @ 2 / Fentanyl @ 150. Bedside and Verbal shift change report given to Jose Antonio Vidal  (oncoming nurse) by Nae Hogan  (offgoing nurse). Report included the following information SBAR, Kardex, Intake/Output, MAR and Recent Results.

## 2021-09-28 NOTE — PROGRESS NOTES
ID Progress Note  2021    Subjective: Intubated it  Episode of hypothermia, required warming blanket  Remain hypotensive    Review of Systems:            Symptom Y/N Comments   Symptom Y/N Comments   Fever/Chills       Chest Pain        Poor Appetite       Edema        Cough       Abdominal Pain        Sputum       Joint Pain        SOB/COATES       Pruritis/Rash        Nausea/vomit       Tolerating PT/OT        Diarrhea       Tolerating Diet        Constipation       Other           Could NOT obtain due to: Intubated it      Objective:     Vitals:   Visit Vitals  BP (!) 92/55   Pulse 77   Temp 98.2 °F (36.8 °C)   Resp 18   Ht 6' 1\" (1.854 m)   Wt 122.3 kg (269 lb 10 oz)   SpO2 100%   BMI 35.57 kg/m²        Tmax:  Temp (24hrs), Av.8 °F (36.6 °C), Min:96.6 °F (35.9 °C), Max:99.3 °F (37.4 °C)      PHYSICAL EXAM:  General: Obese, chronically ill appearing, no acute distress    EENT:  EOMI. Anicteric sclerae. Dry mucous membrane  Resp:  Clear in apex with decreased breath sounds at bases,  No accessory muscle use  CV:  Regular  rhythm,  No edema  GI:  Soft, distended, hypoactive Bowel sounds  Neurologic:  Intubated it, not following commends   Psych:   Unable to assess insight. Skin:  No rashes.   No jaundice    Labs:   Lab Results   Component Value Date/Time    WBC 20.1 (H) 2021 03:22 PM    Hemoglobin (POC) 15.0 2016 05:20 PM    HGB 11.0 (L) 2021 03:22 PM    Hematocrit (POC) 44 2016 05:20 PM    HCT 33.6 (L) 2021 03:22 PM    PLATELET 95 (L)  03:22 PM    MCV 95.2 2021 03:22 PM     Lab Results   Component Value Date/Time    Sodium 137 2021 04:09 AM    Potassium 4.4 2021 04:09 AM    Chloride 108 2021 04:09 AM    CO2 23 2021 04:09 AM    Anion gap 6 2021 04:09 AM    Glucose 203 (H) 2021 04:09 AM    BUN 32 (H) 2021 04:09 AM    Creatinine 2.19 (H) 2021 04:09 AM    BUN/Creatinine ratio 15 2021 04:09 AM    GFR est AA 36 (L) 09/28/2021 04:09 AM    GFR est non-AA 30 (L) 09/28/2021 04:09 AM    Calcium 7.5 (L) 09/28/2021 04:09 AM    Bilirubin, total 0.6 09/28/2021 04:09 AM    Alk. phosphatase 63 09/28/2021 04:09 AM    Protein, total 5.9 (L) 09/28/2021 04:09 AM    Albumin 2.6 (L) 09/28/2021 04:09 AM    Globulin 3.3 09/28/2021 04:09 AM    A-G Ratio 0.8 (L) 09/28/2021 04:09 AM    ALT (SGPT) 30 09/28/2021 04:09 AM     CTA chest, CT of abd/pel: 4.6 cm ascending thoracic aortic aneurysm, without dissection. 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 Pancreas with diffuse peripancreatic fat stranding without discrete fluid  collection, no evidence of necrosis or infection. Prostatomegaly, with findings suspicious for chronic bladder outlet obstruction. US ABD (9/22) Pancreas obscured by bowel gas. Transfer to ICU on 9/24 due to hypotension.   Intubated it 9/26    Assessment and Plan   Leukocytosis  Elevated LFT; resolved  Acute pancreatitis - GI following  - WBC 13.3->20.1, afebrile, lactic acid 2.1, check CXR, resp cx, and procal    Lipase > 3k (9/23), lipase 369    U/A (-)    Blood cx (9/22) no growth        Continue with IV cefepime and flagyl     Fever work up if temp >= 100.4    [place]  Hx of bilateral nonobstructive calyceal calculi and bilateral renal cysts  - creat 2.1    Nephrology following     Ascending thoracic aortic aneurysm & partially trombosis in left internal iliac artery   - further evaluation per primary team    Ash Hart NP

## 2021-09-28 NOTE — PROGRESS NOTES
0730 Bedside shift change report given to Maeve Calhoun, RN (oncoming nurse) by Lidia Mares RN (offgoing nurse). Report included the following information SBAR, Kardex, Intake/Output, MAR, Recent Results and Cardiac Rhythm NSR.     0820 Sputum cxr and procalcitonin sent to lab. 1115  Epi gtt off. /62 (79). 1200 155cc urine on bladder scan. 1520  Levo gtt off. /64 (83).     1525  Versed gtt off.     1600 Temp 96.4 F. Coretta hugger applied. 1930 Bedside shift change report given to Lidia Mares, RN (oncoming nurse) by Maeve Calhoun RN (offgoing nurse). Report included the following information SBAR, Kardex, Intake/Output, Accordion and Recent Results.

## 2021-09-28 NOTE — PROGRESS NOTES
ICU TEAM Progress Note    Name: Teresa Ramsay   : 1950   MRN: [id number]   Date: 2021      Assessment:   Reason for ICU Admission: ARF 2/2 severe pancreatitis     Brief HPI: 69 y/o M w hx of CAD, HTN, OA, and kidney stones presents to the hospital with CC of SOB, abd pain, N/V. He was found to have severe pancreatitis and acute renal injury with associated lactic acidosis and elevated LFTs. He was transferred to the CCU for initiation of CRRT given borderline low blood pressures. - Acute Pancreatitis  - Septic Shock  - Acute Hypoxic Respiratory Failure  - [place] on CRRT  - CAD  - HTN  - OA    Updated step-daughter at bedside on 21  Plan:     Neuro: Versed, SATs  Pulm: mechanical ventilation, wean fio2 as able. Chlorhexidine. HOB >30 degrees. Cardiac: MAP goal >65. Norepi, epi, vaso. Hydrocort, midodrine  Renal: CRRT per nephro. Smith. LR at 500. GI: NGT to LCS. Protonix. Start tpn this weekend  ID: Cefepime, Flagyl. Follow cultures. Heme: SQH. Cont asa. Endo: NIYAH  MSK:  PT/OT     F - Feeding:  No - tpn  A - Analgesia: Fentanyl  S - Sedation: Versed  T - DVT Prophylaxis: SCD's or Sequential Compression Device, SQH  C - Code Status: Full Code  H - Head of Bed: > 30 Degrees  U - Ulcer Prophylaxis: Protonix (pantoprazole)   G - Glycemic Control: Insulin  S - Spontaneous Breathing Trial: N/A  B - Bowel Regimen: None needed at this time  I - Indwelling Catheter:   Tubes: Nasogastric Tube, ETT  Lines: Peripheral IV, Arterial Line, Central Line and Teddy  Drains: Smith Catheter  D - De-escalation of Antibiotics: n/a    Subjective:   Overnight Events:   : CRRT; MV; Pressors  : CRRT; MV; Pressors  : increasing agitation and hypoxia. Intubated. HDL repositioned again. Up on vasopressors  : Cambria pulled back - remains positional.   : transferred into ICU. Soren Mix placed and CRRT started.      Objective:     Visit Vitals  /61 (BP 1 Location: Left lower arm, BP Patient Position: At rest)   Pulse 74   Temp 98.2 °F (36.8 °C)   Resp 18   Ht 6' 1\" (1.854 m)   Wt 122.3 kg (269 lb 10 oz)   SpO2 100%   BMI 35.57 kg/m²    O2 Flow Rate (L/min): 3 l/min O2 Device: Endotracheal tube, Ventilator Temp (24hrs), Av.9 °F (36.6 °C), Min:96.6 °F (35.9 °C), Max:99.3 °F (37.4 °C)    Intake/Output:     Intake/Output Summary (Last 24 hours) at 2021 0955  Last data filed at 2021 0900  Gross per 24 hour   Intake 6563.81 ml   Output 3458 ml   Net 3105.81 ml     Physical Exam:  General:  Intubated and sedated  Eye:  negative  Neurologic: sedated  Neck:  RIJ HDL, LIJ CVC  Lungs:  Distant breath sounds  Heart:  regular rate and rhythm  Abdomen:  Soft, distended  Skin:  Normal.    24h Labs & Data: Reviewed    Medications: Reviewed    Chest X-Ray : Tip of the dialysis catheter in the right atrium. No pneumothorax. TTE :  · LVEF is 55 - 60%. Small left ventricle. Moderate concentric hypertrophy. · LA: Mildly dilated left atrium. · RV: Mildly dilated right ventricle. Mildly reduced systolic function. · IAS: No atrial septal defect present. · MV: Mitral valve non-specific thickening. Multidisciplinary Rounds Completed:  No    ABCDEF Bundle/Checklist Completed: Yes    SPECIAL EQUIPMENT: CRRT    DISPOSITION: Stay in ICU    CRITICAL CARE CONSULTANT NOTE  I had a face to face encounter with the patient, reviewed and interpreted patient data including clinical events, labs, images, vital signs, I/O's, and examined patient. I have discussed the case and the plan and management of the patient's care with the consulting services, the bedside nurses and the respiratory therapist.      NOTE OF PERSONAL INVOLVEMENT IN CARE   This patient has a high probability of imminent, clinically significant deterioration, which requires the highest level of preparedness to intervene urgently.  I participated in the decision-making and personally managed or directed the management of the following life and organ supporting interventions that required my frequent assessment to treat or prevent imminent deterioration. I personally spent 95 minutes of critical care time. This is time spent at this critically ill patient's bedside actively involved in patient care as well as the coordination of care and discussions with the patient's family. This does not include any procedural time which has been billed separately.     Jennifer Stokes DO  Staff Intensivist/Anesthesiologist  [facility]  9/28/2021

## 2021-09-28 NOTE — PROGRESS NOTES
Nephrology Progress Note  Mikala Saeed  Date of Admission : 9/22/2021    CC: Follow up for [place]       Assessment and Plan     [place]:  - multifactorial: ATN from hypotension/severe pancreatitis + IV contrast on admission  - no obstruction on CT scan  - continue CVVHD  - no factor  - BID labs    Lactic acidosis:  - improving    Acute resp failure :  - intubated 9/25    Shock   - on multiple pressors      Pancreatitis:  - per GI     Bradycardia  TAA, b/l BEAU aneurysms  Leukocytosis       Interval History:  Seen and examined. On CRRT, no factor. On 3 pressors. Sedated on the vent. O2 stable. Current Medications: all current  Medications have been eviewed in EPIC  Review of Systems: Review of systems not obtained due to patient factors. Objective:  Vitals:    Vitals:    09/28/21 0600 09/28/21 0700 09/28/21 0710 09/28/21 0800   BP:   (!) 92/55 106/61   Pulse: 67 75 77 83   Resp: 19 18  16   Temp:  98.2 °F (36.8 °C)  98.2 °F (36.8 °C)   SpO2: 100% 100%  100%   Weight:       Height:         Intake and Output:  09/28 0701 - 09/28 1900  In: 103.8 [I.V.:103.8]  Out: -   09/26 1901 - 09/28 0700  In: 9067.2 [I.V.:8742.2]  Out: 5442     Physical Examination:  Pt intubated     yes  General: On vent   Neck:  Teddy +  Resp:  Decreased BS b/l  CV:  RRR,  no murmur or rub, 1+ b/l LE edema  GI:  Non distended, reduced bowel sounds, mild LUQ tenderness  Neurologic:  Sedated on vent   :  No roldan     []    High complexity decision making was performed  []    Patient is at high-risk of decompensation with multiple organ involvement    Lab Data Personally Reviewed: I have reviewed all the pertinent labs, microbiology data and radiology studies during assessment.     Recent Labs     09/28/21  0409 09/28/21  0408 09/27/21  1522 09/27/21  0402 09/27/21  0402 09/27/21  0401 09/26/21  1518    136 135*   < > 136 136   < >   K 4.4 4.4 4.4   < > 3.5 3.5   < >    107 106   < > 107 107   < >   CO2 23 23 21   < > 22 21   < >   * 200* 187*   < > 165* 169*   < >   BUN 32* 34* 35*   < > 38* 38*   < >   CREA 2.19* 2.15* 2.27*   < > 2.79* 2.69*   < >   CA 7.5* 7.4* 7.9*   < > 8.0* 8.1*   < >   MG  --  2.4 2.3  --   --  2.0  --    PHOS  --  1.9*  2.0* 2.6  --  2.0*  --    < >   ALB 2.6* 2.6* 2.5*   < > 1.8* 2.0*   < >   ALT 30  --  33  --   --  35  --     < > = values in this interval not displayed.      Recent Labs     09/27/21  1522 09/25/21  1538   WBC 20.1* 13.3*   HGB 11.0* 15.3   HCT 33.6* 46.2   PLT 95* 135*     Lab Results   Component Value Date/Time    Specimen Description: HIP LEFT JOINT 01/17/2012 03:00 PM    Specimen Description: HIP LEFT JOINT 01/17/2012 03:00 PM    Specimen Description: NARES 07/28/2011 12:12 AM     Lab Results   Component Value Date/Time    Culture result: NO GROWTH 5 DAYS 09/22/2021 07:43 PM    Culture result: NO SIGNIFICANT GROWTH 02/08/2015 04:51 PM    Culture result: NO GROWTH 14 DAYS 01/17/2012 03:00 PM    Culture result: NO GROWTH 14 DAYS 01/17/2012 03:00 PM     Recent Results (from the past 24 hour(s))   POC EG7    Collection Time: 09/27/21 10:44 AM   Result Value Ref Range    Calcium, ionized (POC) 1.09 (L) 1.12 - 1.32 mmol/L    FIO2 (POC) 50 %    pH (POC) 7.34 (L) 7.35 - 7.45      pCO2 (POC) 42.4 35.0 - 45.0 MMHG    pO2 (POC) 126 (H) 80 - 100 MMHG    HCO3 (POC) 23.0 22 - 26 MMOL/L    Base deficit (POC) 2.7 mmol/L    sO2 (POC) 98.7 (H) 92 - 97 %    Site DRAWN FROM ARTERIAL LINE      Device: ADULT VENT      Mode ASSIST CONTROL      Tidal volume 480 ml    Set Rate 18 bpm    PEEP/CPAP (POC) 5 cmH2O    Allens test (POC) NOT APPLICABLE      Specimen type (POC) ARTERIAL     GLUCOSE, POC    Collection Time: 09/27/21 11:39 AM   Result Value Ref Range    Glucose (POC) 156 (H) 65 - 117 mg/dL    Performed by Estuardo Newberry    PHOSPHORUS    Collection Time: 09/27/21  3:22 PM   Result Value Ref Range    Phosphorus 2.6 2.6 - 4.7 MG/DL   LACTIC ACID    Collection Time: 09/27/21  3:22 PM   Result Value Ref Range Lactic acid 2.6 (HH) 0.4 - 2.0 MMOL/L   CBC WITH AUTOMATED DIFF    Collection Time: 09/27/21  3:22 PM   Result Value Ref Range    WBC 20.1 (H) 4.1 - 11.1 K/uL    RBC 3.53 (L) 4.10 - 5.70 M/uL    HGB 11.0 (L) 12.1 - 17.0 g/dL    HCT 33.6 (L) 36.6 - 50.3 %    MCV 95.2 80.0 - 99.0 FL    MCH 31.2 26.0 - 34.0 PG    MCHC 32.7 30.0 - 36.5 g/dL    RDW 15.2 (H) 11.5 - 14.5 %    PLATELET 95 (L) 699 - 400 K/uL    MPV 11.9 8.9 - 12.9 FL    NRBC 0.9 (H) 0  WBC    ABSOLUTE NRBC 0.18 (H) 0.00 - 0.01 K/uL    NEUTROPHILS 83 (H) 32 - 75 %    BAND NEUTROPHILS 3 0 - 6 %    LYMPHOCYTES 5 (L) 12 - 49 %    MONOCYTES 3 (L) 5 - 13 %    EOSINOPHILS 2 0 - 7 %    BASOPHILS 0 0 - 1 %    METAMYELOCYTES 2 (H) 0 %    MYELOCYTES 2 (H) 0 %    IMMATURE GRANULOCYTES 0 %    ABS. NEUTROPHILS 17.3 (H) 1.8 - 8.0 K/UL    ABS. LYMPHOCYTES 1.0 0.8 - 3.5 K/UL    ABS. MONOCYTES 0.6 0.0 - 1.0 K/UL    ABS. EOSINOPHILS 0.4 0.0 - 0.4 K/UL    ABS. BASOPHILS 0.0 0.0 - 0.1 K/UL    ABS. IMM. GRANS. 0.0 K/UL    DF MANUAL      RBC COMMENTS ANISOCYTOSIS  1+        RBC COMMENTS MACROCYTOSIS  1+        RBC COMMENTS SHAVON CELLS  PRESENT       METABOLIC PANEL, COMPREHENSIVE    Collection Time: 09/27/21  3:22 PM   Result Value Ref Range    Sodium 135 (L) 136 - 145 mmol/L    Potassium 4.4 3.5 - 5.1 mmol/L    Chloride 106 97 - 108 mmol/L    CO2 21 21 - 32 mmol/L    Anion gap 8 5 - 15 mmol/L    Glucose 187 (H) 65 - 100 mg/dL    BUN 35 (H) 6 - 20 MG/DL    Creatinine 2.27 (H) 0.70 - 1.30 MG/DL    BUN/Creatinine ratio 15 12 - 20      GFR est AA 35 (L) >60 ml/min/1.73m2    GFR est non-AA 29 (L) >60 ml/min/1.73m2    Calcium 7.9 (L) 8.5 - 10.1 MG/DL    Bilirubin, total 0.9 0.2 - 1.0 MG/DL    ALT (SGPT) 33 12 - 78 U/L    AST (SGOT) 74 (H) 15 - 37 U/L    Alk.  phosphatase 58 45 - 117 U/L    Protein, total 5.8 (L) 6.4 - 8.2 g/dL    Albumin 2.5 (L) 3.5 - 5.0 g/dL    Globulin 3.3 2.0 - 4.0 g/dL    A-G Ratio 0.8 (L) 1.1 - 2.2     MAGNESIUM    Collection Time: 09/27/21  3:22 PM Result Value Ref Range    Magnesium 2.3 1.6 - 2.4 mg/dL   HEMOGLOBIN A1C WITH EAG    Collection Time: 09/27/21  3:22 PM   Result Value Ref Range    Hemoglobin A1c 5.8 (H) 4.0 - 5.6 %    Est. average glucose 120 mg/dL   GLUCOSE, POC    Collection Time: 09/27/21  5:07 PM   Result Value Ref Range    Glucose (POC) 176 (H) 65 - 117 mg/dL    Performed by Anna Mcdonnell    GLUCOSE, POC    Collection Time: 09/28/21 12:56 AM   Result Value Ref Range    Glucose (POC) 158 (H) 65 - 117 mg/dL    Performed by Rush Roy    PHOSPHORUS    Collection Time: 09/28/21  4:08 AM   Result Value Ref Range    Phosphorus 1.9 (L) 2.6 - 4.7 MG/DL   RENAL FUNCTION PANEL    Collection Time: 09/28/21  4:08 AM   Result Value Ref Range    Sodium 136 136 - 145 mmol/L    Potassium 4.4 3.5 - 5.1 mmol/L    Chloride 107 97 - 108 mmol/L    CO2 23 21 - 32 mmol/L    Anion gap 6 5 - 15 mmol/L    Glucose 200 (H) 65 - 100 mg/dL    BUN 34 (H) 6 - 20 MG/DL    Creatinine 2.15 (H) 0.70 - 1.30 MG/DL    BUN/Creatinine ratio 16 12 - 20      GFR est AA 37 (L) >60 ml/min/1.73m2    GFR est non-AA 30 (L) >60 ml/min/1.73m2    Calcium 7.4 (L) 8.5 - 10.1 MG/DL    Phosphorus 2.0 (L) 2.6 - 4.7 MG/DL    Albumin 2.6 (L) 3.5 - 5.0 g/dL   LACTIC ACID    Collection Time: 09/28/21  4:08 AM   Result Value Ref Range    Lactic acid 2.1 (HH) 0.4 - 2.0 MMOL/L   SAMPLES BEING HELD    Collection Time: 09/28/21  4:08 AM   Result Value Ref Range    SAMPLES BEING HELD 1LAV     COMMENT        Add-on orders for these samples will be processed based on acceptable specimen integrity and analyte stability, which may vary by analyte.    MAGNESIUM    Collection Time: 09/28/21  4:08 AM   Result Value Ref Range    Magnesium 2.4 1.6 - 2.4 mg/dL   METABOLIC PANEL, COMPREHENSIVE    Collection Time: 09/28/21  4:09 AM   Result Value Ref Range    Sodium 137 136 - 145 mmol/L    Potassium 4.4 3.5 - 5.1 mmol/L    Chloride 108 97 - 108 mmol/L    CO2 23 21 - 32 mmol/L    Anion gap 6 5 - 15 mmol/L Glucose 203 (H) 65 - 100 mg/dL    BUN 32 (H) 6 - 20 MG/DL    Creatinine 2.19 (H) 0.70 - 1.30 MG/DL    BUN/Creatinine ratio 15 12 - 20      GFR est AA 36 (L) >60 ml/min/1.73m2    GFR est non-AA 30 (L) >60 ml/min/1.73m2    Calcium 7.5 (L) 8.5 - 10.1 MG/DL    Bilirubin, total 0.6 0.2 - 1.0 MG/DL    ALT (SGPT) 30 12 - 78 U/L    AST (SGOT) 73 (H) 15 - 37 U/L    Alk. phosphatase 63 45 - 117 U/L    Protein, total 5.9 (L) 6.4 - 8.2 g/dL    Albumin 2.6 (L) 3.5 - 5.0 g/dL    Globulin 3.3 2.0 - 4.0 g/dL    A-G Ratio 0.8 (L) 1.1 - 2.2     GLUCOSE, POC    Collection Time: 09/28/21  5:07 AM   Result Value Ref Range    Glucose (POC) 226 (H) 65 - 117 mg/dL    Performed by Yolanda Hammond MD  [address]  Phone - [phone]   Fax - [phone]  [email or web address]

## 2021-09-28 NOTE — DIALYSIS
CRRT / [phone]    Orders   Mode: CVVHD restarted @ 0710  14hr runtime   Blood Flow Rate: 180ml/min   Prismasol Dose: 25ml/kg/hr  3000ml/hr   Prismasol Concentrate: 4K/2.5Ca   Blood Warmer Temp: 37*C   Net Fluid Removal: 0ml/hr     Metrics   BP: 92/55   HR: 77   Access Pressure: -56   Filter Pressure: 141   Return Pressure: 75   TMP: 33   Pressure Drop: 30     Access   Type & Location: RIJ non-tunneled CVC   Comments: CDI dated 9/25/21 with transparent dressing and biopatch in place. Hubs and limbs of CVC cleansed per policy, +aspiration/+flushx2. Labs   HBsAg (Antigen) / date: Negative 9/25/21   HBsAb (Antibody) / date: Susceptible 9/25/21   Source: [employer]     Safety:   Time Out Done:   (Time) 0700   Consent obtained/signed: Verified   Education: Access/Infection   Primary Nurse Rpt Pre: Atilio Price RN   Primary Nurse Rpt Post: Atilio Price RN     Comments / Plan:   Pt orders, notes, labs, code status and consent reviewed. Time out complete. CVVHD restarted due to increased filter pressures. All possible blood returned by CRRT RN. Angelique Zhang primed and tested. Lines are visible and secure with blood warmer attached to return line and set at 37*C. Education pre/post with primary RN.

## 2021-09-28 NOTE — PROGRESS NOTES
2021 -   KOBE:  - RUR: 21%  - Disposition is TBD: at baseline patient lives with spouse and is mostly independent in ADLs  - Transport is TBD: family vs BLS  - Patient will need IM Letter    Primary contact is spouseGa: [phone]    - CRRT was initiated   - Patient was intubated  and remains vented and sedated at this time  - Patient continues to withdraw from stimuli  - Patient is S/P 1 unit PRBC   - ABX and Pressor Support continue with plan to decrease pressors today,   - Labs are being monitored  - Patient will need PT/OT/SLP evals once medically appropriate    CRM: Raheem Noel, MPH, [address] [place] [address]; Z: [phone]

## 2021-09-28 NOTE — PROGRESS NOTES
Cardiology Progress Note                                        Admit Date: 9/22/2021    Assessment/Plan:     Bradycardia; returned but related to coming off Epi  Sepsis syndrome; better  ARF/CKD; improving some  CAD; s/p remote CABG; asymtpmatic    Analisa Henry is a 70 y.o. male with     PROBLEM LIST:  Patient Active Problem List    Diagnosis Date Noted    Pancreatitis 09/22/2021    Combined forms of age-related cataract of right eye 06/05/2018    Small bowel obstruction ([facility] [address] 02/08/2015    Perforated bowel ([facility] [address] 08/02/2011    Atrial flutter ([facility] [address] 08/01/2011    Pneumoperitoneum - abdomen 07/27/2011    Coronary atherosclerosis of native coronary artery 07/20/2011         Subjective:     Analisa Henry reports none.     Visit Vitals  /61 (BP 1 Location: Left lower arm, BP Patient Position: At rest)   Pulse (!) 56   Temp 97.3 °F (36.3 °C)   Resp 18   Ht 6' 1\" (1.854 m)   Wt 269 lb 10 oz (122.3 kg)   SpO2 100%   BMI 35.57 kg/m²       Intake/Output Summary (Last 24 hours) at 9/28/2021 1618  Last data filed at 9/28/2021 1600  Gross per 24 hour   Intake 6224.24 ml   Output 3216 ml   Net 3008.24 ml       Objective:      Physical Exam:  HEENT: Perrla, EOMI  Neck: No JVD,  No thyroidmegaly  Resp: some rales  CV: RRR s1s2 No murmur no s3  Abd:Soft, Nontender  Ext: 1+ edema  Neuro: Alert and oriented; Nonfocal  Skin: Warm, Dry, Intact  Pulses: 2+ DP/PT/Rad      Telemetry: normal sinus rhythm    Current Facility-Administered Medications   Medication Dose Route Frequency    midodrine (PROAMATINE) tablet 20 mg  20 mg Oral Q8H    TPN ADULT - CENTRAL   IntraVENous CONTINUOUS    0.9% sodium chloride infusion  5 mL/hr IntraVENous CONTINUOUS    0.9% sodium chloride infusion  5 mL/hr IntraVENous CONTINUOUS    0.9% sodium chloride infusion  3 mL/hr IntraVENous CONTINUOUS    hydrocortisone Sod Succ (PF) (SOLU-CORTEF) injection 100 mg  100 mg IntraVENous Q8H    TPN ADULT - CENTRAL   IntraVENous CONTINUOUS    bicarbonate dialysis (PRISMASOL) BG K 4/Ca 2.5 5000 ml solution   Extracorporeal DIALYSIS CONTINUOUS    vasopressin (VASOSTRICT) 40 Units in 0.9% sodium chloride 40 mL infusion  0-0.04 Units/min IntraVENous TITRATE    EPINEPHrine (ADRENALIN) 10 mg in 0.9% sodium chloride 250 mL infusion  0-10 mcg/min IntraVENous TITRATE    NOREPINephrine (LEVOPHED) 32 mg in 5% dextrose 250 mL infusion  0.5-30 mcg/min IntraVENous TITRATE    glucose chewable tablet 16 g  4 Tablet Oral PRN    dextrose (D50W) injection syrg 12.5-25 g  25-50 mL IntraVENous PRN    glucagon (GLUCAGEN) injection 1 mg  1 mg IntraMUSCular PRN    insulin lispro (HUMALOG) injection   SubCUTAneous Q6H    PHENYLephrine (ANGELIKA-SYNEPHRINE) 30 mg in 0.9% sodium chloride 250 mL infusion   mcg/min IntraVENous TITRATE    pantoprazole (PROTONIX) 40 mg in 0.9% sodium chloride 10 mL injection  40 mg IntraVENous DAILY    chlorhexidine (ORAL CARE KIT) 0.12 % mouthwash 15 mL  15 mL Oral Q12H    aspirin chewable tablet 81 mg  81 mg Oral DAILY    heparin (porcine) 1,000 unit/mL injection 1,100 Units  1,100 Units Hemodialysis PRN    heparin (porcine) 1,000 unit/mL injection 1,400 Units  1,400 Units Hemodialysis PRN    fentaNYL (PF) 1,500 mcg/30 mL (50 mcg/mL) infusion  0-200 mcg/hr IntraVENous TITRATE    midazolam in normal saline (VERSED) 1 mg/mL infusion  0-10 mg/hr IntraVENous TITRATE    cefepime (MAXIPIME) 2 g in 0.9% sodium chloride (MBP/ADV) 100 mL MBP  2 g IntraVENous Q12H    metroNIDAZOLE (FLAGYL) IVPB premix 500 mg  500 mg IntraVENous Q12H    HYDROmorphone (DILAUDID) injection 1 mg  1 mg IntraVENous Q3H PRN    [Held by provider] atenoloL (TENORMIN) tablet 50 mg  50 mg Oral DAILY    [Held by provider] atorvastatin (LIPITOR) tablet 10 mg  10 mg Oral DAILY    sodium chloride (NS) flush 5-40 mL  5-40 mL IntraVENous Q8H    sodium chloride (NS) flush 5-40 mL  5-40 mL IntraVENous PRN    ondansetron (ZOFRAN) injection 4 mg  4 mg IntraVENous Q4H PRN  heparin (porcine) injection 5,000 Units  5,000 Units SubCUTAneous Q8H         Data Review:   Labs:    Recent Results (from the past 24 hour(s))   GLUCOSE, POC    Collection Time: 09/27/21  5:07 PM   Result Value Ref Range    Glucose (POC) 176 (H) 65 - 117 mg/dL    Performed by Edie Garza    GLUCOSE, POC    Collection Time: 09/28/21 12:56 AM   Result Value Ref Range    Glucose (POC) 158 (H) 65 - 117 mg/dL    Performed by Ryan Lara    PHOSPHORUS    Collection Time: 09/28/21  4:08 AM   Result Value Ref Range    Phosphorus 1.9 (L) 2.6 - 4.7 MG/DL   RENAL FUNCTION PANEL    Collection Time: 09/28/21  4:08 AM   Result Value Ref Range    Sodium 136 136 - 145 mmol/L    Potassium 4.4 3.5 - 5.1 mmol/L    Chloride 107 97 - 108 mmol/L    CO2 23 21 - 32 mmol/L    Anion gap 6 5 - 15 mmol/L    Glucose 200 (H) 65 - 100 mg/dL    BUN 34 (H) 6 - 20 MG/DL    Creatinine 2.15 (H) 0.70 - 1.30 MG/DL    BUN/Creatinine ratio 16 12 - 20      GFR est AA 37 (L) >60 ml/min/1.73m2    GFR est non-AA 30 (L) >60 ml/min/1.73m2    Calcium 7.4 (L) 8.5 - 10.1 MG/DL    Phosphorus 2.0 (L) 2.6 - 4.7 MG/DL    Albumin 2.6 (L) 3.5 - 5.0 g/dL   LACTIC ACID    Collection Time: 09/28/21  4:08 AM   Result Value Ref Range    Lactic acid 2.1 (HH) 0.4 - 2.0 MMOL/L   CBC WITH AUTOMATED DIFF    Collection Time: 09/28/21  4:08 AM   Result Value Ref Range    WBC 18.6 (H) 4.1 - 11.1 K/uL    RBC 3.18 (L) 4.10 - 5.70 M/uL    HGB 9.9 (L) 12.1 - 17.0 g/dL    HCT 31.4 (L) 36.6 - 50.3 %    MCV 98.7 80.0 - 99.0 FL    MCH 31.1 26.0 - 34.0 PG    MCHC 31.5 30.0 - 36.5 g/dL    RDW 15.2 (H) 11.5 - 14.5 %    PLATELET 85 (L) 486 - 400 K/uL    MPV 12.4 8.9 - 12.9 FL    NRBC 0.9 (H) 0  WBC    ABSOLUTE NRBC 0.17 (H) 0.00 - 0.01 K/uL    NEUTROPHILS 89 (H) 32 - 75 %    BAND NEUTROPHILS 1 0 - 6 %    LYMPHOCYTES 2 (L) 12 - 49 %    MONOCYTES 4 (L) 5 - 13 %    EOSINOPHILS 0 0 - 7 %    BASOPHILS 0 0 - 1 %    METAMYELOCYTES 1 (H) 0 %    MYELOCYTES 3 (H) 0 %    IMMATURE GRANULOCYTES 0 %    ABS. NEUTROPHILS 16.7 (H) 1.8 - 8.0 K/UL    ABS. LYMPHOCYTES 0.4 (L) 0.8 - 3.5 K/UL    ABS. MONOCYTES 0.7 0.0 - 1.0 K/UL    ABS. EOSINOPHILS 0.0 0.0 - 0.4 K/UL    ABS. BASOPHILS 0.0 0.0 - 0.1 K/UL    ABS. IMM. GRANS. 0.0 K/UL    DF MANUAL      RBC COMMENTS ANISOCYTOSIS  1+        RBC COMMENTS MACROCYTOSIS  1+       SAMPLES BEING HELD    Collection Time: 09/28/21  4:08 AM   Result Value Ref Range    SAMPLES BEING HELD 1LAV     COMMENT        Add-on orders for these samples will be processed based on acceptable specimen integrity and analyte stability, which may vary by analyte. MAGNESIUM    Collection Time: 09/28/21  4:08 AM   Result Value Ref Range    Magnesium 2.4 1.6 - 2.4 mg/dL   METABOLIC PANEL, COMPREHENSIVE    Collection Time: 09/28/21  4:09 AM   Result Value Ref Range    Sodium 137 136 - 145 mmol/L    Potassium 4.4 3.5 - 5.1 mmol/L    Chloride 108 97 - 108 mmol/L    CO2 23 21 - 32 mmol/L    Anion gap 6 5 - 15 mmol/L    Glucose 203 (H) 65 - 100 mg/dL    BUN 32 (H) 6 - 20 MG/DL    Creatinine 2.19 (H) 0.70 - 1.30 MG/DL    BUN/Creatinine ratio 15 12 - 20      GFR est AA 36 (L) >60 ml/min/1.73m2    GFR est non-AA 30 (L) >60 ml/min/1.73m2    Calcium 7.5 (L) 8.5 - 10.1 MG/DL    Bilirubin, total 0.6 0.2 - 1.0 MG/DL    ALT (SGPT) 30 12 - 78 U/L    AST (SGOT) 73 (H) 15 - 37 U/L    Alk.  phosphatase 63 45 - 117 U/L    Protein, total 5.9 (L) 6.4 - 8.2 g/dL    Albumin 2.6 (L) 3.5 - 5.0 g/dL    Globulin 3.3 2.0 - 4.0 g/dL    A-G Ratio 0.8 (L) 1.1 - 2.2     GLUCOSE, POC    Collection Time: 09/28/21  5:07 AM   Result Value Ref Range    Glucose (POC) 226 (H) 65 - 117 mg/dL    Performed by Alonzo Recio    PROCALCITONIN    Collection Time: 09/28/21  8:22 AM   Result Value Ref Range    Procalcitonin 17.30 ng/mL   CULTURE, RESPIRATORY/SPUTUM/BRONCH W GRAM STAIN    Collection Time: 09/28/21  8:22 AM    Specimen: Sputum,ET Suction   Result Value Ref Range    Special Requests: NO SPECIAL REQUESTS GRAM STAIN OCCASIONAL WBCS SEEN      GRAM STAIN RARE EPITHELIAL CELLS SEEN      GRAM STAIN NO ORGANISMS SEEN      Culture result: PENDING    GLUCOSE, POC    Collection Time: 09/28/21 12:39 PM   Result Value Ref Range    Glucose (POC) 140 (H) 65 - 117 mg/dL    Performed by [facility] \[facility] [place] [facility]\""

## 2021-09-29 NOTE — PROGRESS NOTES
0730 Bedside shift change report given to Chanell Yeung RN (oncoming nurse) by Efrain Tapia RN (offgoing nurse). Report included the following information SBAR, Kardex, Intake/Output, MAR and Recent Results. 0845  Blood cultures sent to lab.     0900 CRRT factor increased to 25.      1000 CRRT factor increased to 50.    1200 Temp 96.6 F. Coretta hugger applied. 1212  Versed gtt off.     1128 210cc present on bladder scan. Straight cath produced 150cc dark beverley urine. Per Dr. Zoë Ni, pt to bladder scanned Q shift. 1930 Bedside shift change report given to Radha Menjivar RN (oncoming nurse) by Chanell Yeung RN (offgoing nurse). Report included the following information SBAR, Kardex, Intake/Output, MAR and Recent Results.

## 2021-09-29 NOTE — PROGRESS NOTES
Nephrology Progress Note  Jaswinder Cordova  Date of Admission : 9/22/2021    CC: Follow up for [place]       Assessment and Plan     [place]:  - multifactorial: ATN from hypotension/severe pancreatitis + IV contrast on admission  - no obstruction on CT scan  - continue CVVHD  - no factor  - daily labs for now    Hypophos:  - IV repletion ordered    Nutrition:  - TPN renewed and adjusted    Acute resp failure :  - intubated 9/25    Shock   - on vaso and epi     Pancreatitis     Bradycardia  TAA, b/l BEAU aneurysms  Leukocytosis       Interval History:  Seen and examined. On CRRT, no factor. On 2 pressors. No UOP. remains sedated on the vent. Current Medications: all current  Medications have been eviewed in EPIC  Review of Systems: Review of systems not obtained due to patient factors. Objective:  Vitals:    Vitals:    09/29/21 0700 09/29/21 0724 09/29/21 0800 09/29/21 0815   BP:       Pulse: (!) 57 (!) 58 72 67   Resp: 20 19 18 19   Temp:   97.7 °F (36.5 °C)    SpO2: 100% 100% 99% 100%   Weight:       Height:         Intake and Output:  09/29 0701 - 09/29 1900  In: 22.2 [I.V.:22.2]  Out: -   09/27 1901 - 09/29 0700  In: 5645.5 [I.V.:5245.5]  Out: 4536     Physical Examination:  Pt intubated     yes  General: On vent   Neck:  Teddy +  Resp:  Decreased BS b/l  CV:  RRR,  no murmur or rub, 1+ b/l LE edema  GI:  Non distended, reduced bowel sounds, mild LUQ tenderness  Neurologic:  Sedated on vent   :  No roldan     []    High complexity decision making was performed  []    Patient is at high-risk of decompensation with multiple organ involvement    Lab Data Personally Reviewed: I have reviewed all the pertinent labs, microbiology data and radiology studies during assessment.     Recent Labs     09/29/21  0415 09/28/21  1631 09/28/21  0409 09/28/21  0408 09/28/21  0408 09/27/21  1522 09/27/21  1522   * 136 137   < > 136   < > 135*   K 4.8 4.4 4.4   < > 4.4   < > 4.4    106 108   < > 107   < > 106   CO2 24 25 23   < > 23   < > 21   * 146* 203*   < > 200*   < > 187*   BUN 36* 34* 32*   < > 34*   < > 35*   CREA 2.07* 2.00* 2.19*   < > 2.15*   < > 2.27*   CA 7.9* 7.9* 7.5*   < > 7.4*   < > 7.9*   MG 2.5* 2.7*  --   --  2.4   < > 2.3   PHOS 1.8* 1.8*  --   --  1.9*  2.0*   < > 2.6   ALB 2.3* 2.4* 2.6*   < > 2.6*   < > 2.5*   ALT 30  --  30  --   --   --  33    < > = values in this interval not displayed.      Recent Labs     09/29/21  0415 09/28/21  0408 09/27/21  1522   WBC 25.4* 18.6* 20.1*   HGB 10.0* 9.9* 11.0*   HCT 32.0* 31.4* 33.6*   * 85* 95*     Lab Results   Component Value Date/Time    Specimen Description: HIP LEFT JOINT 01/17/2012 03:00 PM    Specimen Description: HIP LEFT JOINT 01/17/2012 03:00 PM    Specimen Description: NARES 07/28/2011 12:12 AM     Lab Results   Component Value Date/Time    Culture result: PENDING 09/28/2021 08:22 AM    Culture result: NO GROWTH 5 DAYS 09/22/2021 07:43 PM    Culture result: NO SIGNIFICANT GROWTH 02/08/2015 04:51 PM     Recent Results (from the past 24 hour(s))   PROCALCITONIN    Collection Time: 09/28/21  8:22 AM   Result Value Ref Range    Procalcitonin 17.30 ng/mL   CULTURE, RESPIRATORY/SPUTUM/BRONCH W GRAM STAIN    Collection Time: 09/28/21  8:22 AM    Specimen: Sputum,ET Suction   Result Value Ref Range    Special Requests: NO SPECIAL REQUESTS      GRAM STAIN OCCASIONAL WBCS SEEN      GRAM STAIN RARE EPITHELIAL CELLS SEEN      GRAM STAIN NO ORGANISMS SEEN      Culture result: PENDING    GLUCOSE, POC    Collection Time: 09/28/21 12:39 PM   Result Value Ref Range    Glucose (POC) 140 (H) 65 - 117 mg/dL    Performed by [facility]    RENAL FUNCTION PANEL    Collection Time: 09/28/21  4:31 PM   Result Value Ref Range    Sodium 136 136 - 145 mmol/L    Potassium 4.4 3.5 - 5.1 mmol/L    Chloride 106 97 - 108 mmol/L    CO2 25 21 - 32 mmol/L    Anion gap 5 5 - 15 mmol/L    Glucose 146 (H) 65 - 100 mg/dL    BUN 34 (H) 6 - 20 MG/DL    Creatinine 2.00 (H) 0.70 - 1.30 MG/DL    BUN/Creatinine ratio 17 12 - 20      GFR est AA 40 (L) >60 ml/min/1.73m2    GFR est non-AA 33 (L) >60 ml/min/1.73m2    Calcium 7.9 (L) 8.5 - 10.1 MG/DL    Phosphorus 1.8 (L) 2.6 - 4.7 MG/DL    Albumin 2.4 (L) 3.5 - 5.0 g/dL   LACTIC ACID    Collection Time: 09/28/21  4:31 PM   Result Value Ref Range    Lactic acid 1.6 0.4 - 2.0 MMOL/L   MAGNESIUM    Collection Time: 09/28/21  4:31 PM   Result Value Ref Range    Magnesium 2.7 (H) 1.6 - 2.4 mg/dL   GLUCOSE, POC    Collection Time: 09/28/21  6:16 PM   Result Value Ref Range    Glucose (POC) 114 65 - 117 mg/dL    Performed by [address] [place] [address], [address], POC    Collection Time: 09/29/21  2:51 AM   Result Value Ref Range    Glucose (POC) 187 (H) 65 - 117 mg/dL    Performed by Vicki Todd    PHOSPHORUS    Collection Time: 09/29/21  4:15 AM   Result Value Ref Range    Phosphorus 1.8 (L) 2.6 - 4.7 MG/DL   LACTIC ACID    Collection Time: 09/29/21  4:15 AM   Result Value Ref Range    Lactic acid 1.9 0.4 - 2.0 MMOL/L   METABOLIC PANEL, COMPREHENSIVE    Collection Time: 09/29/21  4:15 AM   Result Value Ref Range    Sodium 134 (L) 136 - 145 mmol/L    Potassium 4.8 3.5 - 5.1 mmol/L    Chloride 105 97 - 108 mmol/L    CO2 24 21 - 32 mmol/L    Anion gap 5 5 - 15 mmol/L    Glucose 209 (H) 65 - 100 mg/dL    BUN 36 (H) 6 - 20 MG/DL    Creatinine 2.07 (H) 0.70 - 1.30 MG/DL    BUN/Creatinine ratio 17 12 - 20      GFR est AA 39 (L) >60 ml/min/1.73m2    GFR est non-AA 32 (L) >60 ml/min/1.73m2    Calcium 7.9 (L) 8.5 - 10.1 MG/DL    Bilirubin, total 0.5 0.2 - 1.0 MG/DL    ALT (SGPT) 30 12 - 78 U/L    AST (SGOT) 72 (H) 15 - 37 U/L    Alk.  phosphatase 85 45 - 117 U/L    Protein, total 6.0 (L) 6.4 - 8.2 g/dL    Albumin 2.3 (L) 3.5 - 5.0 g/dL    Globulin 3.7 2.0 - 4.0 g/dL    A-G Ratio 0.6 (L) 1.1 - 2.2     MAGNESIUM    Collection Time: 09/29/21  4:15 AM   Result Value Ref Range    Magnesium 2.5 (H) 1.6 - 2.4 mg/dL   CBC WITH AUTOMATED DIFF    Collection Time: 09/29/21  4:15 AM   Result Value Ref Range    WBC 25.4 (H) 4.1 - 11.1 K/uL    RBC 3.21 (L) 4.10 - 5.70 M/uL    HGB 10.0 (L) 12.1 - 17.0 g/dL    HCT 32.0 (L) 36.6 - 50.3 %    MCV 99.7 (H) 80.0 - 99.0 FL    MCH 31.2 26.0 - 34.0 PG    MCHC 31.3 30.0 - 36.5 g/dL    RDW 15.5 (H) 11.5 - 14.5 %    PLATELET 558 (L) 155 - 400 K/uL    MPV 12.1 8.9 - 12.9 FL    NRBC 0.9 (H) 0  WBC    ABSOLUTE NRBC 0.24 (H) 0.00 - 0.01 K/uL    NEUTROPHILS 69 32 - 75 %    BAND NEUTROPHILS 15 (H) 0 - 6 %    LYMPHOCYTES 4 (L) 12 - 49 %    MONOCYTES 5 5 - 13 %    EOSINOPHILS 0 0 - 7 %    BASOPHILS 0 0 - 1 %    METAMYELOCYTES 4 (H) 0 %    MYELOCYTES 3 (H) 0 %    IMMATURE GRANULOCYTES 0 %    ABS. NEUTROPHILS 21.3 (H) 1.8 - 8.0 K/UL    ABS. LYMPHOCYTES 1.0 0.8 - 3.5 K/UL    ABS. MONOCYTES 1.3 (H) 0.0 - 1.0 K/UL    ABS. EOSINOPHILS 0.0 0.0 - 0.4 K/UL    ABS. BASOPHILS 0.0 0.0 - 0.1 K/UL    ABS. IMM. GRANS. 0.0 K/UL    DF MANUAL      PLATELET COMMENTS Large Platelets      RBC COMMENTS ANISOCYTOSIS  1+        RBC COMMENTS MACROCYTOSIS  1+        RBC COMMENTS POLYCHROMASIA  1+       GLUCOSE, POC    Collection Time: 09/29/21  5:06 AM   Result Value Ref Range    Glucose (POC) 195 (H) 65 - 117 mg/dL    Performed by José Gomez    POC G3 - PUL    Collection Time: 09/29/21  7:29 AM   Result Value Ref Range    FIO2 (POC) 30 %    pH (POC) 7.35 7.35 - 7.45      pCO2 (POC) 42.5 35.0 - 45.0 MMHG    pO2 (POC) 87 80 - 100 MMHG    HCO3 (POC) 23.5 22 - 26 MMOL/L    sO2 (POC) 96.1 92 - 97 %    Base deficit (POC) 2.0 mmol/L    Site DRAWN FROM ARTERIAL LINE      Mode ASSIST CONTROL      Tidal volume 480 ml    Set Rate 18 bpm    PEEP/CPAP (POC) 5 cmH2O    Allens test (POC) Positive      Specimen type (POC) ARTERIAL                     Rosalino Long MD  [facility]   [address] [address]  Phone - [phone]   Fax - [phone]  [email or web address]

## 2021-09-29 NOTE — PROGRESS NOTES
ID Progress Note  2021    Subjective: Intubated it  Episode of hypothermia, required warming blanket  Remain hypotensive    Review of Systems:            Symptom Y/N Comments   Symptom Y/N Comments   Fever/Chills       Chest Pain        Poor Appetite       Edema        Cough       Abdominal Pain        Sputum       Joint Pain        SOB/COATES       Pruritis/Rash        Nausea/vomit       Tolerating PT/OT        Diarrhea       Tolerating Diet        Constipation       Other           Could NOT obtain due to: Intubated it      Objective:     Vitals:   Visit Vitals  BP (!) 105/55   Pulse (!) 58   Temp 98.4 °F (36.9 °C)   Resp 19   Ht 6' 1\" (1.854 m)   Wt 123.1 kg (271 lb 6.2 oz)   SpO2 100%   BMI 35.81 kg/m²        Tmax:  Temp (24hrs), Av.5 °F (36.4 °C), Min:96.4 °F (35.8 °C), Max:98.4 °F (36.9 °C)      PHYSICAL EXAM:  General: Obese, chronically ill appearing, no acute distress    EENT:  EOMI. Anicteric sclerae. Dry mucous membrane  Resp:  Clear in apex with decreased breath sounds at bases,  No accessory muscle use  CV:  Regular  rhythm,  No edema  GI:  Firm, distended, unable to appreciate Bowel sounds  Neurologic:  Intubated it, not following commends   Psych:   Unable to assess insight. Skin:  No rashes.   No jaundice    Labs:   Lab Results   Component Value Date/Time    WBC 25.4 (H) 2021 04:15 AM    Hemoglobin (POC) 15.0 2016 05:20 PM    HGB 10.0 (L) 2021 04:15 AM    Hematocrit (POC) 44 2016 05:20 PM    HCT 32.0 (L) 2021 04:15 AM    PLATELET 653 (L)  04:15 AM    MCV 99.7 (H) 2021 04:15 AM     Lab Results   Component Value Date/Time    Sodium 134 (L) 2021 04:15 AM    Potassium 4.8 2021 04:15 AM    Chloride 105 2021 04:15 AM    CO2 24 2021 04:15 AM    Anion gap 5 2021 04:15 AM    Glucose 209 (H) 2021 04:15 AM    BUN 36 (H) 2021 04:15 AM    Creatinine 2.07 (H) 2021 04:15 AM    BUN/Creatinine ratio 17 2021 04:15 AM    GFR est AA 39 (L) 09/29/2021 04:15 AM    GFR est non-AA 32 (L) 09/29/2021 04:15 AM    Calcium 7.9 (L) 09/29/2021 04:15 AM    Bilirubin, total 0.5 09/29/2021 04:15 AM    Alk. phosphatase 85 09/29/2021 04:15 AM    Protein, total 6.0 (L) 09/29/2021 04:15 AM    Albumin 2.3 (L) 09/29/2021 04:15 AM    Globulin 3.7 09/29/2021 04:15 AM    A-G Ratio 0.6 (L) 09/29/2021 04:15 AM    ALT (SGPT) 30 09/29/2021 04:15 AM     CTA chest, CT of abd/pel: 4.6 cm ascending thoracic aortic aneurysm, without dissection. 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 Pancreas with diffuse peripancreatic fat stranding without discrete fluid  collection, no evidence of necrosis or infection. Prostatomegaly, with findings suspicious for chronic bladder outlet obstruction. US ABD (9/22) Pancreas obscured by bowel gas. Transfer to ICU on 9/24 due to hypotension. Intubated it 9/26    Assessment and Plan   Leukocytosis  ?  ASP PNA  - WBC 13.3->18.6->25.4; escalate ABX therapy, blood cx ordered    Episode of hypothermia; under the warming blanket    Lipase > 3k (9/23), lipase 369    U/A (-)    Blood cx (9/22) no growth     resp cx (9/28) pending    CXR pending       Start IV merrem and vancomycin    Fever work up if temp >= 100.4    Acute pancreatitis - GI following  Elevated LFT; resolved    [place]  Hx of bilateral nonobstructive calyceal calculi and bilateral renal cysts  - creat 2.0    Nephrology following     Ascending thoracic aortic aneurysm & partially trombosis in left internal iliac artery   - further evaluation per primary team    Ash Jacobson NP

## 2021-09-29 NOTE — PROGRESS NOTES
1930: SBAR received from Jose Antonio Vidal. Drip rates verified. 0000: Patient hypotensive - restarted levo and epi with slight improvement. 0045: Patient with HR in 40s and SBP 170s - upon assessment - patient is awake, asynchronous with vent, agitated - restarted sedation and weaned pressors as tolerated. 0730: Shift Summary: Patient on/off dom hugger throughout shift. Pressors titrated as needed to keep MAP > 65. Sedation restarted due to noncompliance with vent, ede not functioning properly while patient awake. Jerry Livers exachanged CVVH filter at 0645. Bedside and Verbal shift change report given to Jose Antonio Vidal  (oncoming nurse) by Afshin Akbar  (offgoing nurse). Report included the following information SBAR, Kardex, Intake/Output, MAR and Recent Results.

## 2021-09-29 NOTE — PROGRESS NOTES
[facility]    ICU TEAM Progress Note    Name: Ruth Valenzuela   : 1950   MRN: [id number]   Date: 2021      I  Subjective:   Progress Note: 2021      Reason for ICU Admission: ARF 2/2 severe pancreatitis      Brief HPI: 71 y/o M w hx of CAD, HTN, OA, and kidney stones presents to the hospital with CC of SOB, abd pain, N/V. He was found to have severe pancreatitis and acute renal injury with associated lactic acidosis and elevated LFTs. He was transferred to the CCU for initiation of CRRT given borderline low blood pressures. Overnight Events:   : No acute event. Remains on vasopressin and low-dose epinephrine. On CRRT.  : CRRT; MV; Pressors  : CRRT; MV; Pressors  : increasing agitation and hypoxia. Intubated. HDL repositioned again. Up on vasopressors  : Pavan Song pulled back - remains positional.   : transferred into ICU. Ivyland Si placed and CRRT started. Active Problem List:     Problem List  Date Reviewed: 2018        Codes Class    Pancreatitis ICD-10-CM: K85.90  ICD-9-CM: 043.7         Combined forms of age-related cataract of right eye ICD-10-CM: H25.811  ICD-9-CM: 366.19     Overview Signed 2018  4:22 PM by Jey Hamilton DO     [facility] IOL OD             Small bowel obstruction ([facility] [address] ICD-10-CM: P47.145  ICD-9-CM: 560.9         Perforated bowel ([address] ICD-10-CM: K63.1  ICD-9-CM: 569.83         Atrial flutter ([facility] [address] ICD-10-CM: I48.92  ICD-9-CM: 427.32         Pneumoperitoneum - abdomen ICD-9-CM: 568.89         Coronary atherosclerosis of native coronary artery ICD-10-CM: I25.10  ICD-9-CM: 414.01               Past Medical History:      has a past medical history of Arthritis, CAD (coronary artery disease), Hypertension, Kidney stones, Nausea & vomiting, Pancreatitis, and Psoriasis.     Past Surgical History:      has a past surgical history that includes hx hip replacement (, 65347); lithotripsy (~); pr eye surg ant [facility] proc unlisted (as child); pr cardiac surg procedure unlist; pr cardiac surg procedure unlist; hx cholecystectomy (2009); hx heent; hx gi; hx gi; hx orthopaedic; hx orthopaedic; ir insert non tunl cvc over 5 yrs (2021); and ir replace cvc non tunl w/o port/pump (2021). Home Medications:     Prior to Admission medications    Medication Sig Start Date End Date Taking? Authorizing Provider   aspirin delayed-release 81 mg tablet Take 81 mg by mouth daily. Yes Provider, Historical   atorvastatin (LIPITOR) 20 mg tablet Take 10 mg by mouth daily. Yes Other, MD Samia   allopurinol (ZYLOPRIM) 300 mg tablet Take 300 mg by mouth daily. Yes Other, MD Samia   atenolol (TENORMIN) 50 mg tablet Take 50 mg by mouth daily. Yes Provider, Historical       Allergies/Social/Family History: Allergies   Allergen Reactions    Albumin Products Rash, Itching and Other (comments)     Hypertension, tachycardia    Penicillins Rash      Social History     Tobacco Use    Smoking status: Former Smoker     Years: 0.00     Types: Cigars    Smokeless tobacco: Never Used    Tobacco comment: used to smoke cigars quit   Substance Use Topics    Alcohol use:  Yes     Alcohol/week: 0.0 standard drinks     Comment: 2 drinks weekly      Family History   Problem Relation Age of Onset    Eczema Mother     Cancer Sister         breast       Review of Systems:     Not able to obtain due to patient medical condition    Objective:   Vital Signs:  Visit Vitals  /61 (BP 1 Location: Left lower arm, BP Patient Position: At rest)   Pulse (!) 57   Temp 98.4 °F (36.9 °C)   Resp 20   Ht 6' 1\" (1.854 m)   Wt 123.1 kg (271 lb 6.2 oz)   SpO2 100%   BMI 35.81 kg/m²    O2 Flow Rate (L/min): 3 l/min O2 Device: Endotracheal tube, Ventilator Temp (24hrs), Av.5 °F (36.4 °C), Min:96.4 °F (35.8 °C), Max:98.4 °F (36.9 °C)           Intake/Output:     Intake/Output Summary (Last 24 hours) at 2021 0710  Last data filed at 2021 0700  Gross per 24 hour   Intake 3073.18 ml   Output 2616 ml   Net 457.18 ml       Physical Exam:    General:  Intubated and sedated  Eye:  negative  Neurologic: sedated  Neck:  RIJ HDL, LIJ CVC  Lungs:  Distant breath sounds  Heart:  regular rate and rhythm  Abdomen:  Soft, distended  Skin:  Normal.    LABS AND  DATA: Personally reviewed  Recent Labs     09/29/21 0415 09/28/21  0408   WBC 25.4* 18.6*   HGB 10.0* 9.9*   HCT 32.0* 31.4*   * 85*     Recent Labs     09/29/21 0415 09/28/21  1631   * 136   K 4.8 4.4    106   CO2 24 25   BUN 36* 34*   CREA 2.07* 2.00*   * 146*   CA 7.9* 7.9*   MG 2.5* 2.7*   PHOS 1.8* 1.8*     Recent Labs     09/29/21 0415 09/28/21  1631 09/28/21  0409 09/28/21  0409   AP 85  --   --  63   TP 6.0*  --   --  5.9*   ALB 2.3* 2.4*   < > 2.6*   GLOB 3.7  --   --  3.3    < > = values in this interval not displayed. No results for input(s): INR, PTP, APTT, INREXT in the last 72 hours. Recent Labs     09/27/21  1044   PHI 7.34*   PCO2I 42.4   PO2I 126*   FIO2I 50     No results for input(s): CPK, CKMB, TROIQ, BNPP in the last 72 hours. Hemodynamics:   PAP:   CO:     Wedge:   CI:     CVP:    SVR:       PVR:       Ventilator Settings:  Mode Rate Tidal Volume Pressure FiO2 PEEP   Assist control, Volume control   480 ml    30 % 5 cm H20     Peak airway pressure: 17 cm H2O    Minute ventilation: 8.88 l/min        MEDS: Reviewed    Chest X-Ray:  CXR Results  (Last 48 hours)    None          Assessment and Plan:   - Acute Pancreatitis  - Septic Shock  - Acute Hypoxic Respiratory Failure  - [place] on CRRT  - CAD  - HTN    Neuro: Versed, SATs  Pulm: mechanical ventilation, wean fio2 as able. Chlorhexidine. HOB >30 degrees. Cardiac: MAP goal >65.  epi, vaso. Hydrocort, midodrine. Remains bradycardic. Appreciate cardiology  Renal: CRRT per nephpatric Smith. Remains anuric. Appreciate nephrology team  GI: NGT to LCS. Protonix. Start tpn this weekend  ID: Cefepime, Flagyl. Follow cultures. Appreciate infectious disease team  Heme: SQH. Cont asa. Endo: NIYAH  MSK:  PT/OT     F - Feeding:  No - tpn. May be able to start enteral feeding soon  A - Analgesia: Fentanyl  S - Sedation: Versed  T - DVT Prophylaxis: SCD's or Sequential Compression Device, SQ  C - Code Status: Full Code  H - Head of Bed: > 30 Degrees  U - Ulcer Prophylaxis: Protonix (pantoprazole)   G - Glycemic Control: Insulin  S - Spontaneous Breathing Trial: N/A  B - Bowel Regimen: None needed at this time  I - Indwelling Catheter:              Tubes: Nasogastric Tube, ETT  Lines: Peripheral IV, Arterial Line, Central Line and Teddy  Drains: Smith Catheter  D - De-escalation of Antibiotics: n/a    DISPOSITION  Stay in ICU    CRITICAL CARE CONSULTANT NOTE  I had a face to face encounter with the patient, reviewed and interpreted patient data including clinical events, labs, images, vital signs, I/O's, and examined patient. I have discussed the case and the plan and management of the patient's care with the consulting services, the bedside nurses and the respiratory therapist.      NOTE OF PERSONAL INVOLVEMENT IN CARE   This patient has a high probability of imminent, clinically significant deterioration, which requires the highest level of preparedness to intervene urgently. I participated in the decision-making and personally managed or directed the management of the following life and organ supporting interventions that required my frequent assessment to treat or prevent imminent deterioration. I personally spent 40 minutes of critical care time. This is time spent at this critically ill patient's bedside actively involved in patient care as well as the coordination of care and discussions with the patient's family. This does not include any procedural time which has been billed separately. Tamera Madison M.D.   Staff Intensivist/Pulmonologist  [employer]  9/29/2021

## 2021-09-29 NOTE — DIALYSIS
CRRT / [id number]    Orders   Mode: CVVHD restarted @ 0655   Blood Flow Rate: 180ml/min   Prismasol Dose: 25ml/kg/hr  3000ml/hr   Prismasol Concentrate: 4K/2.5Ca   Blood Warmer Temp: 37*C   Net Fluid Removal: 0ml/hr     Metrics   BP: 105/55   HR: 55   Access Pressure: -60   Filter Pressure: 122   Return Pressure: 78   TMP: 25   Pressure Drop: 9     Access   Type & Location: RIJ non-tunneled CVC   Comments: CDI dated 9/25/21 with transparent dressing and biopatch in place. Hubs and limbs of CVC cleansed per policy, +aspiration/+flushx2. Labs   HBsAg (Antigen) / date: Negative 9/25/21   HBsAb (Antibody) / date: Susceptible 9/25/21   Source: [facility]     Safety:   Time Out Done:   (Time) 0630   Consent obtained/signed: Verified   Education: Access/Infection   Primary Nurse Rpt Pre: Beba Luther RN   Primary Nurse Rpt Post: Beba Luther RN     Comments / Plan:   Pt orders, notes, labs, code status and consent reviewed. Time out complete. CVVHD restarted due to increased filter pressures. All possible blood returned by CRRT RN. Luther Cruz primed and tested. Lines are visible and secure with blood warmer attached to return line and set at 37*C. Education pre/post with primary RN.

## 2021-09-29 NOTE — PROGRESS NOTES
Pharmacist Note - Vancomycin Dosing    Consult provided for this 70 y.o. male for indication of sepsis  - [place] multifactorial: ATN from hypotension/severe pancreatitis + IV contrast on admission  Antibiotic regimen: Merrem and Vanc      Recent Labs     21  0415 21  1631 21  0409 21  0408 21  0408 21  1522 21  1522   WBC 25.4*  --   --   --  18.6*  --  20.1*   CREA 2.07* 2.00* 2.19*   < > 2.15*   < > 2.27*   BUN 36* 34* 32*   < > 34*   < > 35*    < > = values in this interval not displayed. Frequency of BMP: daily  Height: 185.4 cm  Weight: 123.1 kg  Est CrCl: CVVHD for [place]  Temp (24hrs), Av.5 °F (36.4 °C), Min:96.4 °F (35.8 °C), Max:98.4 °F (36.9 °C)    Cultures:   sputum - pending    The plan below is expected to result in a target trough = 15 mg/L    Therapy will be initiated with a loading dose of 2500 mg IV x 1. A random level will be obtained tomorrow morning to assist with further dosing. Pharmacy to follow patient daily and order levels / make dose adjustments as appropriate.

## 2021-09-29 NOTE — DIALYSIS
CRRT / [phone]    Orders   Mode: CVVHD restarted @ 1950   Blood Flow Rate: 200 ml/min   Dialysate Flow Rate: 3000 ml/hr   Prismasol Concentrate: 4K / 2.5Ca   Blood Warmer Temp: 37*C   Net Fluid Removal: 0 ml/hr     Metrics   BP: 97/56   HR: 68   Access Pressure: -54   Filter Pressure: 120   Return Pressure: 86   TMP: 19   Pressure Drop: 3     Access   Type & Location: RIJ temporary non-tunneled CVC, dressing C/D/I with bio-patch dated 09/26/21. No signs of redness, drainage, or infection visualized. Each catheter limb disinfected for 60 seconds per limb with alcohol swabs. Caps removed, dialysis CVC hub scrubbed with Prevantics for 15 seconds, followed by a 5 second dry time per Hospital P&P. +asp/+flush x 2 ports. Labs   HBsAg (Antigen) / date: Negatibe                  09/25/21                     HBsAb (Antibody) / date: Susceptible  09/25/21   Source: Epic     Safety:   Time Out Done:   (Time) 1945   Consent obtained/signed: Verified   Education: Access/Site Care   Primary Nurse Rpt Pre: Cesar Unger RN   Primary Nurse Rpt Post: Cesar Unger RN     Comments / Plan:   Gus West RN at bedside to change filter d/t rising PDs. Patient, code status, labs, and orders verified. Consents on chart. Time out completed. Gus West RN able to return all pt blood from circuit (165 mls.)  Old set discarded in red biohazard bin. HF-1000 filter set-up, primed w/ 1L NS, tested and running well. Lines visible and connections secure with blood warmer to return line at 37*C. Education, pre and post to primary RN.

## 2021-09-29 NOTE — PROGRESS NOTES
Renal Dosing/Monitoring  Medication: Merrem   Current regimen:  500 mg IV every 8 hr  Recent Labs     09/29/21  0415 09/28/21  1631 09/28/21  0409   CREA 2.07* 2.00* 2.19*   BUN 36* 34* 32*     Estimated CrCl:  CVVHD  Wt: 123.1 kg  Plan: Adjusted dose to 500 mg IV q6h per renal adjustment protocol.

## 2021-09-30 NOTE — PROGRESS NOTES
[facility]    ICU TEAM Progress Note    Name: Dick Ramirez   : 1950   MRN: [id number]   Date: 2021      I  Subjective:   Progress Note: 2021      Reason for ICU Admission: ARF 2/2 severe pancreatitis      Brief HPI: 70 y/o M w hx of CAD, HTN, OA, and kidney stones presents to the hospital with CC of SOB, abd pain, N/V. He was found to have severe pancreatitis and acute renal injury with associated lactic acidosis and elevated LFTs. He was transferred to the CCU for initiation of CRRT given borderline low blood pressures.      Overnight Events:   : No acute event. Off pressors  : No acute event. Remains on vasopressin and low-dose epinephrine. On CRRT.  : CRRT; MV; Pressors  : CRRT; MV; Pressors  : increasing agitation and hypoxia. Intubated. HDL repositioned again. Up on vasopressors  : Pepe Danielle pulled back - remains positional.   : transferred into ICU. Thurmon Grain placed and CRRT started. Active Problem List:     Problem List  Date Reviewed: 2018        Codes Class    Pancreatitis ICD-10-CM: K85.90  ICD-9-CM: 972.6         Combined forms of age-related cataract of right eye ICD-10-CM: H25.811  ICD-9-CM: 366.19     Overview Signed 2018  4:22 PM by Sanju Koroma DO     [facility] IOL OD             Small bowel obstruction ([facility] [address] ICD-10-CM: R28.205  ICD-9-CM: 560.9         Perforated bowel ([address] ICD-10-CM: K63.1  ICD-9-CM: 569.83         Atrial flutter ([address] ICD-10-CM: I48.92  ICD-9-CM: 427.32         Pneumoperitoneum - abdomen ICD-9-CM: 568.89         Coronary atherosclerosis of native coronary artery ICD-10-CM: I25.10  ICD-9-CM: 414.01               Past Medical History:      has a past medical history of Arthritis, CAD (coronary artery disease), Hypertension, Kidney stones, Nausea & vomiting, Pancreatitis, and Psoriasis.     Past Surgical History:      has a past surgical history that includes hx hip replacement (, [id number]); lithotripsy (~); pr eye surg ant [facility]mt proc unlisted (as child); pr cardiac surg procedure unlist; pr cardiac surg procedure unlist; hx cholecystectomy (2009); hx heent; hx gi; hx gi; hx orthopaedic; hx orthopaedic; ir insert non tunl cvc over 5 yrs (2021); and ir replace cvc non tunl w/o port/pump (2021). Home Medications:     Prior to Admission medications    Medication Sig Start Date End Date Taking? Authorizing Provider   aspirin delayed-release 81 mg tablet Take 81 mg by mouth daily. Yes Provider, Historical   atorvastatin (LIPITOR) 20 mg tablet Take 10 mg by mouth daily. Yes Other, MD Samia   allopurinol (ZYLOPRIM) 300 mg tablet Take 300 mg by mouth daily. Yes Other, MD Samia   atenolol (TENORMIN) 50 mg tablet Take 50 mg by mouth daily. Yes Provider, Historical       Allergies/Social/Family History: Allergies   Allergen Reactions    Albumin Products Rash, Itching and Other (comments)     Hypertension, tachycardia    Penicillins Rash      Social History     Tobacco Use    Smoking status: Former Smoker     Years: 0.00     Types: Cigars    Smokeless tobacco: Never Used    Tobacco comment: used to smoke cigars quit   Substance Use Topics    Alcohol use:  Yes     Alcohol/week: 0.0 standard drinks     Comment: 2 drinks weekly      Family History   Problem Relation Age of Onset    Eczema Mother     Cancer Sister         breast       Review of Systems:     Not able to obtain due to patient medical condition    Objective:   Vital Signs:  Visit Vitals  BP 98/63   Pulse (!) 112   Temp 99.5 °F (37.5 °C)   Resp 24   Ht 6' 1\" (1.854 m)   Wt 123.4 kg (272 lb 0.8 oz)   SpO2 92%   BMI 35.89 kg/m²    O2 Flow Rate (L/min): 3 l/min O2 Device: Endotracheal tube Temp (24hrs), Av.7 °F (36.5 °C), Min:96.6 °F (35.9 °C), Max:99.5 °F (37.5 °C)           Intake/Output:     Intake/Output Summary (Last 24 hours) at 2021 0919  Last data filed at 2021 0900  Gross per 24 hour   Intake 2280.06 ml   Output 4040 ml   Net -1759.94 ml       Physical Exam:    General:  Intubated and sedated  Eye:  negative  Neurologic: sedated  Neck:  RIJ HDL, LIJ CVC  Lungs:  Distant breath sounds  Heart:  regular rate and rhythm  Abdomen:  Soft, distended  Skin:  Normal.    LABS AND  DATA: Personally reviewed  Recent Labs     09/30/21 0318 09/29/21 0415   WBC 23.1* 25.4*   HGB 10.2* 10.0*   HCT 31.8* 32.0*   * 108*     Recent Labs     09/30/21 0318 09/29/21 1753    136   K 4.5 4.6    106   CO2 24 23   BUN 38* 37*   CREA 1.92* 1.90*   * 167*   CA 8.2* 7.8*   MG 2.7* 2.7*   PHOS 2.2* 2.7     Recent Labs     09/30/21 0318 09/29/21 1753 09/29/21 0415 09/29/21 0415   AP 97  --   --  85   TP 6.1*  --   --  6.0*   ALB 2.3* 2.4*   < > 2.3*   GLOB 3.8  --   --  3.7    < > = values in this interval not displayed. No results for input(s): INR, PTP, APTT, INREXT in the last 72 hours. Recent Labs     09/29/21  0729 09/27/21  1044   PHI 7.35 7.34*   PCO2I 42.5 42.4   PO2I 87 126*   FIO2I 30 50     No results for input(s): CPK, CKMB, TROIQ, BNPP in the last 72 hours. Hemodynamics:   PAP:   CO:     Wedge:   CI:     CVP:    SVR:       PVR:       Ventilator Settings:  Mode Rate Tidal Volume Pressure FiO2 PEEP   Assist control   4780 ml    30 % 5 cm H20     Peak airway pressure: 11 cm H2O    Minute ventilation: 9.07 l/min        MEDS: Reviewed    Chest X-Ray:  CXR Results  (Last 48 hours)    None          Assessment and Plan:   - Acute Pancreatitis  - Septic Shock  - Acute Hypoxic Respiratory Failure  - [place] on CRRT  - CAD  - HTN     Neuro: Versed, SATs  Pulm: mechanical ventilation, wean fio2 as able. Chlorhexidine. HOB >30 degrees. Cardiac: MAP goal >65.  epi, vaso. Hydrocort, midodrine. Remains bradycardic. Appreciate cardiology  Renal: CRRT per nephro, may be able to switch to IHD as he is off pressors. Smith. Remains anuric. Appreciate nephrology team  GI: NGT to LCS. Protonix.  Start tpn this weekend  ID: Antibiotic changed to meropenem and vancomycin by infectious disease team on September 29  Heme: SQH. Cont asa. Endo: NIYAH  MSK:  PT/OT     F - Feeding:  No - tpn. May be able to start enteral feeding soon  A - Analgesia: Fentanyl  S - Sedation: Versed  T - DVT Prophylaxis: SCD's or Sequential Compression Device, SQH  C - Code Status: Full Code  H - Head of Bed: > 30 Degrees  U - Ulcer Prophylaxis: Protonix (pantoprazole)   G - Glycemic Control: Insulin  S - Spontaneous Breathing Trial: N/A  B - Bowel Regimen: None needed at this time  I - Indwelling Catheter:              TXSBV: Nasogastric Tube, ETT  Lines: Peripheral IV, Arterial Line, Central Line and Teddy  Drains: Smith Catheter  D - De-escalation of Antibiotics: n/a    DISPOSITION  Stay in ICU    CRITICAL CARE CONSULTANT NOTE  I had a face to face encounter with the patient, reviewed and interpreted patient data including clinical events, labs, images, vital signs, I/O's, and examined patient. I have discussed the case and the plan and management of the patient's care with the consulting services, the bedside nurses and the respiratory therapist.      NOTE OF PERSONAL INVOLVEMENT IN CARE   This patient has a high probability of imminent, clinically significant deterioration, which requires the highest level of preparedness to intervene urgently. I participated in the decision-making and personally managed or directed the management of the following life and organ supporting interventions that required my frequent assessment to treat or prevent imminent deterioration. I personally spent 40 minutes of critical care time. This is time spent at this critically ill patient's bedside actively involved in patient care as well as the coordination of care and discussions with the patient's family. This does not include any procedural time which has been billed separately. Parisa Benavidez M.D.   Staff Intensivist/Pulmonologist  [facility]  9/30/2021

## 2021-09-30 NOTE — PROGRESS NOTES
0730 Bedside shift change report given to EWA Whaley (oncoming nurse) by Jefe Rodriguez RN (offgoing nurse). Report included the following information SBAR, Kardex, Intake/Output, MAR and Recent Results. 0800 Coretta hugger removed. 0930 Davita at bedside for filter change. Pt disconnected from CRRT temporarily. Pt bathed and skin assesed due to limited turning. Mepilex applied to right hip prophylactically. STAT cxr and ekg ordered. 1015 BP 70/49 (57). Vaso restarted. 1030 CRRT restarted. 1100 Temp 100.8. Tylenol ordered. 1200 62/45 (52). Albumin ordered. 1235  ETT advanced 1 cm per cxr recommendation. 1245  BP 67/44 (53) despite albumin. Levo started. 1930 Bedside shift change report given to Jefe Rodriguez RN (oncoming nurse) by EWA Whaley (offgoing nurse). Report included the following information SBAR, Kardex, Intake/Output, MAR and Recent Results.

## 2021-09-30 NOTE — DIALYSIS
CRRT / [phone]         Orders   Mode: CVVHD restarted @ 1900   Blood Flow Rate: 200 ml/min   Dialysate Flow Rate: 3000 ml/hr   Prismasol Concentrate: 4K / 2.5Ca   Blood Warmer Temp: 37*C   Net Fluid Removal: 100 ml/hr          Metrics   BP: 142/70   HR: 49   Access Pressure: -59   Filter Pressure: 133   Return Pressure: 93   TMP: 20   Pressure Drop: 10          Access   Type & Location: RIJ temporary non-tunneled CVC, dressing C/D/I with bio-patch dated 09/26/21. No signs of redness, drainage, or infection visualized. Each catheter limb disinfected for 60 seconds per limb with alcohol swabs. Caps removed, dialysis CVC hub scrubbed with Prevantics for 15 seconds, followed by a 5 second dry time per Hospital P&P. +asp/+flush x 2 ports.           Labs   HBsAg (Antigen) / date: Negative 09/25/21                     HBsAb (Antibody) / date: Susceptible  09/25/21   Source: Epic          Safety:   Time Out Done:   (Time) 1850   Consent obtained/signed: Verified   Education: Access/Site Care   Primary Nurse Rpt PreOwelizabeth Pacheco RN   Primary Nurse Rpt Post: Yanira Davila RN      Comments / Plan:   Joo Killian RN at bedside to change filter d/t rising PDs and large obstructed clot in deaeration chamber. Patient, code status, labs, and orders verified. Consents on chart. Time out completed. Joo Killian RN able to return all pt blood from circuit (165 mls.)  Old set discarded in red biohazard bin. HF-1000 filter set-up, primed w/ 1L NS, tested and running well. Lines visible and connections secure with blood warmer to return line at 37*C. Education, pre and post to primary RN.

## 2021-09-30 NOTE — PROGRESS NOTES
Pharmacist Note - Vancomycin Dosing  Therapy day 2  Indication: sepsis  Current regimen: 2500 mg  x1 9/29    Recent Labs     09/30/21  0318 09/29/21  1753 09/29/21  0415 09/28/21  0409 09/28/21  0408   WBC 23.1*  --  25.4*  --  18.6*   CREA 1.92* 1.90* 2.07*   < > 2.15*   BUN 38* 37* 36*   < > 34*    < > = values in this interval not displayed. A random vancomycin level of 11.8 mcg/mL was obtained ~ 19 hours post LD  Goal target range Trough 10-15 mcg/mL      Plan: Will order vancomycin 1250 mg Q 24hr. Pharmacy will continue to monitor this patient daily for changes in clinical status and renal function.

## 2021-09-30 NOTE — PROGRESS NOTES
Nephrology Progress Note  Boris Cook  Date of Admission : 9/22/2021    CC: Follow up for [place]       Assessment and Plan     [place]:  - multifactorial: ATN from hypotension/severe pancreatitis + IV contrast on admission  - no obstruction on CT scan  - continue CVVHD  - increase factor to 100/hr  - may be able to transition to IHD in the next 1-2 days    Lytes:  - K/phos/mag stable    Nutrition:  - TPN renewed     Acute resp failure :  - intubated 9/25    Pancreatitis     Bradycardia  TAA, b/l BEAU aneurysms  Leukocytosis       Interval History:  Seen and examined. On CRRT, tolerating a factor of 50/hr. Off pressors. Sedated on the vent. No UOP. Current Medications: all current  Medications have been eviewed in EPIC  Review of Systems: Review of systems not obtained due to patient factors. Objective:  Vitals:    Vitals:    09/30/21 0600 09/30/21 0700 09/30/21 0711 09/30/21 0800   BP: 138/74 98/63     Pulse: (!) 110 (!) 105 (!) 6 (!) 113   Resp: 19 17  20   Temp:    99.5 °F (37.5 °C)   SpO2: 92%      Weight: 123.4 kg (272 lb 0.8 oz)      Height:         Intake and Output:  09/30 0701 - 09/30 1900  In: 94.5 [I.V.:94.5]  Out: -   09/28 1901 - 09/30 0700  In: 3742.1 [I.V.:3542.1]  Out: 5510 [Urine:150]    Physical Examination:  Pt intubated     yes  General: On vent   Neck:  Teddy +  Resp:  Decreased BS b/l  CV:  RRR,  no murmur or rub, 1+ b/l LE edema  GI:  Non distended, reduced bowel sounds  Neurologic:  Sedated on vent   :  No roldan     []    High complexity decision making was performed  []    Patient is at high-risk of decompensation with multiple organ involvement    Lab Data Personally Reviewed: I have reviewed all the pertinent labs, microbiology data and radiology studies during assessment.     Recent Labs     09/30/21  0318 09/29/21  1753 09/29/21  0415 09/28/21  1631 09/28/21  0409    136 134*   < > 137   K 4.5 4.6 4.8   < > 4.4    106 105   < > 108   CO2 24 23 24   < > 23   * 167* 209*   < > 203*   BUN 38* 37* 36*   < > 32*   CREA 1.92* 1.90* 2.07*   < > 2.19*   CA 8.2* 7.8* 7.9*   < > 7.5*   MG 2.7* 2.7* 2.5*   < >  --    PHOS 2.2* 2.7 1.8*   < >  --    ALB 2.3* 2.4* 2.3*   < > 2.6*   ALT 29  --  30  --  30    < > = values in this interval not displayed.      Recent Labs     09/30/21  0318 09/29/21  0415 09/28/21  0408   WBC 23.1* 25.4* 18.6*   HGB 10.2* 10.0* 9.9*   HCT 31.8* 32.0* 31.4*   * 108* 85*     Lab Results   Component Value Date/Time    Specimen Description: HIP LEFT JOINT 01/17/2012 03:00 PM    Specimen Description: HIP LEFT JOINT 01/17/2012 03:00 PM    Specimen Description: NARES 07/28/2011 12:12 AM     Lab Results   Component Value Date/Time    Culture result: NO GROWTH AFTER 18 HOURS 09/29/2021 09:05 AM    Culture result: MODERATE YEAST, (APPARENT CANDIDA ALBICANS) (A) 09/28/2021 08:22 AM    Culture result: LIGHT NORMAL RESPIRATORY LUIS 09/28/2021 08:22 AM     Recent Results (from the past 24 hour(s))   CULTURE, BLOOD, PAIRED    Collection Time: 09/29/21  9:05 AM    Specimen: Blood   Result Value Ref Range    Special Requests: NO SPECIAL REQUESTS      Culture result: NO GROWTH AFTER 18 HOURS     GLUCOSE, POC    Collection Time: 09/29/21  1:10 PM   Result Value Ref Range    Glucose (POC) 160 (H) 65 - 117 mg/dL    Performed by Kierra Koehler    GLUCOSE, POC    Collection Time: 09/29/21  5:52 PM   Result Value Ref Range    Glucose (POC) 153 (H) 65 - 117 mg/dL    Performed by [address] [place] [address]    RENAL FUNCTION PANEL    Collection Time: 09/29/21  5:53 PM   Result Value Ref Range    Sodium 136 136 - 145 mmol/L    Potassium 4.6 3.5 - 5.1 mmol/L    Chloride 106 97 - 108 mmol/L    CO2 23 21 - 32 mmol/L    Anion gap 7 5 - 15 mmol/L    Glucose 167 (H) 65 - 100 mg/dL    BUN 37 (H) 6 - 20 MG/DL    Creatinine 1.90 (H) 0.70 - 1.30 MG/DL    BUN/Creatinine ratio 19 12 - 20      GFR est AA 43 (L) >60 ml/min/1.73m2    GFR est non-AA 35 (L) >60 ml/min/1.73m2    Calcium 7.8 (L) 8.5 - 10.1 MG/DL    Phosphorus 2.7 2.6 - 4.7 MG/DL    Albumin 2.4 (L) 3.5 - 5.0 g/dL   LACTIC ACID    Collection Time: 09/29/21  5:53 PM   Result Value Ref Range    Lactic acid 1.6 0.4 - 2.0 MMOL/L   MAGNESIUM    Collection Time: 09/29/21  5:53 PM   Result Value Ref Range    Magnesium 2.7 (H) 1.6 - 2.4 mg/dL   GLUCOSE, POC    Collection Time: 09/29/21 10:57 PM   Result Value Ref Range    Glucose (POC) 153 (H) 65 - 117 mg/dL    Performed by Tiff Yi Dr, COMPREHENSIVE    Collection Time: 09/30/21  3:18 AM   Result Value Ref Range    Sodium 137 136 - 145 mmol/L    Potassium 4.5 3.5 - 5.1 mmol/L    Chloride 105 97 - 108 mmol/L    CO2 24 21 - 32 mmol/L    Anion gap 8 5 - 15 mmol/L    Glucose 161 (H) 65 - 100 mg/dL    BUN 38 (H) 6 - 20 MG/DL    Creatinine 1.92 (H) 0.70 - 1.30 MG/DL    BUN/Creatinine ratio 20 12 - 20      GFR est AA 42 (L) >60 ml/min/1.73m2    GFR est non-AA 35 (L) >60 ml/min/1.73m2    Calcium 8.2 (L) 8.5 - 10.1 MG/DL    Bilirubin, total 0.6 0.2 - 1.0 MG/DL    ALT (SGPT) 29 12 - 78 U/L    AST (SGOT) 59 (H) 15 - 37 U/L    Alk.  phosphatase 97 45 - 117 U/L    Protein, total 6.1 (L) 6.4 - 8.2 g/dL    Albumin 2.3 (L) 3.5 - 5.0 g/dL    Globulin 3.8 2.0 - 4.0 g/dL    A-G Ratio 0.6 (L) 1.1 - 2.2     MAGNESIUM    Collection Time: 09/30/21  3:18 AM   Result Value Ref Range    Magnesium 2.7 (H) 1.6 - 2.4 mg/dL   CBC WITH AUTOMATED DIFF    Collection Time: 09/30/21  3:18 AM   Result Value Ref Range    WBC 23.1 (H) 4.1 - 11.1 K/uL    RBC 3.24 (L) 4.10 - 5.70 M/uL    HGB 10.2 (L) 12.1 - 17.0 g/dL    HCT 31.8 (L) 36.6 - 50.3 %    MCV 98.1 80.0 - 99.0 FL    MCH 31.5 26.0 - 34.0 PG    MCHC 32.1 30.0 - 36.5 g/dL    RDW 15.5 (H) 11.5 - 14.5 %    PLATELET 746 (L) 426 - 400 K/uL    MPV 12.7 8.9 - 12.9 FL    NRBC 0.7 (H) 0  WBC    ABSOLUTE NRBC 0.17 (H) 0.00 - 0.01 K/uL    NEUTROPHILS 91 (H) 32 - 75 %    BAND NEUTROPHILS 4 0 - 6 %    LYMPHOCYTES 1 (L) 12 - 49 %    MONOCYTES 3 (L) 5 - 13 % EOSINOPHILS 0 0 - 7 %    BASOPHILS 0 0 - 1 %    METAMYELOCYTES 1 (H) 0 %    IMMATURE GRANULOCYTES 0 %    ABS. NEUTROPHILS 21.9 (H) 1.8 - 8.0 K/UL    ABS. LYMPHOCYTES 0.2 (L) 0.8 - 3.5 K/UL    ABS. MONOCYTES 0.7 0.0 - 1.0 K/UL    ABS. EOSINOPHILS 0.0 0.0 - 0.4 K/UL    ABS. BASOPHILS 0.0 0.0 - 0.1 K/UL    ABS. IMM. GRANS. 0.0 K/UL    DF MANUAL      RBC COMMENTS ANISOCYTOSIS  1+       VANCOMYCIN, RANDOM    Collection Time: 09/30/21  3:18 AM   Result Value Ref Range    Vancomycin, random 11.8 UG/ML   PHOSPHORUS    Collection Time: 09/30/21  3:18 AM   Result Value Ref Range    Phosphorus 2.2 (L) 2.6 - 4.7 MG/DL                   Mingo Maldonado MD  [address]  Phone - [phone]   Fax - [phone]  [email or web address]

## 2021-09-30 NOTE — DIALYSIS
CRRT / [phone]    Orders   Mode: CVVHD restarted @ 1030   Blood Flow Rate: 200 ml/min   Dialysate Flow Rate: 3000 ml/hr   Prismasol Concentrate: 4K / 2.5Ca   Blood Warmer Temp: 37*C   Net Fluid Removal: 100 ml/hr     Metrics   BP: 96/38   HR: 95   Access Pressure: -60   Filter Pressure: 141   Return Pressure: 100   TMP: 15   Pressure Drop: 11     Access   Type & Location: RIJ temporary non-tunneled CVC. Dressing changed as per hospital policy, dated 1/21/43. No signs of redness, drainage, or infection visualized. Each catheter limb disinfected for 60 seconds per limb with alcohol swabs. Caps removed, dialysis CVC hub scrubbed with Prevantics for 15 seconds, followed by a 5 second dry time per Hospital P&P. +asp/+flush x 2 ports. Labs   HBsAg (Antigen) / date: Negatibe                  09/25/21                     HBsAb (Antibody) / date: Susceptible  09/25/21   Source: Epic     Safety:   Time Out Done:   (Time) 0930   Consent obtained/signed: Verified   Education: Access/Site Care   Primary Nurse Rpt Pre: Fredy Gordon RN   Primary Nurse Rpt Post: Fredy Gordon RN     Comments / Plan:   Patient, code status, labs, and orders verified. Consents on chart. Time out completed. CVVHD restarted due to large clot in deaeration chamber. Vicky Mendoza RN able to return all pt blood from circuit (165 mls.)  Old set discarded in red biohazard bin. HF-1000 filter set-up, primed w/ 1L NS, tested and running well. Lines visible and connections secure with blood warmer to return line at 37*C. Education, pre and post to primary RN.

## 2021-09-30 NOTE — PROGRESS NOTES
ID Progress Note  2021    Subjective: Intubated it  Remain hypotensive    Review of Systems:            Symptom Y/N Comments   Symptom Y/N Comments   Fever/Chills       Chest Pain        Poor Appetite       Edema        Cough       Abdominal Pain        Sputum       Joint Pain        SOB/COATES       Pruritis/Rash        Nausea/vomit       Tolerating PT/OT        Diarrhea       Tolerating Diet        Constipation       Other           Could NOT obtain due to: Intubated it      Objective:     Vitals:   Visit Vitals  BP 98/63   Pulse (!) 6   Temp 98.1 °F (36.7 °C)   Resp 17   Ht 6' 1\" (1.854 m)   Wt 123.4 kg (272 lb 0.8 oz)   SpO2 92%   BMI 35.89 kg/m²        Tmax:  Temp (24hrs), Av.4 °F (36.3 °C), Min:96.6 °F (35.9 °C), Max:98.1 °F (36.7 °C)      PHYSICAL EXAM:  General: Obese, chronically ill appearing, no acute distress    EENT:  EOMI. Anicteric sclerae. Dry mucous membrane  Resp:  Clear in apex with decreased breath sounds at bases,  No accessory muscle use  CV:  Regular  rhythm,  No edema  GI:  Firm, distended, unable to appreciate Bowel sounds  Neurologic:  Intubated it, not following commends   Psych:   Unable to assess insight. Skin:  No rashes.   No jaundice    Labs:   Lab Results   Component Value Date/Time    WBC 23.1 (H) 2021 03:18 AM    Hemoglobin (POC) 15.0 2016 05:20 PM    HGB 10.2 (L) 2021 03:18 AM    Hematocrit (POC) 44 2016 05:20 PM    HCT 31.8 (L) 2021 03:18 AM    PLATELET 580 (L)  03:18 AM    MCV 98.1 2021 03:18 AM     Lab Results   Component Value Date/Time    Sodium 137 2021 03:18 AM    Potassium 4.5 2021 03:18 AM    Chloride 105 2021 03:18 AM    CO2 24 2021 03:18 AM    Anion gap 8 2021 03:18 AM    Glucose 161 (H) 2021 03:18 AM    BUN 38 (H) 2021 03:18 AM    Creatinine 1.92 (H) 2021 03:18 AM    BUN/Creatinine ratio 20 2021 03:18 AM    GFR est AA 42 (L) 2021 03:18 AM    GFR est non-AA 35 (L) 09/30/2021 03:18 AM    Calcium 8.2 (L) 09/30/2021 03:18 AM    Bilirubin, total 0.6 09/30/2021 03:18 AM    Alk. phosphatase 97 09/30/2021 03:18 AM    Protein, total 6.1 (L) 09/30/2021 03:18 AM    Albumin 2.3 (L) 09/30/2021 03:18 AM    Globulin 3.8 09/30/2021 03:18 AM    A-G Ratio 0.6 (L) 09/30/2021 03:18 AM    ALT (SGPT) 29 09/30/2021 03:18 AM     CTA chest, CT of abd/pel: 4.6 cm ascending thoracic aortic aneurysm, without dissection. 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 Pancreas with diffuse peripancreatic fat stranding without discrete fluid  collection, no evidence of necrosis or infection. Prostatomegaly, with findings suspicious for chronic bladder outlet obstruction. US ABD (9/22) Pancreas obscured by bowel gas. Transfer to ICU on 9/24 due to hypotension. Intubated it 9/26    Assessment and Plan   Leukocytosis  ? ASP PNA  - WBC 13.3->18.6->25.4->23k    Afebrile overnight    Lipase > 3k (9/23), lipase 369->508    U/A (-)    Blood cx (9/22) no growth     resp cx (9/28) candida albicans    Difficult to do CXR 9/28 & 9/29 due to severe hypotension.      Ordered another CXR to be done if able, procal 17.30   If CXR doesn't revealed concerns of ASDZ then recommend repeat CT of abd/pel      Start IV merrem and vancomycin    Fever work up if temp >= 100.4    Acute pancreatitis - GI following  Elevated LFT; resolved    [place]  Hx of bilateral nonobstructive calyceal calculi and bilateral renal cysts  - creat 1.9    Nephrology following     Ascending thoracic aortic aneurysm & partially trombosis in left internal iliac artery   - further evaluation per primary team    Above plan d/w and agreed by with Dr. Abrahan Whiting, NP

## 2021-10-01 NOTE — WOUND CARE
WOCN Note:     New consult placed for recommendations for right hip with blanching erythema and medically unstable to turn. Assessed in room 4219. Chart reviewed. Admitted DX:  Pancreatitis  Past Medical History:   Diagnosis Date    Arthritis     back hips knees and arms    CAD (coronary artery disease)     Hypertension     Kidney stones     Nausea & vomiting     after hip surgery    Pancreatitis     In 2/2011, resolved    Psoriasis      Assessment:   Patient is intubated, sedated and not able to be turned because the hemodialysis access get blocked with movement. Bed: [facility]  Patient positioned on right side with pillow. Heels offloaded with pillows. 1. Per Nick Barton, Blanching red erythema to right hip. Currently using foam dressings to offload. Wound, Pressure Prevention & Skin Care Recommendations:    1. Minimize layers of linen/pads under patient to optimize support surface. 2.  Turn/reposition approximately every 2 hours and offload heels. 3.  Manage moisture/ Keep skin folds clean and dry. 4.  Right hip:  Venelex TID. 5.  Right hip:  Protect with large sacral foam dressing and offload with Z flow fluidized pillow. 6.  Continue to make micro shifts to offload and reduce pressure. Discussed above plan with Connie Stanley.     Transition of Care:   Plan to follow as needed while admitted to hospital.    CONI Lux RN [facility]patient Wound [facility]  Available on Perfect Serve  Pager [phone]  Office [phone]

## 2021-10-01 NOTE — PROGRESS NOTES
[facility]    ICU TEAM Progress Note    Name: Nicole Croft   : 1950   MRN: [id number]   Date: 10/1/2021      I  Subjective:   Progress Note: 10/1/2021      Reason for ICU Admission: Pancreatitis, acute kidney injury, respiratory failure, septic shock     Brief HPI: 66 y/o M w hx of CAD, HTN, OA, and kidney stones presents to the hospital with CC of SOB, abd pain, N/V. He was found to have severe pancreatitis and acute renal injury with associated lactic acidosis and elevated LFTs. He was transferred to the CCU for initiation of CRRT given borderline low blood pressures.      Overnight Events:   10/1: Required going back on pressors [low-dose vasopressin], otherwise remain anuric on CRRT  : No acute event. Off pressors  : No acute event.  Remains on vasopressin and low-dose epinephrine.  On CRRT.  : CRRT; MV; Pressors  : CRRT; MV; Pressors  : increasing agitation and hypoxia. Intubated. HDL repositioned again. Up on vasopressors  : Shania Frank pulled back - remains positional.   : transferred into ICU.  Tiny Horan placed and CRRT started.       Active Problem List:     Problem List  Date Reviewed: 2018        Codes Class    Pancreatitis ICD-10-CM: K85.90  ICD-9-CM: 149.0         Combined forms of age-related cataract of right eye ICD-10-CM: H25.811  ICD-9-CM: 366.19     Overview Signed 2018  4:22 PM by DO TRISHA Gudino IOL OD             Small bowel obstruction ([address] ICD-10-CM: A92.936  ICD-9-CM: 560.9         Perforated bowel ([address] ICD-10-CM: K63.1  ICD-9-CM: 569.83         Atrial flutter ([address] ICD-10-CM: I48.92  ICD-9-CM: 427.32         Pneumoperitoneum - abdomen ICD-9-CM: 568.89         Coronary atherosclerosis of native coronary artery ICD-10-CM: I25.10  ICD-9-CM: 414.01               Past Medical History:      has a past medical history of Arthritis, CAD (coronary artery disease), Hypertension, Kidney stones, Nausea & vomiting, Pancreatitis, and Psoriasis. Past Surgical History:      has a past surgical history that includes hx hip replacement (, 64123); lithotripsy (~); pr eye surg ant sgmt proc unlisted (as child); pr cardiac surg procedure unlist; pr cardiac surg procedure unlist; hx cholecystectomy (2009); hx heent; hx gi; hx gi; hx orthopaedic; hx orthopaedic; ir insert non tunl cvc over 5 yrs (2021); and ir replace cvc non tunl w/o port/pump (2021). Home Medications:     Prior to Admission medications    Medication Sig Start Date End Date Taking? Authorizing Provider   aspirin delayed-release 81 mg tablet Take 81 mg by mouth daily. Yes Provider, Historical   atorvastatin (LIPITOR) 20 mg tablet Take 10 mg by mouth daily. Yes Other, MD Samia   allopurinol (ZYLOPRIM) 300 mg tablet Take 300 mg by mouth daily. Yes Other, MD Samia   atenolol (TENORMIN) 50 mg tablet Take 50 mg by mouth daily. Yes Provider, Historical       Allergies/Social/Family History: Allergies   Allergen Reactions    Albumin Products Rash, Itching and Other (comments)     Hypertension, tachycardia    Penicillins Rash      Social History     Tobacco Use    Smoking status: Former Smoker     Years: 0.00     Types: Cigars    Smokeless tobacco: Never Used    Tobacco comment: used to smoke cigars quit   Substance Use Topics    Alcohol use:  Yes     Alcohol/week: 0.0 standard drinks     Comment: 2 drinks weekly      Family History   Problem Relation Age of Onset    Eczema Mother     Cancer Sister         breast       Review of Systems:     Not able to obtain due to patient for this condition    Objective:   Vital Signs:  Visit Vitals  BP (!) 96/59   Pulse 98   Temp 98.2 °F (36.8 °C)   Resp 22   Ht 6' 1\" (1.854 m)   Wt 122.9 kg (270 lb 15.1 oz)   SpO2 95%   BMI 35.75 kg/m²    O2 Flow Rate (L/min): 3 l/min O2 Device: Endotracheal tube Temp (24hrs), Av.6 °F (37 °C), Min:97.2 °F (36.2 °C), Max:100.6 °F (38.1 °C)           Intake/Output: Intake/Output Summary (Last 24 hours) at 10/1/2021 0720  Last data filed at 10/1/2021 0600  Gross per 24 hour   Intake 2740.07 ml   Output 1987 ml   Net 753.07 ml       Physical Exam:    General:  Intubated and sedated  Eye:  negative  Neurologic: sedated  Neck:  RIJ HDL, LIJ CVC  Lungs:  Distant breath sounds  Heart:  regular rate and rhythm  Abdomen:  Soft, distended  Skin:  Normal.    LABS AND  DATA: Personally reviewed  Recent Labs     10/01/21  0307 09/30/21  0318   WBC 24.0* 23.1*   HGB 10.2* 10.2*   HCT 32.6* 31.8*   * 110*     Recent Labs     10/01/21  0307 09/30/21  1725 09/30/21  1724 09/30/21  1724   * 135*   < > 135*   K 4.9 5.0   < > 4.9    104   < > 104   CO2 22 25   < > 25   BUN 39* 39*   < > 39*   CREA 1.79* 1.97*   < > 1.93*   * 221*   < > 220*   CA 8.3* 8.2*   < > 7.8*   MG 2.8* 2.7*  --   --    PHOS 3.1  --   --  2.8    < > = values in this interval not displayed. Recent Labs     10/01/21  0307 09/30/21  1725 09/30/21  1724 09/30/21  0833    119*  --   --    TP 6.0* 6.1*  --   --    ALB 2.2* 2.2*   < >  --    GLOB 3.8 3.9  --   --    LPSE  --   --   --  508*    < > = values in this interval not displayed. No results for input(s): INR, PTP, APTT, INREXT in the last 72 hours. Recent Labs     09/29/21  0729   PHI 7.35   PCO2I 42.5   PO2I 87   FIO2I 30     No results for input(s): CPK, CKMB, TROIQ, BNPP in the last 72 hours. Hemodynamics:   PAP:   CO:     Wedge:   CI:     CVP:    SVR:       PVR:       Ventilator Settings:  Mode Rate Tidal Volume Pressure FiO2 PEEP   Assist control   480 ml    50 % 5 cm H20     Peak airway pressure: 18 cm H2O    Minute ventilation: 10.2 l/min        MEDS: Reviewed    Chest X-Ray:  CXR Results  (Last 48 hours)               09/30/21 1014  XR CHEST PORT Final result    Impression:  1. ET tube is 6 cm above the caesar. This could be advanced 1 cm.    2. There are areas of atelectasis medially in the right lower lobe that have   increased. There is improved aeration in left lower lobe       Narrative:  EXAM: XR CHEST PORT       INDICATION: leukocytosis       COMPARISON: 9/25/2021       FINDINGS: A portable AP radiograph of the chest was obtained at 1008 hours. The   patient is on a cardiac monitor. The ET tube is 6 cm above the caesar. This   could be advanced 1 cm. Left IJ catheter and right IJ catheter are in   satisfactory position. There is no change in cardiomegaly. Mild atelectasis in the left lower lobe has decreased. Atelectasis in right   lower lobe has increased. No pneumonia. No pulmonary edema. Assessment and Plan:   - Acute Pancreatitis  - Septic Shock  - Acute Hypoxic Respiratory Failure  - [place] on CRRT  - CAD  - HTN     Neuro: Versed/fentanyl, Daily spontaneous awakening trial  Pulm: mechanical ventilation, wean fio2 as able. Chlorhexidine. HOB >30 degrees. Cardiac: MAP goal >65.  epi, vaso. Hydrocort, midodrine.  Remains bradycardic.  Appreciate cardiology  Renal: CRRT per nephro, Smith.  Remains anuric. Appreciate nephrology team  GI: NGT to LCS. Protonix. On TPN  ID: Antibiotic changed to meropenem and vancomycin by infectious disease team on September 29  Heme: SQH. Cont asa.    Endo: NIYAH  MSK:  PT/OT     F - Feeding:  No - tpn.  May be able to start enteral feeding soon but his lipase remains elevated  A - Analgesia: Fentanyl  S - Sedation: Versed  T - DVT Prophylaxis: SCD's or Sequential Compression Device, SQH  C - Code Status: Full Code  H - Head of Bed: > 30 Degrees  U - Ulcer Prophylaxis: Protonix (pantoprazole)   G - Glycemic Control: Insulin  S - Spontaneous Breathing Trial: N/A  B - Bowel Regimen: None needed at this time  I - Indwelling Catheter:              UGUVF: Nasogastric Tube, ETT  Lines: Peripheral IV, Arterial Line, Central Line and Teddy  Drains: Smith Catheter  D - De-escalation of Antibiotics: n/a    DISPOSITION  Stay in ICU    CRITICAL CARE CONSULTANT NOTE  I had a face to face encounter with the patient, reviewed and interpreted patient data including clinical events, labs, images, vital signs, I/O's, and examined patient. I have discussed the case and the plan and management of the patient's care with the consulting services, the bedside nurses and the respiratory therapist.      NOTE OF PERSONAL INVOLVEMENT IN CARE   This patient has a high probability of imminent, clinically significant deterioration, which requires the highest level of preparedness to intervene urgently. I participated in the decision-making and personally managed or directed the management of the following life and organ supporting interventions that required my frequent assessment to treat or prevent imminent deterioration. I personally spent 40 minutes of critical care time. This is time spent at this critically ill patient's bedside actively involved in patient care as well as the coordination of care and discussions with the patient's family. This does not include any procedural time which has been billed separately. Yunior Molina M.D.   Staff Intensivist/Pulmonologist  [facility]  10/1/2021

## 2021-10-01 NOTE — PROGRESS NOTES
1930: Bedside and Verbal shift change report given to Román Calzada RN (oncoming nurse) by Shanthi Pop, student (offgoing nurse). Report included the following information SBAR, Kardex, ED Summary, OR Summary, Procedure Summary, Intake/Output, MAR, Recent Results, Med Rec Status, Cardiac Rhythm NSR/PVC's, Alarm Parameters  and Dual Neuro Assessment. Drips: NS @ 13, Fent @ 150, Versed @ 6, TPN @ 75, Vaso @ 0.02, 4K CRRT bath    2000: Resumed pt care. MAP 59-60, Vaso increased. Saraiita @ bedside, filter changed, now running w/ no factor. 2100: Pt progressively more bradycardic (53-56), MD notified. Orders to start weaning versed slowly, if drops lower will start epi gtt. 2345: Pt pulling Vt 850-1100, MD aware. Orders to increase fentanyl gtt/give PRN dilaudid     0000: Bladder scan 71    0030: CRRT continuously alarming access extremely negative, lines flushed/reversed with no improvement. Nichelle paged     0040: Pt still pulling Vt 900-1100, orders to max fentanyl gtt & increase versed gtt.     0300: MAP 80's on 0.01 vaso, pulling factor @ 25    0335: CRRT alarming access extremely negative despite all troubleshooting. Nichelle RN @ bedside. Blood returned, MD paged about restarting d/t ongoing issues, orders to not restart @ this time. Teddy heparin locked     0400: AM labs drawn and sent     0420: ABG:  pO2 122; pCO2 37; pH 7.36;  HCO3 20, %O2 Sat 98.    0500: HR 50, epi gtt ordered    0730: Bedside and Verbal shift change report given to Eloy Crawford, Sahra Pelletier, student (oncoming nurse) by Román Calzada RN (offgoing nurse). Report included the following information SBAR, Kardex, ED Summary, Procedure Summary, Intake/Output, MAR, Recent Results, Med Rec Status, Cardiac Rhythm SB/1AVB/PVC, Alarm Parameters  and Dual Neuro Assessment.      Drips: NS @ 13, Fent @ 200, Versed @ 7, TPN @ 75, Vaso @ 0.03, 4K CRRT bath

## 2021-10-01 NOTE — PROGRESS NOTES
0730: Bedside and Verbal shift change report given to Kristy (student nurse) and Clarice Grajeda RN (oncoming nurse) by Colin Ling (offgoing nurse). Report included the following information SBAR, Kardex, Intake/Output, MAR, Recent Results and Cardiac Rhythm sinus rhythm. 0120 Nephrologist at bedside, pt condition and lab results reviewed. Plan to increase factor as tolerated, goal 100 mL/hr.     1350 Wound care consulted d/t blanchable redness on R hip. New orders received for Venelex and large sacral foam dressing. 1312 Pt BP 80/57 (64), Vasopressin restarted, see MAR for further titration details. 1500 Pt /74 (91), started pulling factor of 50 mL/hr.    1551 Repeat lactic and metabolic drawn. 1900 Pt CRRT filter clotted, blood returned. Georgia Hong notified. 1930 Bedside and Verbal shift change report given to Rebeca Perez (oncoming nurse) by Kristy (student nurse) and Clarice Grajeda RN (offgoing nurse). Report included the following information SBAR, Kardex, Intake/Output, MAR, Recent Results and Cardiac Rhythm sinus rhythm.

## 2021-10-01 NOTE — DIALYSIS
CRRT / [phone]    Orders   Mode: CVVHD   Blood Flow Rate: 200 ml/min   Dialysate Flow Rate: 3000 ml/hr   Prismasol Concentrate: 4K / 2.5Ca   Blood Warmer Temp: 37*C   Net Fluid Removal: 100 ml/hr     Metrics   BP: 102/60   HR: 60   Access Pressure: -126   Filter Pressure: 204   Return Pressure: 84   TMP: 51   Pressure Drop: 86     Access   Type & Location: RIJ non-tunneled CVC C/D/I with transparent dressing and biopatch in place dated 09/30/21   Comments:                                        Labs   HBsAg (Antigen) / date: Negative                  09/25/21                            HBsAb (Antibody) / date: Susceptible  09/25/21   Source: Epic     Safety:   Time Out Done:   (Time) 0030   Consent obtained/signed: Verified   Education: Access/Site Care   Primary Nurse Rpt Pre: LILLY Mead RN   Primary Nurse Rpt Post: LILLY Mead RN     Comments / Plan:   Patient, orders, line and consent verified. Labs, notes and code status reviewed. [id number] filter running well with no indications for change at this time. Lines visible/secure with blood warmer attached to the return line and set to 37C*. Education and Pre/Post with primary RN.

## 2021-10-01 NOTE — DIALYSIS
CRRT / [phone]           Orders   Mode: CVVHD restarted @ 2010   Blood Flow Rate: 200 ml/min   Dialysate Flow Rate: 3000 ml/hr   Prismasol Concentrate: 4K / 2.5Ca   Blood Warmer Temp: 37*C   Net Fluid Removal: 100 ml/hr            Metrics   BP: 88/52   HR: 64   Access Pressure: -54   Filter Pressure: 134   Return Pressure: 104   TMP: 14   Pressure Drop: 5            Access   Type & Location: RIJ temporary non-tunneled CVC, dressing C/D/I with bio-patch dated 09/30/21. No signs of redness, drainage, or infection visualized. Each catheter limb disinfected for 60 seconds per limb with alcohol swabs. Caps removed, dialysis CVC hub scrubbed with Prevantics for 15 seconds, followed by a 5 second dry time per Hospital P&P. +asp/+flush x 2 ports.             Labs   HBsAg (Antigen) / date: Negative 09/25/21                     HBsAb (Antibody) / date: Susceptible  09/25/21   Source: Epic            Safety:   Time Out Done:   (Time) 1850   Consent obtained/signed: Verified   Education: Access/Site Care   Primary Nurse Rpt Pre: ROYCE Lisa RN   Primary Nurse Rpt Post: ROYCE Lisa RN      Comments / Plan:   Asiya Camacho RN at bedside to change filter d/t pt 'filter clotted. '. Patient, code status, labs, and orders verified. Consents on chart. Time out completed. Primary RN able to return all pt blood from circuit (165 mls.)  Old set discarded in red biohazard bin. HF-1000 filter set-up, primed w/ 1L NS, tested and running well. Lines visible and connections secure with blood warmer to return line at 37*C. Education, pre and post to primary RN.

## 2021-10-01 NOTE — PROGRESS NOTES
Spiritual Care Assessment/Progress Note  [facility]      NAME: Alondra Ramirez      MRN: [id number]  AGE: 70 y.o. SEX: male  Druze Affiliation: Zoroastrianism   Language: English     10/1/2021     Total Time (in minutes): 25     Spiritual Assessment begun in [facility] through conversation with:         []Patient        [x] Family    [] Friend(s)        Reason for Consult: Initial/Spiritual assessment, critical care     Spiritual beliefs: (Please include comment if needed)     [x] Identifies with a deandre tradition:         [] Supported by a deandre community:            [] Claims no spiritual orientation:           [] Seeking spiritual identity:                [] Adheres to an individual form of spirituality:           [] Not able to assess:                           Identified resources for coping:      [x] Prayer                               [] Music                  [] Guided Imagery     [x] Family/friends                 [] Pet visits     [] Devotional reading                         [] Unknown     [] Other:                                            Interventions offered during this visit: (See comments for more details)    Patient Interventions: Spiritual care volunteer support     Family/Friend(s):  Affirmation of emotions/emotional suffering, Affirmation of deandre, Catharsis/review of pertinent events in supportive environment, Coping skills reviewed/reinforced, Normalization of emotional/spiritual concerns, Prayer (assurance of)     Plan of Care:     [] Support spiritual and/or cultural needs    [] Support AMD and/or advance care planning process      [] Support grieving process   [] Coordinate Rites and/or Rituals    [] Coordination with community clergy   [] No spiritual needs identified at this time   [] Detailed Plan of Care below (See Comments)  [] Make referral to Music Therapy  [] Make referral to Pet Therapy     [] Make referral to Addiction services  [] Make referral to [facility]  [] Make referral to Spiritual Care Partner  [] No future visits requested        [x] Follow up upon further referrals     Comments:  for initial visit in CCU. Pt's wife is at bedside and welcomed visit. Let her know of  availability. Provided pastoral listening, support and assurance of prayer. Talked with staff for update. Please contact [phone] Abdi sam for further support.      [email or web address] NAW[place], MACE   [phone])

## 2021-10-01 NOTE — PROGRESS NOTES
ID Progress Note  10/1/2021    Subjective: Intubated it    Review of Systems:            Symptom Y/N Comments   Symptom Y/N Comments   Fever/Chills       Chest Pain        Poor Appetite       Edema        Cough       Abdominal Pain        Sputum       Joint Pain        SOB/COATES       Pruritis/Rash        Nausea/vomit       Tolerating PT/OT        Diarrhea       Tolerating Diet        Constipation       Other           Could NOT obtain due to: Intubated it      Objective:     Vitals:   Visit Vitals  BP (!) 96/59   Pulse 98   Temp 98.2 °F (36.8 °C)   Resp 22   Ht 6' 1\" (1.854 m)   Wt 122.9 kg (270 lb 15.1 oz)   SpO2 95%   BMI 35.75 kg/m²        Tmax:  Temp (24hrs), Av.6 °F (37 °C), Min:97.2 °F (36.2 °C), Max:100.6 °F (38.1 °C)      PHYSICAL EXAM:  General: Obese, chronically ill appearing, no acute distress    EENT:  EOMI. Anicteric sclerae. Dry mucous membrane  Resp:  Clear in apex with decreased breath sounds at bases,  No accessory muscle use  CV:  Regular  rhythm,  No edema  GI:  Firm, distended, unable to appreciate Bowel sounds  Neurologic:  Intubated it, not following commends   Psych:   Unable to assess insight. Skin:  No rashes.   No jaundice    Labs:   Lab Results   Component Value Date/Time    WBC 24.0 (H) 10/01/2021 03:07 AM    Hemoglobin (POC) 15.0 2016 05:20 PM    HGB 10.2 (L) 10/01/2021 03:07 AM    Hematocrit (POC) 44 2016 05:20 PM    HCT 32.6 (L) 10/01/2021 03:07 AM    PLATELET 179 (L)  03:07 AM    MCV 97.9 10/01/2021 03:07 AM     Lab Results   Component Value Date/Time    Sodium 135 (L) 10/01/2021 03:07 AM    Potassium 4.9 10/01/2021 03:07 AM    Chloride 105 10/01/2021 03:07 AM    CO2 22 10/01/2021 03:07 AM    Anion gap 8 10/01/2021 03:07 AM    Glucose 180 (H) 10/01/2021 03:07 AM    BUN 39 (H) 10/01/2021 03:07 AM    Creatinine 1.79 (H) 10/01/2021 03:07 AM    BUN/Creatinine ratio 22 (H) 10/01/2021 03:07 AM    GFR est AA 46 (L) 10/01/2021 03:07 AM    GFR est non-AA 38 (L) 10/01/2021 03:07 AM    Calcium 8.3 (L) 10/01/2021 03:07 AM    Bilirubin, total 0.7 10/01/2021 03:07 AM    Alk. phosphatase 117 10/01/2021 03:07 AM    Protein, total 6.0 (L) 10/01/2021 03:07 AM    Albumin 2.2 (L) 10/01/2021 03:07 AM    Globulin 3.8 10/01/2021 03:07 AM    A-G Ratio 0.6 (L) 10/01/2021 03:07 AM    ALT (SGPT) 25 10/01/2021 03:07 AM     CTA chest, CT of abd/pel: 4.6 cm ascending thoracic aortic aneurysm, without dissection. 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 Pancreas with diffuse peripancreatic fat stranding without discrete fluid  collection, no evidence of necrosis or infection. Prostatomegaly, with findings suspicious for chronic bladder outlet obstruction. US ABD (9/22) Pancreas obscured by bowel gas. Transfer to ICU on 9/24 due to hypotension. Intubated it 9/26  C-line and dialysis catheter placement 9/26    Assessment and Plan   Leukocytosis  ? ASP PNA  - WBC 24k, procal 17.30 (9/28)    T-max 100.6; blood cx from 9/28 no growth so far    Lipase > 3k (9/23), lipase 369->508    Blood cx (9/22) no growth     resp cx (9/28) candida albicans      CXR (9/30) There are areas of atelectasis medially in the right lower lobe that have  increased.  There is improved aeration in left lower lobe      Recommend repeat CT of abd/pel if amendable      Continue with IV merrem and vancomycin    Fever work up if temp >= 100.4    Acute pancreatitis - GI following  Elevated LFT; resolved    CHNIA  Hx of bilateral nonobstructive calyceal calculi and bilateral renal cysts  - creat 1.7    Nephrology following     Ascending thoracic aortic aneurysm & partially trombosis in left internal iliac artery   - further evaluation per primary team    Above plan d/w and agreed by with Dr. Beth Burnham, NP

## 2021-10-01 NOTE — DIALYSIS
CRRT / [phone]           Orders   Mode: CVVHD restarted @ 1425   Blood Flow Rate: 200 ml/min   Dialysate Flow Rate: 3000 ml/hr   Prismasol Concentrate: 4K / 2.5Ca   Blood Warmer Temp: 37*C   Net Fluid Removal: 100 ml/hr            Metrics   BP: 81/57   HR: 87   Access Pressure: -56   Filter Pressure: 163   Return Pressure: 133   TMP: 22   Pressure Drop: 3            Access   Type & Location: RIJ temporary non-tunneled CVC, dressing C/D/I with bio-patch dated 09/26/21. No signs of redness, drainage, or infection visualized. Each catheter limb disinfected for 60 seconds per limb with alcohol swabs. Caps removed, dialysis CVC hub scrubbed with Prevantics for 15 seconds, followed by a 5 second dry time per Hospital P&P. +asp/+flush x 2 ports.             Labs   HBsAg (Antigen) / date: Negative 09/25/21                     HBsAb (Antibody) / date: Susceptible  09/25/21   Source: Epic            Safety:   Time Out Done:   (Time) 4284   Consent obtained/signed: Verified   Education: Access/Site Care   Primary Nurse Rpt PreErick Son RN   Primary Nurse Rpt Post: Елена Pal RN      Comments / Plan:   Arlene Bolden RN at bedside to change filter d/t rising PD. Bartolome Euceda, code status, labs, and orders verified. Consents on chart. Time out completed. Darrell MCNEIL able to return all pt blood from circuit (165 mls.)  Old set discarded in red biohazard bin. HF-1000 filter set-up, primed w/ 1L NS, tested and running well. Lines visible and connections secure with blood warmer to return line at 37*C. Education, pre and post to primary RN.

## 2021-10-01 NOTE — PROGRESS NOTES
Nephrology Progress Note  Jayy Young  Date of Admission : 9/22/2021    CC: Follow up for [place]       Assessment and Plan     [place]:  - multifactorial: ATN from hypotension/severe pancreatitis + IV contrast on admission  - no obstruction on CT scan  - continue CVVHD  - factor as tolerated  - daily labs and I/Os    Lytes:  - K/phos/mag stable    Nutrition:  - TPN renewed     Acute resp failure :  - intubated 9/25    Pancreatitis     Bradycardia  TAA, b/l BEAU aneurysms  Leukocytosis       Interval History:  Seen and examined. On CRRT, no factor. BP labile. On and off pressors most of the night. Poor UOP. Sedated on the ventn. Current Medications: all current  Medications have been eviewed in EPIC  Review of Systems: Review of systems not obtained due to patient factors. Objective:  Vitals:    Vitals:    10/01/21 0600 10/01/21 0700 10/01/21 0800 10/01/21 0812   BP: (!) 96/59      Pulse: 71 98 (!) 114 (!) 110   Resp: 16 22 20 23   Temp:       SpO2:  95% 94% 94%   Weight:       Height:         Intake and Output:  No intake/output data recorded. 09/29 1901 - 10/01 0700  In: 3939.1 [I.V.:3939.1]  Out: 3824     Physical Examination:  Pt intubated     yes  General: On vent   Neck:  Teddy +  Resp:  Decreased BS b/l  CV:  RRR,  no murmur or rub, 1+ b/l LE edema  GI:  Non distended, reduced bowel sounds  Neurologic:  Sedated on vent   :  No roldan     []    High complexity decision making was performed  []    Patient is at high-risk of decompensation with multiple organ involvement    Lab Data Personally Reviewed: I have reviewed all the pertinent labs, microbiology data and radiology studies during assessment.     Recent Labs     10/01/21  0307 09/30/21  1725 09/30/21  1724 09/30/21  0833 09/30/21  0318 09/30/21  0318   * 135* 135*  --    < > 137   K 4.9 5.0 4.9  --    < > 4.5    104 104  --    < > 105   CO2 22 25 25  --    < > 24   * 221* 220*  --    < > 161*   BUN 39* 39* 39*  --    < > 38* CREA 1.79* 1.97* 1.93*  --    < > 1.92*   CA 8.3* 8.2* 7.8*  --    < > 8.2*   MG 2.8* 2.7*  --   --   --  2.7*   PHOS 3.1  --  2.8 2.1*   < > 2.2*   ALB 2.2* 2.2* 2.2*  --    < > 2.3*   ALT 25 26  --   --   --  29    < > = values in this interval not displayed. Recent Labs     10/01/21  0307 09/30/21  0318 09/29/21  0415   WBC 24.0* 23.1* 25.4*   HGB 10.2* 10.2* 10.0*   HCT 32.6* 31.8* 32.0*   * 110* 108*     Lab Results   Component Value Date/Time    Specimen Description: HIP LEFT JOINT 01/17/2012 03:00 PM    Specimen Description: HIP LEFT JOINT 01/17/2012 03:00 PM    Specimen Description: NARES 07/28/2011 12:12 AM     Lab Results   Component Value Date/Time    Culture result: NO GROWTH 2 DAYS 09/29/2021 09:05 AM    Culture result: MODERATE YEAST, (APPARENT CANDIDA ALBICANS) (A) 09/28/2021 08:22 AM    Culture result: LIGHT NORMAL RESPIRATORY LUIS 09/28/2021 08:22 AM     Recent Results (from the past 24 hour(s))   EKG, 12 LEAD, INITIAL    Collection Time: 09/30/21  9:47 AM   Result Value Ref Range    Ventricular Rate 102 BPM    Atrial Rate 102 BPM    P-R Interval 192 ms    QRS Duration 110 ms    Q-T Interval 350 ms    QTC Calculation (Bezet) 456 ms    Calculated P Axis 44 degrees    Calculated R Axis 85 degrees    Calculated T Axis 28 degrees    Diagnosis       Sinus tachycardia with occasional premature ventricular complexes  When compared with ECG of 22-SEP-2021 10:29,  premature ventricular complexes are now present  Vent.  rate has increased BY  51 BPM  Confirmed by Parvin Diaz M.D., [facility] ([id number]) on 9/30/2021 11:03:25 PM     GLUCOSE, POC    Collection Time: 09/30/21  1:54 PM   Result Value Ref Range    Glucose (POC) 184 (H) 65 - 117 mg/dL    Performed by [address]    RENAL FUNCTION PANEL    Collection Time: 09/30/21  5:24 PM   Result Value Ref Range    Sodium 135 (L) 136 - 145 mmol/L    Potassium 4.9 3.5 - 5.1 mmol/L    Chloride 104 97 - 108 mmol/L    CO2 25 21 - 32 mmol/L    Anion gap 6 5 - 15 mmol/L    Glucose 220 (H) 65 - 100 mg/dL    BUN 39 (H) 6 - 20 MG/DL    Creatinine 1.93 (H) 0.70 - 1.30 MG/DL    BUN/Creatinine ratio 20 12 - 20      GFR est AA 42 (L) >60 ml/min/1.73m2    GFR est non-AA 34 (L) >60 ml/min/1.73m2    Calcium 7.8 (L) 8.5 - 10.1 MG/DL    Phosphorus 2.8 2.6 - 4.7 MG/DL    Albumin 2.2 (L) 3.5 - 5.0 g/dL   LACTIC ACID    Collection Time: 09/30/21  5:25 PM   Result Value Ref Range    Lactic acid 1.8 0.4 - 2.0 MMOL/L   METABOLIC PANEL, COMPREHENSIVE    Collection Time: 09/30/21  5:25 PM   Result Value Ref Range    Sodium 135 (L) 136 - 145 mmol/L    Potassium 5.0 3.5 - 5.1 mmol/L    Chloride 104 97 - 108 mmol/L    CO2 25 21 - 32 mmol/L    Anion gap 6 5 - 15 mmol/L    Glucose 221 (H) 65 - 100 mg/dL    BUN 39 (H) 6 - 20 MG/DL    Creatinine 1.97 (H) 0.70 - 1.30 MG/DL    BUN/Creatinine ratio 20 12 - 20      GFR est AA 41 (L) >60 ml/min/1.73m2    GFR est non-AA 34 (L) >60 ml/min/1.73m2    Calcium 8.2 (L) 8.5 - 10.1 MG/DL    Bilirubin, total 0.9 0.2 - 1.0 MG/DL    ALT (SGPT) 26 12 - 78 U/L    AST (SGOT) 46 (H) 15 - 37 U/L    Alk.  phosphatase 119 (H) 45 - 117 U/L    Protein, total 6.1 (L) 6.4 - 8.2 g/dL    Albumin 2.2 (L) 3.5 - 5.0 g/dL    Globulin 3.9 2.0 - 4.0 g/dL    A-G Ratio 0.6 (L) 1.1 - 2.2     MAGNESIUM    Collection Time: 09/30/21  5:25 PM   Result Value Ref Range    Magnesium 2.7 (H) 1.6 - 2.4 mg/dL   GLUCOSE, POC    Collection Time: 09/30/21  6:38 PM   Result Value Ref Range    Glucose (POC) 192 (H) 65 - 117 mg/dL    Performed by [address] [place] [address], [address], POC    Collection Time: 09/30/21 11:38 PM   Result Value Ref Range    Glucose (POC) 187 (H) 65 - 117 mg/dL    Performed by Master [employer]    PHOSPHORUS    Collection Time: 10/01/21  3:07 AM   Result Value Ref Range    Phosphorus 3.1 2.6 - 4.7 MG/DL   METABOLIC PANEL, COMPREHENSIVE    Collection Time: 10/01/21  3:07 AM   Result Value Ref Range    Sodium 135 (L) 136 - 145 mmol/L    Potassium 4.9 3.5 - 5.1 mmol/L    Chloride 105 97 - 108 mmol/L    CO2 22 21 - 32 mmol/L    Anion gap 8 5 - 15 mmol/L    Glucose 180 (H) 65 - 100 mg/dL    BUN 39 (H) 6 - 20 MG/DL    Creatinine 1.79 (H) 0.70 - 1.30 MG/DL    BUN/Creatinine ratio 22 (H) 12 - 20      GFR est AA 46 (L) >60 ml/min/1.73m2    GFR est non-AA 38 (L) >60 ml/min/1.73m2    Calcium 8.3 (L) 8.5 - 10.1 MG/DL    Bilirubin, total 0.7 0.2 - 1.0 MG/DL    ALT (SGPT) 25 12 - 78 U/L    AST (SGOT) 45 (H) 15 - 37 U/L    Alk. phosphatase 117 45 - 117 U/L    Protein, total 6.0 (L) 6.4 - 8.2 g/dL    Albumin 2.2 (L) 3.5 - 5.0 g/dL    Globulin 3.8 2.0 - 4.0 g/dL    A-G Ratio 0.6 (L) 1.1 - 2.2     MAGNESIUM    Collection Time: 10/01/21  3:07 AM   Result Value Ref Range    Magnesium 2.8 (H) 1.6 - 2.4 mg/dL   CBC WITH AUTOMATED DIFF    Collection Time: 10/01/21  3:07 AM   Result Value Ref Range    WBC 24.0 (H) 4.1 - 11.1 K/uL    RBC 3.33 (L) 4.10 - 5.70 M/uL    HGB 10.2 (L) 12.1 - 17.0 g/dL    HCT 32.6 (L) 36.6 - 50.3 %    MCV 97.9 80.0 - 99.0 FL    MCH 30.6 26.0 - 34.0 PG    MCHC 31.3 30.0 - 36.5 g/dL    RDW 15.3 (H) 11.5 - 14.5 %    PLATELET 398 (L) 327 - 400 K/uL    MPV 12.6 8.9 - 12.9 FL    NRBC 0.4 (H) 0  WBC    ABSOLUTE NRBC 0.09 (H) 0.00 - 0.01 K/uL    NEUTROPHILS 72 32 - 75 %    BAND NEUTROPHILS 18 (H) 0 - 6 %    LYMPHOCYTES 5 (L) 12 - 49 %    MONOCYTES 3 (L) 5 - 13 %    EOSINOPHILS 1 0 - 7 %    BASOPHILS 0 0 - 1 %    MYELOCYTES 1 (H) 0 %    IMMATURE GRANULOCYTES 0 %    ABS. NEUTROPHILS 21.6 (H) 1.8 - 8.0 K/UL    ABS. LYMPHOCYTES 1.2 0.8 - 3.5 K/UL    ABS. MONOCYTES 0.7 0.0 - 1.0 K/UL    ABS. EOSINOPHILS 0.2 0.0 - 0.4 K/UL    ABS. BASOPHILS 0.0 0.0 - 0.1 K/UL    ABS. IMM. GRANS. 0.0 K/UL    DF MANUAL      RBC COMMENTS ANISOCYTOSIS  1+        RBC COMMENTS MACROCYTOSIS  1+                       Kimber Loco MD  [facility]   [address] [address]  Phone - [phone]   Fax - [phone]  [email or web address]

## 2021-10-02 NOTE — PROGRESS NOTES
1930: Bedside and Verbal shift change report given to Gato Molina RN (oncoming nurse) by Sarita Mac, student (offgoing nurse). Report included the following information SBAR, Kardex, ED Summary, Procedure Summary, Intake/Output, MAR, Recent Results, Med Rec Status, Cardiac Rhythm NSR, Alarm Parameters  and Dual Neuro Assessment. Drips: [facility] @ 13, Versed @ 5, Levo @ 4, Vaso @ 0.04     2000: Resumed pt care. HD currently in process. 2030: -130, EKG ordered (Sinus tach PAC's). Orders for STAT CMP/Mag/Phos. HD complete, 1700cc fluid removed    2200: Temp 100.6, PRN tylenol given. Pt with bigeminal PVC's, MD aware    2215: MAP 94, Levo off    2300: HR sustaining 115-120, orders for 250cc 5% albumin    0000: Bladder scan 89    0200: Area of redness/very firm on L lower abdomen. MD notified    0400: AM labs drawn and sent     0430: ABG:  pO2 83; pCO2 36; pH 7.43;  HCO3 23.8, %O2 Sat 96.6. No changes made    0600: Incontinence care provided for BM     0700: Dr. Linda Iqbal @ bedside. Orders to start trickle TF. Vent FiO2 decreased to 40%. 0730: Bedside and Verbal shift change report given to Liana Lehman RN (oncoming nurse) by Gato Molina RN (offgoing nurse). Report included the following information SBAR, Kardex, ED Summary, Procedure Summary, Intake/Output, MAR, Recent Results, Med Rec Status, Cardiac Rhythm NSR/PVC's/PAC's, Alarm Parameters  and Dual Neuro Assessment.      Drips: KVO @ 8, Versed @ 4, Levo @ 4, Vaso @ 0.03

## 2021-10-02 NOTE — DIALYSIS
CRRT / [phone]    Comments / Plan:   Jorge Mo RN at bedside to round on pt. Primary RN, Israel Meraz, reports consistent issues with pt access pressures. Despite multiple attempts to troubleshoot pt CVC, access pressure alarms remain an issue. Both ports on CVC aspirate and flush with no issue. Jorge Mo RN returned all pt blood from circuit in amidst issues with CVC (165 mls.) Primary RN, Israel Meraz, informed to edith huang for CRRT restart when pt obtains new CVC.

## 2021-10-02 NOTE — PROGRESS NOTES
Nephrology Progress Note  Caroline Thorne  Date of Admission : 9/22/2021    CC: Follow up for [place]    Assessment and Plan     Acute kidney injury  - multifactorial: ATN from hypotension/severe pancreatitis + IV contrast on admission  - no obstruction on CT scan  - CRRT Is clotting frequently. - we will try hemodialysis today    Lytes:  - K/phos/mag stable    Protein malnutrition:  - continue TPN    Acute resp failure :  - intubated 9/25    Pancreatitis     Bradycardia  TAA, b/l BEAU aneurysms  Leukocytosis       Interval History:  Seen and examined. OFF CRRT  crrt clotted multiple time  On vasopressors     Current Medications: all current  Medications have been eviewed in EPIC  Review of Systems: Review of systems not obtained due to patient factors. Objective:  Vitals:    Vitals:    10/02/21 1000 10/02/21 1100 10/02/21 1200 10/02/21 1300   BP:   (!) 106/54 106/67   Pulse: 68 (!) 54 (!) 52 (!) 56   Resp: 19 17 19 18   Temp:   (!) 96.3 °F (35.7 °C)    SpO2: 100% 100% 100% 100%   Weight:       Height:         Intake and Output:  10/02 0701 - 10/02 1900  In: 625.8 [I.V.:615.8]  Out: 0   09/30 1901 - 10/02 0700  In: 8199 [I.V.:3956]  Out: 2950     Physical Examination:  Pt intubated     yes  General: On vent  Neck:  Teddy +  Resp:  Clear b/l, no wheezing  CV:  RRR,  s1,s2, 1+ b/l LE edema  GI:  Non distended, reduced bowel sounds  Neurologic:  Can't access due to patient's current condition         []    High complexity decision making was performed  []    Patient is at high-risk of decompensation with multiple organ involvement    Lab Data Personally Reviewed: I have reviewed all the pertinent labs, microbiology data and radiology studies during assessment.     Recent Labs     10/02/21  0400 10/01/21  1551 10/01/21  0307   * 136 135*   K 4.9 4.6 4.9    104 105   CO2 22 25 22   * 187* 180*   BUN 48* 42* 39*   CREA 1.90* 1.82* 1.79*   CA 8.0* 8.4* 8.3*   MG 2.9* 2.7* 2.8*   PHOS 4.0 3.1 3.1   ALB 1. 9* 2.1* 2.2*   ALT 26 27 25     Recent Labs     10/02/21  0400 10/01/21  0307 09/30/21  0318   WBC 24.1* 24.0* 23.1*   HGB 10.0* 10.2* 10.2*   HCT 31.6* 32.6* 31.8*    139* 110*     Lab Results   Component Value Date/Time    Specimen Description: HIP LEFT JOINT 01/17/2012 03:00 PM    Specimen Description: HIP LEFT JOINT 01/17/2012 03:00 PM    Specimen Description: NARES 07/28/2011 12:12 AM     Lab Results   Component Value Date/Time    Culture result: NO GROWTH 3 DAYS 09/29/2021 09:05 AM    Culture result: MODERATE YEAST, (APPARENT CANDIDA ALBICANS) (A) 09/28/2021 08:22 AM    Culture result: LIGHT NORMAL RESPIRATORY LUIS 09/28/2021 08:22 AM     Recent Results (from the past 24 hour(s))   POC G3 - PUL    Collection Time: 10/01/21  1:46 PM   Result Value Ref Range    FIO2 (POC) 50 %    pH (POC) 7.41 7.35 - 7.45      pCO2 (POC) 36.3 35.0 - 45.0 MMHG    pO2 (POC) 100 80 - 100 MMHG    HCO3 (POC) 22.7 22 - 26 MMOL/L    sO2 (POC) 97.8 (H) 92 - 97 %    Base deficit (POC) 1.7 mmol/L    Site DRAWN FROM ARTERIAL LINE      Device: ADULT VENT      Mode ASSIST CONTROL      Tidal volume 480 ml    Set Rate 18 bpm    PEEP/CPAP (POC) 5 cmH2O    Allens test (POC) Positive      Specimen type (POC) ARTERIAL     LACTIC ACID    Collection Time: 10/01/21  3:51 PM   Result Value Ref Range    Lactic acid 1.7 0.4 - 2.0 MMOL/L   METABOLIC PANEL, COMPREHENSIVE    Collection Time: 10/01/21  3:51 PM   Result Value Ref Range    Sodium 136 136 - 145 mmol/L    Potassium 4.6 3.5 - 5.1 mmol/L    Chloride 104 97 - 108 mmol/L    CO2 25 21 - 32 mmol/L    Anion gap 7 5 - 15 mmol/L    Glucose 187 (H) 65 - 100 mg/dL    BUN 42 (H) 6 - 20 MG/DL    Creatinine 1.82 (H) 0.70 - 1.30 MG/DL    BUN/Creatinine ratio 23 (H) 12 - 20      GFR est AA 45 (L) >60 ml/min/1.73m2    GFR est non-AA 37 (L) >60 ml/min/1.73m2    Calcium 8.4 (L) 8.5 - 10.1 MG/DL    Bilirubin, total 0.7 0.2 - 1.0 MG/DL    ALT (SGPT) 27 12 - 78 U/L    AST (SGOT) 42 (H) 15 - 37 U/L    Alk. phosphatase 137 (H) 45 - 117 U/L    Protein, total 6.0 (L) 6.4 - 8.2 g/dL    Albumin 2.1 (L) 3.5 - 5.0 g/dL    Globulin 3.9 2.0 - 4.0 g/dL    A-G Ratio 0.5 (L) 1.1 - 2.2     MAGNESIUM    Collection Time: 10/01/21  3:51 PM   Result Value Ref Range    Magnesium 2.7 (H) 1.6 - 2.4 mg/dL   PHOSPHORUS    Collection Time: 10/01/21  3:51 PM   Result Value Ref Range    Phosphorus 3.1 2.6 - 4.7 MG/DL   GLUCOSE, POC    Collection Time: 10/01/21  5:13 PM   Result Value Ref Range    Glucose (POC) 164 (H) 65 - 117 mg/dL    Performed by Pasha, POC    Collection Time: 10/01/21 11:31 PM   Result Value Ref Range    Glucose (POC) 163 (H) 65 - 117 mg/dL    Performed by Kiko Wall    PHOSPHORUS    Collection Time: 10/02/21  4:00 AM   Result Value Ref Range    Phosphorus 4.0 2.6 - 4.7 MG/DL   LACTIC ACID    Collection Time: 10/02/21  4:00 AM   Result Value Ref Range    Lactic acid 1.5 0.4 - 2.0 MMOL/L   METABOLIC PANEL, COMPREHENSIVE    Collection Time: 10/02/21  4:00 AM   Result Value Ref Range    Sodium 133 (L) 136 - 145 mmol/L    Potassium 4.9 3.5 - 5.1 mmol/L    Chloride 106 97 - 108 mmol/L    CO2 22 21 - 32 mmol/L    Anion gap 5 5 - 15 mmol/L    Glucose 189 (H) 65 - 100 mg/dL    BUN 48 (H) 6 - 20 MG/DL    Creatinine 1.90 (H) 0.70 - 1.30 MG/DL    BUN/Creatinine ratio 25 (H) 12 - 20      GFR est AA 43 (L) >60 ml/min/1.73m2    GFR est non-AA 35 (L) >60 ml/min/1.73m2    Calcium 8.0 (L) 8.5 - 10.1 MG/DL    Bilirubin, total 0.6 0.2 - 1.0 MG/DL    ALT (SGPT) 26 12 - 78 U/L    AST (SGOT) 40 (H) 15 - 37 U/L    Alk.  phosphatase 140 (H) 45 - 117 U/L    Protein, total 5.8 (L) 6.4 - 8.2 g/dL    Albumin 1.9 (L) 3.5 - 5.0 g/dL    Globulin 3.9 2.0 - 4.0 g/dL    A-G Ratio 0.5 (L) 1.1 - 2.2     MAGNESIUM    Collection Time: 10/02/21  4:00 AM   Result Value Ref Range    Magnesium 2.9 (H) 1.6 - 2.4 mg/dL   CBC WITH AUTOMATED DIFF    Collection Time: 10/02/21  4:00 AM   Result Value Ref Range    WBC 24.1 (H) 4.1 - 11.1 K/uL    RBC 3.24 (L) 4.10 - 5.70 M/uL    HGB 10.0 (L) 12.1 - 17.0 g/dL    HCT 31.6 (L) 36.6 - 50.3 %    MCV 97.5 80.0 - 99.0 FL    MCH 30.9 26.0 - 34.0 PG    MCHC 31.6 30.0 - 36.5 g/dL    RDW 15.0 (H) 11.5 - 14.5 %    PLATELET 054 429 - 420 K/uL    MPV 12.5 8.9 - 12.9 FL    NRBC 0.2 (H) 0  WBC    ABSOLUTE NRBC 0.06 (H) 0.00 - 0.01 K/uL    NEUTROPHILS 92 (H) 32 - 75 %    BAND NEUTROPHILS 2 0 - 6 %    LYMPHOCYTES 2 (L) 12 - 49 %    MONOCYTES 4 (L) 5 - 13 %    EOSINOPHILS 0 0 - 7 %    BASOPHILS 0 0 - 1 %    IMMATURE GRANULOCYTES 0 %    ABS. NEUTROPHILS 22.6 (H) 1.8 - 8.0 K/UL    ABS. LYMPHOCYTES 0.5 (L) 0.8 - 3.5 K/UL    ABS. MONOCYTES 1.0 0.0 - 1.0 K/UL    ABS. EOSINOPHILS 0.0 0.0 - 0.4 K/UL    ABS. BASOPHILS 0.0 0.0 - 0.1 K/UL    ABS. IMM. GRANS. 0.0 K/UL    DF MANUAL      RBC COMMENTS ANISOCYTOSIS  1+        RBC COMMENTS MACROCYTOSIS  1+       LIPASE    Collection Time: 10/02/21  4:00 AM   Result Value Ref Range    Lipase 437 (H) 73 - 393 U/L   BLOOD GAS, ARTERIAL    Collection Time: 10/02/21  4:22 AM   Result Value Ref Range    pH 7.36 7.35 - 7.45      PCO2 37 35 - 45 mmHg    PO2 122 (H) 80 - 100 mmHg    O2 SAT 98 (H) 92 - 97 %    BICARBONATE 20 (L) 22 - 26 mmol/L    BASE DEFICIT 4.4 mmol/L    O2 METHOD VENT      FIO2 50 %    MODE ASSIST CONTROL      Tidal volume 450.0      SET RATE 18      PEEP/CPAP 5.0      Sample source ARTERIAL      SITE DRAWN FROM ARTERIAL LINE      DRE'S TEST NOT APPLICABLE     GLUCOSE, POC    Collection Time: 10/02/21  5:58 AM   Result Value Ref Range    Glucose (POC) 182 (H) 65 - 117 mg/dL    Performed by Nieves Arce    GLUCOSE, POC    Collection Time: 10/02/21 11:11 AM   Result Value Ref Range    Glucose (POC) 262 (H) 65 - 117 mg/dL    Performed by Luis Eduardo Rivers MD  [address]  Phone - [phone]   Fax - [phone]  www. [facility] [employer] [facility] [employer]

## 2021-10-02 NOTE — PROGRESS NOTES
0730 Bedside and Verbal shift change report given to Kristy (student nurse) and Yasmine MCNEIL (oncoming nurse) by Betzy Huddleston RN (offgoing nurse). Report included the following information SBAR, Kardex, Intake/Output, MAR, Recent Results and Cardiac Rhythm Sinus Rhythm. 0805 CRRT machine restarted by Gus West RN with factor of 50.    0900 Pt /60, increased factor to 75.    1000 Pt tolerating fluid removal, increased factor to 100 (goal). 3469 Dr. Juancho Valdes at bedside, pt condition reviewed. 0940 Pt HR 44, /63, Dr. Melly Mo notified. No new orders received. 0945 Pt rectal temp 96.6, initiated dom spears. 46 Pt daughter, Karen Isaacs, called for an update. Dr. Juancho Valdes updated her about pt condition. 1233 Pt CRRT high pressure alarms. Pt repositioned, flushed lines and lines reversed with no improvement. Blood returned and nephrologist paged to request transition to HD.     [facility] notified about CRRT machine. 18 Dr. Sarina Miguelded with nephrology at bedside, plan to transition to HD.     [address] at bedside starting HD.     1752 Pt became tachycardic (HR 130s with increased ectopy) and hypotensive (76/48 (57)) Epi stopped, fentanyl stopped and Levophed started. See MAR for details. 1803 Pt BP 75/51 (60) , Levophed increased. 1809 Pt VSS following changes. Gus West RN able to continue HD.     1930 Bedside and Verbal shift change report given to Enrique Renner (oncoming nurse) by rKisty (student nurse) and Yasmine MCNEIL (offgoing nurse). Report included the following information SBAR, Kardex, Intake/Output, MAR, Recent Results and Cardiac Rhythm sinus rhythm.

## 2021-10-02 NOTE — DIALYSIS
Hemodialysis / [phone]    Vitals Pre Post Assessment Pre Post   BP BP: 101/63 (10/02/21 1733) 139/75   LOC sedated sedated   HR Pulse (Heart Rate): (!) 101 (10/02/21 1733) 130   Lungs     Resp Resp Rate: 16 (10/02/21 1733) 19 Cardiac nsr Sinus tach   Temp Temp: 100.4 °F (38 °C) (10/02/21 1718) 99.9 Skin Warm dry Warm dry   Weight    Edema generalized genderalized   Tele status   Pain Pain Intensity 1: 0 (10/02/21 1600)      Orders   Duration: Start: 1358 End: 2030 Total: 3hr   Dialyzer: Dialyzer/Set Up Inspection: [place] Nj (10/02/21 1718)   K Bath: Dialysate K (mEq/L): 2 (10/02/21 1718)   Ca Bath: Dialysate CA (mEq/L): 2.5 (10/02/21 1718)   Na: Dialysate NA (mEq/L): 138 (10/02/21 1718)   Bicarb: Dialysate HCO3 (mEq/L): 40 (10/02/21 1718)   Target Fluid Removal: Goal/Amount of Fluid to Remove (mL): 2000 mL (10/02/21 1718)     Access   Type & Location: RIJ non tunneled hd cvc- dressing cdi, +br, flushes with ease. Each catheter limb disinfected per p&p, caps removed, hubs disinfected per p&p. Comments:  Poorly functioning catheter. Positional?                                       Labs   HBsAg (Antigen) / date: Hepatitis B surface Ag   Date Value Ref Range Status   09/25/2021 <0.10 Index Final                                       HBsAb (Antibody) / date: Hep B surface Ab Interp. Date Value Ref Range Status   09/25/2021 NONREACTIVE NR   Final     Comment:     (NOTE)  The [employer] Centaur Anti-HBs2 assay is traceable to the [address] [facility]) Hepatitis B Immunoglobulin 1st International   Reference Preparation (2065). Samples with a calculated value of 10   mIU/mL or greater are considered reactive (protective) in accordance   with the CDC guidelines. The accepted criteria for immunity to HBV is   anti-HBs activity greater than or equal to 10 mIU/mL, as defined by   the [facility] International Reference Preparation.   Assay performance has not been established in pregnant women,   patients who are immunosuppressed or immunocompromised, nor have   performance characteristics been established in conjunction with   other 's assays for specific HBV serologic markers. This   assay does not differentiate between vaccine induced immune response   and a response due to infection with HBV. Passively acquired anti-HBs   may be identified following patient transfusion, receipt of   immunoglobulin products, etc.        Source:    Obtained/Reviewed  Critical Results Called HGB   Date Value Ref Range Status   10/02/2021 9.8 (L) 12.1 - 17.0 g/dL Final     Potassium   Date Value Ref Range Status   10/02/2021 5.3 (H) 3.5 - 5.1 mmol/L Final     Calcium   Date Value Ref Range Status   10/02/2021 8.4 (L) 8.5 - 10.1 MG/DL Final     BUN   Date Value Ref Range Status   10/02/2021 62 (H) 6 - 20 MG/DL Final     Creatinine   Date Value Ref Range Status   10/02/2021 2.66 (H) 0.70 - 1.30 MG/DL Final        Meds Given   Name Dose Route   none                 Adequacy / Fluid    Total Liters Process: 34   Net Fluid Removed: 1700ml      Comments   Time Out Done:   (Time) 1700   Admitting Diagnosis: arie   Consent obtained/signed: Informed Consent Verified: Yes (10/02/21 7729)   Machine / RO # Machine Number: Davis Salk (10/02/21 5637)   Primary Nurse Rpt Pre: Dustin Trevino    Primary Nurse Rpt Post: roberto kitchen   Pt Education: n/a   Care Plan: Cont current hd poc   Pts outpatient clinic: na     Tx Summary   Comments:    Patient didn't tolerate well. Levophed added to support BP. Vasopressin maxed during entire treatment. Catheter is poorly functioning, which reduced blood flow rates.

## 2021-10-02 NOTE — PROGRESS NOTES
[facility]    ICU TEAM Progress Note    Name: Ananda Sultana   : 1950   MRN: [id number]   Date: 10/2/2021      I  Subjective:   Progress Note: 10/2/2021      Reason for ICU Admission: Pancreatitis, acute kidney injury, respiratory failure, septic shock     Brief HPI: 68 y/o M w hx of CAD, HTN, OA, and kidney stones presents to the hospital with CC of SOB, abd pain, N/V. He was found to have severe pancreatitis and acute renal injury with associated lactic acidosis and elevated LFTs. He was transferred to the CCU for initiation of CRRT given borderline low blood pressures.      Overnight Events:   10/2: Antibiotics discontinued, still on low-dose pressors, continue to have issues with dialysis access, no fever  10/1: Required going back on pressors [low-dose vasopressin], otherwise remain anuric on CRRT  : No acute event.  Off pressors  : No acute event.  Remains on vasopressin and low-dose epinephrine.  On CRRT.  : CRRT; MV; Pressors  : CRRT; MV; Pressors  : increasing agitation and hypoxia. Intubated. HDL repositioned again. Up on vasopressors  : Ozella Matar pulled back - remains positional.   : transferred into ICU.  Reather Limber placed and CRRT started.       Active Problem List:     Problem List  Date Reviewed: 2018        Codes Class    Pancreatitis ICD-10-CM: K85.90  ICD-9-CM: 096.6         Combined forms of age-related cataract of right eye ICD-10-CM: H25.811  ICD-9-CM: 366.19     Overview Signed 2018  4:22 PM by Brunilda Michele DO KPE IOL OD             Small bowel obstruction ([facility] [address] ICD-10-CM: X64.271  ICD-9-CM: 560.9         Perforated bowel ([address] ICD-10-CM: K63.1  ICD-9-CM: 569.83         Atrial flutter ([facility] [address] ICD-10-CM: I48.92  ICD-9-CM: 427.32         Pneumoperitoneum - abdomen ICD-9-CM: 568.89         Coronary atherosclerosis of native coronary artery ICD-10-CM: I25.10  ICD-9-CM: 414.01               Past Medical History:      has a past medical history of Arthritis, CAD (coronary artery disease), Hypertension, Kidney stones, Nausea & vomiting, Pancreatitis, and Psoriasis. Past Surgical History:      has a past surgical history that includes hx hip replacement (1996, [address]6712); lithotripsy (~2001); pr eye surg ant sgmt proc unlisted (as child); pr cardiac surg procedure unlist; pr cardiac surg procedure unlist; hx cholecystectomy (7/2009); hx heent; hx gi; hx gi; hx orthopaedic; hx orthopaedic; ir insert non tunl cvc over 5 yrs (9/24/2021); and ir replace cvc non tunl w/o port/pump (9/25/2021). Home Medications:     Prior to Admission medications    Medication Sig Start Date End Date Taking? Authorizing Provider   aspirin delayed-release 81 mg tablet Take 81 mg by mouth daily. Yes Provider, Historical   atorvastatin (LIPITOR) 20 mg tablet Take 10 mg by mouth daily. Yes Other, MD Samia   allopurinol (ZYLOPRIM) 300 mg tablet Take 300 mg by mouth daily. Yes Other, MD Samia   atenolol (TENORMIN) 50 mg tablet Take 50 mg by mouth daily. Yes Provider, Historical       Allergies/Social/Family History: Allergies   Allergen Reactions    Albumin Products Rash, Itching and Other (comments)     Hypertension, tachycardia    Penicillins Rash      Social History     Tobacco Use    Smoking status: Former Smoker     Years: 0.00     Types: Cigars    Smokeless tobacco: Never Used    Tobacco comment: used to smoke cigars quit   Substance Use Topics    Alcohol use:  Yes     Alcohol/week: 0.0 standard drinks     Comment: 2 drinks weekly      Family History   Problem Relation Age of Onset    Eczema Mother     Cancer Sister         breast       Review of Systems:     Not able to obtain due to patient medical condition    Objective:   Vital Signs:  Visit Vitals  BP (!) 89/48   Pulse 64   Temp 97.2 °F (36.2 °C)   Resp 16   Ht 6' 1\" (1.854 m)   Wt 121.4 kg (267 lb 10.2 oz)   SpO2 100%   BMI 35.31 kg/m²    O2 Flow Rate (L/min): 3 l/min O2 Device: Endotracheal tube, Ventilator Temp (24hrs), Av.8 °F (36.6 °C), Min:97 °F (36.1 °C), Max:98.4 °F (36.9 °C)           Intake/Output:     Intake/Output Summary (Last 24 hours) at 10/2/2021 0704  Last data filed at 10/2/2021 0700  Gross per 24 hour   Intake 3062.56 ml   Output 2154 ml   Net 908.56 ml       Physical Exam:  General:  Intubated and sedated  Eye:  negative  Neurologic: sedated  Neck:  RIJ HDL, LIJ CVC  Lungs:  Distant breath sounds  Heart:  regular rate and rhythm  Abdomen:  Soft, distended  Skin:  Normal.    LABS AND  DATA: Personally reviewed  Recent Labs     10/02/21  0400 10/01/21  0307   WBC 24.1* 24.0*   HGB 10.0* 10.2*   HCT 31.6* 32.6*    139*     Recent Labs     10/02/21  0400 10/01/21  1551   * 136   K 4.9 4.6    104   CO2 22 25   BUN 48* 42*   CREA 1.90* 1.82*   * 187*   CA 8.0* 8.4*   MG 2.9* 2.7*   PHOS 4.0 3.1     Recent Labs     10/02/21  0400 10/01/21  1551 21  0833   * 137*  --    TP 5.8* 6.0*  --    ALB 1.9* 2.1*  --    GLOB 3.9 3.9  --    LPSE 437*  --    < >    < > = values in this interval not displayed. No results for input(s): INR, PTP, APTT, INREXT in the last 72 hours. Recent Labs     10/01/21  1346 21  0729   PHI 7.41 7.35   PCO2I 36.3 42.5   PO2I 100 87   FIO2I 50 30     No results for input(s): CPK, CKMB, TROIQ, BNPP in the last 72 hours. Hemodynamics:   PAP:   CO:     Wedge:   CI:     CVP:    SVR:       PVR:       Ventilator Settings:  Mode Rate Tidal Volume Pressure FiO2 PEEP   Assist control   480 ml    50 % 5 cm H20     Peak airway pressure: 27 cm H2O    Minute ventilation: 9.3 l/min        MEDS: Reviewed    Chest X-Ray:  CXR Results  (Last 48 hours)               21 1014  XR CHEST PORT Final result    Impression:  1. ET tube is 6 cm above the caesar. This could be advanced 1 cm. 2. There are areas of atelectasis medially in the right lower lobe that have   increased.  There is improved aeration in left lower lobe Narrative:  EXAM: XR CHEST PORT       INDICATION: leukocytosis       COMPARISON: 9/25/2021       FINDINGS: A portable AP radiograph of the chest was obtained at 1008 hours. The   patient is on a cardiac monitor. The ET tube is 6 cm above the caesar. This   could be advanced 1 cm. Left IJ catheter and right IJ catheter are in   satisfactory position. There is no change in cardiomegaly. Mild atelectasis in the left lower lobe has decreased. Atelectasis in right   lower lobe has increased. No pneumonia. No pulmonary edema. Assessment and Plan:   - Acute Pancreatitis  - Septic Shock  - Acute Hypoxic Respiratory Failure  - [place] on CRRT  - CAD  - HTN     Neuro: Versed/fentanyl, Daily spontaneous awakening trial  Pulm: mechanical ventilation, wean fio2 as able. Chlorhexidine. HOB >30 degrees. Cardiac: MAP goal >65.  epi, vaso. Hydrocort, midodrine.  Remains bradycardic.  Appreciate cardiology  Renal: CRRT per nephro,Remains anuric. Appreciate nephrology team  GI: NGT to LCS. Protonix. On TPN  ID: Antibiotic changed to meropenem and vancomycin by infectious disease team on September 29. After discussion with ID we discontinued meropenem and vancomycin on October 1. Monitor off antibiotic  Heme: SQH. Cont asa. Endo: NIYAH  MSK:  PT/OT     F - Feeding:  No - tpn.  I will start trickle tube feeding on October 2  A - Analgesia: Fentanyl  S - Sedation: Versed  T - DVT Prophylaxis: SCD's or Sequential Compression Device, SQH  C - Code Status: Full Code  H - Head of Bed: > 30 Degrees  U - Ulcer Prophylaxis: Protonix (pantoprazole)   G - Glycemic Control: Insulin  S - Spontaneous Breathing Trial: N/A  B - Bowel Regimen: None needed at this time  I - Indwelling Catheter:              CCPCB: Nasogastric Tube, ETT  Lines: Peripheral IV, Arterial Line, Central Line and Naomi Collie  D - De-escalation of Antibiotics: Meropenem and vancomycin discontinued on October 1.   Monitor off antibiotic    DISPOSITION  Stay in ICU    CRITICAL CARE CONSULTANT NOTE  I had a face to face encounter with the patient, reviewed and interpreted patient data including clinical events, labs, images, vital signs, I/O's, and examined patient. I have discussed the case and the plan and management of the patient's care with the consulting services, the bedside nurses and the respiratory therapist.      NOTE OF PERSONAL INVOLVEMENT IN CARE   This patient has a high probability of imminent, clinically significant deterioration, which requires the highest level of preparedness to intervene urgently. I participated in the decision-making and personally managed or directed the management of the following life and organ supporting interventions that required my frequent assessment to treat or prevent imminent deterioration. I personally spent 40 minutes of critical care time. This is time spent at this critically ill patient's bedside actively involved in patient care as well as the coordination of care and discussions with the patient's family. This does not include any procedural time which has been billed separately. Ayala Owen M.D.   Staff Intensivist/Pulmonologist  [facility]  10/2/2021

## 2021-10-02 NOTE — DIALYSIS
CRRT / [phone]           Orders   Mode: CVVHD restarted @ 5940   Blood Flow Rate: 200 ml/min   Dialysate Flow Rate: 3000 ml/hr   Prismasol Concentrate: 4K / 2.5Ca   Blood Warmer Temp: 37*C   Net Fluid Removal: 100 ml/hr            Metrics   BP: 92/49   HR: 61   Access Pressure: -62   Filter Pressure: 96   Return Pressure: 64   TMP: 24   Pressure Drop: 6            Access   Type & Location: RIJ temporary non-tunneled CVC, dressing C/D/I with bio-patch dated 10/02/21. No signs of redness, drainage, or infection visualized. Each catheter limb disinfected for 60 seconds per limb with alcohol swabs. Caps removed, dialysis CVC hub scrubbed with [employer] for 15 seconds, followed by a 5 second dry time per Hospital P&P. +asp/+flush x 2 ports.             Labs   HBsAg (Antigen) / date: Negative 09/25/21                     HBsAb (Antibody) / date: Susceptible  09/25/21   Source: Epic            Safety:   Time Out Done:   (Time) 5521   Consent obtained/signed: Verified   Education: Access/Site Care   Primary Nurse Rpt PreBenjaman Cooks, RN   Primary Nurse Rpt Post: Dickie Goldmann, RN      Comments / Plan:   Marisol Del Valle RN at bedside to change filter d/t unresolved access alarms. (Machine history shows 94 alarms related to access pressure between 5565-3581).  Patient, code status, labs, and orders verified. Consents on chart. Time out completed. Darrell MCNEIL able to return all pt blood from circuit (165 mls.)  Old set discarded in red biohazard bin. HF-1000 filter set-up, primed w/ 1L NS, tested and running well. Lines visible and connections secure with blood warmer to return line at 37*C. Education, pre and post to primary RN.

## 2021-10-03 NOTE — PROGRESS NOTES
Nephrology Progress Note  Elliot Ortiz  Date of Admission : 9/22/2021    CC: Follow up for [place]    Assessment and Plan     [place]  - multifactorial: ATN from hypotension/severe pancreatitis + IV contrast on admission  - no obstruction on CT scan  - CRRT Is clotting frequently. - hd again today. Remove 1.5 kg     Lytes:  - K/phos/mag stable    Protein malnutrition:  - continue TPN    Acute resp failure :  - intubated 9/25    Pancreatitis     Bradycardia  TAA, b/l BEAU aneurysms  Leukocytosis       Interval History:  Seen and examined. He had 1.7 kg fluid removed yesterday      Current Medications: all current  Medications have been eviewed in EPIC  Review of Systems: Review of systems not obtained due to patient factors. Objective:  Vitals:    Vitals:    10/03/21 0408 10/03/21 0500 10/03/21 0600 10/03/21 0700   BP:  (!) 115/59 110/62 113/62   Pulse: 82 80 96 96   Resp: 27 27 23 29   Temp:       SpO2: 100% 100% 100% 100%   Weight:       Height:         Intake and Output:  No intake/output data recorded. 10/01 1901 - 10/03 0700  In: 3964.1 [I.V.:3804.1]  Out: 2315     Physical Examination:  Pt intubated     yes  General: On vent  Neck:  Teddy +  Resp:  clear b/l, no wheezing  CV:  RRR,  s1,s2, 1+ b/l LE edema  Neurologic:  Can't access due to patient's current condition         []    High complexity decision making was performed  []    Patient is at high-risk of decompensation with multiple organ involvement    Lab Data Personally Reviewed: I have reviewed all the pertinent labs, microbiology data and radiology studies during assessment.     Recent Labs     10/03/21  0356 10/02/21  2107 10/02/21  1558 10/02/21  0400 10/02/21  0400   * 134* 134*   < > 133*   K 4.7 4.7 5.3*   < > 4.9    102 103   < > 106   CO2 24 24 24   < > 22   * 217* 243*   < > 189*   BUN 71* 48* 62*   < > 48*   CREA 3.07* 2.22* 2.66*   < > 1.90*   CA 7.7* 8.4* 8.4*   < > 8.0*   MG 2.8* 2.5* 2.9*   < > 2.9*   PHOS 4.8* 3.9 --   --  4.0   ALB 2.0* 2.0* 1.9*   < > 1.9*   ALT 26 28 25   < > 26    < > = values in this interval not displayed. Recent Labs     10/03/21  0356 10/02/21  1559 10/02/21  0400   WBC 18.4* 24.2* 24.1*   HGB 9.5* 9.8* 10.0*   HCT 29.1* 30.8* 31.6*    205 161     Lab Results   Component Value Date/Time    Specimen Description: HIP LEFT JOINT 01/17/2012 03:00 PM    Specimen Description: HIP LEFT JOINT 01/17/2012 03:00 PM    Specimen Description: NARES 07/28/2011 12:12 AM     Lab Results   Component Value Date/Time    Culture result: NO GROWTH 3 DAYS 09/29/2021 09:05 AM    Culture result: MODERATE YEAST, (APPARENT CANDIDA ALBICANS) (A) 09/28/2021 08:22 AM    Culture result: LIGHT NORMAL RESPIRATORY LUIS 09/28/2021 08:22 AM     Recent Results (from the past 24 hour(s))   GLUCOSE, POC    Collection Time: 10/02/21 11:11 AM   Result Value Ref Range    Glucose (POC) 262 (H) 65 - 117 mg/dL    Performed by [address] [place] [address]    Collection Time: 10/02/21  3:58 PM   Result Value Ref Range    Sodium 134 (L) 136 - 145 mmol/L    Potassium 5.3 (H) 3.5 - 5.1 mmol/L    Chloride 103 97 - 108 mmol/L    CO2 24 21 - 32 mmol/L    Anion gap 7 5 - 15 mmol/L    Glucose 243 (H) 65 - 100 mg/dL    BUN 62 (H) 6 - 20 MG/DL    Creatinine 2.66 (H) 0.70 - 1.30 MG/DL    BUN/Creatinine ratio 23 (H) 12 - 20      GFR est AA 29 (L) >60 ml/min/1.73m2    GFR est non-AA 24 (L) >60 ml/min/1.73m2    Calcium 8.4 (L) 8.5 - 10.1 MG/DL    Bilirubin, total 0.5 0.2 - 1.0 MG/DL    ALT (SGPT) 25 12 - 78 U/L    AST (SGOT) 36 15 - 37 U/L    Alk.  phosphatase 149 (H) 45 - 117 U/L    Protein, total 5.9 (L) 6.4 - 8.2 g/dL    Albumin 1.9 (L) 3.5 - 5.0 g/dL    Globulin 4.0 2.0 - 4.0 g/dL    A-G Ratio 0.5 (L) 1.1 - 2.2     MAGNESIUM    Collection Time: 10/02/21  3:58 PM   Result Value Ref Range    Magnesium 2.9 (H) 1.6 - 2.4 mg/dL   CBC W/O DIFF    Collection Time: 10/02/21  3:59 PM   Result Value Ref Range    WBC 24.2 (H) 4.1 - 11.1 K/uL    RBC 3.21 (L) 4.10 - 5.70 M/uL    HGB 9.8 (L) 12.1 - 17.0 g/dL    HCT 30.8 (L) 36.6 - 50.3 %    MCV 96.0 80.0 - 99.0 FL    MCH 30.5 26.0 - 34.0 PG    MCHC 31.8 30.0 - 36.5 g/dL    RDW 15.3 (H) 11.5 - 14.5 %    PLATELET 472 552 - 088 K/uL    MPV 12.6 8.9 - 12.9 FL    NRBC 0.6 (H) 0  WBC    ABSOLUTE NRBC 0.14 (H) 0.00 - 0.01 K/uL   GLUCOSE, POC    Collection Time: 10/02/21  5:06 PM   Result Value Ref Range    Glucose (POC) 236 (H) 65 - 117 mg/dL    Performed by Jalen Leary    EKG, 12 LEAD, INITIAL    Collection Time: 10/02/21  8:44 PM   Result Value Ref Range    Ventricular Rate 123 BPM    Atrial Rate 123 BPM    P-R Interval 194 ms    QRS Duration 110 ms    Q-T Interval 346 ms    QTC Calculation (Bezet) 495 ms    Calculated P Axis 47 degrees    Calculated R Axis 90 degrees    Calculated T Axis 28 degrees    Diagnosis       Sinus tachycardia with premature atrial complexes    When compared with ECG of 02-OCT-2021 20:44,  heart rate has increased  Confirmed by Natacha Quintana M.D., Rochelle Gross ([id number]) on 10/3/2021 2:46:95 AM     METABOLIC PANEL, COMPREHENSIVE    Collection Time: 10/02/21  9:07 PM   Result Value Ref Range    Sodium 134 (L) 136 - 145 mmol/L    Potassium 4.7 3.5 - 5.1 mmol/L    Chloride 102 97 - 108 mmol/L    CO2 24 21 - 32 mmol/L    Anion gap 8 5 - 15 mmol/L    Glucose 217 (H) 65 - 100 mg/dL    BUN 48 (H) 6 - 20 MG/DL    Creatinine 2.22 (H) 0.70 - 1.30 MG/DL    BUN/Creatinine ratio 22 (H) 12 - 20      GFR est AA 36 (L) >60 ml/min/1.73m2    GFR est non-AA 29 (L) >60 ml/min/1.73m2    Calcium 8.4 (L) 8.5 - 10.1 MG/DL    Bilirubin, total 0.7 0.2 - 1.0 MG/DL    ALT (SGPT) 28 12 - 78 U/L    AST (SGOT) 42 (H) 15 - 37 U/L    Alk.  phosphatase 169 (H) 45 - 117 U/L    Protein, total 6.4 6.4 - 8.2 g/dL    Albumin 2.0 (L) 3.5 - 5.0 g/dL    Globulin 4.4 (H) 2.0 - 4.0 g/dL    A-G Ratio 0.5 (L) 1.1 - 2.2     MAGNESIUM    Collection Time: 10/02/21  9:07 PM   Result Value Ref Range    Magnesium 2.5 (H) 1.6 - 2.4 mg/dL PHOSPHORUS    Collection Time: 10/02/21  9:07 PM   Result Value Ref Range    Phosphorus 3.9 2.6 - 4.7 MG/DL   GLUCOSE, POC    Collection Time: 10/02/21 11:22 PM   Result Value Ref Range    Glucose (POC) 228 (H) 65 - 117 mg/dL    Performed by Matthew Akhtar    PHOSPHORUS    Collection Time: 10/03/21  3:56 AM   Result Value Ref Range    Phosphorus 4.8 (H) 2.6 - 4.7 MG/DL   LACTIC ACID    Collection Time: 10/03/21  3:56 AM   Result Value Ref Range    Lactic acid 2.0 0.4 - 2.0 MMOL/L   METABOLIC PANEL, COMPREHENSIVE    Collection Time: 10/03/21  3:56 AM   Result Value Ref Range    Sodium 133 (L) 136 - 145 mmol/L    Potassium 4.7 3.5 - 5.1 mmol/L    Chloride 103 97 - 108 mmol/L    CO2 24 21 - 32 mmol/L    Anion gap 6 5 - 15 mmol/L    Glucose 236 (H) 65 - 100 mg/dL    BUN 71 (H) 6 - 20 MG/DL    Creatinine 3.07 (H) 0.70 - 1.30 MG/DL    BUN/Creatinine ratio 23 (H) 12 - 20      GFR est AA 24 (L) >60 ml/min/1.73m2    GFR est non-AA 20 (L) >60 ml/min/1.73m2    Calcium 7.7 (L) 8.5 - 10.1 MG/DL    Bilirubin, total 0.6 0.2 - 1.0 MG/DL    ALT (SGPT) 26 12 - 78 U/L    AST (SGOT) 38 (H) 15 - 37 U/L    Alk.  phosphatase 144 (H) 45 - 117 U/L    Protein, total 5.8 (L) 6.4 - 8.2 g/dL    Albumin 2.0 (L) 3.5 - 5.0 g/dL    Globulin 3.8 2.0 - 4.0 g/dL    A-G Ratio 0.5 (L) 1.1 - 2.2     MAGNESIUM    Collection Time: 10/03/21  3:56 AM   Result Value Ref Range    Magnesium 2.8 (H) 1.6 - 2.4 mg/dL   CBC WITH AUTOMATED DIFF    Collection Time: 10/03/21  3:56 AM   Result Value Ref Range    WBC 18.4 (H) 4.1 - 11.1 K/uL    RBC 3.07 (L) 4.10 - 5.70 M/uL    HGB 9.5 (L) 12.1 - 17.0 g/dL    HCT 29.1 (L) 36.6 - 50.3 %    MCV 94.8 80.0 - 99.0 FL    MCH 30.9 26.0 - 34.0 PG    MCHC 32.6 30.0 - 36.5 g/dL    RDW 15.4 (H) 11.5 - 14.5 %    PLATELET 480 245 - 223 K/uL    MPV 12.5 8.9 - 12.9 FL    NRBC 0.3 (H) 0  WBC    ABSOLUTE NRBC 0.06 (H) 0.00 - 0.01 K/uL    NEUTROPHILS 92 (H) 32 - 75 %    BAND NEUTROPHILS 2 0 - 6 %    LYMPHOCYTES 5 (L) 12 - 49 % MONOCYTES 1 (L) 5 - 13 %    EOSINOPHILS 0 0 - 7 %    BASOPHILS 0 0 - 1 %    IMMATURE GRANULOCYTES 0 %    ABS. NEUTROPHILS 17.3 (H) 1.8 - 8.0 K/UL    ABS. LYMPHOCYTES 0.9 0.8 - 3.5 K/UL    ABS. MONOCYTES 0.2 0.0 - 1.0 K/UL    ABS. EOSINOPHILS 0.0 0.0 - 0.4 K/UL    ABS. BASOPHILS 0.0 0.0 - 0.1 K/UL    ABS. IMM. GRANS. 0.0 K/UL    DF MANUAL      RBC COMMENTS ANISOCYTOSIS  1+       POC G3 - PUL    Collection Time: 10/03/21  4:24 AM   Result Value Ref Range    FIO2 (POC) 50 %    pH (POC) 7.43 7.35 - 7.45      pCO2 (POC) 36.0 35.0 - 45.0 MMHG    pO2 (POC) 83 80 - 100 MMHG    HCO3 (POC) 23.8 22 - 26 MMOL/L    sO2 (POC) 96.6 92 - 97 %    Base deficit (POC) 0.3 mmol/L    Site DRAWN FROM ARTERIAL LINE      Device: ADULT VENT      Mode ASSIST CONTROL      Tidal volume 450 ml    Set Rate 18 bpm    PEEP/CPAP (POC) 5 cmH2O    Allens test (POC) NOT APPLICABLE      Specimen type (POC) ARTERIAL     GLUCOSE, POC    Collection Time: 10/03/21  5:55 AM   Result Value Ref Range    Glucose (POC) 227 (H) 65 - 117 mg/dL    Performed by Nabil Renteria MD  [address]  Phone - [phone]   Fax - [phone]  www. [email or web address]

## 2021-10-03 NOTE — DIALYSIS
HD    Catheter with + blood return and flushes with ease no able to support HD blood flow rate as low as 100ml/ml. HD started at 1324 and blood rinsed back at 1333 due to poor flows. Dr. Janene Hernandez notified. New orders. Cath Isaac then attempt HD again. Cathflo to red lumen at [id number] cath isaac aspirated successfully. Blood return was intermittent. Findings called to [employer]. New order for IR to replace HD Catheter Monday. No more HD today.

## 2021-10-03 NOTE — PROGRESS NOTES
Problem: Falls - Risk of  Goal: *Absence of Falls  Description: Document Michelana rosa Pfeiffer Fall Risk and appropriate interventions in the flowsheet. Outcome: Progressing Towards Goal  Note: Fall Risk Interventions:  Mobility Interventions: Communicate number of staff needed for ambulation/transfer    Mentation Interventions: Adequate sleep, hydration, pain control, Door open when patient unattended, Evaluate medications/consider consulting pharmacy, Increase mobility, Reorient patient, Update white board, Room close to nurse's station    Medication Interventions: Evaluate medications/consider consulting pharmacy    Elimination Interventions: Toileting schedule/hourly rounds              Problem: Patient Education: Go to Patient Education Activity  Goal: Patient/Family Education  Outcome: Progressing Towards Goal     Problem: Pain  Goal: *Control of Pain  Outcome: Progressing Towards Goal  Goal: *PALLIATIVE CARE:  Alleviation of Pain  Outcome: Progressing Towards Goal     Problem: Patient Education: Go to Patient Education Activity  Goal: Patient/Family Education  Outcome: Progressing Towards Goal     Problem: Discharge Planning  Goal: *Discharge to safe environment  Outcome: Progressing Towards Goal     Problem: Pressure Injury - Risk of  Goal: *Prevention of pressure injury  Description: Document Genaro Scale and appropriate interventions in the flowsheet. Outcome: Progressing Towards Goal  Note: Pressure Injury Interventions:  Sensory Interventions: Assess changes in LOC, Assess need for specialty bed, Avoid rigorous massage over bony prominences, Check visual cues for pain, Float heels, Keep linens dry and wrinkle-free, Minimize linen layers, Monitor skin under medical devices, Pressure redistribution bed/mattress (bed type), Turn and reposition approx.  every two hours (pillows and wedges if needed), Suspension boots    Moisture Interventions: Absorbent underpads, Apply protective barrier, creams and emollients, Assess need for specialty bed, Check for incontinence Q2 hours and as needed, Maintain skin hydration (lotion/cream), Minimize layers, Moisture barrier    Activity Interventions: Assess need for specialty bed, Pressure redistribution bed/mattress(bed type)    Mobility Interventions: Assess need for specialty bed, Float heels, Pressure redistribution bed/mattress (bed type), Turn and reposition approx.  every two hours(pillow and wedges)    Nutrition Interventions: Document food/fluid/supplement intake, Discuss nutritional consult with provider    Friction and Shear Interventions: Apply protective barrier, creams and emollients, Foam dressings/transparent film/skin sealants, Lift sheet, Minimize layers, Transferring/repositioning devices                Problem: Patient Education: Go to Patient Education Activity  Goal: Patient/Family Education  Outcome: Progressing Towards Goal     Problem: Patient Education: Go to Patient Education Activity  Goal: Patient/Family Education  Outcome: Progressing Towards Goal     Problem: Acute Renal Failure: Discharge Outcomes  Goal: *Optimal pain control at patient's stated goal  Outcome: Progressing Towards Goal  Goal: *Urinary output within identified parameters  Outcome: Progressing Towards Goal  Goal: *Hemodynamically stable  Outcome: Progressing Towards Goal  Goal: *Tolerating diet  Outcome: Progressing Towards Goal  Goal: *Lab values stabilized  Outcome: Progressing Towards Goal  Goal: *Verbalizes understanding and describes medication purposes and frequencies  Outcome: Progressing Towards Goal  Goal: *Medication reconciliation  Outcome: Progressing Towards Goal     Problem: Nausea/Vomiting (Adult)  Goal: *Absence of nausea/vomiting  Outcome: Progressing Towards Goal     Problem: Patient Education: Go to Patient Education Activity  Goal: Patient/Family Education  Outcome: Progressing Towards Goal     Problem: Non-Violent Restraints  Goal: Removal from restraints as soon as assessed to be safe  Outcome: Progressing Towards Goal  Goal: No harm/injury to patient while restraints in use  Outcome: Progressing Towards Goal  Goal: Patient's dignity will be maintained  Outcome: Progressing Towards Goal  Goal: Patient Interventions  Outcome: Progressing Towards Goal     Problem: Ventilator Management  Goal: *Adequate oxygenation and ventilation  Outcome: Progressing Towards Goal  Goal: *Patient maintains clear airway/free of aspiration  Outcome: Progressing Towards Goal  Goal: *Absence of infection signs and symptoms  Outcome: Progressing Towards Goal  Goal: *Normal spontaneous ventilation  Outcome: Progressing Towards Goal     Problem: Patient Education: Go to Patient Education Activity  Goal: Patient/Family Education  Outcome: Progressing Towards Goal     Problem: Breathing Pattern - Ineffective  Goal: *Absence of hypoxia  Outcome: Progressing Towards Goal  Goal: *Use of effective breathing techniques  Outcome: Progressing Towards Goal  Goal: *PALLIATIVE CARE:  Alleviation of Dyspnea  Outcome: Progressing Towards Goal     Problem: Patient Education: Go to Patient Education Activity  Goal: Patient/Family Education  Outcome: Progressing Towards Goal     Problem: Infection - Risk of, Central Venous Catheter-Associated Bloodstream Infection  Goal: *Absence of infection signs and symptoms  Outcome: Progressing Towards Goal     Problem: Patient Education: Go to Patient Education Activity  Goal: Patient/Family Education  Outcome: Progressing Towards Goal     Problem: Infection - Risk of, Ventilator-Associated Pneumonia  Goal: *Absence of infection signs and symptoms  Outcome: Progressing Towards Goal     Problem: Patient Education: Go to Patient Education Activity  Goal: Patient/Family Education  Outcome: Progressing Towards Goal     Problem: Hypotension  Goal: *Blood pressure within specified parameters  Outcome: Progressing Towards Goal  Goal: *Fluid volume balance  Outcome: Progressing Towards Goal  Goal: *Labs within defined limits  Outcome: Progressing Towards Goal     Problem: Patient Education: Go to Patient Education Activity  Goal: Patient/Family Education  Outcome: Progressing Towards Goal     Problem: Diabetes Self-Management  Goal: *Disease process and treatment process  Description: Define diabetes and identify own type of diabetes; list 3 options for treating diabetes. Outcome: Progressing Towards Goal  Goal: *Incorporating nutritional management into lifestyle  Description: Describe effect of type, amount and timing of food on blood glucose; list 3 methods for planning meals. Outcome: Progressing Towards Goal  Goal: *Incorporating physical activity into lifestyle  Description: State effect of exercise on blood glucose levels. Outcome: Progressing Towards Goal  Goal: *Developing strategies to promote health/change behavior  Description: Define the ABC's of diabetes; identify appropriate screenings, schedule and personal plan for screenings. Outcome: Progressing Towards Goal  Goal: *Using medications safely  Description: State effect of diabetes medications on diabetes; name diabetes medication taking, action and side effects. Outcome: Progressing Towards Goal  Goal: *Monitoring blood glucose, interpreting and using results  Description: Identify recommended blood glucose targets  and personal targets. Outcome: Progressing Towards Goal  Goal: *Prevention, detection, treatment of acute complications  Description: List symptoms of hyper- and hypoglycemia; describe how to treat low blood sugar and actions for lowering  high blood glucose level. Outcome: Progressing Towards Goal  Goal: *Prevention, detection and treatment of chronic complications  Description: Define the natural course of diabetes and describe the relationship of blood glucose levels to long term complications of diabetes.   Outcome: Progressing Towards Goal  Goal: *Developing strategies to address psychosocial issues  Description: Describe feelings about living with diabetes; identify support needed and support network  Outcome: Progressing Towards Goal  Goal: *Insulin pump training  Outcome: Progressing Towards Goal  Goal: *Sick day guidelines  Outcome: Progressing Towards Goal  Goal: *Patient Specific Goal (EDIT GOAL, INSERT TEXT)  Outcome: Progressing Towards Goal     Problem: Patient Education: Go to Patient Education Activity  Goal: Patient/Family Education  Outcome: Progressing Towards Goal

## 2021-10-03 NOTE — PROGRESS NOTES
0730 Bedside shift change report given to Ovidio Childers (oncoming nurse) by Tahir Rivera (offgoing nurse). Report included the following information SBAR, Intake/Output, MAR, Recent Results and Cardiac Rhythm NSR.     1020 Dr. Arsenio Zhang with Nephrology at bedside. Plan for HD today with fluid removal goal of 2-3L  1133 /73(93); Vasopressin stopped. 1200 Hourly large watery stools (total 6 this shift). Assigned nurse, Mara Longo, RN, and verifying nurse, Nikki Johnson, verify a provider order for the Fecal Management System is present and have reviewed the indications and contraindications confirming it is appropriate to proceed with insertion of the Fecal Management System. [address] at bedside for HD  [address] unable to initiate HD due to poor flow from arterial line. Alteplase used without improvement. Dr. Arsenio Zhang with Nephrology notified by [facility] RN. Plan to place new line in IR tomorrow. 1930 Bedside shift change report given to Tahir Rivera (oncoming nurse) by Ovidio Childers (offgoing nurse). Report included the following information SBAR, Intake/Output, MAR, Recent Results and Cardiac Rhythm NSR.

## 2021-10-03 NOTE — PROGRESS NOTES
[facility]    ICU TEAM Progress Note    Name: Chino Bundy   : 1950   MRN: [id number]   Date: 10/3/2021      I  Subjective:   Progress Note: 10/3/2021      Reason for ICU Admission: Pancreatitis, acute kidney injury, respiratory failure, septic shock     Brief HPI: 66 y/o M w hx of CAD, HTN, OA, and kidney stones presents to the hospital with CC of SOB, abd pain, N/V. He was found to have severe pancreatitis and acute renal injury with associated lactic acidosis and elevated LFTs. He was transferred to the CCU for initiation of CRRT given borderline low blood pressures.      Overnight Events:   10/3: Switch CRRT to IHD on the second, needed to increase pressor requirement but were able to pull 1.7 L. Afterward he was tachycardic and that improved after infusion of 250 cc of albumin. Had a bowel movement, no bleeding. Pressors weaning down after IHD completed. Still anuric  10/2: Antibiotics discontinued, still on low-dose pressors, continue to have issues with dialysis access, no fever  10/1: Required going back on pressors [low-dose vasopressin], otherwise remain anuric on CRRT  : No acute event.  Off pressors  : No acute event.  Remains on vasopressin and low-dose epinephrine.  On CRRT.  : CRRT; MV; Pressors  : CRRT; MV; Pressors  : increasing agitation and hypoxia. Intubated. HDL repositioned again. Up on vasopressors  : Delfino Jose pulled back - remains positional.   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 ICD-10-CM: H25.811  ICD-9-CM: 366.19     Overview Signed 2018  4:22 PM by Josseline Rothman DO     KPE IOL OD             Small bowel obstruction ([address] ICD-10-CM: L85.861  ICD-9-CM: 560.9         Perforated bowel ([address] ICD-10-CM: K63.1  ICD-9-CM: 569.83         Atrial flutter (HCC) ICD-10-CM: I48.92  ICD-9-CM: 427.32         Pneumoperitoneum - abdomen ICD-9-CM: 568.89         Coronary atherosclerosis of native coronary artery ICD-10-CM: I25.10  ICD-9-CM: 414.01               Past Medical History:      has a past medical history of Arthritis, CAD (coronary artery disease), Hypertension, Kidney stones, Nausea & vomiting, Pancreatitis, and Psoriasis. Past Surgical History:      has a past surgical history that includes hx hip replacement (1996, [id number]); lithotripsy (~2001); pr eye surg ant sgmt proc unlisted (as child); pr cardiac surg procedure unlist; pr cardiac surg procedure unlist; hx cholecystectomy (7/2009); hx heent; hx gi; hx gi; hx orthopaedic; hx orthopaedic; ir insert non tunl cvc over 5 yrs (9/24/2021); and ir replace cvc non tunl w/o port/pump (9/25/2021). Home Medications:     Prior to Admission medications    Medication Sig Start Date End Date Taking? Authorizing Provider   aspirin delayed-release 81 mg tablet Take 81 mg by mouth daily. Yes Provider, Historical   atorvastatin (LIPITOR) 20 mg tablet Take 10 mg by mouth daily. Yes Other, MD Samia   allopurinol (ZYLOPRIM) 300 mg tablet Take 300 mg by mouth daily. Yes Other, MD Samia   atenolol (TENORMIN) 50 mg tablet Take 50 mg by mouth daily. Yes Provider, Historical       Allergies/Social/Family History: Allergies   Allergen Reactions    Albumin Products Rash, Itching and Other (comments)     Hypertension, tachycardia    Penicillins Rash      Social History     Tobacco Use    Smoking status: Former Smoker     Years: 0.00     Types: Cigars    Smokeless tobacco: Never Used    Tobacco comment: used to smoke cigars quit   Substance Use Topics    Alcohol use:  Yes     Alcohol/week: 0.0 standard drinks     Comment: 2 drinks weekly      Family History   Problem Relation Age of Onset    Eczema Mother     Cancer Sister         breast       Review of Systems: Not able to obtain due to patient medical condition    Objective:   Vital Signs:  Visit Vitals  /62   Pulse 96   Temp 99.9 °F (37.7 °C)   Resp 29   Ht 6' 1\" (1.854 m)   Wt 119.3 kg (263 lb 0.1 oz)   SpO2 100%   BMI 34.70 kg/m²    O2 Flow Rate (L/min): 3 l/min O2 Device: Endotracheal tube, Ventilator Temp (24hrs), Av.4 °F (37.4 °C), Min:96.3 °F (35.7 °C), Max:100.6 °F (38.1 °C)           Intake/Output:     Intake/Output Summary (Last 24 hours) at 10/3/2021 0736  Last data filed at 10/3/2021 0700  Gross per 24 hour   Intake 2724.19 ml   Output 1700 ml   Net 1024.19 ml       Physical Exam:    General:  Intubated and sedated  Eye: No pallor no jaundice pupils equal reactive  Neurologic: sedated  Neck:  RIJ HDL, LIJ CVC  Lungs:  Distant breath sounds  Heart:  regular rate and rhythm  Abdomen:  Soft, distended, could not appreciate tenderness  Skin:  Normal.    LABS AND  DATA: Personally reviewed  Recent Labs     10/03/21  0356 10/02/21  1559   WBC 18.4* 24.2*   HGB 9.5* 9.8*   HCT 29.1* 30.8*    205     Recent Labs     10/03/21  0356 10/02/21  2107   * 134*   K 4.7 4.7    102   CO2 24 24   BUN 71* 48*   CREA 3.07* 2.22*   * 217*   CA 7.7* 8.4*   MG 2.8* 2.5*   PHOS 4.8* 3.9     Recent Labs     10/03/21  0356 10/02/21  2107 10/02/21  1558 10/02/21  0400   * 169*   < > 140*   TP 5.8* 6.4   < > 5.8*   ALB 2.0* 2.0*   < > 1.9*   GLOB 3.8 4.4*   < > 3.9   LPSE  --   --   --  437*    < > = values in this interval not displayed. No results for input(s): INR, PTP, APTT, INREXT in the last 72 hours. Recent Labs     10/03/21  0424 10/01/21  1346   PHI 7.43 7.41   PCO2I 36.0 36.3   PO2I 83 100   FIO2I 50 50     No results for input(s): CPK, CKMB, TROIQ, BNPP in the last 72 hours.     Hemodynamics:   PAP:   CO:     Wedge:   CI:     CVP:    SVR:       PVR:       Ventilator Settings:  Mode Rate Tidal Volume Pressure FiO2 PEEP   Assist control   480 ml    40 % 5 cm H20     Peak airway pressure: 19 cm H2O    Minute ventilation: 11.2 l/min        MEDS: Reviewed    Chest X-Ray:  CXR Results  (Last 48 hours)    None            Assessment and Plan:   - Acute Pancreatitis  - Septic Shock  - Acute Hypoxic Respiratory Failure  - [place] on RRT  -History of CAD      Over the last 5 days patient showed some improvement especially in the amount of pressor support required. He also started to have bowel movement. We have been monitoring off antibiotic however his condition remained tenuous. I discussed this with his daughter on October 2 at length. We started the discussion regarding goals of care and CODE STATUS. The daughter will discuss with her mother further issues and we will continue to follow with them. At this time he continues to be full code     Neuro: Versed, reported to respond to painful stimuli and try to follow command when sedation is off  Pulm: mechanical ventilation, showing some improvement as we are able to wean FiO2 down to 40% now. No significant restriction with the intra-abdominal pathology. Wean fio2 as able. Chlorhexidine. HOB >30 degrees. Cardiac: MAP goal >65.  Overall improvement in pressors requirement. Lactic acid remained stable   renal: Remains anuric, issue with the CRRT circuit, tried IHD on October 2 and was able to remove 1.7 L, required increasing pressors during this. Pressors weaned off afterward. Appreciate nephrology  GI: On TPN, starting tube feeding, monitor. Had a bowel movement on October 2 without evidence of melena or hematochezia. If tolerating advancement and tube feeding hopefully will be able to discontinue TPN in couple of days  ID: Antibiotic changed to meropenem and vancomycin by infectious disease team on September 29. After discussion with ID we discontinued meropenem and vancomycin on October 1.   T-max was 100.6, WBC finally started to trend down, as mentioned above pressors requirement trending down he is having bowel movement now and abdomen is not firm or tender. Plan is to obtain an abdominal CAT scan however given the above favorable circumstances I will hold as I do not see an indication of clinical deterioration at this time  Heme: SQH. Cont asa. Endo: Insulin sliding scale  MSK:  PT/OT    DISPOSITION  Stay in ICU    CRITICAL CARE CONSULTANT NOTE  I had a face to face encounter with the patient, reviewed and interpreted patient data including clinical events, labs, images, vital signs, I/O's, and examined patient. I have discussed the case and the plan and management of the patient's care with the consulting services, the bedside nurses and the respiratory therapist.      NOTE OF PERSONAL INVOLVEMENT IN CARE   This patient has a high probability of imminent, clinically significant deterioration, which requires the highest level of preparedness to intervene urgently. I participated in the decision-making and personally managed or directed the management of the following life and organ supporting interventions that required my frequent assessment to treat or prevent imminent deterioration. I personally spent 40 minutes of critical care time. This is time spent at this critically ill patient's bedside actively involved in patient care as well as the coordination of care and discussions with the patient's family. This does not include any procedural time which has been billed separately. Dominick Canchola M.D.   Staff Intensivist/Pulmonologist  [facility]  10/3/2021

## 2021-10-03 NOTE — PROGRESS NOTES
1930: Bedside and Verbal shift change report given to Jaspal Luis RN (oncoming nurse) by Mark Bean RN (offgoing nurse). Report included the following information SBAR, Kardex, ED Summary, Procedure Summary, Intake/Output, MAR, Recent Results, Med Rec Status, Cardiac Rhythm NSR/PVC, Alarm Parameters  and Dual Neuro Assessment. Drips: [facility] @ 8, Versed @ 4, TPN @ 75    2000: Resumed pt care     2300: Bladder scan = 250. MD notified     0130: RR 30, , HR increased. Per MD give PRN dilaudid IVP    0320: ABG:  pO2 104; pCO2 31.4; pH 7.4;  HCO3 19.5, %O2 Sat 98. 1.    0400: AM labs drawn and sent. Bladder scan = 362. Straight cath performed, 325cc dark, tea colored urine out. Post void scan = 38    0600: Dr. Jose Alonzo @ bedside, OK to increase H2O flush to 50cc Q4.     0730: Bedside and Verbal shift change report given to EWA Victoria (oncoming nurse) by Jaspal Luis RN (offgoing nurse). Report included the following information SBAR, Kardex, ED Summary, Procedure Summary, Intake/Output, MAR, Recent Results, Med Rec Status, Cardiac Rhythm NSR/ST/PAC/PVC, Alarm Parameters  and Dual Neuro Assessment.

## 2021-10-04 NOTE — PROGRESS NOTES
[facility]    ICU TEAM Progress Note    Name: Priyanka Arreguin   : 1950   MRN: [id number]   Date: 10/4/2021      I  Subjective:   Progress Note: 10/4/2021      Reason for ICU Admission: Pancreatitis, acute kidney injury, respiratory failure, septic shock     Brief HPI: 68 y/o M w hx of CAD, HTN, OA, and kidney stones presents to the hospital with CC of SOB, abd pain, N/V. He was found to have severe pancreatitis and acute renal injury with associated lactic acidosis and elevated LFTs. He was transferred to the CCU for initiation of CRRT given borderline low blood pressures.      Overnight Events:     10/4 Remains intubated, on/off pressors    10/3: Switch CRRT to IHD on the second, needed to increase pressor requirement but were able to pull 1.7 L. Afterward he was tachycardic and that improved after infusion of 250 cc of albumin. Had a bowel movement, no bleeding. Pressors weaning down after IHD completed. Still anuric  10/2: Antibiotics discontinued, still on low-dose pressors, continue to have issues with dialysis access, no fever  10/1: Required going back on pressors [low-dose vasopressin], otherwise remain anuric on CRRT  : No acute event.  Off pressors  : No acute event.  Remains on vasopressin and low-dose epinephrine.  On CRRT.  : CRRT; MV; Pressors  : CRRT; MV; Pressors  : increasing agitation and hypoxia. Intubated. HDL repositioned again. Up on vasopressors  : [place] pulled back - remains positional.   : transferred into ICU.  Maria Teresa Hernandes placed and CRRT started    Objective:   Vital Signs:  Visit Vitals  /71   Pulse 80   Temp 98.5 °F (36.9 °C)   Resp 26   Ht 6' 1\" (1.854 m)   Wt 122.3 kg (269 lb 10 oz)   SpO2 95%   BMI 35.57 kg/m²    O2 Flow Rate (L/min): 3 l/min O2 Device: Endotracheal tube Temp (24hrs), Av.4 °F (36.9 °C), Min:97.9 °F (36.6 °C), Max:99.1 °F (37.3 °C)           Intake/Output:     Intake/Output Summary (Last 24 hours) at 10/4/2021 1579 [facility] filed at 10/4/2021 1400  Gross per 24 hour   Intake 2466.83 ml   Output 350 ml   Net 2116.83 ml       Physical Exam:    General:  Intubated and sedated  Eye: No pallor no jaundice pupils equal reactive  Neurologic: sedated  Neck:  RIJ HDL, LIJ CVC  Lungs:  Distant breath sounds  Heart:  regular rate and rhythm  Abdomen:  Soft, distended, could not appreciate tenderness  Skin:  Normal.    LABS AND  DATA: Personally reviewed  Recent Labs     10/04/21  0351 10/03/21  0356   WBC 21.5* 18.4*   HGB 9.8* 9.5*   HCT 30.6* 29.1*    203     Recent Labs     10/04/21  0351 10/03/21  1617   * 134*   K 4.8 4.6    100   CO2 20* 25   * 101*   CREA 4.53* 3.96*   * 262*   CA 7.9* 7.9*   MG 3.1* 3.0*   PHOS 8.9* 7.0*     Recent Labs     10/04/21  0351 10/03/21  1617 10/02/21  1558 10/02/21  0400   * 157*   < > 140*   TP 5.9* 5.9*   < > 5.8*   ALB 1.9* 2.0*   < > 1.9*   GLOB 4.0 3.9   < > 3.9   LPSE  --   --   --  437*    < > = values in this interval not displayed. No results for input(s): INR, PTP, APTT, INREXT, INREXT in the last 72 hours. Recent Labs     10/04/21  0320 10/03/21  0424   PHI  --  7.43   PCO2I  --  36.0   PO2I  --  83   FIO2I 40 50     No results for input(s): CPK, CKMB, TROIQ, BNPP in the last 72 hours. Hemodynamics:   PAP:   CO:     Wedge:   CI:     CVP:    SVR:       PVR:       Ventilator Settings:  Mode Rate Tidal Volume Pressure FiO2 PEEP   Assist control, Volume control   480 ml    40 % 5 cm H20     Peak airway pressure: 16 cm H2O    Minute ventilation: 11.2 l/min        MEDS: Reviewed    Chest X-Ray:  CXR Results  (Last 48 hours)               10/04/21 1335  XR CHEST PORT Final result    Impression:  Right transjugular dialysis catheter overlying the right atrium. No   pneumothorax.        Narrative:  EXAM:  XR CHEST PORT       INDICATION:  Confirm dialysis catheter placement       COMPARISON: None       TECHNIQUE: AP portable upright chest view FINDINGS: Tip of the right transverse jugular dialysis catheter overlies the   right atrium. Endotracheal tube, enteric tube, and left transjugular central   venous line are in appropriate position. No pneumothorax. 10/04/21 0505  XR CHEST PORT Final result    Impression:  No significant change. ET tube in satisfactory position. Narrative:  INDICATION:    ETT        EXAMINATION:  AP CHEST, PORTABLE       COMPARISON: September 30       FINDINGS: Single AP portable view of the chest at 446 hours demonstrates no   change in position of the lines and tubes. The cardiomediastinal silhouette is   stable. There is no new airspace disease. Lungs are hypoinspiratory. There is   bibasilar atelectasis. Assessment and Plan:   - Acute Pancreatitis  - Septic Shock  - Acute Hypoxic Respiratory Failure  - [place] on RRT  -History of CAD      Neuro - Analgosedation with opioids and precedex, other delirium prevention strategies    CV - MAP goal > 65, cont midodrine, use levo of needed, start weaning stress dose steroids    Pulm - cont lung protective mech vent, SBTs as tolerated    GI - Advance TF, D/C TPN, occult stool +ve, no overt bleeding, on PPI therapy    Renal - [place] on HD - now on IHD, needs volume off, /u renal recs    Heme - Heparin for DVT ppx    ID - Undulating WBC count - monitor, will have a low threshold to restart abx therapy, micro studies neg to date    Endo - Ensure Vit D supplementation - quite deficient, keep glu 100-180    Lines - L JIMI Law, R DOMITILA Jaime     NOTE OF PERSONAL INVOLVEMENT IN CARE   This patient has a high probability of imminent, clinically significant deterioration, which requires the highest level of preparedness to intervene urgently.  I participated in the decision-making and personally managed or directed the management of the following life and organ supporting interventions that required my frequent assessment to treat or prevent imminent deterioration. I personally spent 40 minutes of critical care time. This does not include any procedural time.     Signed By: Isabel Schwartz MD     October 4, 2021

## 2021-10-04 NOTE — PROGRESS NOTES
Nephrology Progress Note  Caroline Thorne  Date of Admission : 9/22/2021    CC: Follow up for CHINA       Assessment and Plan     CH[place]:  - multifactorial: ATN from hypotension/severe pancreatitis + IV contrast on admission  - no obstruction on CT scan  - CRRT switched to IHD over weekend  - IR to change Teddy today   - daily HD for volume control   - daily labs and I/Os    Nutrition:  - TPN being weaned   - advance TF as tolerated    Shock   - off pressors  - On high dose Midodrine     Acute resp failure :  - intubated 9/25    Pancreatitis     Bradycardia  TAA, b/l BEAU aneurysms  Leukocytosis       Interval History:  Seen and examined. Weekend events reviewed. Azotemia worse since stopping CRRT despite one HD Rx. MAKING urine. Off pressors, minimal vent support, not following commands    Current Medications: all current  Medications have been eviewed in EPIC  Review of Systems: Review of systems not obtained due to patient factors. Objective:  Vitals:    Vitals:    10/04/21 0323 10/04/21 0400 10/04/21 0500 10/04/21 0600   BP:  127/65 (!) 107/56 112/60   Pulse: 84 88 89 83   Resp: 28 29 29 28   Temp:  97.9 °F (36.6 °C)     SpO2: 94% 97% 94% 94%   Weight:  122.3 kg (269 lb 10 oz)     Height:         Intake and Output:  10/03 1901 - 10/04 0700  In: 1147 [I.V.:957]  Out: 350 [Urine:325; Drains:25]  10/02 0701 - 10/03 1900  In: 3834.3 [I.V.:3644.3]  Out: 1800 [Drains:100]    Physical Examination:  Pt intubated     yes  General: On vent   Neck:  Teddy +  Resp:  Decreased BS b/l  CV:  RRR,  no murmur or rub, 1+ b/l LE edema  GI:  Non distended, reduced bowel sounds  Neurologic:  Sedated on vent   Ext                   Pitting edema +    []    High complexity decision making was performed  []    Patient is at high-risk of decompensation with multiple organ involvement    Lab Data Personally Reviewed: I have reviewed all the pertinent labs, microbiology data and radiology studies during assessment.     Recent Labs 10/04/21  0351 10/03/21  1617 10/03/21  0356   * 134* 133*   K 4.8 4.6 4.7    100 103   CO2 20* 25 24   * 262* 236*   * 101* 71*   CREA 4.53* 3.96* 3.07*   CA 7.9* 7.9* 7.7*   MG 3.1* 3.0* 2.8*   PHOS 8.9* 7.0* 4.8*   ALB 1.9* 2.0* 2.0*   ALT 47 32 26     Recent Labs     10/04/21  0351 10/03/21  0356 10/02/21  1559   WBC 21.5* 18.4* 24.2*   HGB 9.8* 9.5* 9.8*   HCT 30.6* 29.1* 30.8*    203 205     Lab Results   Component Value Date/Time    Specimen Description: HIP LEFT JOINT 01/17/2012 03:00 PM    Specimen Description: HIP LEFT JOINT 01/17/2012 03:00 PM    Specimen Description: NARES 07/28/2011 12:12 AM     Lab Results   Component Value Date/Time    Culture result: NO GROWTH 5 DAYS 09/29/2021 09:05 AM    Culture result: MODERATE YEAST, (APPARENT CANDIDA ALBICANS) (A) 09/28/2021 08:22 AM    Culture result: LIGHT NORMAL RESPIRATORY LUIS 09/28/2021 08:22 AM     Recent Results (from the past 24 hour(s))   GLUCOSE, POC    Collection Time: 10/03/21 11:25 AM   Result Value Ref Range    Glucose (POC) 196 (H) 65 - 117 mg/dL    Performed by Mary Aguirre    PHOSPHORUS    Collection Time: 10/03/21  4:17 PM   Result Value Ref Range    Phosphorus 7.0 (H) 2.6 - 4.7 MG/DL   LACTIC ACID    Collection Time: 10/03/21  4:17 PM   Result Value Ref Range    Lactic acid 1.5 0.4 - 2.0 MMOL/L   METABOLIC PANEL, COMPREHENSIVE    Collection Time: 10/03/21  4:17 PM   Result Value Ref Range    Sodium 134 (L) 136 - 145 mmol/L    Potassium 4.6 3.5 - 5.1 mmol/L    Chloride 100 97 - 108 mmol/L    CO2 25 21 - 32 mmol/L    Anion gap 9 5 - 15 mmol/L    Glucose 262 (H) 65 - 100 mg/dL     (H) 6 - 20 MG/DL    Creatinine 3.96 (H) 0.70 - 1.30 MG/DL    BUN/Creatinine ratio 26 (H) 12 - 20      GFR est AA 18 (L) >60 ml/min/1.73m2    GFR est non-AA 15 (L) >60 ml/min/1.73m2    Calcium 7.9 (L) 8.5 - 10.1 MG/DL    Bilirubin, total 0.6 0.2 - 1.0 MG/DL    ALT (SGPT) 32 12 - 78 U/L    AST (SGOT) 52 (H) 15 - 37 U/L    Alk. phosphatase 157 (H) 45 - 117 U/L    Protein, total 5.9 (L) 6.4 - 8.2 g/dL    Albumin 2.0 (L) 3.5 - 5.0 g/dL    Globulin 3.9 2.0 - 4.0 g/dL    A-G Ratio 0.5 (L) 1.1 - 2.2     MAGNESIUM    Collection Time: 10/03/21  4:17 PM   Result Value Ref Range    Magnesium 3.0 (H) 1.6 - 2.4 mg/dL   GLUCOSE, POC    Collection Time: 10/03/21  6:03 PM   Result Value Ref Range    Glucose (POC) 259 (H) 65 - 117 mg/dL    Performed by Pasha, POC    Collection Time: 10/03/21 11:05 PM   Result Value Ref Range    Glucose (POC) 227 (H) 65 - 117 mg/dL    Performed by Adina Mcdaniels    POC VENOUS BLOOD GAS    Collection Time: 10/04/21  3:20 AM   Result Value Ref Range    Device: ADULT VENT      FIO2 (POC) 40 %    pH, venous (POC) 7.40 7.32 - 7.42      pCO2, venous (POC) 31.4 (L) 41 - 51 MMHG    pO2, venous (POC) 104 (H) 25 - 40 mmHg    HCO3, venous (POC) 19.5 (L) 23.0 - 28.0 MMOL/L    sO2, venous (POC) 98.1 (H) 65 - 88 %    Base deficit, venous (POC) 4.5 mmol/L    Mode ASSIST CONTROL      Tidal volume 480 ml    Set Rate 18 bpm    PEEP/CPAP (POC) 5 cmH2O    Allens test (POC) NOT APPLICABLE      Site DRAWN FROM ARTERIAL LINE      Specimen type (POC) VENOUS BLOOD      Performed by Jose Lei    PHOSPHORUS    Collection Time: 10/04/21  3:51 AM   Result Value Ref Range    Phosphorus 8.9 (H) 2.6 - 4.7 MG/DL   LACTIC ACID    Collection Time: 10/04/21  3:51 AM   Result Value Ref Range    Lactic acid 1.4 0.4 - 2.0 MMOL/L   METABOLIC PANEL, COMPREHENSIVE    Collection Time: 10/04/21  3:51 AM   Result Value Ref Range    Sodium 132 (L) 136 - 145 mmol/L    Potassium 4.8 3.5 - 5.1 mmol/L    Chloride 100 97 - 108 mmol/L    CO2 20 (L) 21 - 32 mmol/L    Anion gap 12 5 - 15 mmol/L    Glucose 240 (H) 65 - 100 mg/dL     (H) 6 - 20 MG/DL    Creatinine 4.53 (H) 0.70 - 1.30 MG/DL    BUN/Creatinine ratio 27 (H) 12 - 20      GFR est AA 16 (L) >60 ml/min/1.73m2    GFR est non-AA 13 (L) >60 ml/min/1.73m2    Calcium 7.9 (L) 8.5 - 10.1 MG/DL Bilirubin, total 0.5 0.2 - 1.0 MG/DL    ALT (SGPT) 47 12 - 78 U/L    AST (SGOT) 77 (H) 15 - 37 U/L    Alk. phosphatase 165 (H) 45 - 117 U/L    Protein, total 5.9 (L) 6.4 - 8.2 g/dL    Albumin 1.9 (L) 3.5 - 5.0 g/dL    Globulin 4.0 2.0 - 4.0 g/dL    A-G Ratio 0.5 (L) 1.1 - 2.2     MAGNESIUM    Collection Time: 10/04/21  3:51 AM   Result Value Ref Range    Magnesium 3.1 (H) 1.6 - 2.4 mg/dL   CBC WITH AUTOMATED DIFF    Collection Time: 10/04/21  3:51 AM   Result Value Ref Range    WBC 21.5 (H) 4.1 - 11.1 K/uL    RBC 3.24 (L) 4.10 - 5.70 M/uL    HGB 9.8 (L) 12.1 - 17.0 g/dL    HCT 30.6 (L) 36.6 - 50.3 %    MCV 94.4 80.0 - 99.0 FL    MCH 30.2 26.0 - 34.0 PG    MCHC 32.0 30.0 - 36.5 g/dL    RDW 15.1 (H) 11.5 - 14.5 %    PLATELET 515 381 - 505 K/uL    MPV 12.4 8.9 - 12.9 FL    NRBC 0.1 (H) 0  WBC    ABSOLUTE NRBC 0.02 (H) 0.00 - 0.01 K/uL    NEUTROPHILS 88 (H) 32 - 75 %    BAND NEUTROPHILS 8 (H) 0 - 6 %    LYMPHOCYTES 0 (L) 12 - 49 %    MONOCYTES 4 (L) 5 - 13 %    EOSINOPHILS 0 0 - 7 %    BASOPHILS 0 0 - 1 %    IMMATURE GRANULOCYTES 0 %    ABS. NEUTROPHILS 20.6 (H) 1.8 - 8.0 K/UL    ABS. LYMPHOCYTES 0.0 (L) 0.8 - 3.5 K/UL    ABS. MONOCYTES 0.9 0.0 - 1.0 K/UL    ABS. EOSINOPHILS 0.0 0.0 - 0.4 K/UL    ABS. BASOPHILS 0.0 0.0 - 0.1 K/UL    ABS. IMM. GRANS. 0.0 K/UL    DF MANUAL      PLATELET COMMENTS Large Platelets      RBC COMMENTS ANISOCYTOSIS  1+       GLUCOSE, POC    Collection Time: 10/04/21  5:21 AM   Result Value Ref Range    Glucose (POC) 229 (H) 65 - 117 mg/dL    Performed by Tracee Dominguez MD  [facility]   [id number] [place] [address]  Phone - [phone]   Fax - [phone]  [email or web address]

## 2021-10-04 NOTE — PROGRESS NOTES
Problem: Falls - Risk of  Goal: *Absence of Falls  Description: Document Reiann Boggs Fall Risk and appropriate interventions in the flowsheet. Outcome: Progressing Towards Goal  Note: Fall Risk Interventions:  Mobility Interventions: Communicate number of staff needed for ambulation/transfer, Strengthening exercises (ROM-active/passive)    Mentation Interventions: Door open when patient unattended, Evaluate medications/consider consulting pharmacy, Room close to nurse's station, Update white board    Medication Interventions: Evaluate medications/consider consulting pharmacy    Elimination Interventions: Toileting schedule/hourly rounds              Problem: Patient Education: Go to Patient Education Activity  Goal: Patient/Family Education  Outcome: Progressing Towards Goal     Problem: Pain  Goal: *Control of Pain  Outcome: Progressing Towards Goal  Goal: *PALLIATIVE CARE:  Alleviation of Pain  Outcome: Progressing Towards Goal     Problem: Patient Education: Go to Patient Education Activity  Goal: Patient/Family Education  Outcome: Progressing Towards Goal     Problem: Discharge Planning  Goal: *Discharge to safe environment  Outcome: Progressing Towards Goal     Problem: Pressure Injury - Risk of  Goal: *Prevention of pressure injury  Description: Document Genaro Scale and appropriate interventions in the flowsheet. Outcome: Progressing Towards Goal  Note: Pressure Injury Interventions:  Sensory Interventions: Assess changes in LOC, Assess need for specialty bed, Avoid rigorous massage over bony prominences, Check visual cues for pain, Float heels, Keep linens dry and wrinkle-free, Minimize linen layers, Pressure redistribution bed/mattress (bed type), Turn and reposition approx.  every two hours (pillows and wedges if needed)    Moisture Interventions: Absorbent underpads, Apply protective barrier, creams and emollients, Assess need for specialty bed, Check for incontinence Q2 hours and as needed, Internal/External fecal devices, Maintain skin hydration (lotion/cream), Minimize layers, Moisture barrier    Activity Interventions: Assess need for specialty bed, Pressure redistribution bed/mattress(bed type)    Mobility Interventions: Assess need for specialty bed, Float heels, Pressure redistribution bed/mattress (bed type), Turn and reposition approx.  every two hours(pillow and wedges)    Nutrition Interventions: Document food/fluid/supplement intake, Discuss nutritional consult with provider    Friction and Shear Interventions: Apply protective barrier, creams and emollients, Foam dressings/transparent film/skin sealants, Lift sheet, Minimize layers, Transferring/repositioning devices                Problem: Patient Education: Go to Patient Education Activity  Goal: Patient/Family Education  Outcome: Progressing Towards Goal     Problem: Patient Education: Go to Patient Education Activity  Goal: Patient/Family Education  Outcome: Progressing Towards Goal     Problem: Acute Renal Failure: Discharge Outcomes  Goal: *Optimal pain control at patient's stated goal  Outcome: Progressing Towards Goal  Goal: *Urinary output within identified parameters  Outcome: Progressing Towards Goal  Goal: *Hemodynamically stable  Outcome: Progressing Towards Goal  Goal: *Tolerating diet  Outcome: Progressing Towards Goal  Goal: *Lab values stabilized  Outcome: Progressing Towards Goal  Goal: *Verbalizes understanding and describes medication purposes and frequencies  Outcome: Progressing Towards Goal  Goal: *Medication reconciliation  Outcome: Progressing Towards Goal     Problem: Nausea/Vomiting (Adult)  Goal: *Absence of nausea/vomiting  Outcome: Progressing Towards Goal     Problem: Patient Education: Go to Patient Education Activity  Goal: Patient/Family Education  Outcome: Progressing Towards Goal     Problem: Non-Violent Restraints  Goal: Removal from restraints as soon as assessed to be safe  Outcome: Progressing Towards Goal  Goal: No harm/injury to patient while restraints in use  Outcome: Progressing Towards Goal  Goal: Patient's dignity will be maintained  Outcome: Progressing Towards Goal  Goal: Patient Interventions  Outcome: Progressing Towards Goal     Problem: Ventilator Management  Goal: *Adequate oxygenation and ventilation  Outcome: Progressing Towards Goal  Goal: *Patient maintains clear airway/free of aspiration  Outcome: Progressing Towards Goal  Goal: *Absence of infection signs and symptoms  Outcome: Progressing Towards Goal  Goal: *Normal spontaneous ventilation  Outcome: Progressing Towards Goal     Problem: Patient Education: Go to Patient Education Activity  Goal: Patient/Family Education  Outcome: Progressing Towards Goal     Problem: Breathing Pattern - Ineffective  Goal: *Absence of hypoxia  Outcome: Progressing Towards Goal  Goal: *Use of effective breathing techniques  Outcome: Progressing Towards Goal  Goal: *PALLIATIVE CARE:  Alleviation of Dyspnea  Outcome: Progressing Towards Goal     Problem: Patient Education: Go to Patient Education Activity  Goal: Patient/Family Education  Outcome: Progressing Towards Goal     Problem: Infection - Risk of, Central Venous Catheter-Associated Bloodstream Infection  Goal: *Absence of infection signs and symptoms  Outcome: Progressing Towards Goal     Problem: Patient Education: Go to Patient Education Activity  Goal: Patient/Family Education  Outcome: Progressing Towards Goal     Problem: Infection - Risk of, Ventilator-Associated Pneumonia  Goal: *Absence of infection signs and symptoms  Outcome: Progressing Towards Goal     Problem: Patient Education: Go to Patient Education Activity  Goal: Patient/Family Education  Outcome: Progressing Towards Goal     Problem: Hypotension  Goal: *Blood pressure within specified parameters  Outcome: Progressing Towards Goal  Goal: *Fluid volume balance  Outcome: Progressing Towards Goal  Goal: *Labs within defined limits  Outcome: Progressing Towards Goal     Problem: Patient Education: Go to Patient Education Activity  Goal: Patient/Family Education  Outcome: Progressing Towards Goal     Problem: Diabetes Self-Management  Goal: *Disease process and treatment process  Description: Define diabetes and identify own type of diabetes; list 3 options for treating diabetes. Outcome: Progressing Towards Goal  Goal: *Incorporating nutritional management into lifestyle  Description: Describe effect of type, amount and timing of food on blood glucose; list 3 methods for planning meals. Outcome: Progressing Towards Goal  Goal: *Incorporating physical activity into lifestyle  Description: State effect of exercise on blood glucose levels. Outcome: Progressing Towards Goal  Goal: *Developing strategies to promote health/change behavior  Description: Define the ABC's of diabetes; identify appropriate screenings, schedule and personal plan for screenings. Outcome: Progressing Towards Goal  Goal: *Using medications safely  Description: State effect of diabetes medications on diabetes; name diabetes medication taking, action and side effects. Outcome: Progressing Towards Goal  Goal: *Monitoring blood glucose, interpreting and using results  Description: Identify recommended blood glucose targets  and personal targets. Outcome: Progressing Towards Goal  Goal: *Prevention, detection, treatment of acute complications  Description: List symptoms of hyper- and hypoglycemia; describe how to treat low blood sugar and actions for lowering  high blood glucose level. Outcome: Progressing Towards Goal  Goal: *Prevention, detection and treatment of chronic complications  Description: Define the natural course of diabetes and describe the relationship of blood glucose levels to long term complications of diabetes.   Outcome: Progressing Towards Goal  Goal: *Developing strategies to address psychosocial issues  Description: Describe feelings about living with diabetes; identify support needed and support network  Outcome: Progressing Towards Goal  Goal: *Insulin pump training  Outcome: Progressing Towards Goal  Goal: *Sick day guidelines  Outcome: Progressing Towards Goal  Goal: *Patient Specific Goal (EDIT GOAL, INSERT TEXT)  Outcome: Progressing Towards Goal     Problem: Patient Education: Go to Patient Education Activity  Goal: Patient/Family Education  Outcome: Progressing Towards Goal     Problem: General Wound Care  Goal: *Non-infected wound: Improvement of existing wound, absence of infection, and maintenance of skin integrity  Outcome: Progressing Towards Goal  Goal: *Infected Wound: Prevention of further infection and promotion of healing  Description: Infection control procedures (eg: clean dressings, clean gloves, hand washing, precautions to isolate wound from contamination, sterile instruments used for wound debridement) should be implemented.   Outcome: Progressing Towards Goal  Goal: Interventions  Outcome: Progressing Towards Goal     Problem: Patient Education: Go to Patient Education Activity  Goal: Patient/Family Education  Outcome: Progressing Towards Goal

## 2021-10-04 NOTE — PROGRESS NOTES
Comprehensive Nutrition Assessment    Type and Reason for Visit: Reassess    Nutrition Recommendations/Plan:      Suggest to increase enteral feedings to goal of 45 ml/hr as tolerated. Add 1 pack prosource BID to increase total protein to 117 g/day    Increase flush to 50 ml every 4 hours as tolerated. Check labs/lipase in the next few days with use of enteral feedings. Nutrition Assessment:    Past Medical History:   Diagnosis Date    Arthritis       back hips knees and arms    CAD (coronary artery disease)      Hypertension      Kidney stones      Nausea & vomiting       after hip surgery    Pancreatitis       In 2/2011, resolved    Psoriasis     Pt admitted for acute onset chest pain/epigastric abd pain with N/V and SOB. GI, Nephrology, and Cardiology consulted. CTA chest, abd/pelvis 9/22 notes:   1. Acute, uncomplicated pancreatitis. May be secondary to debris filled duodenal diverticulum. 2. 4.6 cm ascending thoracic aortic aneurysm, without dissection. 3. 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 4. 50% ostial stenosis of the bilateral main renal arteries, with cortical medullary thinning of the kidneys bilaterally. 5. Prostatomegaly, with findings suspicious for chronic bladder outlet obstruction. 6. Nonobstructing calyceal renal calculi bilaterally. 7. Enlarged main pulmonary artery suggestive of pulmonary hypertension.     Pt required transfer to ICU for shock, requiring pressor support and CRRT for [place]. TPN initiated given multiple pressors and NG tube to suction, still unclear cause of pancreatitis. Spoke to dtr at bedside, reports no known acute loss of appetite or wt given relatively quick onset of symptoms. Dtr is an RN. TPN as presently ordered providing 1420 kcal and 126 g protein, which meets ~64% upper end kcal needs, 74% upper end protein needs.  Recommend increasing dextrose concentration, rate, and 250 ml 20% lipids 3x/week as above to provide on average 2130 kcal, 139 g protein, with 398 g CHO (GIR 2.3 mg/kg/min), and volume 7860-6700 ml.      10/4/21: pt now off CRRT and receiving HD, off pressors, lipase decreasing; Enteral feedings initiated and running @ 20 ml/hr. Suggest to discontinue TPN and continue to increase enteral feedings to goal of 45 ml/hr. This will provide: 1080 ml, 1944 kcal , 78 grams protein, 87 grams protein and 845 ml total fluid. Malnutrition Assessment:  Malnutrition Status: At risk for malnutrition (specify)    Context:  Acute illness     Findings of the 6 clinical characteristics of malnutrition:   Energy Intake:  Mild decrease in energy intake (specify)  Weight Loss:  No significant weight loss     Body Fat Loss:  No significant body fat loss,     Muscle Mass Loss:  No significant muscle mass loss,      Nutritionally Significant Medication: lantus, solu cortef, midodrine, Protonix, vasopression    Estimated Daily Nutrient Needs:  Energy (kcal): 8132-0039 ; Weight Used for Energy Requirements: Current  Protein (g): 120-170 (1.5-2 g/kg IBW); Weight Used for Protein Requirements: Ideal  Fluid (ml/day): Per Renal; Method Used for Fluid Requirements: Other (comment)    Current Nutrition Therapies:   Diet: NP)  Additional Caloric Sources: TPN and Nepro @ 30 ml/hr with 10 ml water every 4 hours   Meal intake: No data found. Supplement Intake: No data found. Anthropometric Measures:  · Height:  6' 1\" (185.4 cm)  · Current Body Wt:  121.5 kg (267 lb 13.7 oz)   · Admission Body Wt:  267 lb 13.7 oz    · Usual Body Wt:        · Ideal Body Wt:  184:  145.6 %   · Adjusted Body Weight:   ; Weight Adjustment for: No adjustment   · Adjusted BMI:       · BMI Categories:  Obese class 2 (BMI 35.0-39. 9)     Wt Readings from Last 10 Encounters:   10/04/21 122.3 kg (269 lb 10 oz)   05/10/19 117.9 kg (260 lb)   06/06/18 114.3 kg (252 lb)   06/09/16 122.5 kg (270 lb)   02/08/15 122.5 kg (270 lb)   01/17/12 115.7 kg (255 lb) 01/03/12 115.7 kg (255 lb)   08/31/11 113.4 kg (250 lb)   08/15/11 113.4 kg (250 lb)   07/27/11 113.4 kg (250 lb)     Stool output: 125 ml  Nutrition Diagnosis:   · Inadequate oral intake related to altered GI function, increased demand for energy/nutrients as evidenced by NPO or clear liquid status due to medical condition    · Inadequate oral intake related to renal dysfunction as evidenced by nutrition support-enteral nutrition      Nutrition Interventions:   Food and/or Nutrient Delivery: Continue tube feeding, Continue NPO  Nutrition Education and Counseling: No recommendations at this time  Coordination of Nutrition Care: Continue to monitor while inpatient    Goals:  Meet nutritional needs via Enteral support x 5-7 days. Nutrition Monitoring and Evaluation:   Behavioral-Environmental Outcomes: None identified  Food/Nutrient Intake Outcomes: Parenteral nutrition intake/tolerance  Physical Signs/Symptoms Outcomes: Biochemical data, Fluid status or edema, Hemodynamic status    Discharge Planning:     Too soon to determine     Mellody Plan, RD

## 2021-10-04 NOTE — PROCEDURES
Hemodialysis / [phone]    Vitals Pre Post Assessment Pre Post   BP BP: (!) 113/55 (10/04/21 1420) 101/61 LOC sedated sedated   HR Pulse (Heart Rate): 63 (10/04/21 1420) 86 Lungs Intubated diminished Intubated diminished   Resp Resp Rate: 24 (10/04/21 1420) 22 Cardiac NSR NSR   Temp Temp: 98.5 °F (36.9 °C) (10/04/21 1347) 97.9 Skin W/D W/D   Weight    Edema +2 generalized +2 generalized   Tele status  NSR Pain Pain Intensity 1: 0 (10/04/21 1400)      Orders   Duration: Start: 14:20 End: 17:50 Total: 3.5   Dialyzer: Dialyzer/Set Up Inspection: Revaclear (10/04/21 1420)   K Bath: Dialysate K (mEq/L): 3 (10/04/21 1420)   Ca Bath: Dialysate CA (mEq/L): 2.5 (10/04/21 1420)   Na: Dialysate NA (mEq/L): 138 (10/04/21 1420)   Bicarb: Dialysate HCO3 (mEq/L): 35 (10/04/21 1420)   Target Fluid Removal: Goal/Amount of Fluid to Remove (mL): 2000 mL (10/04/21 1420)     Access   Type & Location: Right chest port   Comments:                                   Placed this afternoon     Labs   HBsAg (Antigen) / date:      9/25/21 negative                                         HBsAb (Antibody) / date: 9/25/21 Susceptible   Source: Epic   Obtained/Reviewed  Critical Results Called HGB   Date Value Ref Range Status   10/04/2021 9.8 (L) 12.1 - 17.0 g/dL Final     Potassium   Date Value Ref Range Status   10/04/2021 4.8 3.5 - 5.1 mmol/L Final     Calcium   Date Value Ref Range Status   10/04/2021 7.9 (L) 8.5 - 10.1 MG/DL Final     BUN   Date Value Ref Range Status   10/04/2021 121 (H) 6 - 20 MG/DL Final     Creatinine   Date Value Ref Range Status   10/04/2021 4.53 (H) 0.70 - 1.30 MG/DL Final        Meds Given   Name Dose Route   heparin 1,000:1 1.1 arterial 1.4 venous IV               Adequacy / Fluid    Total Liters Process: 60.6   Net Fluid Removed: 2000      Comments   Time Out Done:   (Time) 14:10   Admitting Diagnosis: Pancreatitis   Consent obtained/signed: Informed Consent Verified: Yes (10/04/21 1017)   Machine / RO # Machine Number: [id number] (10/04/21 7322)   Primary Nurse Rpt Pre: Abrahan Jimenes RN   Primary Nurse Rpt Post: Abrahan Jimenes RN   Pt Education: Pt is non responsive. Educated wife and sister - procedural   Care Plan: Continue current plan of care   Pts outpatient clinic: n/a     Tx Summary  Report received from primary RN. Time out and assessment performed and documented. New port cath placed. Xray taken and confirmation noted. Each catheter limb disinfected per p&p, caps removed, hubs disinfected per p&p. Treatment started at 14:20  Nurse had to restart pressors to maintain blood pressure during HD. Pt tolerated treatment well   Treatment ended at 17:50 All possible blood returned to patient. Each dialysis catheter limb disinfected per p&p, blood returned per p&p, each dialysis hub disinfected per p&p, post dialysis catheter dwell instilled per order, and caps applied.      Comments:

## 2021-10-04 NOTE — PROGRESS NOTES
0730 Bedside shift change report given to Cat RN and Hollie FLORES (oncoming nurse) by Román Calzada RN (offgoing nurse). Report included the following information SBAR, Kardex, Procedure Summary, Intake/Output, MAR, Recent Results, Cardiac Rhythm NSR/tachy and Dual Neuro Assessment. 0830 Positive occult stool    0900 Versed stopped per Damon CHING. Precedex ordered and started. Fentanyl ordered if needed. Advanced tube feed to 20 ml/hr. 1000 RT at bedside. ETT advanced 2 cm per Damon CHING and respiratory rate decreased to 15.    1230 IR placed new ede at bedside. [address] at bedside. 1430 Levophed restarted. 1800 Dialysis complete. One urine occurrence. Advanced tube feed to 30 ml/hr. 1915 Post void bladder scan 55 ml.     2000 Bedside shift change report given to [id number] (oncoming nurse) by Esme RN and Román Marx (offgoing nurse). Report included the following information SBAR, Kardex, Procedure Summary, Intake/Output, MAR, Cardiac Rhythm NSR and Dual Neuro Assessment.

## 2021-10-04 NOTE — WOUND CARE
WOCN Note    Reconsult for assessment of right ear. Patient currently on dialysis. Will follow tomorrow. Use Venelex TID and Z flow pillow.     CONI Rowe RN [facility] Inpatient Wound Care  Available on Perfect Serve  Pager [phone]  Office [phone]

## 2021-10-05 NOTE — WOUND CARE
WOCN Note:      Re consult for assessment of right ear and right side of head. Patient has been too unstable to turn (BP wound drop and hemodialysis would get blocked). Yesterday patient received a new port cath and is now tolerating turns. Assessed in room 4219 with Ndea Alcala RN and Opal Pappas RN. Chart reviewed. Admitted DX:  Pancreatitis       Past Medical History:   Diagnosis Date    Arthritis       back hips knees and arms    CAD (coronary artery disease)      Hypertension      Kidney stones      Nausea & vomiting       after hip surgery    Pancreatitis       In 2/2011, resolved    Psoriasis        Assessment:   Patient is intubated, sedated and requires 2+ for turning/repostioning. Bed: [place]  FMS in place. Samira Anal area intact. Sacrum and buttocks intact with blanching red erythema. Patient positioned on back. Heels offloaded with pillows.      1. Right ear rim, non blanching purple erythema. 4 x 2 x 0 cm. 2.  Right temple, non blanching purple erythema. 3.5 x 3 x 0 cm. 3.  Right hip has blanching pink erythema. Foam in place for protection.     Wound, Pressure Prevention & Skin Care Recommendations:    1. Minimize layers of linen/pads under patient to optimize support surface. 2.  Turn/reposition approximately every 2 hours and offload heels. 3.  Manage moisture/ Keep skin folds clean and dry. 4.  Right hip:  Venelex TID and Protect with large sacral foam dressing. 5.  Right ear and right temple:  Apply Venelex TID and Protect with pink foam.  6.  Protect samira anal skin with zinc cream.  Ensure that there is slack on the FMS tubing to reduce any tension to the surrounding tissue and skin. 7.  Buttocks and Sacrum:  Venelex TID.     Discussed above plan with Jose Antonio Vidal.   Discussed with Dr Kena Saldaña.     Transition of Care:   Plan to follow as needed while admitted to hospital.     CONI Faith RN [facility] Inpatient Wound Care  Available on Perfect Serve  Pager [phone] [facility]

## 2021-10-05 NOTE — PROGRESS NOTES
ID Progress Note  10/5/2021    Subjective: Intubated it    Review of Systems:            Symptom Y/N Comments   Symptom Y/N Comments   Fever/Chills       Chest Pain        Poor Appetite       Edema        Cough       Abdominal Pain        Sputum       Joint Pain        SOB/[place]ATES       Pruritis/Rash        Nausea/vomit       Tolerating PT/OT        Diarrhea       Tolerating Diet        Constipation       Other           Could NOT obtain due to: Intubated it      Objective:     Vitals:   Visit Vitals  BP 91/63   Pulse 68   Temp 99.4 °F (37.4 °C)   Resp 28   Ht 6' 1\" (1.854 m)   Wt 121.8 kg (268 lb 8.3 oz)   SpO2 98%   BMI 35.43 kg/m²        Tmax:  Temp (24hrs), Av.7 °F (37.1 °C), Min:97.9 °F (36.6 °C), Max:100.1 °F (37.8 °C)      PHYSICAL EXAM:  General: Obese, chronically ill appearing, no acute distress    EENT:  EOMI. Anicteric sclerae. Dry mucous membrane  Resp:  Clear in apex with decreased breath sounds at bases,  No accessory muscle use  CV:  Regular  rhythm,  trace of edema  GI:  Soft, non distended, (+) Bowel sounds  Neurologic:  Intubated it, not following commends   Psych:   Unable to assess insight. Skin:  No rashes.   No jaundice    Labs:   Lab Results   Component Value Date/Time    WBC 25.3 (H) 10/05/2021 03:54 AM    Hemoglobin (POC) 15.0 2016 05:20 PM    HGB 10.4 (L) 10/05/2021 03:54 AM    Hematocrit (POC) 44 2016 05:20 PM    HCT 32.8 (L) 10/05/2021 03:54 AM    PLATELET 424  03:54 AM    MCV 96.2 10/05/2021 03:54 AM     Lab Results   Component Value Date/Time    Sodium 135 (L) 10/05/2021 03:54 AM    Potassium 4.7 10/05/2021 03:54 AM    Chloride 101 10/05/2021 03:54 AM    CO2 22 10/05/2021 03:54 AM    Anion gap 12 10/05/2021 03:54 AM    Glucose 190 (H) 10/05/2021 03:54 AM     (H) 10/05/2021 03:54 AM    Creatinine 4.46 (H) 10/05/2021 03:54 AM    BUN/Creatinine ratio 26 (H) 10/05/2021 03:54 AM    GFR est AA 16 (L) 10/05/2021 03:54 AM    GFR est non-AA 13 (L) 10/05/2021 03:54 AM    Calcium 8.2 (L) 10/05/2021 03:54 AM    Bilirubin, total 0.6 10/04/2021 05:51 PM    Alk. phosphatase 192 (H) 10/04/2021 05:51 PM    Protein, total 6.2 (L) 10/04/2021 05:51 PM    Albumin 2.1 (L) 10/05/2021 03:54 AM    Globulin 4.2 (H) 10/04/2021 05:51 PM    A-G Ratio 0.5 (L) 10/04/2021 05:51 PM    ALT (SGPT) 77 10/04/2021 05:51 PM     CTA chest, CT of abd/pel: 4.6 cm ascending thoracic aortic aneurysm, without dissection. 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 Pancreas with diffuse peripancreatic fat stranding without discrete fluid  collection, no evidence of necrosis or infection. Prostatomegaly, with findings suspicious for chronic bladder outlet obstruction. US ABD (9/22) Pancreas obscured by bowel gas. Transfer to ICU on 9/24 due to hypotension. Intubated it 9/26  C-line and dialysis catheter placement 9/26, line exchanged on 10/4    Assessment and Plan   Leukocytosis  ? ASP PNA  - afebrile, WBC 25k; off ABX since 10/1     Blood cx (9/22 & 9/28) no growth     resp cx (9/28) candida albicans      IV merrem and vancomycin (9/29-10/1)    Off from ABX since 10/1; WBC trending back up.  Afebrile, will continue to monitor      Fever work up if temp >= 100.4    Acute pancreatitis - GI following; Lipase 437  Elevated LFT; resolved    [place]  Hx of bilateral nonobstructive calyceal calculi and bilateral renal cysts  - creat 4.4    Nephrology following     Ascending thoracic aortic aneurysm & partially trombosis in left internal iliac artery   - further evaluation per primary team    Above plan d/w and agreed by with Dr. Marion Beavers, NP

## 2021-10-05 NOTE — PROGRESS NOTES
Transition of Care Plan   RUR-  Medium    DISPOSITION: pending medical progression   Transport: [facility]  Patient admitted with pancreatitis, acute kidney injury, respiratory failure, septic shock  Patient remains in the CCU, vented transitioned to HD 10/2. Patient on IV Precedex, Fentanyl and Levophed gtts. Prior to admission patient was independent. Wife Андрей Calzada [phone] is primary decision maker. Care management is continuing to follow.   Kai Garcia RN,Care Management

## 2021-10-05 NOTE — PROGRESS NOTES
[facility]    ICU TEAM Progress Note    Name: Analisa Henry   : 1950   MRN: [id number]   Date: 10/5/2021      I  Subjective:   Progress Note: 10/5/2021      Reason for ICU Admission: Pancreatitis, acute kidney injury, respiratory failure, septic shock     Brief HPI: 68 y/o M w hx of CAD, HTN, OA, and kidney stones presents to the hospital with CC of SOB, abd pain, N/V. He was found to have severe pancreatitis and acute renal injury with associated lactic acidosis and elevated LFTs. He was transferred to the CCU for initiation of CRRT given borderline low blood pressures.      Overnight Events:     10/4- - Remains intubated, on/off pressors    10/3: Switch CRRT to IHD on the second, needed to increase pressor requirement but were able to pull 1.7 L. Afterward he was tachycardic and that improved after infusion of 250 cc of albumin. Had a bowel movement, no bleeding. Pressors weaning down after IHD completed. Still anuric  10/2: Antibiotics discontinued, still on low-dose pressors, continue to have issues with dialysis access, no fever  10/1: Required going back on pressors [low-dose vasopressin], otherwise remain anuric on CRRT  : No acute event.  Off pressors  : No acute event.  Remains on vasopressin and low-dose epinephrine.  On CRRT.  : CRRT; MV; Pressors  : CRRT; MV; Pressors  : increasing agitation and hypoxia. Intubated. HDL repositioned again. Up on vasopressors  : Loco Robles pulled back - remains positional.   : transferred into ICU.  Gary Maher placed and CRRT started    Objective:   Vital Signs:  Visit Vitals  BP 91/63   Pulse 70   Temp 100 °F (37.8 °C)   Resp 27   Ht 6' 1\" (1.854 m)   Wt 121.8 kg (268 lb 8.3 oz)   SpO2 99%   BMI 35.43 kg/m²    O2 Flow Rate (L/min): 3 l/min O2 Device: Endotracheal tube, Ventilator Temp (24hrs), Av.4 °F (37.4 °C), Min:98.1 °F (36.7 °C), Max:100.1 °F (37.8 °C)           Intake/Output:     Intake/Output Summary (Last 24 hours) at 10/5/2021 1641  Last data filed at 10/5/2021 1200  Gross per 24 hour   Intake 1210.08 ml   Output 2675 ml   Net -1464.92 ml       Physical Exam:    General:  Intubated and sedated  Eye: No pallor no jaundice pupils equal reactive  Neurologic: sedated  Neck:  RIJ HDL, LIJ CVC  Lungs:  Distant breath sounds  Heart:  regular rate and rhythm  Abdomen:  Soft, distended, could not appreciate tenderness  Skin:  Normal.    LABS AND  DATA: Personally reviewed  Recent Labs     10/05/21  0354 10/04/21  0351   WBC 25.3* 21.5*   HGB 10.4* 9.8*   HCT 32.8* 30.6*    257     Recent Labs     10/05/21  0354 10/04/21  1751   * 134*   K 4.7 3.9    103   CO2 22 20*   * 89*   CREA 4.46* 3.36*   * 201*   CA 8.2* 8.0*   MG 3.0* 2.6*   PHOS 8.2* 5.9*     Recent Labs     10/05/21  0354 10/04/21  1751 10/04/21  0351 10/04/21  0351   AP  --  192*  --  165*   TP  --  6.2*  --  5.9*   ALB 2.1* 2.0*   < > 1.9*   GLOB  --  4.2*  --  4.0    < > = values in this interval not displayed. No results for input(s): INR, PTP, APTT, INREXT, INREXT in the last 72 hours. Recent Labs     10/05/21  0420 10/04/21  0320 10/03/21  0424 10/03/21  0424   PHI 7.36  --   --  7.43   PCO2I 35.6  --   --  36.0   PO2I 95  --   --  83   FIO2I 40 40   < > 50    < > = values in this interval not displayed. No results for input(s): CPK, CKMB, TROIQ, BNPP in the last 72 hours. Hemodynamics:   PAP:   CO:     Wedge:   CI:     CVP:    SVR:       PVR:       Ventilator Settings:  Mode Rate Tidal Volume Pressure FiO2 PEEP   Assist control, VC+ (found on)   480 ml    40 % 5 cm H20     Peak airway pressure: 14 cm H2O    Minute ventilation: 12.1 l/min        MEDS: Reviewed    Chest X-Ray:  CXR Results  (Last 48 hours)               10/05/21 0508  XR CHEST PORT Final result    Impression:  No acute process. Shallow volumes. Narrative:  PORTABLE CHEST RADIOGRAPH/S: 10/5/2021 5:08 AM       INDICATION: Pneumonia. COMPARISON: 10/4/2021, 9/30/2021, 9/25/2021. TECHNIQUE: Portable frontal semiupright radiograph/s of the chest.       FINDINGS:    The lungs are hypoinflated. There is mild bibasilar atelectasis and subsegmental   atelectasis in the right midlung. The central airways are patent, but partially   obscured by rotation. No pneumothorax and no large pleural effusion. An ET tube,   NG tube, right IJ hemodialysis catheter, and left IJ central line are in   appropriate position. Post CABG. 10/04/21 1335  XR CHEST PORT Final result    Impression:  Right transjugular dialysis catheter overlying the right atrium. No   pneumothorax. Narrative:  EXAM:  XR CHEST PORT       INDICATION:  Confirm dialysis catheter placement       COMPARISON: None       TECHNIQUE: AP portable upright chest view       FINDINGS: Tip of the right transverse jugular dialysis catheter overlies the   right atrium. Endotracheal tube, enteric tube, and left transjugular central   venous line are in appropriate position. No pneumothorax. 10/04/21 0505  XR CHEST PORT Final result    Impression:  No significant change. ET tube in satisfactory position. Narrative:  INDICATION:    ETT        EXAMINATION:  AP CHEST, PORTABLE       COMPARISON: September 30       FINDINGS: Single AP portable view of the chest at 446 hours demonstrates no   change in position of the lines and tubes. The cardiomediastinal silhouette is   stable. There is no new airspace disease. Lungs are hypoinspiratory. There is   bibasilar atelectasis.                    Assessment and Plan:   - Acute Pancreatitis  - Septic Shock  - Acute Hypoxic Respiratory Failure  - [place] on RRT  -History of CAD      Neuro - Analgosedation with opioids and precedex, other delirium prevention strategies    CV - MAP goal > 65, cont midodrine, use levo of needed - on low dose currently, cont weaning stress dose steroids    Pulm - cont lung protective mech vent, SBTs as tolerated, vent day 10 - heading towards needing a trach if decision makers want to continue with aggressive medical therapy    GI - Cont TF, occult stool +ve, no overt bleeding, on PPI therapy    Renal - [place] on HD - now on IHD, needs volume off, f/u renal recs    Heme - Heparin for DVT ppx    ID - Undulating WBC count - monitor, will have a low threshold to restart abx therapy, micro studies neg to date, resend resp cx, and PCT    Endo - Ensure Vit D supplementation - quite deficient, keep glu 100-180    Dispo - Very guarded at this time - will get palliative care team on board to help with [address] disussions    Lines - L IJ Thanh, R IJ Teddy, L jacques Cao     NOTE OF PERSONAL INVOLVEMENT IN CARE   This patient has a high probability of imminent, clinically significant deterioration, which requires the highest level of preparedness to intervene urgently. I participated in the decision-making and personally managed or directed the management of the following life and organ supporting interventions that required my frequent assessment to treat or prevent imminent deterioration. I personally spent 40 minutes of critical care time. This does not include any procedural time.     Signed By: Parth Boudreaux MD     October 5, 2021

## 2021-10-05 NOTE — PROGRESS NOTES
0730 Bedside shift change report given to EWA Whaley (oncoming nurse) by Norah Stover RN (offgoing nurse). Report included the following information SBAR, Kardex, Intake/Output, MAR and Recent Results. 1045  Wound care at bedside. 1200  Palliative consult placed. 1800  HD at bedside. MAP 58. Albumin ordered and levo titrated. 1930 Bedside shift change report given to Wilver Baumann RN  (oncoming nurse) by EWA Whaley (offgoing nurse). Report included the following information SBAR, Kardex, Intake/Output, MAR and Recent Results.

## 2021-10-05 NOTE — PROGRESS NOTES
1930: Bedside shift report received from Cat RN. RR in 30s, increased precedex gtt. 2115: Patient bp 64/44 after reposition/turning. Levophed gtt increased, orders for 12.5 g of albumin 5% given. Pt placed back supine, bp recovered. 0200: Attempt to turn patient unsuccessful, unable to tolerate resulting in hypotension - 70/40s.  0400: labs drawn. 2574Susana Gruber MD at bedside, updated on patient status. Condom catheter placed in attempt to measure urine output. Orders received to use levophed as needed during HD today. 0730: Bedside and Verbal shift change report given to [address] (oncoming nurse) by Mitcheal Epley (offgoing nurse). Report included the following information SBAR, Kardex, Intake/Output, MAR, Recent Results and Cardiac Rhythm nsr.

## 2021-10-05 NOTE — PROCEDURES
10/05/21 1845   During Hemodialysis    Temp (!) 102.2 °F (39 °C)   Temp source Axillary   Pulse (Heart Rate) 70   Resp Rate 25   O2 Sat (%) 96 %   BP (!) 97/49   MAP (Calculated) 65   Saline Given (mL) 200 mL   Blood Flow Rate (ml/min) 300 ml/min   Dialysate Flow Rate (ml/hr) 800 ml/hr   Arterial Access Pressure (mmHg) -220   Venous Return Pressure (mmHg) 110   Transmembrane Pressure (mmHg) 30 mmHg   Ultrafiltration Rate (ml/hr) 0 ml/hr   Fluid Removed (mL) 0   $$ Dialysis Charges   $$ Method Hemodialysis      10/05/21 1845   Assessment  Type   Assessment Type Shift assessment(DIALYSIS)   Psychosocial   Psychosocial (WDL) X   Neuro   Neurologic State Unresponsive; Other (Comment)(INTUBATED/SEDATED)   Orientation Level Unable to verbalize   Cardiac   Cardiac Rhythm Sinus Rhythm   Cardiac/Telemetry   Cardiac/Telemetry Monitor On Yes   Box Number [id number]   Alarm Audible Yes   Respiratory   Respiratory (WDL) X   Patient on Vent Yes - If patient is on vent, add Doc Flowsheet Ventilator (). Oxygen Therapy   O2 Sat (%) 96 %   O2 Device Ventilator   Abdominal    Abdominal (WDL) X   Abdominal Assessment Obese;Semi-soft; Other (comment)(PITTING +4 EDEMA TO ABD/FLANKS)   Genitourinary   Genitourinary (WDL) X   Urine Assessment   Urinary Status External catheter;Draining   Edema   LUE 3+;4+;Pitting   LLE 4+;Pitting   RUE 3+;4+;Pitting   RLE 4+   Activity and Safety   Activity Level Bed Rest   Neuro   Neuro (WDL) X   Cardiac   Cardiac (WDL) X   Musculoskeletal   Musculoskeletal (WDL) X   LLE Swelling(+4 EDEMA)   RLE Swelling(+4 EDEMA)   NO S/S PAIN  ENDING 10/05/21 2205   During Hemodialysis    Temp (!) 100.6 °F (38.1 °C)   Temp source Axillary   Pulse (Heart Rate) 78   Resp Rate 28   O2 Sat (%) 90 %   BP (!) 85/51   MAP (Calculated) (!) 62   Fluid Removed (mL) 0   NET Fluid Removed (mL) 0 ml   Post-Dialysis   Rinseback Volume (ml) 200 ml   Duration of Treatment (hours) 3.25 hours   Patient Response to Treatment Poor Patient Disposition (remained in room)   Condition of Dialyzer Filter Fair   Hemodialysis End Time 2205      10/05/21 2205   Assessment  Type   Assessment Type Reassessment   Reassessment Performed No change to previous assessment   Oxygen Therapy   O2 Sat (%) 90 %     Orders   Duration: Start: 4348 End: 5682 Total: 4   Dialyzer: Dialyzer/Set Up Inspection: Wyal[place] Asa (10/05/21 1845)   K Bath: Dialysate K (mEq/L): 3.5 (10/05/21 1845)   Ca Bath: Dialysate CA (mEq/L): 2.5 (10/05/21 1845)   Na: Dialysate NA (mEq/L): 140 (10/05/21 1845)   Bicarb: Dialysate HCO3 (mEq/L): 35 (10/05/21 1845)   Target Fluid Removal: Goal/Amount of Fluid to Remove (mL): 2000 mL (10/05/21 1845)     [facility]   [facility]   Comments:  No s/s infection/complication @ insertion site, drsg dry/intact                                     Labs   Lab Results   Component Value Date/Time    Hepatitis B surface Ag <0.10 09/25/2021 05:48 AM    Hepatitis B surface Ab <3.10 09/25/2021 05:49 AM      Obtained/Reviewed  Critical Results Called HGB   Date Value Ref Range Status   10/05/2021 10.4 (L) 12.1 - 17.0 g/dL Final     Potassium   Date Value Ref Range Status   10/05/2021 4.7 3.5 - 5.1 mmol/L Final     Calcium   Date Value Ref Range Status   10/05/2021 8.2 (L) 8.5 - 10.1 MG/DL Final     BUN   Date Value Ref Range Status   10/05/2021 117 (H) 6 - 20 MG/DL Final     Creatinine   Date Value Ref Range Status   10/05/2021 4.46 (H) 0.70 - 1.30 MG/DL Final     Comment:     INVESTIGATED PER DELTA CHECK PROTOCOL        Meds Given   SEE MAR     Adequacy / Fluid    Total Liters Process:    Net Fluid Removed:       Comments   Time Out Done:   (Time) 1840   Admitting Diagnosis: PANCREATITIS   Consent obtained/signed: Informed Consent Verified: Yes (10/04/21 1420)   Machine / RO # Machine Number: [id number] (10/05/21 8559)   Primary Nurse Rpt Pre: Nicki Peacemaker, RN   Primary Nurse Rpt Post:    Pt Education: Robert Medina PT SEDATED/INTUBATED   LINE: [id number] DIALYZER: [id number]  Tx Summary   Comments: @[id number], GOOD PUSH/PULL YET NOTED INCREASED AP UPON TX START. -220AP @ 300BFR. PT BP VERY LABILE PRIOR TO HD TX. PT ON 4MCG OF LEVO PRIOR TO HD START. UPON TX START PRIMARY RN INCREASED TO 5MCG, ALBUMIN 25% STARTED SHORTLY AFTER TX START. PT TEMP 102.2 AXILLARY, HD MACHINE TEMP DOWN TO 35.5. UF OFF @ START OF TX D/T BP.   @1854 SPOKE W/ DR HOLT TO UPDATE ON PT STATUS. MD STATES: RUN PT EVEN, NO FLUID REMOVAL, CONTINUE TX AS SET @ THIS TIME. INCREASE LEVO AS NEEDED UP TO 14MCG DURING HD, END HD TX IF ALL INTERVENTIONS ARE NOT SUCCESSFUL. @1900 SECOND ALBUMIN STARTED.   @1931 THIRD ALBUMIN STARTED         @1947 FLOOR RN INCREASED LEVO TO 8MCG/MIN W/ +EFFECT     @2030 NIGHT SHIFT PRIMARY RN @ BEDSIDE FOR PT CARE         @2115 NAD, BP REMAINING SAME, NO FURTHER CHANGES IN LEVOPHED @ THIS TIME        @2140 LEOVPHED INCREASED TO 10MCG/MIN D/T SBP DROP INTO 70S  @2205 TX SUSPENDED, PT BP TRENDING IN 70S, CCU MD IN ROOM TO INCREASE LEVO TO 15MCG. ALBUMIN 5% ORDERED BY MD FOR PRIMARY RN ADMINISTRATION. DR HOLT MESSAGE AND TX SUSPENDED. PT BP INCREASING POST RINSEBACK. LIMBS FLUSHED AND LOCKED W/ NS. NO S/S INFECTION/COMPLICATION @ INSERTION SITE. NEW CAPS PLACED.

## 2021-10-06 NOTE — PROGRESS NOTES
Bedside shift change report given to Armaan Marshall RN (oncoming nurse) by Tasha Costa RN (offgoing nurse). Report included the following information SBAR, Kardex, ED Summary, Intake/Output, MAR and Recent Results. SHIFT SUMMARY:    0800 Initial Shift  Assessment performed           Mental Status: Intubated. Sedated. Pupils 2 and reactive to light. Respiratory: FiO2 100%. Cardiac: Sinus           GI/: Male Wick/ Flexi           IV Drips: Fentanyl 200mcg/hr, Precedex 1.4mcg/kg/hr, Levophed 30mcg/min, Vasopressin 0.04units/min, Versed 10mg/hr, Mor 55mcg/min. 0830 Night shift RN spoke with wife and updated on overnight events. Daughter on the way to hospital this AM. Patient continues to breathe over vent despite sedation. Sats remain 88% on 100% FiO2.   1030 Intensivist at bedside updated step-daughter. [address] with family currently. 1545 Patient taken down for CT scan with 2 RNs and RT.   1615 Patient back from CT. Hooked to monitor. VSS at this time. Asiya Parent RN at bedside to restart CVVHD. 65 Spoke with nephrologist concerning potassium level. Switching dialysate bags to 2K. 7 Roberto Valdez updated family on CT scan results.    [address]

## 2021-10-06 NOTE — PROGRESS NOTES
Pharmacist Note - Vancomycin Dosing    Consult provided for this 70 y.o. male for indication of blood stream infection  Antibiotic regimen(s): Merrem +Vancomycin  Patient on vancomycin as inpatient (-10/1)    Recent Labs     10/06/21  0229 10/05/21  0354 10/04/21  1751 10/04/21  0351 10/04/21  0351   WBC 21.6* 25.3*  --   --  21.5*   CREA 3.89* 4.46* 3.36*   < > 4.53*   * 117* 89*   < > 121*    < > = values in this interval not displayed. Frequency of BMP: q12hr  Height:185.4 cm  Weight: 124 kg  Est CrCl: CVVHD  Temp (24hrs), Av.5 °F (38.1 °C), Min:99.1 °F (37.3 °C), Max:102.2 °F (39 °C)    Cultures:  10/5 respiratory GPC, GNR,  3 + budding yeast    MRSA Swab ordered (if applicable)? N/A    The plan below is expected to result in a target range of Trough 10-15 mcg/mL    Therapy will be initiated with a loading dose of 2250 mg IV x 1 to be followed by a maintenance dose of 1250 mg IV every 24hours. Pharmacy to follow patient daily and order levels / make dose adjustments as appropriate.

## 2021-10-06 NOTE — PROGRESS NOTES
Nephrology Progress Note  Nicole Croft  Date of Admission : 9/22/2021    CC: Follow up for [place]       Assessment and Plan     [place]:  - multifactorial: ATN from hypotension/severe pancreatitis + IV contrast on admission  - CRRT switched to IHD on 10/2   - s/p Line exchange on 10/4  - not tolerating HD due to Hypotension  - start CVVHD. No factor this am and if stable , add factor after noon   - daily labs and I/Os    Nutrition:  -Off TPN, tolerating TF    Shock   - on Levophed   - On high dose Midodrine  - consider Pan Cx      Acute resp failure :  - intubated 9/25    Pancreatitis     Bradycardia  TAA, b/l BEAU aneurysms  Leukocytosis       Interval History:  Seen and examined. Overnight, did not tolerated HD. Worsening Hypotension and pressor needs, needed 750- cc albumin. On 100% FIO2. ARDS picture on CXR       Current Medications: all current  Medications have been eviewed in EPIC  Review of Systems: Review of systems not obtained due to patient factors.     Objective:  Vitals:    Vitals:    10/06/21 0500 10/06/21 0521 10/06/21 0530 10/06/21 0600   BP:       Pulse: (!) 103 (!) 104 85 84   Resp: 27 29 26 26   Temp: (!) 101 °F (38.3 °C)      TempSrc:       SpO2: 94% 94% 93% (!) 89%   Weight:    124.3 kg (274 lb 0.5 oz)   Height:         Intake and Output:  10/05 1901 - 10/06 0700  In: 1198.3 [I.V.:1118.3]  Out: 0   10/04 0701 - 10/05 1900  In: 2405.3 [I.V.:1850.3]  Out: 2675 [Urine:200; Drains:475]    Physical Examination:  Pt intubated     yes  General: On vent   Neck:  Teddy +  Resp:  Decreased BS b/l  CV:  RRR,  no murmur or rub, 1+ b/l LE edema  GI:  Non distended, reduced bowel sounds  Neurologic:  Sedated on vent   Ext                   Pitting edema +    []    High complexity decision making was performed  []    Patient is at high-risk of decompensation with multiple organ involvement    Lab Data Personally Reviewed: I have reviewed all the pertinent labs, microbiology data and radiology studies during assessment. Recent Labs     10/06/21  0229 10/05/21  0354 10/04/21  1751 10/04/21  0351 10/04/21  0351   * 135* 134*   < > 132*   K 4.7 4.7 3.9   < > 4.8    101 103   < > 100   CO2 21 22 20*   < > 20*   * 190* 201*   < > 240*   * 117* 89*   < > 121*   CREA 3.89* 4.46* 3.36*   < > 4.53*   CA 7.9* 8.2* 8.0*   < > 7.9*   MG 2.4 3.0* 2.6*   < > 3.1*   PHOS 7.2* 8.2* 5.9*   < > 8.9*   ALB 2.4* 2.1* 2.0*   < > 1.9*   ALT 48  --  77  --  47    < > = values in this interval not displayed.      Recent Labs     10/06/21  0229 10/05/21  0354 10/04/21  0351   WBC 21.6* 25.3* 21.5*   HGB 10.0* 10.4* 9.8*   HCT 30.6* 32.8* 30.6*    312 257     Lab Results   Component Value Date/Time    Specimen Description: HIP LEFT JOINT 01/17/2012 03:00 PM    Specimen Description: HIP LEFT JOINT 01/17/2012 03:00 PM    Specimen Description: NARES 07/28/2011 12:12 AM     Lab Results   Component Value Date/Time    Culture result: PENDING 10/05/2021 06:52 PM    Culture result: NO GROWTH 5 DAYS 09/29/2021 09:05 AM    Culture result: MODERATE YEAST, (APPARENT CANDIDA ALBICANS) (A) 09/28/2021 08:22 AM    Culture result: LIGHT NORMAL RESPIRATORY LUIS 09/28/2021 08:22 AM     Recent Results (from the past 24 hour(s))   GLUCOSE, POC    Collection Time: 10/05/21 12:27 PM   Result Value Ref Range    Glucose (POC) 183 (H) 65 - 117 mg/dL    Performed by [id number], [address], POC    Collection Time: 10/05/21  6:30 PM   Result Value Ref Range    Glucose (POC) 130 (H) 65 - 117 mg/dL    Performed by [facility], RESPIRATORY/SPUTUM/BRONCH Staci Mendoza STAIN    Collection Time: 10/05/21  6:52 PM    Specimen: Tracheal Aspirate; Sputum   Result Value Ref Range    Special Requests: NO SPECIAL REQUESTS      GRAM STAIN OCCASIONAL EPITHELIAL CELLS SEEN      GRAM STAIN 2+ WBCS SEEN      GRAM STAIN 4+ GRAM NEGATIVE RODS      GRAM STAIN 3+ BUDDING YEAST      GRAM STAIN 1+ GRAM POSITIVE COCCI      Culture result: PENDING    GLUCOSE, POC    Collection Time: 10/06/21  1:30 AM   Result Value Ref Range    Glucose (POC) 133 (H) 65 - 117 mg/dL    Performed by Isabella Fernández    POC G3 - PUL    Collection Time: 10/06/21  2:23 AM   Result Value Ref Range    FIO2 (POC) 100 %    pH (POC) 7.36 7.35 - 7.45      pCO2 (POC) 35.0 35.0 - 45.0 MMHG    pO2 (POC) 82 80 - 100 MMHG    HCO3 (POC) 19.6 (L) 22 - 26 MMOL/L    sO2 (POC) 95.7 92 - 97 %    Base deficit (POC) 5.3 mmol/L    Site DRAWN FROM ARTERIAL LINE      Device: ADULT VENT      Mode ASSIST CONTROL      Tidal volume 480 ml    Set Rate 20 bpm    PEEP/CPAP (POC) 8 cmH2O    Specimen type (POC) ARTERIAL     PHOSPHORUS    Collection Time: 10/06/21  2:29 AM   Result Value Ref Range    Phosphorus 7.2 (H) 2.6 - 4.7 MG/DL   MAGNESIUM    Collection Time: 10/06/21  2:29 AM   Result Value Ref Range    Magnesium 2.4 1.6 - 2.4 mg/dL   LACTIC ACID    Collection Time: 10/06/21  2:29 AM   Result Value Ref Range    Lactic acid 2.7 (HH) 0.4 - 2.0 MMOL/L   METABOLIC PANEL, COMPREHENSIVE    Collection Time: 10/06/21  2:29 AM   Result Value Ref Range    Sodium 135 (L) 136 - 145 mmol/L    Potassium 4.7 3.5 - 5.1 mmol/L    Chloride 102 97 - 108 mmol/L    CO2 21 21 - 32 mmol/L    Anion gap 12 5 - 15 mmol/L    Glucose 141 (H) 65 - 100 mg/dL     (H) 6 - 20 MG/DL    Creatinine 3.89 (H) 0.70 - 1.30 MG/DL    BUN/Creatinine ratio 27 (H) 12 - 20      GFR est AA 19 (L) >60 ml/min/1.73m2    GFR est non-AA 15 (L) >60 ml/min/1.73m2    Calcium 7.9 (L) 8.5 - 10.1 MG/DL    Bilirubin, total 1.0 0.2 - 1.0 MG/DL    ALT (SGPT) 48 12 - 78 U/L    AST (SGOT) 54 (H) 15 - 37 U/L    Alk.  phosphatase 141 (H) 45 - 117 U/L    Protein, total 5.8 (L) 6.4 - 8.2 g/dL    Albumin 2.4 (L) 3.5 - 5.0 g/dL    Globulin 3.4 2.0 - 4.0 g/dL    A-G Ratio 0.7 (L) 1.1 - 2.2     CBC WITH AUTOMATED DIFF    Collection Time: 10/06/21  2:29 AM   Result Value Ref Range    WBC 21.6 (H) 4.1 - 11.1 K/uL    RBC 3.22 (L) 4.10 - 5.70 M/uL    HGB 10.0 (L) 12.1 - 17.0 g/dL    HCT 30.6 (L) 36.6 - 50.3 %    MCV 95.0 80.0 - 99.0 FL    MCH 31.1 26.0 - 34.0 PG    MCHC 32.7 30.0 - 36.5 g/dL    RDW 14.7 (H) 11.5 - 14.5 %    PLATELET 432 912 - 089 K/uL    MPV 12.1 8.9 - 12.9 FL    NRBC 0.0 0  WBC    ABSOLUTE NRBC 0.00 0.00 - 0.01 K/uL    NEUTROPHILS 93 (H) 32 - 75 %    LYMPHOCYTES 4 (L) 12 - 49 %    MONOCYTES 1 (L) 5 - 13 %    EOSINOPHILS 0 0 - 7 %    BASOPHILS 0 0 - 1 %    METAMYELOCYTES 2 (H) 0 %    IMMATURE GRANULOCYTES 0 %    ABS. NEUTROPHILS 20.1 (H) 1.8 - 8.0 K/UL    ABS. LYMPHOCYTES 0.9 0.8 - 3.5 K/UL    ABS. MONOCYTES 0.2 0.0 - 1.0 K/UL    ABS. EOSINOPHILS 0.0 0.0 - 0.4 K/UL    ABS. BASOPHILS 0.0 0.0 - 0.1 K/UL    ABS. IMM.  GRANS. 0.0 K/UL    DF MANUAL      PLATELET COMMENTS Large Platelets      RBC COMMENTS MACROCYTOSIS  1+       PROCALCITONIN    Collection Time: 10/06/21  2:29 AM   Result Value Ref Range    Procalcitonin 4.86 ng/mL   RESPIRATORY VIRUS PANEL W/COVID-19, PCR    Collection Time: 10/06/21  5:10 AM    Specimen: Nasopharyngeal   Result Value Ref Range    Adenovirus Not detected NOTD      Coronavirus 229E Not detected NOTD      Coronavirus HKU1 Not detected NOTD      Coronavirus CVNL63 Not detected NOTD      Coronavirus OC43 Not detected NOTD      SARS-CoV-2, PCR Not detected NOTD      Metapneumovirus Not detected NOTD      Rhinovirus and Enterovirus Not detected NOTD      Influenza A Not detected NOTD      Influenza A, subtype H1 Not detected NOTD      Influenza A, subtype H3 Not detected NOTD      INFLUENZA A H1N1 PCR Not detected NOTD      Influenza B Not detected NOTD      Parainfluenza 1 Not detected NOTD      Parainfluenza 2 Not detected NOTD      Parainfluenza 3 Not detected NOTD      Parainfluenza virus 4 Not detected NOTD      RSV by PCR Not detected NOTD      B. parapertussis, PCR Not detected NOTD      Bordetella pertussis - PCR Not detected NOTD      Chlamydophila pneumoniae DNA, QL, PCR Not detected NOTD      Mycoplasma pneumoniae DNA, QL, PCR Not detected FERNIE Woods MD  [facility]   [address] [address] [address], [facility]  Phone - [phone]   Fax - [phone]  [email or web address]

## 2021-10-06 NOTE — PROGRESS NOTES
[facility]    ICU TEAM Progress Note    Name: Reddy Carrera   : 1950   MRN: [id number]   Date: 10/6/2021      I  Subjective:   Progress Note: 10/6/2021      Reason for ICU Admission: Pancreatitis, acute kidney injury, respiratory failure, septic shock     Brief HPI: 68 y/o M w hx of CAD, HTN, OA, and kidney stones presents to the hospital with CC of SOB, abd pain, N/V. He was found to have severe pancreatitis and acute renal injury with associated lactic acidosis and elevated LFTs. He was transferred to the CCU for initiation of CRRT given borderline low blood pressures.      Overnight Events:     10/6 - Developed severe hypoxia and hypotension ON - PEE now up to 12 and on 3 pressors    10/4- - Remains intubated, on/off pressors    10/3: Switch CRRT to IHD on the second, needed to increase pressor requirement but were able to pull 1.7 L. Afterward he was tachycardic and that improved after infusion of 250 cc of albumin. Had a bowel movement, no bleeding. Pressors weaning down after IHD completed. Still anuric  10/2: Antibiotics discontinued, still on low-dose pressors, continue to have issues with dialysis access, no fever  10/1: Required going back on pressors [low-dose vasopressin], otherwise remain anuric on CRRT  : No acute event.  Off pressors  : No acute event.  Remains on vasopressin and low-dose epinephrine.  On CRRT.  : CRRT; MV; Pressors  : CRRT; MV; Pressors  : increasing agitation and hypoxia. Intubated. HDL repositioned again. Up on vasopressors  : Juancho Szymanski pulled back - remains positional.   : transferred into ICU.  Parisa Alejo placed and CRRT started    Objective:   Vital Signs:  Visit Vitals  /68 (BP 1 Location: Left lower arm, BP Patient Position: At rest)   Pulse 89   Temp (!) 100.7 °F (38.2 °C)   Resp 28   Ht 6' 1\" (1.854 m)   Wt 124.3 kg (274 lb 0.5 oz)   SpO2 98%   BMI 36.15 kg/m²    O2 Flow Rate (L/min): 3 l/min O2 Device: Endotracheal tube, Ventilator Temp (24hrs), Av.7 °F (38.2 °C), Min:99.1 °F (37.3 °C), Max:102.2 °F (39 °C)           Intake/Output:     Intake/Output Summary (Last 24 hours) at 10/6/2021 1430  Last data filed at 10/6/2021 1300  Gross per 24 hour   Intake 1810.34 ml   Output 487 ml   Net 1323.34 ml       Physical Exam:    General:  Intubated and sedated  Eye: No pallor no jaundice pupils equal reactive  Neurologic: sedated  Neck:  RIJ HDL, LIJ CVC  Lungs:  Distant breath sounds  Heart:  regular rate and rhythm  Abdomen:  Soft, distended, could not appreciate tenderness  Skin:  Normal.    LABS AND  DATA: Personally reviewed  Recent Labs     10/06/21  0229 10/05/21  0354   WBC 21.6* 25.3*   HGB 10.0* 10.4*   HCT 30.6* 32.8*    312     Recent Labs     10/06/21  0229 10/05/21  0354   * 135*   K 4.7 4.7    101   CO2 21 22   * 117*   CREA 3.89* 4.46*   * 190*   CA 7.9* 8.2*   MG 2.4 3.0*   PHOS 7.2* 8.2*     Recent Labs     10/06/21  0229 10/05/21  0354 10/04/21  1751 10/04/21  1751   *  --   --  192*   TP 5.8*  --   --  6.2*   ALB 2.4* 2.1*   < > 2.0*   GLOB 3.4  --   --  4.2*   LPSE 212  --   --   --     < > = values in this interval not displayed. No results for input(s): INR, PTP, APTT, INREXT, INREXT in the last 72 hours. Recent Labs     10/06/21  0955 10/06/21  0223   PHI 7.22* 7.36   PCO2I 52.8* 35.0   PO2I 73* 82   FIO2I 100 100     No results for input(s): CPK, CKMB, TROIQ, BNPP in the last 72 hours.     Hemodynamics:   PAP:   CO:     Wedge:   CI:     CVP:    SVR:       PVR:       Ventilator Settings:  Mode Rate Tidal Volume Pressure FiO2 PEEP   Assist control, Volume control   500 ml (found on)    100 % 15 cm H20 (found on)     Peak airway pressure: 36 cm H2O    Minute ventilation: 14.4 l/min        MEDS: Reviewed    Chest X-Ray:  CXR Results  (Last 48 hours)               10/06/21 0453  XR CHEST PORT Final result    Impression:      Low lung volumes with mild bibasilar opacities that may represent atelectasis,   edema, or infection. Narrative:  EXAM:  XR CHEST PORT       INDICATION: Hypoxia       COMPARISON: 10/5/2021 at 0424 hours       TECHNIQUE: Portable AP semiupright chest view at 0411 hours       FINDINGS: The endotracheal tube, enteric tube, and bilateral IJ catheters are   stable. The cardiomediastinal contours are stable. There are low lung volumes with mild bibasilar opacities. There is no pleural   effusion or pneumothorax. The bones and upper abdomen are stable. 10/05/21 0508  XR CHEST PORT Final result    Impression:  No acute process. Shallow volumes. Narrative:  PORTABLE CHEST RADIOGRAPH/S: 10/5/2021 5:08 AM       INDICATION: Pneumonia. COMPARISON: 10/4/2021, 9/30/2021, 9/25/2021. TECHNIQUE: Portable frontal semiupright radiograph/s of the chest.       FINDINGS:    The lungs are hypoinflated. There is mild bibasilar atelectasis and subsegmental   atelectasis in the right midlung. The central airways are patent, but partially   obscured by rotation. No pneumothorax and no large pleural effusion. An ET tube,   NG tube, right IJ hemodialysis catheter, and left IJ central line are in   appropriate position. Post CABG. Assessment and Plan:   - Acute Pancreatitis  - Septic Shock  - Acute Hypoxic Respiratory Failure  - [place] on RRT  -History of CAD      Neuro - Analgosedation with opioids, propofol, versed and precedex, other delirium prevention strategies    CV - MAP goal > 65, cont midodrine, on levo, vaso and karsten currently, cont midodrine    Pulm - cont lung protective mech vent, SBTs as tolerated, vent day 11 - support went up ON - now on a PEEP of 12 and requiring a higher minute ventilation, ?  Why - getting a CT chest to better asess lung parenchyma and to r/o a PE, heading towards needing a trach if decision makers want to continue with aggressive medical therapy    GI -TF on hold for now due to escalating pressor requirements, occult stool +ve, no overt bleeding, on PPI therapy    Renal - [place] on HD - back to CRRT due to HD instability, needs, f/u renal recs    Heme - Heparin for DVT ppx    ID - Undulating WBC count - +ve sputum GS - restart Bsabx, PCT improving, getting CT abd to r/p peripancreatic abscess    Endo - Ensure Vit D supplementation - quite deficient, keep glu 100-180     Dispo - Very guarded at this time - f/u palliative GOC discussions with family    Lines - DIANA Shannon L radial Aline     NOTE OF PERSONAL INVOLVEMENT IN CARE   This patient has a high probability of imminent, clinically significant deterioration, which requires the highest level of preparedness to intervene urgently. I participated in the decision-making and personally managed or directed the management of the following life and organ supporting interventions that required my frequent assessment to treat or prevent imminent deterioration. I personally spent 80 minutes of critical care time. This does not include any procedural time.     Signed By: Sam Riggs MD     October 6, 2021

## 2021-10-06 NOTE — PROGRESS NOTES
Follow up visit with pt who has a new palliative consult. Pt is on vent support. Pt's step daughter Kathryn Ayon was at bedside. The pt has no biological children. The pt and Rita's mother have been together for some 45 years and Kathryn Ayon is quite close to him. Her biological father  a few years ago. Kathryn Nany is an RN who is now a full time NP student, a year into a 2 1/2 year program. She is aware of the pt's condition and reflected on times he admonished her not to keep him on life support for a protracted period of time. She has spoken to her mother about anticipated decisions that are likely at hand.   Chaplain Peters MDiv, MS, [facility]

## 2021-10-06 NOTE — PALLIATIVE CARE
Palliative Medicine      Code Status: DNR as of today    Advance Care Planning:  Advance Care Planning 9/23/2021   Patient's Healthcare Decision Maker is: - Wife      Primary Decision Maker Name -Tim Rehman   Primary Decision Maker Phone Number -[phone]   Confirm Advance Directive None     Patient / Family Encounter Documentation    Participants (names): Para Chau LCSW, pts wife Tim Rehman, step daughter Lennis Player, and pts sister. Narrative: This LCSW and Dr. Gilberto Araya met with pts wife, step daughter and sister in PICU waiting room. Palliative Medicine and team roles explained. Pt is sedated and intubated. Prior to this admission family reports although pt has had health problems including arthritis, he was fairly independent. Pt and wife have been together for 45 years, pt has no biological children, pts tep daughter and pts sister are very involved. The family describe pt as independent and active. Pt owned an auto glass business, and was involved in the business. Goals of care were discussed. Family reports pt has made his wishes clear; he did not want artifical life sustaining measures. No ACP documents on file. Code status discussed. Pt was made DNR today. The goal is for continued treatment to see if pt can make gains. Comfort care and hospice was discussed briefly, wife/step daughter would want if pt could not make meaningful recovery. Psychosocial Issues Identified/ Resilience Factors: Wife appears to be well supported by her daughter and family, step daughter is in NP school full time, has 2 children she gets off the bus daily at 2. Wife and family overwhelmed, pt declined over night, having further testing done this afternoon. LCSW provide emotional support and checked on resources for family. Goals of Care / Plan: The goal is for continued treatment to see if pt can make gains. Palliative team will follow-up tomorrow.      Thank you for including Palliative Medicine in the care of Mr. Edis Grubbs.     Carmen Pradhan [facility], [address] [facility]  [phone] ( [phone])

## 2021-10-06 NOTE — DIALYSIS
CRRT / [phone]    Orders   Mode: CVVHD restarted today @ 1020   Blood Flow Rate: 150ml/min   Prismasol Dose: 2500ml/hr   Prismasol Concentrate: 4K/2. Anola Presto     Blood Warmer Temp: 37*C   Net Fluid Removal: 0ml/hr     Metrics   BP: 120/62   HR: 90   Access Pressure: -66   Filter Pressure: 100   Return Pressure: 59   TMP: 25   Pressure Drop: 11     Access   Type & Location: R CVC CDI dated 10/04/21   Comments:  Each catheter limb disinfected per p&p, caps removed, hubs disinfected per p&p, +aspiration/flush X2. Labs   HBsAg (Antigen) / date:  Neg 09/25/21   HBsAb (Antibody) / date: Susceptible 9/25/21   Source: Startpack     Safety:   Time Out Done:   (Time) 0950   Consent obtained/signed: Remains on file from previously/same admission   Education: Educated daughter at bedside/she is familiar from before, no questions at this time. Primary Nurse Rpt Pre: REINALDO Moreland RN   Primary Nurse Rpt Post: REINALDO Javier RN     Comments / Plan:      Pt orders, notes, labs, code status and consent reviewed. Time out complete. CVVHD restarted as per Dr. Viktor Menchaca orders. [id number] primed and tested. Lines are visible and secure with blood warmer attached to return line and set at 37*C. Education pre/post with primary RNREINALDO.

## 2021-10-06 NOTE — DIALYSIS
CRRT / [phone]    Orders   Mode: CVVHD restarted post CT scan @ 1630   Blood Flow Rate: 150ml/min   Prismasol Dose: 2500ml/hr   Prismasol Concentrate: 4K/2. Meghna Crawford     Blood Warmer Temp: 37*C   Net Fluid Removal: 0ml/hr     Metrics   BP: 79/51   HR: 82   Access Pressure: -70   Filter Pressure: 89   Return Pressure: 52   TMP: 23   Pressure Drop: 8     Access   Type & Location: R CVC CDI dated 10/04/21   Comments:  Each catheter limb disinfected per p&p, caps removed, hubs disinfected per p&p, +aspiration/flush X2. Labs   HBsAg (Antigen) / date:  Neg 09/25/21   HBsAb (Antibody) / date: Susceptible 9/25/21   Source: [employer]     Safety:   Time Out Done:   (Time) 1600   Consent obtained/signed: Remains on file from previously/same admission   Education: Unable/pt intubated & sedated   Primary Nurse Rpt Pre: REINALDO Nava RN   Primary Nurse Rpt Post: REINALDO Javier RN     Comments / Plan:      Pt orders, notes, labs, code status and consent reviewed. Time out complete. CVVHD restarted post CT scan All possible blood returned by primary RN. [id number] primed and tested. Lines are visible and secure with blood warmer attached to return line and set at 37*C. Education pre/post with primary RNREINALDO.

## 2021-10-06 NOTE — PROGRESS NOTES
ID Progress Note  10/6/2021    Subjective: Intubated it  Rectal tube in place due to diarrhea     Review of Systems:            Symptom Y/N Comments   Symptom Y/N Comments   Fever/Chills       Chest Pain        Poor Appetite       Edema        Cough       Abdominal Pain        Sputum       Joint Pain        SOB/COATES       Pruritis/Rash        Nausea/vomit       Tolerating PT/OT        Diarrhea       Tolerating Diet        Constipation       Other           Could NOT obtain due to: Intubated it      Objective:     Vitals:   Visit Vitals  BP (!) 122/59   Pulse 83   Temp (!) 101 °F (38.3 °C)   Resp 29   Ht 6' 1\" (1.854 m)   Wt 124.3 kg (274 lb 0.5 oz)   SpO2 (!) 88%   BMI 36.15 kg/m²        Tmax:  Temp (24hrs), Av.6 °F (38.1 °C), Min:99.1 °F (37.3 °C), Max:102.2 °F (39 °C)      PHYSICAL EXAM:  General: Obese, chronically ill appearing, no acute distress    EENT:  EOMI. Anicteric sclerae. Dry mucous membrane  Resp:  Clear in apex with decreased breath sounds at bases,  No accessory muscle use  CV:  Regular  rhythm,  trace of edema  GI:  Soft, non distended, (+) Bowel sounds  Neurologic:  Intubated it, not following commends   Psych:   Unable to assess insight. Skin:  No rashes.   No jaundice    Labs:   Lab Results   Component Value Date/Time    WBC 21.6 (H) 10/06/2021 02:29 AM    Hemoglobin (POC) 15.0 2016 05:20 PM    HGB 10.0 (L) 10/06/2021 02:29 AM    Hematocrit (POC) 44 2016 05:20 PM    HCT 30.6 (L) 10/06/2021 02:29 AM    PLATELET 662 2845 02:29 AM    MCV 95.0 10/06/2021 02:29 AM     Lab Results   Component Value Date/Time    Sodium 135 (L) 10/06/2021 02:29 AM    Potassium 4.7 10/06/2021 02:29 AM    Chloride 102 10/06/2021 02:29 AM    CO2 21 10/06/2021 02:29 AM    Anion gap 12 10/06/2021 02:29 AM    Glucose 141 (H) 10/06/2021 02:29 AM     (H) 10/06/2021 02:29 AM    Creatinine 3.89 (H) 10/06/2021 02:29 AM    BUN/Creatinine ratio 27 (H) 10/06/2021 02:29 AM    GFR est AA 19 (L) 10/06/2021 02:29 AM    GFR est non-AA 15 (L) 10/06/2021 02:29 AM    Calcium 7.9 (L) 10/06/2021 02:29 AM    Bilirubin, total 1.0 10/06/2021 02:29 AM    Alk. phosphatase 141 (H) 10/06/2021 02:29 AM    Protein, total 5.8 (L) 10/06/2021 02:29 AM    Albumin 2.4 (L) 10/06/2021 02:29 AM    Globulin 3.4 10/06/2021 02:29 AM    A-G Ratio 0.7 (L) 10/06/2021 02:29 AM    ALT (SGPT) 48 10/06/2021 02:29 AM     CTA chest, CT of abd/pel: 4.6 cm ascending thoracic aortic aneurysm, without dissection. 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 Pancreas with diffuse peripancreatic fat stranding without discrete fluid  collection, no evidence of necrosis or infection. Prostatomegaly, with findings suspicious for chronic bladder outlet obstruction. US ABD (9/22) Pancreas obscured by bowel gas. Transfer to ICU on 9/24 due to hypotension. Intubated it 9/26  C-line and dialysis catheter placement 9/26, line exchanged on 10/4    Assessment and Plan   Leukocytosis  ? ASP PNA  - T-max 101, WBC 21k    Blood cx (9/22 & 9/28) no growth     resp cx (9/28) candida albicans    Blood cx (10/6) to be done    resp cx (10/5) pending    CXR (10/6) Low lung volumes with mild bibasilar opacities that may represent atelectasis,  edema, or infection. IV merrem and vancomycin were d/c'd on 10/1 and resumed on 10/6 by intensive     Agree with IV merrem to treat possible aspiration PNA; pls complete total 7 days. Rectal tube in place due to diarrhea - pls check for C-diff. THIS ORDER NOT TO BE CANCELLED. ANY QUESTIONS, PLEASE CALL DR. Nel Rodriguez.       Fever work up if temp >= 100.4    Acute pancreatitis - GI following; Lipase 437  Elevated LFT; resolved    [place]  Hx of bilateral nonobstructive calyceal calculi and bilateral renal cysts  - creat 4.4    Nephrology following     Ascending thoracic aortic aneurysm & partially trombosis in left internal iliac artery   - further evaluation per primary team    Above plan d/w and agreed by with Dr. Alexa Brady, NP

## 2021-10-06 NOTE — PROGRESS NOTES
Day #1 of Merrem  Indication:  bacteremia  Current regimen:  500 mg Q12hr  Abx regimen: Merrem +vancomycin  Recent Labs     10/06/21  0229 10/05/21  0354 10/04/21  1751 10/04/21  0351 10/04/21  0351   WBC 21.6* 25.3*  --   --  21.5*   CREA 3.89* 4.46* 3.36*   < > 4.53*   * 117* 89*   < > 121*    < > = values in this interval not displayed.      Est CrCl: on CVVHD  Temp (24hrs), Av.5 °F (38.1 °C), Min:99.1 °F (37.3 °C), Max:102.2 °F (39 °C)      Plan: Change to Merrem 500 mg while on CVVHD

## 2021-10-06 NOTE — CONSULTS
Palliative Medicine Consult  Sukhwinder: [phone] ([phone])    Patient Name: Layla Novoa  YOB: 1950    Date of Initial Consult: 10/6/21  Reason for Consult: Care decisions   Requesting Provider: Radha Henry   Primary Care Physician: Juan Grande MD     SUMMARY:   Layla Novoa is a 70 y.o. with a past history of CAD s/p CABG, HTN , hx of cholecystectomy, appendectomy, R colectomy 2011 who was admitted on 9/22/2021 with chest and epigastric pain, N/V. Found to have acute pancreatitis, [place], bradycardia, lactic acoidosis. Tx to [facility] on 9/24/21for hypotension, confusion and CRRT started then changed to [facility] 10/2. Intubated 9/26. On IV abx, BCx NGTD, yeast in resp cx 9/28 possible aspiration PNA, c diff pending. 10/5 worsening w/ incr pressor needs, incr vent requirements, leukocytosis, did not tolerate HD yest.    Current medical issues leading to Palliative Medicine involvement include: care decisions. Social: Pt  to Kyree Guadarrama- they have been together for ~ 38 years. His step dtr is Trini Lutz, who is an RN in NP school right now who requested the Palliative consult. 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 Sister Litzy Gonsalves involved too. Loved life, always active, independent. PALLIATIVE DIAGNOSES:   1. Septic shock   2. Shortness of breath  3. Debility   4. Goals of care       PLAN:   1. Along w/ Carmen PRINCE meet w/ step dtr of many years Trini Lutz- she is an RN at [employer] and in NP school, understands clinical situation and overall concern for poor prognosis. She is the one that initiated the palliative medicine consult. 2. Later on,again w/ Carmen CHRISTIANSENW meet w/ wife Huber Sheriff, and pt's sister Litzy Gonsalves. Go over medical situation - pt's hospital course and decline over past 24h. Pt critically ill and honest that pt may not survive this admission or even the day despite all measures.    3. While pt had underlying health issues incl CAD s/p CABG and DJD limiting physical ability- was independent and doing all ADLs, no recent hospital stays. Has had acute steep decline, uncertain cause- concern for infection given labs and reinitiation of abx today. May try to get CT A/P, Cx obtained. 4. At this point, as there are unknowns I think that the goal should be if pt can recover on abx and other measures- knowing that I think pt will either improve or decline in the next day. However, if family decided to change to comfort measures now I also think that would be appropriate. 5. I also talk about comfort measures w/ or w/out hospice and what that would look like. 6. Code status discussed and decision to continue current measures for now to see if pt can recovery, but DNR/allow natural death if his heart were to stop- he is at significant risk for cardiac arrest.   7. Family all in agreement that pt would never want trach/PEG and they are not even sure pt would want current measures - certainly not if continues to be so critically ill. They even think that pt would not want dialysis long term, for example. He told them he would \"haunt\" them if they allowed him to stay on life prolonging measures w/out chance of very good recovery. Supporting one another well. 8. Initial consult note routed to primary continuity provider and/or primary health care team members  9. Communicated plan of care with: Palliative IDT, [facility] [address] Team incl Dr Clyde Cardona RN     GOALS OF CARE / TREATMENT PREFERENCES:     GOALS OF CARE:  Patient/Health Care Proxy Stated Goals:  Other (comment) (Recovery if possible)    TREATMENT PREFERENCES:   Code Status: DNR      The patient identifies the following as important for living well: being independent       Advance Care Planning:  [x] The [facility] Interdisciplinary Team has updated the ACP Navigator with Health Care Decision Maker and Patient Capacity      Primary Decision Maker: Kike Harness - [phone]  Advance Care Planning 9/23/2021   Patient's Healthcare Decision Maker is: -   Primary Decision Maker Name -   Primary Decision Maker Phone Number -   Confirm Advance Directive None       Medical Interventions: Limited additional interventions       Other:    As far as possible, the palliative care team has discussed with patient / health care proxy about goals of care / treatment preferences for patient. HISTORY:     History obtained from: chart, staff, family     CHIEF COMPLAINT: Cannot obtain due to patient factors      HPI/SUBJECTIVE:    The patient is:   [] Verbal and participatory  [x] Non-participatory due to: sedated on vent    Clinical Pain Assessment (nonverbal scale for severity on nonverbal patients):   Clinical Pain Assessment  Severity: 0     Activity (Movement): Lying quietly, normal position    Duration: for how long has pt been experiencing pain (e.g., 2 days, 1 month, years)  Frequency: how often pain is an issue (e.g., several times per day, once every few days, constant)     FUNCTIONAL ASSESSMENT:     Palliative Performance Scale (PPS):  PPS: 10       PSYCHOSOCIAL/SPIRITUAL SCREENING:     Palliative IDT has assessed this patient for cultural preferences / practices and a referral made as appropriate to needs (Cultural Services, Patient Advocacy, Ethics, etc.)    Any spiritual / Taoism concerns:  [] Yes /  [x] No    Caregiver Burnout:  [] Yes /  [x] No /  [] No Caregiver Present      Anticipatory grief assessment:   [x] Normal  / [] Maladaptive       ESAS Anxiety:      ESAS Depression:     Cannot obtain due to patient factors       REVIEW OF SYSTEMS:     Positive and pertinent negative findings in ROS are noted above in HPI. The following systems were [x] reviewed / [] unable to be reviewed as noted in HPI  Other findings are noted below.   Systems: constitutional, ears/nose/mouth/throat, respiratory, gastrointestinal, genitourinary, musculoskeletal, integumentary, neurologic, psychiatric, endocrine. Positive findings noted below. Modified ESAS Completed by: provider   Fatigue: 10 Drowsiness: 10     Pain: 0           Dyspnea: 0           Stool Occurrence(s): 1        PHYSICAL EXAM:     From RN flowsheet:  Wt Readings from Last 3 Encounters:   10/06/21 274 lb 0.5 oz (124.3 kg)   05/10/19 260 lb (117.9 kg)   06/06/18 252 lb (114.3 kg)     Blood pressure 105/68, pulse 89, temperature (!) 100.7 °F (38.2 °C), resp. rate 28, height 6' 1\" (1.854 m), weight 274 lb 0.5 oz (124.3 kg), SpO2 98 %.     Pain Scale 1: Adult Nonverbal Pain Scale  Pain Intensity 1: 0  Pain Onset 1: chronic  Pain Location 1: Generalized  Pain Orientation 1: Other (comment) (PHILLIP)  Pain Description 1: Other (comment) (PHILLIP)  Pain Intervention(s) 1: Medication (see MAR)  Last bowel movement, if known:     Constitutional: sedated on vent, mult pressors     HISTORY:     Active Problems:    Pancreatitis (9/22/2021)      Past Medical History:   Diagnosis Date    Arthritis     back hips knees and arms    CAD (coronary artery disease)     Hypertension     Kidney stones     Nausea & vomiting     after hip surgery    Pancreatitis     In 2/2011, resolved    Psoriasis       Past Surgical History:   Procedure Laterality Date    HX CHOLECYSTECTOMY  7/2009    Dr. Germain Beat - Open Cholecystectomy    HX GI      GALLBLADDER 09    HX GI      perforated colon    HX HEENT      LEFT EYE MUSCLE SURGERY    HX [address]EMENT  [address] [facility] [address]    right and left    HX ORTHOPAEDIC      bilat hip replacement, left hip replaced 2 x    HX ORTHOPAEDIC      RIGHT ELBOW  SURGERY ON NERVE    IR INSERT NON TUNL CVC OVER 5 YRS  9/24/2021    IR INSERT NON TUNL CVC OVER 5 YRS  10/4/2021    IR REPLACE CVC NON TUNL W/O PORT/PUMP  9/25/2021    LITHOTRIPSY  ~2001    TN CARDIAC SURG PROCEDURE UNLIST      cardiac cath 7/20/2011    TN CARDIAC SURG PROCEDURE UNLIST      CABG WITH 2 GRAPHS    TN EYE SURG ANT SGMT PROC UNLISTED  as child      Family History   Problem Relation Age of Onset    Eczema Mother     Cancer Sister         breast      History reviewed, no pertinent family history. Social History     Tobacco Use    Smoking status: Former Smoker     Years: 0.00     Types: Cigars    Smokeless tobacco: Never Used    Tobacco comment: used to smoke cigars quit   Substance Use Topics    Alcohol use:  Yes     Alcohol/week: 0.0 standard drinks     Comment: 2 drinks weekly     Allergies   Allergen Reactions    Albumin Products Rash, Itching and Other (comments)     Hypertension, tachycardia    Penicillins Rash      Current Facility-Administered Medications   Medication Dose Route Frequency    bumetanide (BUMEX) injection 2 mg  2 mg IntraVENous Q12H    cisatracurium (NIMBEX) 2 mg/mL IV infusion (UNDILUTED)  0-10 mcg/kg/min IntraVENous TITRATE    midazolam in normal saline (VERSED) 1 mg/mL infusion  0-10 mg/hr IntraVENous TITRATE    heparin (porcine) 1,000 unit/mL injection 1,400 Units  1,400 Units InterCATHeter DIALYSIS PRN    And    heparin (porcine) 1,000 unit/mL injection 1,400 Units  1,400 Units InterCATHeter DIALYSIS PRN    bicarbonate dialysis (PRISMASOL) BG K 4/Ca 2.5 5000 ml solution   Extracorporeal DIALYSIS CONTINUOUS    hydrocortisone Sod Succ (PF) (SOLU-CORTEF) injection 100 mg  100 mg IntraVENous Q8H    meropenem (MERREM) 500 mg in sterile water (preservative free) 10 mL IV syringe  0.5 g IntraVENous Q6H    Vancomycin Pharmacy Dosing   Other PRN    [START ON 10/7/2021] vancomycin (VANCOCIN) 1250 mg in  ml infusion  1,250 mg IntraVENous Q24H    acetaminophen (TYLENOL) solution 650 mg  650 mg Per NG tube Q6H PRN    [Held by provider] OLANZapine (ZyPREXA) tablet 10 mg  10 mg Oral QHS    influenza vaccine 2021-22 (6 mos+)(PF) (FLUARIX/FLULAVAL/FLUZONE QUAD) injection 0.5 mL  1 Each IntraMUSCular PRIOR TO DISCHARGE    propofol (DIPRIVAN) 10 mg/mL infusion  0-50 mcg/kg/min IntraVENous TITRATE    albumin human 25% (BUMINATE) solution 12.5 g  12.5 g IntraVENous DIALYSIS PRN    fentaNYL (PF) 1,500 mcg/30 mL (50 mcg/mL) infusion  0-200 mcg/hr IntraVENous TITRATE    cholecalciferol (vitamin D3) 10 mcg/mL (400 unit/mL) oral liquid 100 mcg  100 mcg Oral DAILY    insulin glargine (LANTUS) injection 10 Units  10 Units SubCUTAneous DAILY    dexmedeTOMidine in 0.9 % NaCl (PRECEDEX) 400 mcg/100 mL (4 mcg/mL) infusion soln  0.2-1.4 mcg/kg/hr IntraVENous TITRATE    heparin (porcine) pf 300 Units  300 Units InterCATHeter PRN    balsam peru-castor oiL (VENELEX) ointment   Topical TID    midodrine (PROAMATINE) tablet 20 mg  20 mg Oral Q8H    0.9% sodium chloride infusion  5 mL/hr IntraVENous CONTINUOUS    0.9% sodium chloride infusion  3 mL/hr IntraVENous CONTINUOUS    vasopressin (VASOSTRICT) 40 Units in 0.9% sodium chloride 40 mL infusion  0-0.04 Units/min IntraVENous TITRATE    NOREPINephrine (LEVOPHED) 32 mg in 5% dextrose 250 mL infusion  0.5-30 mcg/min IntraVENous TITRATE    glucose chewable tablet 16 g  4 Tablet Oral PRN    dextrose (D50W) injection syrg 12.5-25 g  25-50 mL IntraVENous PRN    glucagon (GLUCAGEN) injection 1 mg  1 mg IntraMUSCular PRN    insulin lispro (HUMALOG) injection   SubCUTAneous Q6H    PHENYLephrine (ANGELIKA-SYNEPHRINE) 30 mg in 0.9% sodium chloride 250 mL infusion   mcg/min IntraVENous TITRATE    pantoprazole (PROTONIX) 40 mg in 0.9% sodium chloride 10 mL injection  40 mg IntraVENous DAILY    chlorhexidine (ORAL CARE KIT) 0.12 % mouthwash 15 mL  15 mL Oral Q12H    aspirin chewable tablet 81 mg  81 mg Oral DAILY    heparin (porcine) 1,000 unit/mL injection 1,100 Units  1,100 Units Hemodialysis PRN    heparin (porcine) 1,000 unit/mL injection 1,400 Units  1,400 Units Hemodialysis PRN    HYDROmorphone (DILAUDID) injection 1 mg  1 mg IntraVENous Q3H PRN    [Held by provider] atenoloL (TENORMIN) tablet 50 mg  50 mg Oral DAILY    [Held by provider] atorvastatin (LIPITOR) tablet 10 mg 10 mg Oral DAILY    sodium chloride (NS) flush 5-40 mL  5-40 mL IntraVENous Q8H    sodium chloride (NS) flush 5-40 mL  5-40 mL IntraVENous PRN    ondansetron (ZOFRAN) injection 4 mg  4 mg IntraVENous Q4H PRN    heparin (porcine) injection 5,000 Units  5,000 Units SubCUTAneous Q8H          LAB AND IMAGING FINDINGS:     Lab Results   Component Value Date/Time    WBC 21.6 (H) 10/06/2021 02:29 AM    HGB 10.0 (L) 10/06/2021 02:29 AM    PLATELET 800 57/70/4749 02:29 AM     Lab Results   Component Value Date/Time    Sodium 135 (L) 10/06/2021 02:29 AM    Potassium 4.7 10/06/2021 02:29 AM    Chloride 102 10/06/2021 02:29 AM    CO2 21 10/06/2021 02:29 AM     (H) 10/06/2021 02:29 AM    Creatinine 3.89 (H) 10/06/2021 02:29 AM    Calcium 7.9 (L) 10/06/2021 02:29 AM    Magnesium 2.4 10/06/2021 02:29 AM    Phosphorus 7.2 (H) 10/06/2021 02:29 AM      Lab Results   Component Value Date/Time    Alk. phosphatase 141 (H) 10/06/2021 02:29 AM    Protein, total 5.8 (L) 10/06/2021 02:29 AM    Albumin 2.4 (L) 10/06/2021 02:29 AM    Globulin 3.4 10/06/2021 02:29 AM     Lab Results   Component Value Date/Time    INR 1.2 (H) 09/24/2021 11:24 AM    Prothrombin time 12.9 (H) 09/24/2021 11:24 AM    aPTT 28.9 07/27/2011 06:42 PM      No results found for: IRON, FE, TIBC, IBCT, PSAT, FERR   Lab Results   Component Value Date/Time    pH 7.36 10/02/2021 04:22 AM    PCO2 37 10/02/2021 04:22 AM    PO2 122 (H) 10/02/2021 04:22 AM     No components found for: [place]   Lab Results   Component Value Date/Time     07/29/2011 04:00 AM    CK - MB 2.4 07/29/2011 04:00 AM                Total time: 70 min   Counseling / coordination time, spent as noted above: 50 min   > 50% counseling / coordination?: yes    Prolonged service was provided for  []30 min   []75 min in face to face time in the presence of the patient, spent as noted above. Time Start:   Time End:   Note: this can only be billed with 51493 (initial) or 70504 (follow up).   If multiple start / stop times, list each separately.

## 2021-10-07 NOTE — PROGRESS NOTES
Nephrology Progress Note  Jaswinder Cordova  Date of Admission : 9/22/2021    CC: Follow up for [place]       Assessment and Plan     [place]:  - ATN from severe Pancreatitis. - CT showing B/L non obstructing Kidney stones   - CRRT switched to IHD on 10/2   - Back on CVVHD on 10/6  - place roldan and hold Bumex for today   - aim for net +ve given worsening Pancreatitis and shock   - daily labs and I/Os    Hyperkalemia :  - CRRT bags changed   - TPN adjusted     Metabolic/ Lactic acidosis   - start Bicarb gtt     Protein Calorie Malnutrition   - Ordered TPN. . TF on hold   - Insulin in TPN per [facility]    Shock   - on Levophed and Midodrine  - consider Pan Cx      Acute resp failure :  - intubated 9/25    Pancreatitis     Bradycardia  TAA, b/l BEAU aneurysms  Leukocytosis       Interval History:  CT with IV contrast yesterday and findings noted. Kidney stones are non obstructing. needed straight cath and had roldan. IVF boluses overnight. CRRT with access alarm issues. Current Medications: all current  Medications have been eviewed in EPIC  Review of Systems: Review of systems not obtained due to patient factors.     Objective:  Vitals:    Vitals:    10/07/21 0400 10/07/21 0500 10/07/21 0600 10/07/21 0602   BP: 114/74 128/76 (!) 141/78    Pulse: 65 (!) 59 (!) 55 (!) 57   Resp: 28 28 28 28   Temp: 97.9 °F (36.6 °C)      TempSrc:       SpO2: 100% 100% 100% 100%   Weight: 124.4 kg (274 lb 4 oz)      Height:         Intake and Output:  10/06 1901 - 10/07 0700  In: 1927.6 [I.V.:1927.6]  Out: 690 [Urine:530; Drains:100]  10/05 0701 - 10/06 1900  In: 5360 [I.V.:2398]  Out: 0645 [Urine:200; Drains:500]    Physical Examination:  Pt intubated     yes  General: On vent   Neck:  Teddy +  Resp:  Decreased BS b/l  CV:  RRR,  no murmur or rub, 1+ b/l LE edema  GI:  Non distended, reduced bowel sounds  Neurologic:  Sedated on vent   Ext                   Pitting edema +    []    High complexity decision making was performed  []    Patient is at high-risk of decompensation with multiple organ involvement    Lab Data Personally Reviewed: I have reviewed all the pertinent labs, microbiology data and radiology studies during assessment. Recent Labs     10/07/21  0529 10/07/21  0004 10/06/21  1648 10/06/21  0229 10/06/21  0229   * 132* 132*   < > 135*   K 5.3* 6.3* 6.3*   < > 4.7    102 101   < > 102   CO2 17* 19* 19*   < > 21   * 202* 195*   < > 141*   BUN 86* 102* 107*   < > 105*   CREA 3.04* 3.78* 4.02*   < > 3.89*   CA 8.8 8.0* 7.6*   < > 7.9*   MG 2.6* 2.8* 2.8*   < > 2.4   PHOS 9.1* 10.2* 10.1*   < > 7.2*   ALB 2.7* 2.4* 2.3*   < > 2.4*   ALT 62 73  --   --  48    < > = values in this interval not displayed. Recent Labs     10/07/21  0529 10/06/21  0229 10/05/21  0354   WBC 23.0* 21.6* 25.3*   HGB 9.2* 10.0* 10.4*   HCT 28.8* 30.6* 32.8*    322 312     Lab Results   Component Value Date/Time    Specimen Description: HIP LEFT JOINT 01/17/2012 03:00 PM    Specimen Description: HIP LEFT JOINT 01/17/2012 03:00 PM    Specimen Description: JAKE 07/28/2011 12:12 AM     Lab Results   Component Value Date/Time    Culture result: NO GROWTH AFTER 20 HOURS 10/06/2021 09:55 AM    Culture result: HEAVY GRAM NEGATIVE RODS (A) 10/05/2021 06:52 PM    Culture result: LIGHT NORMAL RESPIRATORY LUIS 10/05/2021 06:52 PM     Recent Results (from the past 24 hour(s))   EKG, 12 LEAD, INITIAL    Collection Time: 10/06/21  7:21 AM   Result Value Ref Range    Ventricular Rate 82 BPM    Atrial Rate 81 BPM    QRS Duration 94 ms    Q-T Interval 554 ms    QTC Calculation (Bezet) 647 ms    Calculated R Axis 81 degrees    Calculated T Axis -57 degrees    Diagnosis       ** Poor data quality, interpretation may be adversely affected Probable   Normal sinus rhythm  ST & T wave abnormality, consider inferior ischemia  Prolonged QT  When compared with ECG of 10/02/21  Vent.  rate has decreased BY  41 BPM  T wave inversion more evident in Inferior leads  Confirmed by Cornelius Dean M.D., Pedrito Castro ([id number]) on 10/6/2021 2:37:58 PM     CULTURE, BLOOD, PAIRED    Collection Time: 10/06/21  9:55 AM    Specimen: Blood   Result Value Ref Range    Special Requests: NO SPECIAL REQUESTS      Culture result: NO GROWTH AFTER 20 HOURS     LACTIC ACID    Collection Time: 10/06/21  9:55 AM   Result Value Ref Range    Lactic acid 2.8 (HH) 0.4 - 2.0 MMOL/L   POC EG7    Collection Time: 10/06/21  9:55 AM   Result Value Ref Range    Calcium, ionized (POC) 1.03 (L) 1.12 - 1.32 mmol/L    FIO2 (POC) 100 %    pH (POC) 7.22 (LL) 7.35 - 7.45      pCO2 (POC) 52.8 (H) 35.0 - 45.0 MMHG    pO2 (POC) 73 (L) 80 - 100 MMHG    HCO3 (POC) 21.5 (L) 22 - 26 MMOL/L    Base deficit (POC) 6.4 mmol/L    sO2 (POC) 90.7 (L) 92 - 97 %    Site RIGHT BRACHIAL      Device: ADULT VENT      Mode ASSIST CONTROL      Tidal volume 480 ml    Set Rate 24 bpm    PEEP/CPAP (POC) 12 cmH2O    Allens test (POC) Positive      Specimen type (POC) ARTERIAL      Critical value read back ANNE MCNEIL    GLUCOSE, POC    Collection Time: 10/06/21 12:00 PM   Result Value Ref Range    Glucose (POC) 121 (H) 65 - 117 mg/dL    Performed by Kat Sheppard    RENAL FUNCTION PANEL    Collection Time: 10/06/21  4:48 PM   Result Value Ref Range    Sodium 132 (L) 136 - 145 mmol/L    Potassium 6.3 (H) 3.5 - 5.1 mmol/L    Chloride 101 97 - 108 mmol/L    CO2 19 (L) 21 - 32 mmol/L    Anion gap 12 5 - 15 mmol/L    Glucose 195 (H) 65 - 100 mg/dL     (H) 6 - 20 MG/DL    Creatinine 4.02 (H) 0.70 - 1.30 MG/DL    BUN/Creatinine ratio 27 (H) 12 - 20      GFR est AA 18 (L) >60 ml/min/1.73m2    GFR est non-AA 15 (L) >60 ml/min/1.73m2    Calcium 7.6 (L) 8.5 - 10.1 MG/DL    Phosphorus 10.1 (H) 2.6 - 4.7 MG/DL    Albumin 2.3 (L) 3.5 - 5.0 g/dL   MAGNESIUM    Collection Time: 10/06/21  4:48 PM   Result Value Ref Range    Magnesium 2.8 (H) 1.6 - 2.4 mg/dL   LACTIC ACID    Collection Time: 10/06/21  4:48 PM   Result Value Ref Range    Lactic acid 2.9 (HH) 0.4 - 2.0 MMOL/L   GLUCOSE, POC    Collection Time: 10/06/21  6:30 PM   Result Value Ref Range    Glucose (POC) 183 (H) 65 - 117 mg/dL    Performed by Adriana Munoz    GLUCOSE, POC    Collection Time: 10/07/21 12:03 AM   Result Value Ref Range    Glucose (POC) 201 (H) 65 - 117 mg/dL    Performed by Kevan Levi    MAGNESIUM    Collection Time: 10/07/21 12:04 AM   Result Value Ref Range    Magnesium 2.8 (H) 1.6 - 2.4 mg/dL   LACTIC ACID    Collection Time: 10/07/21 12:04 AM   Result Value Ref Range    Lactic acid 2.7 (HH) 0.4 - 2.0 MMOL/L   METABOLIC PANEL, COMPREHENSIVE    Collection Time: 10/07/21 12:04 AM   Result Value Ref Range    Sodium 132 (L) 136 - 145 mmol/L    Potassium 6.3 (H) 3.5 - 5.1 mmol/L    Chloride 102 97 - 108 mmol/L    CO2 19 (L) 21 - 32 mmol/L    Anion gap 11 5 - 15 mmol/L    Glucose 202 (H) 65 - 100 mg/dL     (H) 6 - 20 MG/DL    Creatinine 3.78 (H) 0.70 - 1.30 MG/DL    BUN/Creatinine ratio 27 (H) 12 - 20      GFR est AA 19 (L) >60 ml/min/1.73m2    GFR est non-AA 16 (L) >60 ml/min/1.73m2    Calcium 8.0 (L) 8.5 - 10.1 MG/DL    Bilirubin, total 1.0 0.2 - 1.0 MG/DL    ALT (SGPT) 73 12 - 78 U/L    AST (SGOT) 127 (H) 15 - 37 U/L    Alk.  phosphatase 145 (H) 45 - 117 U/L    Protein, total 5.8 (L) 6.4 - 8.2 g/dL    Albumin 2.4 (L) 3.5 - 5.0 g/dL    Globulin 3.4 2.0 - 4.0 g/dL    A-G Ratio 0.7 (L) 1.1 - 2.2     TRIGLYCERIDE    Collection Time: 10/07/21 12:04 AM   Result Value Ref Range    Triglyceride 329 (H) <150 MG/DL   PHOSPHORUS    Collection Time: 10/07/21 12:04 AM   Result Value Ref Range    Phosphorus 10.2 (H) 2.6 - 4.7 MG/DL   GLUCOSE, POC    Collection Time: 10/07/21  5:09 AM   Result Value Ref Range    Glucose (POC) 187 (H) 65 - 117 mg/dL    Performed by Venancio Tolentino    CBC WITH AUTOMATED DIFF    Collection Time: 10/07/21  5:29 AM   Result Value Ref Range    WBC 23.0 (H) 4.1 - 11.1 K/uL    RBC 2.98 (L) 4.10 - 5.70 M/uL    HGB 9.2 (L) 12.1 - 17.0 g/dL    HCT 28.8 (L) 36.6 - 50.3 %    MCV 96.6 80.0 - 99.0 FL    MCH 30.9 26.0 - 34.0 PG    MCHC 31.9 30.0 - 36.5 g/dL    RDW 14.8 (H) 11.5 - 14.5 %    PLATELET 072 477 - 859 K/uL    MPV 11.8 8.9 - 12.9 FL    NRBC 0.1 (H) 0  WBC    ABSOLUTE NRBC 0.02 (H) 0.00 - 0.01 K/uL    NEUTROPHILS 91 (H) 32 - 75 %    LYMPHOCYTES 3 (L) 12 - 49 %    MONOCYTES 5 5 - 13 %    EOSINOPHILS 0 0 - 7 %    BASOPHILS 0 0 - 1 %    IMMATURE GRANULOCYTES 1 (H) 0.0 - 0.5 %    ABS. NEUTROPHILS 20.9 (H) 1.8 - 8.0 K/UL    ABS. LYMPHOCYTES 0.7 (L) 0.8 - 3.5 K/UL    ABS. MONOCYTES 1.2 (H) 0.0 - 1.0 K/UL    ABS. EOSINOPHILS 0.0 0.0 - 0.4 K/UL    ABS. BASOPHILS 0.0 0.0 - 0.1 K/UL    ABS. IMM. GRANS. 0.2 (H) 0.00 - 0.04 K/UL    DF SMEAR SCANNED      PLATELET COMMENTS Large Platelets      RBC COMMENTS ANISOCYTOSIS  1+        RBC COMMENTS POLYCHROMASIA  1+       PHOSPHORUS    Collection Time: 10/07/21  5:29 AM   Result Value Ref Range    Phosphorus 9.1 (H) 2.6 - 4.7 MG/DL   METABOLIC PANEL, COMPREHENSIVE    Collection Time: 10/07/21  5:29 AM   Result Value Ref Range    Sodium 133 (L) 136 - 145 mmol/L    Potassium 5.3 (H) 3.5 - 5.1 mmol/L    Chloride 102 97 - 108 mmol/L    CO2 17 (L) 21 - 32 mmol/L    Anion gap 14 5 - 15 mmol/L    Glucose 185 (H) 65 - 100 mg/dL    BUN 86 (H) 6 - 20 MG/DL    Creatinine 3.04 (H) 0.70 - 1.30 MG/DL    BUN/Creatinine ratio 28 (H) 12 - 20      GFR est AA 25 (L) >60 ml/min/1.73m2    GFR est non-AA 20 (L) >60 ml/min/1.73m2    Calcium 8.8 8.5 - 10.1 MG/DL    Bilirubin, total 1.0 0.2 - 1.0 MG/DL    ALT (SGPT) 62 12 - 78 U/L    AST (SGOT) 90 (H) 15 - 37 U/L    Alk.  phosphatase 137 (H) 45 - 117 U/L    Protein, total 6.4 6.4 - 8.2 g/dL    Albumin 2.7 (L) 3.5 - 5.0 g/dL    Globulin 3.7 2.0 - 4.0 g/dL    A-G Ratio 0.7 (L) 1.1 - 2.2     MAGNESIUM    Collection Time: 10/07/21  5:29 AM   Result Value Ref Range    Magnesium 2.6 (H) 1.6 - 2.4 mg/dL   LACTIC ACID    Collection Time: 10/07/21  5:29 AM   Result Value Ref Range    Lactic acid 2.4 (HH) 0.4 - 2.0 MMOL/L   POC G3 - PUL    Collection Time: 10/07/21  6:04 AM   Result Value Ref Range    FIO2 (POC) 100 %    pH (POC) 7.25 (L) 7.35 - 7.45      pCO2 (POC) 47.3 (H) 35.0 - 45.0 MMHG    pO2 (POC) 427 (H) 80 - 100 MMHG    HCO3 (POC) 20.6 (L) 22 - 26 MMOL/L    sO2 (POC) 100.0 (H) 92 - 97 %    Base deficit (POC) 6.5 mmol/L    Site DRAWN FROM ARTERIAL LINE      Device: ADULT VENT      Mode ASSIST CONTROL      Tidal volume 500 ml    Set Rate 28 bpm    PEEP/CPAP (POC) 15 cmH2O    Allens test (POC) Negative      Specimen type (POC) ARTERIAL      Critical value read back DR Maximo RUDD [facility],  [place] Puente[address]  Phone - [phone]   Fax - [phone]  www. [email or web address] Hospitaldelicious[facility] Hospital

## 2021-10-07 NOTE — PROGRESS NOTES
0730 Bedside and Verbal shift change report given to EWA Turner (oncoming nurse) by Dayo Osuna (offgoing nurse). Report included the following information SBAR, Kardex, Intake/Output, MAR and Cardiac Rhythm SB/NSR.     0945 Patient HR 36-44, Dr. Marti Mcintosh aware. Orders received to start Dopamine, wean vasopressin as able. 1000 [place]ita at bedside to change set. Trickle feeds started. 1030 Palliative at bedside with daughter. 1130 Lactic sent. 1800 labs sent. 1930 Bedside and Verbal shift change report given to Dayo Osuna (oncoming nurse) by Kaya Carrasquillo (offgoing nurse). Report included the following information SBAR, Kardex, Intake/Output, MAR and Cardiac Rhythm SB/NSR/1st degree AVB.

## 2021-10-07 NOTE — PROGRESS NOTES
ID Progress Note  10/7/2021    Subjective: Intubated it    Review of Systems:            Symptom Y/N Comments   Symptom Y/N Comments   Fever/Chills       Chest Pain        Poor Appetite       Edema        Cough       Abdominal Pain        Sputum       Joint Pain        SOB/COATES       Pruritis/Rash        Nausea/vomit       Tolerating PT/OT        Diarrhea       Tolerating Diet        Constipation       Other           Could NOT obtain due to: Intubated it      Objective:     Vitals:   Visit Vitals  /63   Pulse (!) 56   Temp 97.9 °F (36.6 °C)   Resp 28   Ht 6' 1\" (1.854 m)   Wt 124.4 kg (274 lb 4 oz)   SpO2 100%   BMI 36.18 kg/m²        Tmax:  Temp (24hrs), Av.2 °F (37.3 °C), Min:96.9 °F (36.1 °C), Max:101.2 °F (38.4 °C)      PHYSICAL EXAM:  General: Obese, chronically ill appearing, no acute distress    EENT:  EOMI. Anicteric sclerae. Dry mucous membrane  Resp:  Clear in apex with decreased breath sounds at bases,  No accessory muscle use  CV:  Regular  rhythm,  trace of edema  GI:  Soft, non distended, (+) Bowel sounds  Neurologic:  Intubated it, not following commends   Psych:   Unable to assess insight. Skin:  No rashes.   No jaundice    Labs:   Lab Results   Component Value Date/Time    WBC 23.0 (H) 10/07/2021 05:29 AM    Hemoglobin (POC) 15.0 2016 05:20 PM    HGB 9.2 (L) 10/07/2021 05:29 AM    Hematocrit (POC) 44 2016 05:20 PM    HCT 28.8 (L) 10/07/2021 05:29 AM    PLATELET 287  05:29 AM    MCV 96.6 10/07/2021 05:29 AM     Lab Results   Component Value Date/Time    Sodium 133 (L) 10/07/2021 05:29 AM    Potassium 5.3 (H) 10/07/2021 05:29 AM    Chloride 102 10/07/2021 05:29 AM    CO2 17 (L) 10/07/2021 05:29 AM    Anion gap 14 10/07/2021 05:29 AM    Glucose 185 (H) 10/07/2021 05:29 AM    BUN 86 (H) 10/07/2021 05:29 AM    Creatinine 3.04 (H) 10/07/2021 05:29 AM    BUN/Creatinine ratio 28 (H) 10/07/2021 05:29 AM    GFR est AA 25 (L) 10/07/2021 05:29 AM    GFR est non-AA 20 (L) 10/07/2021 05:29 AM    Calcium 8.8 10/07/2021 05:29 AM    Bilirubin, total 1.0 10/07/2021 05:29 AM    Alk. phosphatase 137 (H) 10/07/2021 05:29 AM    Protein, total 6.4 10/07/2021 05:29 AM    Albumin 2.7 (L) 10/07/2021 05:29 AM    Globulin 3.7 10/07/2021 05:29 AM    A-G Ratio 0.7 (L) 10/07/2021 05:29 AM    ALT (SGPT) 62 10/07/2021 05:29 AM     CTA chest, CT of abd/pel: 4.6 cm ascending thoracic aortic aneurysm, without dissection. 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 Pancreas with diffuse peripancreatic fat stranding without discrete fluid  collection, no evidence of necrosis or infection. Prostatomegaly, with findings suspicious for chronic bladder outlet obstruction. US ABD (9/22) Pancreas obscured by bowel gas. Transfer to ICU on 9/24 due to hypotension. Intubated it 9/26  C-line and dialysis catheter placement 9/26, line exchanged on 10/4    Assessment and Plan   Leukocytosis  ASP PNA  - afebrile overnight, WBC 21k->23k    Blood cx (9/22 & 9/28) no growth     Blood cx (10/6) no growth so far    resp cx (10/5) GNR    C-diff pending    CXR (10/6) Low lung volumes with mild bibasilar opacities that may represent atelectasis,  edema, or infection. CT chest, abd, pel (10/6)   1. No acute pulmonary embolus  2. Patchy airspace infiltrates in left upper lobe and both lower lobes concerning for pneumonia, possibly aspiration with prominent dependent atelectasis. 3. Pancreatitis with newly developed extensive peripancreatic inflammation, and  retroperitoneal and peritoneal free fluid. No complication identified otherwise. 4. 4.6 cm an aortic aneurysm. IV merrem and vancomycin were d/c'd on 10/1 and resumed on 10/6 by intensive     Agree with IV merrem to treat aspiration PNA; pls complete at least total 7 days.         Fever work up if temp >= 100.4    Acute pancreatitis - GI following; Lipase 212  Elevated LFT    [place]  Hx of bilateral nonobstructive calyceal calculi and bilateral renal cysts  - creat 4.4    Nephrology following     Ascending thoracic aortic aneurysm & partially trombosis in left internal iliac artery   - further evaluation per primary team    Above plan d/w and agreed by with Dr. Corey Ee, NP

## 2021-10-07 NOTE — PROGRESS NOTES
0000: Bedside and Verbal shift change report given to Morgan Ibarra, RN (oncoming nurse) by Stella Archibald, EWA (offgoing nurse). Report included the following information SBAR, Kardex, ED Summary, Procedure Summary, Intake/Output, MAR, Recent Results, Med Rec Status, Cardiac Rhythm NSR/PAC's/PVC's, Alarm Parameters  and Dual Neuro Assessment. Drips: [facility] @ 8, Precedex @ 1.4, Fent @ 200, Versed @ 10, Levo @ 18, Prop @ 25, Vaso @ 0.04    0000: Resumed pt care     0200: Attempted to turn patient to R side, BP 60/40 maxed on levo/vaso, Mor turned on briefly & pt HR dropped to 50's. Pt placed back supine & BP recovered. Mor off    0230: MAP 50's, levo and vaso maxed. Orders for 5% albumin.     0300: Bladder scan = 614. MD notified, orders to place roldan. Roldan placed & 500cc UOP noted     0530: Q12 labs drawn and sent     0600: ABG: pO2 427; pCO2 47.3; pH 7.25;  HCO3 20.6, %O2 Sat 100. PEEP decreased to 10, FiO2 decreased to 60%. 7788: Dr. Hosea Shafer @ bedside, orders to start bicarb gtt @ 84mL/hr & scheduled albumin. Will restart TPN. Goal to keep net +, will pull 50cc fluid/hr via CRRT    0725: Pt more bradycardic (40-50), MD notified, orders to stop precedex     0730: Bedside and Verbal shift change report given to Erickson Noguera RN (oncoming nurse) by Morgan Ibarra, EWA (offgoing nurse). Report included the following information SBAR, Kardex, ED Summary, Procedure Summary, Intake/Output, MAR, Recent Results, Med Rec Status, Cardiac Rhythm NSR/SB/1AVB/PVC's/PAC's, Alarm Parameters  and Dual Neuro Assessment.

## 2021-10-07 NOTE — PROGRESS NOTES
NUTRITION brief  Recommendations:     Advance tube feeding as tolerated to goal: Nepro @ 35 ml/hr with 2 packets Prosource bid and 50 ml water flush q 6 hr. If unable to advance tube feeding-continue with TPN       Diet: NPO  Nutrition Support: TPN: 7% AA D15 @ 75 ml/hr with B1, Vit C, Folic acid     Tube Feeding: Nepro @ 10 ml/hr with 30 ml water flush q 4 hr  Nutrition-related meds: Dopamine, Propofol @ 18.3 ml/hr, Versed    New events impacting nutrition plan of care:   Hemodynamically unstable 10/6-requiring 3 pressors. Also transitioned to CVVHD yesterday. Now on Dopamine only (weaned off Levo, Epi and Vaso). TPN ordered today by nephrology; tube feeds resumed at very low rate. Lipase 212 (10/6). Palliative Care following-possible transition to comfort care soon. TPN as ordered provides: 1800 ml, 126 gm protein and 1420 calories per day. TPN and propofol combined provide 2035 calories per day. See full RD assessment from 10/4 for additional details, goals, and monitoring/evaluation.    Estimated Nutrition Needs:   Energy: 8769-8386   Wt used: Current  Protein: 120-170 (1.5-2 g/kg IBW)  Wt used: Ideal   Fluid: Per Renal     Teresa Van RD [facility]

## 2021-10-07 NOTE — PALLIATIVE CARE
Palliative Medicine  [place]: [phone] ([phone]  [facility]: [phone])        14:00 This LCSW and Dr. Smiley Mello met with pt's wife Tatianna Eller, her sister Saul Barrera and pts step daughter Tawanna Cordova per their request to discuss care goals. MD addressed medical concerns, discussed pts intubation may cause harm if prolonged. MD discussed temporary trach with wife/family and likely SNF placement. Family reported pt \" would never want that\". Discussed quality of life pt would want to have if he were to make any gains. Pt not likely to have meaningful recovery. Comfort care was discussed at length with wife and family. Palliative team and family talked about what that would look like here at [facility]. Discussed possible hospice. Wife reports she is overwhelmed, want to take time for \" this information to sink in\". LCSW affirmed her feelings, discussed no decsion needs to be made today. LCSW provided emotional support for wife in coping with difficult decisions and feelings of being overwhelmed. .     10: 30 am This LCSW into meet with pt's step-daughter Danielle Miller at bedside, pts wife Tatianna Eller was not present, she is expected later today. at bedside. Daniela Ceron RN updated daughter re pts clinical status. LCSW provided emotional support for daughter as we discussed pts medical course and events over the past day. LCSW and daughter discussed pts wishes, not wanting mechanical  measures to prolong his life. LCSW and daughter discussed giving pt \" time to make gains\". Step daughter reports wife/family \" leaning towards comfort\". LCSW and daughter talked about difficult decisions. Step daughter requesting family meeting with Dr. Smiley Mello once wife comes to bedside, to have medical guidance on moving forward with goals of care. Step daughter is an RN, discussed being \" a daughter\" now,daughter reports she does not want to influence her mother in any way in her role as an RN.      LCSW provided grief support for step daughter in processing anticipatory grief. LCSW provided reflective listening as daughter talked about child alberto memories of with pt. Daughter is in school full time, working at bedside on a paper due soon. LCSW will continue to monitor and assess pt/family needs as goals of care discussion is ongoing.        Carmen Pradhan [facility], [address] [place] [address] Rd  [phone] ( [phone])

## 2021-10-07 NOTE — PROGRESS NOTES
Problem: Falls - Risk of  Goal: *Absence of Falls  Description: Document Starleen Freeze Fall Risk and appropriate interventions in the flowsheet. Outcome: Progressing Towards Goal  Note: Fall Risk Interventions:  Mobility Interventions: Assess mobility with egress test, Bed/chair exit alarm, Patient to call before getting OOB    Mentation Interventions: Adequate sleep, hydration, pain control, Bed/chair exit alarm, Door open when patient unattended    Medication Interventions: Bed/chair exit alarm    Elimination Interventions: Bed/chair exit alarm, Call light in reach, Patient to call for help with toileting needs              Problem: Patient Education: Go to Patient Education Activity  Goal: Patient/Family Education  Outcome: Progressing Towards Goal     Problem: Pain  Goal: *Control of Pain  Outcome: Progressing Towards Goal  Goal: *PALLIATIVE CARE:  Alleviation of Pain  Outcome: Progressing Towards Goal     Problem: Patient Education: Go to Patient Education Activity  Goal: Patient/Family Education  Outcome: Progressing Towards Goal     Problem: Discharge Planning  Goal: *Discharge to safe environment  Outcome: Progressing Towards Goal     Problem: Pressure Injury - Risk of  Goal: *Prevention of pressure injury  Description: Document Genaro Scale and appropriate interventions in the flowsheet.   Outcome: Progressing Towards Goal  Note: Pressure Injury Interventions:  Sensory Interventions: Assess changes in LOC, Check visual cues for pain, Float heels, Keep linens dry and wrinkle-free, Minimize linen layers    Moisture Interventions: Absorbent underpads, Check for incontinence Q2 hours and as needed    Activity Interventions: Pressure redistribution bed/mattress(bed type)    Mobility Interventions: Float heels, HOB 30 degrees or less, Pressure redistribution bed/mattress (bed type)    Nutrition Interventions: Document food/fluid/supplement intake    Friction and Shear Interventions: HOB 30 degrees or less, Lift sheet, Minimize layers                Problem: Patient Education: Go to Patient Education Activity  Goal: Patient/Family Education  Outcome: Progressing Towards Goal     Problem: Patient Education: Go to Patient Education Activity  Goal: Patient/Family Education  Outcome: Progressing Towards Goal     Problem: Acute Renal Failure: Discharge Outcomes  Goal: *Optimal pain control at patient's stated goal  Outcome: Progressing Towards Goal  Goal: *Urinary output within identified parameters  Outcome: Progressing Towards Goal  Goal: *Hemodynamically stable  Outcome: Progressing Towards Goal  Goal: *Tolerating diet  Outcome: Progressing Towards Goal  Goal: *Lab values stabilized  Outcome: Progressing Towards Goal  Goal: *Verbalizes understanding and describes medication purposes and frequencies  Outcome: Progressing Towards Goal  Goal: *Medication reconciliation  Outcome: Progressing Towards Goal     Problem: Nausea/Vomiting (Adult)  Goal: *Absence of nausea/vomiting  Outcome: Progressing Towards Goal     Problem: Patient Education: Go to Patient Education Activity  Goal: Patient/Family Education  Outcome: Progressing Towards Goal     Problem: Non-Violent Restraints  Goal: Removal from restraints as soon as assessed to be safe  Outcome: Progressing Towards Goal  Goal: No harm/injury to patient while restraints in use  Outcome: Progressing Towards Goal  Goal: Patient's dignity will be maintained  Outcome: Progressing Towards Goal  Goal: Patient Interventions  Outcome: Progressing Towards Goal     Problem: Ventilator Management  Goal: *Adequate oxygenation and ventilation  Outcome: Progressing Towards Goal  Goal: *Patient maintains clear airway/free of aspiration  Outcome: Progressing Towards Goal  Goal: *Absence of infection signs and symptoms  Outcome: Progressing Towards Goal  Goal: *Normal spontaneous ventilation  Outcome: Progressing Towards Goal     Problem: Patient Education: Go to Patient Education Activity  Goal: Patient/Family Education  Outcome: Progressing Towards Goal     Problem: Breathing Pattern - Ineffective  Goal: *Absence of hypoxia  Outcome: Progressing Towards Goal  Goal: *Use of effective breathing techniques  Outcome: Progressing Towards Goal  Goal: *PALLIATIVE CARE:  Alleviation of Dyspnea  Outcome: Progressing Towards Goal     Problem: Patient Education: Go to Patient Education Activity  Goal: Patient/Family Education  Outcome: Progressing Towards Goal     Problem: Infection - Risk of, Central Venous Catheter-Associated Bloodstream Infection  Goal: *Absence of infection signs and symptoms  Outcome: Progressing Towards Goal     Problem: Patient Education: Go to Patient Education Activity  Goal: Patient/Family Education  Outcome: Progressing Towards Goal     Problem: Infection - Risk of, Ventilator-Associated Pneumonia  Goal: *Absence of infection signs and symptoms  Outcome: Progressing Towards Goal     Problem: Patient Education: Go to Patient Education Activity  Goal: Patient/Family Education  Outcome: Progressing Towards Goal     Problem: Hypotension  Goal: *Blood pressure within specified parameters  Outcome: Progressing Towards Goal  Goal: *Fluid volume balance  Outcome: Progressing Towards Goal  Goal: *Labs within defined limits  Outcome: Progressing Towards Goal     Problem: Patient Education: Go to Patient Education Activity  Goal: Patient/Family Education  Outcome: Progressing Towards Goal     Problem: Diabetes Self-Management  Goal: *Disease process and treatment process  Description: Define diabetes and identify own type of diabetes; list 3 options for treating diabetes. Outcome: Progressing Towards Goal  Goal: *Incorporating nutritional management into lifestyle  Description: Describe effect of type, amount and timing of food on blood glucose; list 3 methods for planning meals.   Outcome: Progressing Towards Goal  Goal: *Incorporating physical activity into lifestyle  Description: State effect of exercise on blood glucose levels. Outcome: Progressing Towards Goal  Goal: *Developing strategies to promote health/change behavior  Description: Define the ABC's of diabetes; identify appropriate screenings, schedule and personal plan for screenings. Outcome: Progressing Towards Goal  Goal: *Using medications safely  Description: State effect of diabetes medications on diabetes; name diabetes medication taking, action and side effects. Outcome: Progressing Towards Goal  Goal: *Monitoring blood glucose, interpreting and using results  Description: Identify recommended blood glucose targets  and personal targets. Outcome: Progressing Towards Goal  Goal: *Prevention, detection, treatment of acute complications  Description: List symptoms of hyper- and hypoglycemia; describe how to treat low blood sugar and actions for lowering  high blood glucose level. Outcome: Progressing Towards Goal  Goal: *Prevention, detection and treatment of chronic complications  Description: Define the natural course of diabetes and describe the relationship of blood glucose levels to long term complications of diabetes.   Outcome: Progressing Towards Goal  Goal: *Developing strategies to address psychosocial issues  Description: Describe feelings about living with diabetes; identify support needed and support network  Outcome: Progressing Towards Goal  Goal: *Insulin pump training  Outcome: Progressing Towards Goal  Goal: *Sick day guidelines  Outcome: Progressing Towards Goal  Goal: *Patient Specific Goal (EDIT GOAL, INSERT TEXT)  Outcome: Progressing Towards Goal     Problem: Patient Education: Go to Patient Education Activity  Goal: Patient/Family Education  Outcome: Progressing Towards Goal     Problem: General Wound Care  Goal: *Non-infected wound: Improvement of existing wound, absence of infection, and maintenance of skin integrity  Outcome: Progressing Towards Goal  Goal: *Infected Wound: Prevention of further infection and promotion of healing  Description: Infection control procedures (eg: clean dressings, clean gloves, hand washing, precautions to isolate wound from contamination, sterile instruments used for wound debridement) should be implemented. Outcome: Progressing Towards Goal  Goal: Interventions  Outcome: Progressing Towards Goal     Problem: Patient Education: Go to Patient Education Activity  Goal: Patient/Family Education  Outcome: Progressing Towards Goal     Problem: Nutrition Deficit  Goal: *Optimize nutritional status  Outcome: Progressing Towards Goal     Problem: Risk for Spread of Infection  Goal: Prevent transmission of infectious organism to others  Description: Prevent the transmission of infectious organisms to other patients, staff members, and visitors.   Outcome: Progressing Towards Goal     Problem: Patient Education:  Go to Education Activity  Goal: Patient/Family Education  Outcome: Progressing Towards Goal

## 2021-10-07 NOTE — WOUND CARE
WOCN Note:      Follow up of right ear and right side of head. Patient is too unable to turn and the dialysis catheter is positional.     Assessed in YGWT 1093. Chart reviewed. Admitted DX:  Pancreatitis          Past Medical History:   Diagnosis Date    Arthritis       back hips knees and arms    CAD (coronary artery disease)      Hypertension      Kidney stones      Nausea & vomiting       after hip surgery    Pancreatitis       In 2/2011, resolved    Psoriasis        Assessment:   Patient is intubated and sedated. Bed: denise yaniv  FMS in place. Patient positioned on right side. Heels offloaded with pillows.      1.  Right ear rim, non blanching purple erythema. 4 x 2 x 0 cm. 2.  Right temple, non blanching purple erythema. 3.5 x 3 x 0 cm.     Wound, Pressure Prevention & Skin Care Recommendations:    1. Minimize layers of linen/pads under patient to optimize support surface.    2.  Turn/reposition approximately every 2 hours and offload heels. 3.  Manage moisture/ Keep skin folds clean and dry. 4.  Right hip:  Venelex TID and Protect with large sacral foam dressing. 5.  Right ear and right temple:  Apply Venelex TID and Protect with pink foam.  6.  Protect lucila anal skin with zinc cream.  Ensure that there is slack on the FMS tubing to reduce any tension to the surrounding tissue and skin. 7.  Buttocks and Sacrum:  Venelex TID.       Transition of Care:   Plan to follow as needed while admitted to hospital.     KESHAV ValeN RN [facility]  Available on [employer]  Pager [phone]  Office [phone]

## 2021-10-07 NOTE — DIALYSIS
CRRT / [phone]         Orders   Mode: CVVHD restarted @1025   Blood Flow Rate: 150ml/min   Prismasol Dose: 2500ml/hr   Prismasol Concentrate: 4K/2. Jaren Idania 5Ca      Blood Warmer Temp: 37*C   Net Fluid Removal: 0ml/hr          Metrics   BP: 180/67   HR: 70   Access Pressure: -56   Filter Pressure: 76   Return Pressure: 36   TMP: 20   Pressure Drop: 8          Access   Type & Location: R CVC CDI dated 10/04/21   Comments:  Each catheter limb disinfected per p&p, caps removed, hubs disinfected per p&p, +aspiration/flush X2.                                            Labs   HBsAg (Antigen) / date: Neg 09/25/21   HBsAb (Antibody) / date: Susceptible 9/25/21   Source: Epic          Safety:   Time Out Done:   (Time) 1000   Consent obtained/signed: Remains on file from previously/same admission   Education: Unable/pt intubated & sedated   Primary Nurse Rpt PreArethan Lehman RN   Primary Nurse Rpt Post: Ignacia Vee RN      Comments / Plan:   Pt orders, notes, labs, code status and consent reviewed. Time out complete. CVVHD restarted due to large clot in deaeration chamber obstructing view, all possible blood returned by CRRT RN.  Gerhardt Dess primed and tested. Lines are visible and secure with blood warmer attached to return line and set at 37*C. Education pre/post with primary RN.

## 2021-10-07 NOTE — DIALYSIS
CRRT / [phone]         Orders   Mode: CVVHD restarted post Cathflo dwell @ 0110   Blood Flow Rate: 150ml/min   Prismasol Dose: 2500ml/hr   Prismasol Concentrate: 4K/2. Cleophus Beverly 5Ca      Blood Warmer Temp: 37*C   Net Fluid Removal: 0ml/hr          Metrics   BP: 100/63   HR: 82   Access Pressure: -76   Filter Pressure: 92   Return Pressure: 63   TMP: 26   Pressure Drop: 15          Access   Type & Location: R CVC CDI dated 10/04/21   Comments:  Each catheter limb disinfected per p&p, caps removed, hubs disinfected per p&p, +aspiration/flush X2 (blue port with pauses on aspiration).                                            Labs   HBsAg (Antigen) / date: Neg 09/25/21   HBsAb (Antibody) / date: Susceptible 9/25/21   Source: Epic          Safety:   Time Out Done:   (Time) 0050   Consent obtained/signed: Remains on file from previously/same admission   Education: Unable/pt intubated & sedated   Primary Nurse Rpt Pre: Camille Pennington RN   Primary Nurse Rpt Post: Camille Pennington RN      Comments / Plan:   Pt orders, notes, labs, code status and consent reviewed. Time out complete. CVVHD restarted post Cathflow dwell. Per primary RN patient was alarming with access issues when they started giving him a bath and turning him. She was unable to resolve access issues and pulled clots from both lines (larger clots from blue line). Primary RN unable to return blood; EBL 165ml. [id number] primed and tested. Lines are visible and secure with blood warmer attached to return line and set at 37*C. Education pre/post with primary RN.

## 2021-10-07 NOTE — PROGRESS NOTES
1930: Bedside and Verbal shift change report given to Lori Navas RN (oncoming nurse) by Corry Kelly RN (offgoing nurse). Report included the following information SBAR, Kardex, ED Summary, Procedure Summary, Intake/Output, MAR, Recent Results, Med Rec Status, Cardiac Rhythm NSR/PAC's/PVC's, Alarm Parameters  and Dual Neuro Assessment. Drips: [facility] @ 8, Dopamine @ 6, Fent @ 175, Versed @ 10, Prop @ 25, Bicarb @ 84, TPN @ 75    2000: Resumed pt care. Unable to obtain axillary temp, esophageal temp probe placed, temp 33.9C. Coretta hugger on. Pt vomiting copious amounts of green/yellow fluid, TF turned off, NGT hooked to suction, 550cc out. MD notified. 2015: C. Diff negative, precautions removed     2045: Pt zana into 40's despite dopamine. MD notified, orders to restart levophed @ 5, 250cc albumin bolus, come down on dopamine if able., propofol stopped     2145: Pt now hypertensive 200/90's, levo & dopamine stopped     2200: Levo restarted     2345: Lactic drawn and sent     0030: Lactic 2.7     0050: CRRT alarming filter clotting, attempted to return blood but unable, clots noted in aeration chamber and on end of return line. Adelina Ahuja notified, MD aware     0100: Nichelle @ bedside     0400: Labs sent     0825: ABG: pO2 164; pCO2 34.1; pH 7.44;  HCO3 22.9, %O2 Sat 99.5. FiO2 decreased to 40%    0500: HGB drop from 9.3 to 7, orders to transfuse 1 unit PRBC.    0600: Dr. Daniela Perez @ bedside, orders to start heparin through CRRT circuit and increase blood warmer temp to 38C. Q6 PTT's starting @ 1200. Bicarb gtt d/c     0630: 1 unit PRBC started     0645: Lactic 2.9    0730: Bedside and Verbal shift change report given to Connie Merlos RN (oncoming nurse) by Lori Navas RN (offgoing nurse). Report included the following information SBAR, Kardex, ED Summary, Procedure Summary, Intake/Output, MAR, Recent Results, Med Rec Status, Cardiac Rhythm NSR/PAC's/PVC's/1AVB/BBB, Alarm Parameters  and Dual Neuro Assessment.      Drips: [facility] @ 8, Fent @ 150, Versed @ 10, TPN @ 75, Levo @ 10

## 2021-10-07 NOTE — PROGRESS NOTES
[facility]    ICU TEAM Progress Note    Name: Mayuri Kasper   : 1950   MRN: [id number]   Date: 10/7/2021      I  Subjective:   Progress Note: 10/7/2021      Reason for ICU Admission: Pancreatitis, acute kidney injury, respiratory failure, septic shock     Brief HPI: 66 y/o M w hx of CAD, HTN, OA, and kidney stones presents to the hospital with CC of SOB, abd pain, N/V. He was found to have severe pancreatitis and acute renal injury with associated lactic acidosis and elevated LFTs. He was transferred to the CCU for initiation of CRRT given borderline low blood pressures.      Overnight Events:     10/7 - Oxygenation and hemodynamics improved ON with volume and abx    10/6 - Developed severe hypoxia and hypotension ON - PEE now up to 12 and on 3 pressors    10/4- - Remains intubated, on/off pressors    10/3: Switch CRRT to IHD on the second, needed to increase pressor requirement but were able to pull 1.7 L. Afterward he was tachycardic and that improved after infusion of 250 cc of albumin. Had a bowel movement, no bleeding. Pressors weaning down after IHD completed. Still anuric  10/2: Antibiotics discontinued, still on low-dose pressors, continue to have issues with dialysis access, no fever  10/1: Required going back on pressors [low-dose vasopressin], otherwise remain anuric on CRRT  : No acute event.  Off pressors  : No acute event.  Remains on vasopressin and low-dose epinephrine.  On CRRT.  : CRRT; MV; Pressors  : CRRT; MV; Pressors  : increasing agitation and hypoxia. Intubated. HDL repositioned again. Up on vasopressors  : Naomi Medeiros pulled back - remains positional.   : transferred into ICU.  Deshawn Bob placed and CRRT started    Objective:   Vital Signs:  Visit Vitals  BP (!) 93/52 (BP 1 Location: Left lower arm, BP Patient Position: At rest)   Pulse 66   Temp 97.5 °F (36.4 °C)   Resp 22   Ht 6' 1\" (1.854 m)   Wt 124.4 kg (274 lb 4 oz)   SpO2 99%   BMI 36.18 kg/m²    O2 Flow Rate (L/min): 3 l/min O2 Device: Endotracheal tube, Ventilator Temp (24hrs), Av °F (36.7 °C), Min:96.9 °F (36.1 °C), Max:100.4 °F (38 °C)           Intake/Output:     Intake/Output Summary (Last 24 hours) at 10/7/2021 1425  Last data filed at 10/7/2021 1400  Gross per 24 hour   Intake 4293.1 ml   Output 1191 ml   Net 3102.1 ml       Physical Exam:    General:  Intubated and sedated  Eye: No pallor no jaundice pupils equal reactive  Neurologic: sedated  Neck:  RIJ HDL, LIJ CVC  Lungs:  Distant breath sounds  Heart:  regular rate and rhythm  Abdomen:  Soft, distended, could not appreciate tenderness  Skin:  Normal.    LABS AND  DATA: Personally reviewed  Recent Labs     10/07/21  0529 10/06/21  0229   WBC 23.0* 21.6*   HGB 9.2* 10.0*   HCT 28.8* 30.6*    322     Recent Labs     10/07/21  0529 10/07/21  0004   * 132*   K 5.3* 6.3*    102   CO2 17* 19*   BUN 86* 102*   CREA 3.04* 3.78*   * 202*   CA 8.8 8.0*   MG 2.6* 2.8*   PHOS 9.1* 10.2*     Recent Labs     10/07/21  0529 10/07/21  0004 10/06/21  1648 10/06/21  0229   * 145*  --  141*   TP 6.4 5.8*  --  5.8*   ALB 2.7* 2.4*   < > 2.4*   GLOB 3.7 3.4  --  3.4   LPSE  --   --   --  212    < > = values in this interval not displayed. No results for input(s): INR, PTP, APTT, INREXT, INREXT in the last 72 hours. Recent Labs     10/07/21  0604 10/06/21  0955   PHI 7.25* 7.22*   PCO2I 47.3* 52.8*   PO2I 427* 73*   FIO2I 100 100     No results for input(s): CPK, CKMB, TROIQ, BNPP in the last 72 hours.     Hemodynamics:   PAP:   CO:     Wedge:   CI:     CVP:    SVR:       PVR:       Ventilator Settings:  Mode Rate Tidal Volume Pressure FiO2 PEEP   Assist control   500 ml    60 % 10 cm H20     Peak airway pressure: 36 cm H2O    Minute ventilation: 15.1 l/min        MEDS: Reviewed    Chest X-Ray:  CXR Results  (Last 48 hours)               10/07/21 0437  XR CHEST PORT Final result    Impression:      Decreased mild bibasilar atelectasis. Narrative:  EXAM:  XR CHEST PORT       INDICATION: Bibasilar opacities       COMPARISON: 10/6/2021       TECHNIQUE: Portable AP semiupright chest view at 0404 hours       FINDINGS: The endotracheal tube, enteric tube, and bilateral IJ catheters are   stable. The cardiomediastinal contours are stable. The pulmonary vasculature is   within normal limits. There is decreased mild bibasilar opacities. There is no pleural effusion or   pneumothorax. The bones and upper abdomen are stable. 10/06/21 0453  XR CHEST PORT Final result    Impression:      Low lung volumes with mild bibasilar opacities that may represent atelectasis,   edema, or infection. Narrative:  EXAM:  XR CHEST PORT       INDICATION: Hypoxia       COMPARISON: 10/5/2021 at 0424 hours       TECHNIQUE: Portable AP semiupright chest view at 0411 hours       FINDINGS: The endotracheal tube, enteric tube, and bilateral IJ catheters are   stable. The cardiomediastinal contours are stable. There are low lung volumes with mild bibasilar opacities. There is no pleural   effusion or pneumothorax. The bones and upper abdomen are stable.                    Assessment and Plan:   - Acute Pancreatitis  - Septic Shock  - Acute Hypoxic Respiratory Failure  - [place] on RRT  -History of CAD      Neuro - Analgosedation with opioids, propofol, versed as needed    CV - MAP goal > 65, cont midodrine, on dopamine as needed for bradycardia, cont midodrine, cont stress dose steroids, ASA    Pulm - cont lung protective mech vent, SBTs as tolerated, vent day 12 - support improved ON, heading towards needing a trach if decision makers want to continue with aggressive medical therapy    GI - Trickle TF for now, occult stool +ve, no overt bleeding, on PPI therapy    Renal - [place] on HD - back on CRRT due to HD instability, f/u renal recs    Heme - Heparin for DVT ppx    ID - Undulating WBC count - +ve sputum GS - cont Bsabx    Endo - Ensure Vit D supplementation - quite deficient, keep glu 100-180     Dispo - Very guarded at this time - f/u palliative GOC discussions with family    Lines - DIANA Shannon L radial Aline     NOTE OF PERSONAL INVOLVEMENT IN CARE   This patient has a high probability of imminent, clinically significant deterioration, which requires the highest level of preparedness to intervene urgently. I participated in the decision-making and personally managed or directed the management of the following life and organ supporting interventions that required my frequent assessment to treat or prevent imminent deterioration. I personally spent 40 minutes of critical care time. This does not include any procedural time.     Signed By: Adonay Macias MD     October 7, 2021

## 2021-10-07 NOTE — PROGRESS NOTES
Palliative Medicine Consult  Sukhwinder: [phone] ([phone])    Patient Name: Daniela Vargas  YOB: 1950    Date of Initial Consult: 10/6/21  Reason for Consult: Care decisions   Requesting Provider: Alona Dalton   Primary Care Physician: Freddie Navarro MD     SUMMARY:   Daniela Vargas is a 70 y.o. with a past history of CAD s/p CABG, HTN , hx of cholecystectomy, appendectomy, R colectomy 2011 who was admitted on 9/22/2021 with chest and epigastric pain, N/V. Found to have acute pancreatitis, [place], bradycardia, lactic acoidosis. Tx to [facility] on 9/24/21for hypotension, confusion and CRRT started then changed to [facility] 10/2. Intubated 9/26. On IV abx, BCx NGTD, yeast in resp cx 9/28 possible aspiration PNA, c diff pending. 10/5 worsening w/ incr pressor needs, incr vent requirements, leukocytosis, did not tolerate HD yest.    10/7- CT A/P done yest, no fluid removal w/ dialysis, able to lower vent settings. Current medical issues leading to Palliative Medicine involvement include: care decisions. Social: Pt  to Cassie Casarez- they have been together for ~ 38 years. His step dtr is Korey Rizo, who is an RN in NP school right now who requested the Palliative consult. 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 Sister Katya Brannon involved too. Loved life, always active, independent. PALLIATIVE DIAGNOSES:   1. Septic shock   2. Shortness of breath  3. Debility   4. Goals of care       PLAN:   1. Given current medical status, intensivist team does not see pt being able to tolerate SBTs for a week or so. Has brought up a trach as a temporary measure to see if pt can come off vent in future as pancreatitis improves and fluid status improves. Has potential to improve, but is going to be a long road. 2. Per request of family meet again w/ wife Adriana Palmer and Rosario's sister along w/ Carmen LCSW.    3. Attending talked to Adam Johnson about above plan earlier this morning and family looking for guidance about when an appropriate time to move to comfort measures would be. 4. I agree that to know overall how pt would do, needs more time. 5.  However, very likely would need a trach (even though temporary would be the goal) and would be extremely debilitated (already had some debility due to significant arthritis) and I think at high risk for other complications/infections. If/when d/c from hospital would require LTACH or SNF depending on vent needs and once again I think would be high risk for readmissions. Getting back to where he was before this admission- independent w/ ADLs living at home w/ wife- would be difficult to achieve. 6. That all being said, family very clear and agree what pt would say in this situation- that he would not want such interventions. Thus, moving to comfort measures in the next day or so would be appropriate. 7. Following- cont current measures. 8. Communicated plan of care with: Palliative IDT, [facility] Team incl Dr Maurisio Heck RN     GOALS OF CARE / TREATMENT PREFERENCES:     GOALS OF CARE:  Patient/Health Care Proxy Stated Goals:  Other (comment) (Recovery if possible)    TREATMENT PREFERENCES:   Code Status: DNR      The patient identifies the following as important for living well: being independent       Advance Care Planning:  [x] [facility] Interdisciplinary Team has updated the ACP Navigator with Health Care Decision Maker and Patient Capacity      Primary Decision Maker: Harshad Che - [phone]  Advance Care Planning 9/23/2021   Patient's Healthcare Decision Maker is: -   Primary Decision Maker Name -   Primary Decision Maker Phone Number -   Confirm Advance Directive None       Medical Interventions: Limited additional interventions       Other:    As far as possible, the palliative care team has discussed with patient / health care proxy about goals of care / treatment preferences for patient. HISTORY:     History obtained from: chart, staff, family     CHIEF COMPLAINT: Cannot obtain due to patient factors      HPI/SUBJECTIVE:    The patient is:   [] Verbal and participatory  [x] Non-participatory due to: sedated on vent    Clinical Pain Assessment (nonverbal scale for severity on nonverbal patients):   Clinical Pain Assessment  Severity: 0     Activity (Movement): Lying quietly, normal position    Duration: for how long has pt been experiencing pain (e.g., 2 days, 1 month, years)  Frequency: how often pain is an issue (e.g., several times per day, once every few days, constant)     FUNCTIONAL ASSESSMENT:     Palliative Performance Scale (PPS):  PPS: 10       PSYCHOSOCIAL/SPIRITUAL SCREENING:     Palliative IDT has assessed this patient for cultural preferences / practices and a referral made as appropriate to needs (Cultural Services, Patient Advocacy, Ethics, etc.)    Any spiritual / Congregational concerns:  [] Yes /  [x] No    Caregiver Burnout:  [] Yes /  [x] No /  [] No Caregiver Present      Anticipatory grief assessment:   [x] Normal  / [] Maladaptive       ESAS Anxiety:      ESAS Depression:     Cannot obtain due to patient factors       REVIEW OF SYSTEMS:     Positive and pertinent negative findings in ROS are noted above in HPI. The following systems were [x] reviewed / [] unable to be reviewed as noted in HPI  Other findings are noted below. Systems: constitutional, ears/nose/mouth/throat, respiratory, gastrointestinal, genitourinary, musculoskeletal, integumentary, neurologic, psychiatric, endocrine. Positive findings noted below.   Modified ESAS Completed by: provider   Fatigue: 10 Drowsiness: 10     Pain: 0           Dyspnea: 0           Stool Occurrence(s): 1        PHYSICAL EXAM:     From RN flowsheet:  Wt Readings from Last 3 Encounters:   10/07/21 274 lb 4 oz (124.4 kg)   05/10/19 260 lb (117.9 kg)   06/06/18 252 lb (114.3 kg)     Blood pressure (!) 93/52, pulse 67, temperature 97.5 °F (36.4 °C), resp. rate 28, height 6' 1\" (1.854 m), weight 274 lb 4 oz (124.4 kg), SpO2 99 %. Pain Scale 1: Adult Nonverbal Pain Scale  Pain Intensity 1: 0  Pain Onset 1: chronic  Pain Location 1: Generalized  Pain Orientation 1: Other (comment) (PHILLIP)  Pain Description 1: Other (comment) (PHILLIP)  Pain Intervention(s) 1: Medication (see MAR)  Last bowel movement, if known:     Constitutional: sedated on vent, mult pressors     HISTORY:     Active Problems:    Pancreatitis (9/22/2021)      Past Medical History:   Diagnosis Date    Arthritis     back hips knees and arms    CAD (coronary artery disease)     Hypertension     Kidney stones     Nausea & vomiting     after hip surgery    Pancreatitis     In 2/2011, resolved    Psoriasis       Past Surgical History:   Procedure Laterality Date    HX CHOLECYSTECTOMY  7/2009    Dr. Ellyn Smith - Open Cholecystectomy    HX GI      GALLBLADDER 09    HX GI      perforated colon    HX HEENT      LEFT EYE MUSCLE SURGERY    HX HIP REPLACEMENT  [address] [facility] [address]    right and left    HX ORTHOPAEDIC      bilat hip replacement, left hip replaced 2 x    HX ORTHOPAEDIC      RIGHT ELBOW  SURGERY ON NERVE    IR INSERT NON TUNL CVC OVER 5 YRS  9/24/2021    IR INSERT NON TUNL CVC OVER 5 YRS  10/4/2021    IR REPLACE CVC NON TUNL W/O PORT/PUMP  9/25/2021    LITHOTRIPSY  ~2001    IL CARDIAC SURG PROCEDURE UNLIST      cardiac cath 7/20/2011    IL CARDIAC SURG PROCEDURE UNLIST      CABG WITH 2 GRAPHS    IL EYE SURG ANT SGMT PROC UNLISTED  as child      Family History   Problem Relation Age of Onset    Eczema Mother     Cancer Sister         breast      History reviewed, no pertinent family history. Social History     Tobacco Use    Smoking status: Former Smoker     Years: 0.00     Types: Cigars    Smokeless tobacco: Never Used    Tobacco comment: used to smoke cigars quit   Substance Use Topics    Alcohol use:  Yes     Alcohol/week: 0.0 standard drinks     Comment: 2 drinks weekly     Allergies   Allergen Reactions    Albumin Products Rash, Itching and Other (comments)     Hypertension, tachycardia    Penicillins Rash      Current Facility-Administered Medications   Medication Dose Route Frequency    sodium bicarbonate 150 mEq/1000 mL D5W (premix)   IntraVENous CONTINUOUS    albumin human 25% (BUMINATE) solution 25 g  25 g IntraVENous Q6H    TPN ADULT - CENTRAL   IntraVENous CONTINUOUS    [START ON 10/8/2021] vancomycin random level with am labs on 10/8   Other ONCE    DOPamine (INTROPIN) 800 mg in dextrose 5% 250 mL infusion  0-20 mcg/kg/min IntraVENous TITRATE    cisatracurium (NIMBEX) 2 mg/mL IV infusion (UNDILUTED)  0-10 mcg/kg/min IntraVENous TITRATE    midazolam in normal saline (VERSED) 1 mg/mL infusion  0-10 mg/hr IntraVENous TITRATE    heparin (porcine) 1,000 unit/mL injection 1,400 Units  1,400 Units InterCATHeter DIALYSIS PRN    And    heparin (porcine) 1,000 unit/mL injection 1,400 Units  1,400 Units InterCATHeter DIALYSIS PRN    hydrocortisone Sod Succ (PF) (SOLU-CORTEF) injection 100 mg  100 mg IntraVENous Q8H    meropenem (MERREM) 500 mg in sterile water (preservative free) 10 mL IV syringe  0.5 g IntraVENous Q6H    Vancomycin Pharmacy Dosing   Other PRN    vancomycin (VANCOCIN) 1250 mg in  ml infusion  1,250 mg IntraVENous Q24H    acetaminophen (TYLENOL) solution 650 mg  650 mg Per NG tube Q6H PRN    PHENYLephrine (ANGELIKA-SYNEPHRINE) 30 mg in 0.9% sodium chloride 250 mL infusion   mcg/min IntraVENous TITRATE    bicarbonate dialysis (PRISMASOL) BG K 2/Ca 3.5 5000 ml solution   Extracorporeal DIALYSIS CONTINUOUS    [Held by provider] OLANZapine (ZyPREXA) tablet 10 mg  10 mg Oral QHS    influenza vaccine 2021-22 (6 mos+)(PF) (FLUARIX/FLULAVAL/FLUZONE QUAD) injection 0.5 mL  1 Each IntraMUSCular PRIOR TO DISCHARGE    propofol (DIPRIVAN) 10 mg/mL infusion  0-50 mcg/kg/min IntraVENous TITRATE    albumin human 25% (BUMINATE) solution 12.5 g  12.5 g IntraVENous DIALYSIS PRN    fentaNYL (PF) 1,500 mcg/30 mL (50 mcg/mL) infusion  0-200 mcg/hr IntraVENous TITRATE    cholecalciferol (vitamin D3) 10 mcg/mL (400 unit/mL) oral liquid 100 mcg  100 mcg Oral DAILY    insulin glargine (LANTUS) injection 10 Units  10 Units SubCUTAneous DAILY    dexmedeTOMidine in 0.9 % NaCl (PRECEDEX) 400 mcg/100 mL (4 mcg/mL) infusion soln  0.2-1.4 mcg/kg/hr IntraVENous TITRATE    heparin (porcine) pf 300 Units  300 Units InterCATHeter PRN    balsam peru-castor oiL (VENELEX) ointment   Topical TID    midodrine (PROAMATINE) tablet 20 mg  20 mg Oral Q8H    0.9% sodium chloride infusion  5 mL/hr IntraVENous CONTINUOUS    0.9% sodium chloride infusion  3 mL/hr IntraVENous CONTINUOUS    vasopressin (VASOSTRICT) 40 Units in 0.9% sodium chloride 40 mL infusion  0-0.04 Units/min IntraVENous TITRATE    NOREPINephrine (LEVOPHED) 32 mg in 5% dextrose 250 mL infusion  0.5-30 mcg/min IntraVENous TITRATE    glucose chewable tablet 16 g  4 Tablet Oral PRN    dextrose (D50W) injection syrg 12.5-25 g  25-50 mL IntraVENous PRN    glucagon (GLUCAGEN) injection 1 mg  1 mg IntraMUSCular PRN    insulin lispro (HUMALOG) injection   SubCUTAneous Q6H    pantoprazole (PROTONIX) 40 mg in 0.9% sodium chloride 10 mL injection  40 mg IntraVENous DAILY    chlorhexidine (ORAL CARE KIT) 0.12 % mouthwash 15 mL  15 mL Oral Q12H    aspirin chewable tablet 81 mg  81 mg Oral DAILY    heparin (porcine) 1,000 unit/mL injection 1,100 Units  1,100 Units Hemodialysis PRN    heparin (porcine) 1,000 unit/mL injection 1,400 Units  1,400 Units Hemodialysis PRN    HYDROmorphone (DILAUDID) injection 1 mg  1 mg IntraVENous Q3H PRN    [Held by provider] atenoloL (TENORMIN) tablet 50 mg  50 mg Oral DAILY    [Held by provider] atorvastatin (LIPITOR) tablet 10 mg  10 mg Oral DAILY    sodium chloride (NS) flush 5-40 mL  5-40 mL IntraVENous Q8H    sodium chloride (NS) flush 5-40 mL 5-40 mL IntraVENous PRN    ondansetron (ZOFRAN) injection 4 mg  4 mg IntraVENous Q4H PRN    heparin (porcine) injection 5,000 Units  5,000 Units SubCUTAneous Q8H          LAB AND IMAGING FINDINGS:     Lab Results   Component Value Date/Time    WBC 23.0 (H) 10/07/2021 05:29 AM    HGB 9.2 (L) 10/07/2021 05:29 AM    PLATELET 501 15/06/4597 05:29 AM     Lab Results   Component Value Date/Time    Sodium 133 (L) 10/07/2021 05:29 AM    Potassium 5.3 (H) 10/07/2021 05:29 AM    Chloride 102 10/07/2021 05:29 AM    CO2 17 (L) 10/07/2021 05:29 AM    BUN 86 (H) 10/07/2021 05:29 AM    Creatinine 3.04 (H) 10/07/2021 05:29 AM    Calcium 8.8 10/07/2021 05:29 AM    Magnesium 2.6 (H) 10/07/2021 05:29 AM    Phosphorus 9.1 (H) 10/07/2021 05:29 AM      Lab Results   Component Value Date/Time    Alk. phosphatase 137 (H) 10/07/2021 05:29 AM    Protein, total 6.4 10/07/2021 05:29 AM    Albumin 2.7 (L) 10/07/2021 05:29 AM    Globulin 3.7 10/07/2021 05:29 AM     Lab Results   Component Value Date/Time    INR 1.2 (H) 09/24/2021 11:24 AM    Prothrombin time 12.9 (H) 09/24/2021 11:24 AM    aPTT 28.9 07/27/2011 06:42 PM      No results found for: IRON, FE, TIBC, IBCT, PSAT, FERR   Lab Results   Component Value Date/Time    pH 7.36 10/02/2021 04:22 AM    PCO2 37 10/02/2021 04:22 AM    PO2 122 (H) 10/02/2021 04:22 AM     No components found for: [place]   Lab Results   Component Value Date/Time     07/29/2011 04:00 AM    CK - MB 2.4 07/29/2011 04:00 AM                Total time: 35 min   Counseling / coordination time, spent as noted above: 30 min   > 50% counseling / coordination?: yes    Prolonged service was provided for  []30 min   []75 min in face to face time in the presence of the patient, spent as noted above. Time Start:   Time End:   Note: this can only be billed with 31493 (initial) or 26020 (follow up). If multiple start / stop times, list each separately.

## 2021-10-08 PROBLEM — K85.90 ACUTE PANCREATITIS: Status: ACTIVE | Noted: 2021-01-01

## 2021-10-08 NOTE — PROGRESS NOTES
0730  Bedside shift change report given to Lola eMng RN (oncoming nurse) by Linus Breen RN (offgoing nurse). Report included the following information SBAR, Kardex, Intake/Output, MAR and Recent Results. 0825  Blood complete. 1310  Lactic 2.8. No new orders received. 0856  Dr. Roger Connelly at bedside. Decision for comfort care made at this time. Message left with [facility] in regard to organ donation. Blood returned and CRRT disconnected. Monica Garcia 1998 contacted by [facility]. Pt not a candidate for donation.      36  Pt extubated to 2L NC.

## 2021-10-08 NOTE — HOSPICE
[facility]  [phone]    Inpatient Nursing Admission   Patient Name: Teresa Ramsay  YOB: 1950  Age: 70 y.o. Date of Hospice Admission: 10/8/2021  Hospice Attending Elected by Patient: Daniel Brewer MD  Primary Care Physician: Bruce Nazario MD  Admitting RN: Tha Kenney RN, [facility]  : Shelvy Shone, MSW    Level of Care (GIP/Routine/Respite): GIP  Facility of Care: [facility]  Patient Room: 9989/01     HOSPICE SUMMARY   ER Visits/ Hospitalizations in past year: 2  Hospice Diagnosis: Acute pancreatitis [K85.90]  Onset Date of Hospice Diagnosis:     Hospital Course: 69 y/o M w hx of CAD, HTN, OA, and kidney stones presents to the hospital with CC of SOB, abd pain, N/V. He was found to have severe pancreatitis and acute renal injury with associated lactic acidosis and elevated LFTs.    He was transferred to the CCU for initiation of CRRT given borderline low blood pressures.   10/8 - DMs thinking about transitioning to [address] [place] [address]  10/7 - Oxygenation and hemodynamics improved ON with volume and abx  10/6 - Developed severe hypoxia and hypotension ON - PEE now up to 12 and on 3 pressors  10/4-5 - Remains intubated, on/off pressors  10/3: Switch CRRT to IHD on the second, needed to increase pressor requirement but were able to pull 1.7 L.  Afterward he was tachycardic and that improved after infusion of 250 cc of albumin.  Had a bowel movement, no bleeding.  Pressors weaning down after IHD completed.  Still anuric  10/2: Antibiotics discontinued, still on low-dose pressors, continue to have issues with dialysis access, no fever  10/1: Required going back on pressors [low-dose vasopressin], otherwise remain anuric on CRRT  9/30: No acute event.  Off pressors  9/29: No acute event.  Remains on vasopressin and low-dose epinephrine.  On CRRT.  9/28: CRRT; MV; Pressors  9/27: CRRT; MV; Pressors  9/26: increasing agitation and hypoxia. Intubated. HDL repositioned again. Up on vasopressors  9/25: Diamond Feliciano pulled back - remains positional.   9/24: transferred into ICU. Reather Limber placed and CRRT started     Summary of Disease Progression Leading to Hospice Diagnosis:          History of Presenting Illness:   Ananda Sultana is a 70 y.o. male who presents with abdominal pain     History is primary obtained from the patient     Patient with history of coronary artery disease, presents today with chest pain/epigastric abdominal pain vomiting, nausea, shortness of breath. Patient reports her symptoms started while driving his car yesterday and has been significant and severe since then. Patient reports the symptoms even got worse today, he got concerned and decided to come to the hospital.  Patient reports he does not drink alcohol on a regular basis, reports that he has not been vaccinated for Covid.   Patient came to the ER and was requested to be admitted for further management and evaluation, was found to have pancreatitis     The patient denies any Headache, blurry vision, sore throat, trouble swallowing, trouble with speech, cough, fever, chills,, urinary symptoms, constipation, recent travels, sick contacts, focal or generalized neurological symptoms,  falls, injuries, rashes, contact with COVID-19 diagnosed patients, hematemesis, melena, hemoptysis, hematuria, rashes, denies starting any new medications and denies any other concerns or problems besides as mentioned above.             Co-Morbidities:   Patient Active Problem List   Diagnosis Code    Coronary atherosclerosis of native coronary artery I25.10    Pneumoperitoneum - abdomen     Atrial flutter ([address] I48.92    Perforated bowel ([address] K63.1    Small bowel obstruction ([address] K56.609    Combined forms of age-related cataract of right eye H25.811    Pancreatitis K85.90    Acute pancreatitis K85.90     Diagnoses RELATED to the terminal prognosis: acute kidney injury, respiratory failure, septic shock  Other Diagnoses: HTN, CAD, OA    Rationale for a prognosis of life expectancy of 6 months or less if the disease follows its normal course (Disease Specific History):     Dao Ferreira is a 70 y.o. who was admitted to [facility]. The patient's principle diagnosis of Acute Pancreatitis has resulted in Continuous Dialysis, requiring a ventaltor. Functionally, the patient's Palliative Performance Scale has declined over a period of 20 days and is estimated at 10. Objective information that support this patients limited prognosis includes:  Results for Yaritza Mortensen (MRN [id number]) as of 10/8/2021 19:08   Ref. Range 10/8/2021 12:19   Sodium Latest Ref Range: 136 - 145 mmol/L 134 (L)   Potassium Latest Ref Range: 3.5 - 5.1 mmol/L 3.6   Chloride Latest Ref Range: 97 - 108 mmol/L 100   CO2 Latest Ref Range: 21 - 32 mmol/L 24   Anion gap Latest Ref Range: 5 - 15 mmol/L 10   Glucose Latest Ref Range: 65 - 100 mg/dL 249 (H)   BUN Latest Ref Range: 6 - 20 MG/DL 65 (H)   Creatinine Latest Ref Range: 0.70 - 1.30 MG/DL 2.11 (H)   BUN/Creatinine ratio Latest Ref Range: 12 - 20   31 (H)   Calcium Latest Ref Range: 8.5 - 10.1 MG/DL 8.6   Phosphorus Latest Ref Range: 2.6 - 4.7 MG/DL 3.3   GFR est non-AA Latest Ref Range: >60 ml/min/1.73m2 31 (L)   GFR est AA Latest Ref Range: >60 ml/min/1.73m2 38 (L)   Albumin Latest Ref Range: 3.5 - 5.0 g/dL 3.2 (L)   Lactic acid Latest Ref Range: 0.4 - 2.0 MMOL/L 2.8 [facility] ACUTE [facility])   Results for Yaritza Mortensen (MRN [id number]) as of 10/8/2021 19:08   Ref.  Range 10/8/2021 04:31   WBC Latest Ref Range: 4.1 - 11.1 K/uL 17.0 (H)   NRBC Latest Ref Range: 0  WBC 0.2 (H)   RBC Latest Ref Range: 4.10 - 5.70 M/uL 2.26 (L)   HGB Latest Ref Range: 12.1 - 17.0 g/dL 7.0 (L)   HCT Latest Ref Range: 36.6 - 50.3 % 21.0 (L)   MCV Latest Ref Range: 80.0 - 99.0 FL 92.9   MCH Latest Ref Range: 26.0 - 34.0 PG 31.0   MCHC Latest Ref Range: 30.0 - 36.5 g/dL 33.3   RDW Latest Ref Range: 11.5 - 14.5 % 14.6 (H)   PLATELET Latest Ref Range: 150 - 400 K/uL 183   MPV Latest Ref Range: 8.9 - 12.9 FL 12.3   NEUTROPHILS Latest Ref Range: 32 - 75 % 93 (H)   LYMPHOCYTES Latest Ref Range: 12 - 49 % 2 (L)   MONOCYTES Latest Ref Range: 5 - 13 % 3 (L)   EOSINOPHILS Latest Ref Range: 0 - 7 % 0   BASOPHILS Latest Ref Range: 0 - 1 % 0   IMMATURE GRANULOCYTES Latest Units: % 0   METAMYELOCYTES Latest Ref Range: 0 % 2 (H)   DF Latest Units:   MANUAL   ABSOLUTE NRBC Latest Ref Range: 0.00 - 0.01 K/uL 0.04 (H)   ABS. NEUTROPHILS Latest Ref Range: 1.8 - 8.0 K/UL 15.8 (H)   ABS. IMM. GRANS. Latest Units: K/UL 0.0   ABS. LYMPHOCYTES Latest Ref Range: 0.8 - 3.5 K/UL 0.3 (L)   ABS. MONOCYTES Latest Ref Range: 0.0 - 1.0 K/UL 0.5   ABS. EOSINOPHILS Latest Ref Range: 0.0 - 0.4 K/UL 0.0   ABS. BASOPHILS Latest Ref Range: 0.0 - 0.1 K/UL 0.0      IMPRESSION     1. No acute pulmonary embolus  2. Patchy airspace infiltrates in left upper lobe and both lower lobes  concerning for pneumonia, possibly aspiration with prominent dependent  atelectasis. 3. Pancreatitis with newly developed extensive peripancreatic inflammation, and  retroperitoneal and peritoneal free fluid. No complication identified otherwise. 4. 4.6 cm an aortic aneurysm. 5. Nonobstructing nephrolithiasis bilaterally. 6. Enlarged main pulmonary artery suggestive of pulmonary arterial hypertension. 7. Other incidental findings as above. The patient/family chose comfort measures with the support of Hospice. Patient meets for GIP LOC as evidenced by  Respiratory distress, secretions, shortness of breath, pain, edema    Prognosis estimated based on 10/08/21 clinical assessment is:     [x] Hours to Days       ASSESSMENT    Patient self-reports:  []  Yes    [x] No    SYMPTOMS: Respiratory distress, secretions, shortness of breath, pain, edema    SIGNS/PHYSICAL FINDINGS: Pt with +3 edema all over; Smith draining dark urine. Pt with furrowed brow.     KARNOFSKY: 10    FAST for all dementia:      Learning Assessment:  Patient  Is patient willing/able to learn? NO  What is the highest level of education completed? Learning preference (written material, demonstration, visual)? Learning barriers (ESOL, [place], poor vision)? Caregiver  Is caregiver willing to learn care for patient? YES  What is the highest level of education completed? Learning preference (written material, demonstration, visual)? Learning barriers (ESOL, [place], poor vision)? CLINICAL INFORMATION     Wt Readings from Last 3 Encounters:   10/08/21 124 kg (273 lb 5.9 oz)   10/08/21 124.1 kg (273 lb 9.5 oz)   05/10/19 117.9 kg (260 lb)     Ht Readings from Last 3 Encounters:   10/08/21 6' 1\" (1.854 m)   09/27/21 6' 1\" (1.854 m)   05/10/19 6' 1\" (1.854 m)     Body mass index is 36.07 kg/m². Visit Vitals  Pulse (!) 134   Resp 21   Ht 6' 1\" (1.854 m)   Wt 124 kg (273 lb 5.9 oz)   SpO2 (!) 89%   BMI 36.07 kg/m²       LAB VALUES  No results found for this visit on 10/08/21 (from the past 12 hour(s)). No results found for this visit on 10/08/21 (from the past 6 hour(s)). Lab Results   Component Value Date/Time    Protein, total 5.6 (L) 10/08/2021 05:35 AM    Albumin 3.2 (L) 10/08/2021 12:19 PM       Currently this patient has:  [x] Supplemental O2 [] Peripheral IV  [] PICC    [] PORT   [x] Smith Catheter [x] Fecal Tube   [x] Central Line [] Ostomy    [] AICD: Has ICD been deactivated? [] Yes [] No:______    PLAN   Plan of Care:    1. GIP level of care needed for symptoms necessitating frequent skilled nursing assessment and administration of parenteral  medications. Needs monitoring for need to titrate sx mgt regimen for optimization of comfort. 2. Pt is at high risk of rapid decline and death due to terminal disease process  3. Provide education and support to unit staff caring for hospice patient and family regarding end of life care and Hospice plan of care.   Provide staff with direct contact information to reach hospice team [phone]   4. Provide support and frequent rounds for patient comfort and safety ongoing  5. Provide  support ongoing, continue to discuss discharge plan if patient becomes kathleen and does not require acute nursing care interventions for GIP level of care  6. Provide  and bereavement support ongoing  7. Discontinue: Dialysis, [employer], TPN, [place]. Extubate patient. 8. Schedule: Fentanyl IV 150mcg/hour and Versed 10mg/hour  9. Add PRN medications: IV Dilaudid 2mg and Lorazepam 2mg, Robinul, Toradol, Zofran and compazine  10 . Maintain skin integrity as tolerated for hospice patient, turning and repositioning for comfort, and specialty mattress if appropriate  11. Smith care per hospital policy for infection prevention  12. Central line care as per hospital policy for infection prevention      Hospice Team Frequency Orders:  Skilled Nurse - Daily x 7 days /every other day x 7 days  with 5 PRN visits for symptom control. MSW  1 visit for initial assessment/evaluation for family support and need for volunteer services. Onita Major  1 visit for initial assessment/evaluation for spiritual support. ADVANCE CARE PLANNING (Complete in ACP Flow Sheet)   Code Status: DNR  Durable DNR: [x]  Yes  []  No  Code Status Discussed/Confirmed: YES  Preference for Other Life Sustaining Treatment Discussed/Confirmed: YES  Hospitalization Preference: YES  Advance Care Planning 2021   Patient's Healthcare Decision Maker is: -   Primary Decision Maker Name -   Primary Decision Maker Phone Number -   Confirm Advance Directive None        Service: [] Yes  []  No      [] Unknown  Appropriate for Pinning Ceremony:  [] Yes     [x] No  Roman Catholic: Anglican   Home: Cremation location to be determined     DISCHARGE PLANNING     1. Discharge Plan: If patient stabilized and safe to transport, family would like pt to return home with hospice support.   2. Patient/Family teaching: Pt wife, Shanelulú Guajardo, and daughter Karen Darling   3. Response to patient/family teaching: Family bedside state agreement with hospice plan of care    SOCIAL/EMOTIONAL/SPIRITUAL NEEDS     Spiritual Issues Identified: Hospice Chaplain support available ongoing. Hospital  bedside with family today. Psych/ Social/ Emotional Issues Identified: Hospice Social worker support available ongoing See Hospice Social Worker ISAEL Espinoza notes. Caregiver Support:  [x] Provided information on End of Life Care   [x] Material Provided: Gone From My Sight or Journey's End     CARE COORDINATION   Mirna Ram MD  contacted, discharge to hospice order received  Dr. Janice Francis contacted, agrees to serve as attending provider for hospice and provided verbal certification of terminal illness with life expectancy of 6 months or less. Orders for hospice admission, medications and plan of treatment received. Medication reconciliation completed. MEDS: See medication list below  DME: Per hospital  Supplies: Per hospital  IDT communication to include MD, SN, SW, CH and support team    ALLERGIES AND MEDICATIONS     Allergies:    Allergies   Allergen Reactions    Albumin Products Rash, Itching and Other (comments)     Hypertension, tachycardia    Penicillins Rash     Current Facility-Administered Medications   Medication Dose Route Frequency    ondansetron (ZOFRAN) injection 4 mg  4 mg IntraVENous Q4H PRN    prochlorperazine (COMPAZINE) injection 10 mg  10 mg IntraVENous Q4H PRN    LORazepam (ATIVAN) injection 2 mg  2 mg IntraVENous Q15MIN PRN    ketorolac (TORADOL) injection 30 mg  30 mg IntraVENous Q6H PRN    glycopyrrolate (ROBINUL) injection 0.2 mg  0.2 mg IntraVENous Q4H PRN    HYDROmorphone (PF) (DILAUDID) injection 2 mg  2 mg IntraVENous Q15MIN PRN    fentaNYL (PF) 1,500 mcg/30 mL (50 mcg/mL) infusion  150 mcg/hr IntraVENous CONTINUOUS    midazolam in normal saline (VERSED) 1 mg/mL infusion  10 mg/hr IntraVENous CONTINUOUS     Miracle Diego RN, [facility] Nurse Liaison  [phone] Mobile  [phone] Office  Available on [employer]

## 2021-10-08 NOTE — PALLIATIVE CARE
Palliative Medicine  [place]: [phone]  [facility]: [phone])        12: 27 Pt is now under inpt hospice care. Pt extubated, LCSW provided support to wife, step daughter and family. This LCSW met with pts wife Belgica Hebert at bedside. Dr. Lois Nguyen has spoken to wife Belgica Hebert and step daughter Barry Scanlon. Goals for care are to transition to comfort and extubate when family arrives. LCSW and wife talked about pts medical course, his wish not to have mechanical life support. Wife reports she is at peace, comforted knowing she has done all she can do for her . LCSW affirmed her care and concern for him. LCSW provided emotional support and supportive counseling for wife in processing transition to comfort. LCSW engaged in wife in life review. LCSW provided reflective listening as wife reminisced about their relationship and life together. LCSW and wife discussed next steps, Wife reports her daughter and sister are in the 4th floor waiting room, pts sister Francisco Javier Arias is enroute another family member will be joining them soon. Wife reports she will want to proceed with comfort measures and extubation once family members arrive and they have a chance to talk. LCSW and wife discussed self care. LCSW encouraged wife to get lunch. LCSW can reach out to [facility] if needed for support for wife.        [facility]armen Pradhan [facility] Hospital[facility], [address] [place] [address] Rd  [phone] ( [phone])

## 2021-10-08 NOTE — PROGRESS NOTES
Problem: Falls - Risk of  Goal: *Absence of Falls  Description: Document Jan Led Fall Risk and appropriate interventions in the flowsheet. Outcome: Progressing Towards Goal  Note: Fall Risk Interventions:  Mobility Interventions: Communicate number of staff needed for ambulation/transfer, Strengthening exercises (ROM-active/passive)    Mentation Interventions: Adequate sleep, hydration, pain control, Door open when patient unattended, Evaluate medications/consider consulting pharmacy, More frequent rounding, Room close to nurse's station, Update white board    Medication Interventions: Evaluate medications/consider consulting pharmacy    Elimination Interventions: Toileting schedule/hourly rounds              Problem: Patient Education: Go to Patient Education Activity  Goal: Patient/Family Education  Outcome: Progressing Towards Goal     Problem: Pain  Goal: *Control of Pain  Outcome: Progressing Towards Goal  Goal: *PALLIATIVE CARE:  Alleviation of Pain  Outcome: Progressing Towards Goal     Problem: Patient Education: Go to Patient Education Activity  Goal: Patient/Family Education  Outcome: Progressing Towards Goal     Problem: Discharge Planning  Goal: *Discharge to safe environment  Outcome: Progressing Towards Goal     Problem: Pressure Injury - Risk of  Goal: *Prevention of pressure injury  Description: Document Genaro Scale and appropriate interventions in the flowsheet. Outcome: Progressing Towards Goal  Note: Pressure Injury Interventions:  Sensory Interventions: Assess changes in LOC, Avoid rigorous massage over bony prominences, Float heels, Keep linens dry and wrinkle-free, Minimize linen layers, Monitor skin under medical devices, Pressure redistribution bed/mattress (bed type), Turn and reposition approx.  every two hours (pillows and wedges if needed)    Moisture Interventions: Absorbent underpads, Apply protective barrier, creams and emollients, Assess need for specialty bed, Check for incontinence Q2 hours and as needed, Internal/External fecal devices, Internal/External urinary devices, Maintain skin hydration (lotion/cream), Minimize layers, Moisture barrier    Activity Interventions: Assess need for specialty bed, Pressure redistribution bed/mattress(bed type)    Mobility Interventions: Assess need for specialty bed, Float heels, Pressure redistribution bed/mattress (bed type), Turn and reposition approx.  every two hours(pillow and wedges)    Nutrition Interventions: Document food/fluid/supplement intake, Discuss nutritional consult with provider    Friction and Shear Interventions: Apply protective barrier, creams and emollients, Foam dressings/transparent film/skin sealants, Lift sheet, Minimize layers, Transferring/repositioning devices                Problem: Patient Education: Go to Patient Education Activity  Goal: Patient/Family Education  Outcome: Progressing Towards Goal     Problem: Patient Education: Go to Patient Education Activity  Goal: Patient/Family Education  Outcome: Progressing Towards Goal     Problem: Acute Renal Failure: Discharge Outcomes  Goal: *Optimal pain control at patient's stated goal  Outcome: Progressing Towards Goal  Goal: *Urinary output within identified parameters  Outcome: Progressing Towards Goal  Goal: *Hemodynamically stable  Outcome: Progressing Towards Goal  Goal: *Tolerating diet  Outcome: Progressing Towards Goal  Goal: *Lab values stabilized  Outcome: Progressing Towards Goal  Goal: *Verbalizes understanding and describes medication purposes and frequencies  Outcome: Progressing Towards Goal  Goal: *Medication reconciliation  Outcome: Progressing Towards Goal     Problem: Nausea/Vomiting (Adult)  Goal: *Absence of nausea/vomiting  Outcome: Progressing Towards Goal     Problem: Patient Education: Go to Patient Education Activity  Goal: Patient/Family Education  Outcome: Progressing Towards Goal     Problem: Non-Violent Restraints  Goal: Removal from restraints as soon as assessed to be safe  Outcome: Progressing Towards Goal  Goal: No harm/injury to patient while restraints in use  Outcome: Progressing Towards Goal  Goal: Patient's dignity will be maintained  Outcome: Progressing Towards Goal  Goal: Patient Interventions  Outcome: Progressing Towards Goal     Problem: Ventilator Management  Goal: *Adequate oxygenation and ventilation  Outcome: Progressing Towards Goal  Goal: *Patient maintains clear airway/free of aspiration  Outcome: Progressing Towards Goal  Goal: *Absence of infection signs and symptoms  Outcome: Progressing Towards Goal  Goal: *Normal spontaneous ventilation  Outcome: Progressing Towards Goal     Problem: Patient Education: Go to Patient Education Activity  Goal: Patient/Family Education  Outcome: Progressing Towards Goal     Problem: Breathing Pattern - Ineffective  Goal: *Absence of hypoxia  Outcome: Progressing Towards Goal  Goal: *Use of effective breathing techniques  Outcome: Progressing Towards Goal  Goal: *PALLIATIVE CARE:  Alleviation of Dyspnea  Outcome: Progressing Towards Goal     Problem: Patient Education: Go to Patient Education Activity  Goal: Patient/Family Education  Outcome: Progressing Towards Goal     Problem: Infection - Risk of, Central Venous Catheter-Associated Bloodstream Infection  Goal: *Absence of infection signs and symptoms  Outcome: Progressing Towards Goal     Problem: Patient Education: Go to Patient Education Activity  Goal: Patient/Family Education  Outcome: Progressing Towards Goal     Problem: Infection - Risk of, Ventilator-Associated Pneumonia  Goal: *Absence of infection signs and symptoms  Outcome: Progressing Towards Goal     Problem: Patient Education: Go to Patient Education Activity  Goal: Patient/Family Education  Outcome: Progressing Towards Goal     Problem: Hypotension  Goal: *Blood pressure within specified parameters  Outcome: Progressing Towards Goal  Goal: *Fluid volume balance  Outcome: Progressing Towards Goal  Goal: *Labs within defined limits  Outcome: Progressing Towards Goal     Problem: Patient Education: Go to Patient Education Activity  Goal: Patient/Family Education  Outcome: Progressing Towards Goal     Problem: Diabetes Self-Management  Goal: *Disease process and treatment process  Description: Define diabetes and identify own type of diabetes; list 3 options for treating diabetes. Outcome: Progressing Towards Goal  Goal: *Incorporating nutritional management into lifestyle  Description: Describe effect of type, amount and timing of food on blood glucose; list 3 methods for planning meals. Outcome: Progressing Towards Goal  Goal: *Incorporating physical activity into lifestyle  Description: State effect of exercise on blood glucose levels. Outcome: Progressing Towards Goal  Goal: *Developing strategies to promote health/change behavior  Description: Define the ABC's of diabetes; identify appropriate screenings, schedule and personal plan for screenings. Outcome: Progressing Towards Goal  Goal: *Using medications safely  Description: State effect of diabetes medications on diabetes; name diabetes medication taking, action and side effects. Outcome: Progressing Towards Goal  Goal: *Monitoring blood glucose, interpreting and using results  Description: Identify recommended blood glucose targets  and personal targets. Outcome: Progressing Towards Goal  Goal: *Prevention, detection, treatment of acute complications  Description: List symptoms of hyper- and hypoglycemia; describe how to treat low blood sugar and actions for lowering  high blood glucose level. Outcome: Progressing Towards Goal  Goal: *Prevention, detection and treatment of chronic complications  Description: Define the natural course of diabetes and describe the relationship of blood glucose levels to long term complications of diabetes.   Outcome: Progressing Towards Goal  Goal: *Developing strategies to address psychosocial issues  Description: Describe feelings about living with diabetes; identify support needed and support network  Outcome: Progressing Towards Goal  Goal: *Insulin pump training  Outcome: Progressing Towards Goal  Goal: *Sick day guidelines  Outcome: Progressing Towards Goal  Goal: *Patient Specific Goal (EDIT GOAL, INSERT TEXT)  Outcome: Progressing Towards Goal     Problem: Patient Education: Go to Patient Education Activity  Goal: Patient/Family Education  Outcome: Progressing Towards Goal     Problem: General Wound Care  Goal: *Non-infected wound: Improvement of existing wound, absence of infection, and maintenance of skin integrity  Outcome: Progressing Towards Goal  Goal: *Infected Wound: Prevention of further infection and promotion of healing  Description: Infection control procedures (eg: clean dressings, clean gloves, hand washing, precautions to isolate wound from contamination, sterile instruments used for wound debridement) should be implemented. Outcome: Progressing Towards Goal  Goal: Interventions  Outcome: Progressing Towards Goal     Problem: Patient Education: Go to Patient Education Activity  Goal: Patient/Family Education  Outcome: Progressing Towards Goal     Problem: Nutrition Deficit  Goal: *Optimize nutritional status  Outcome: Progressing Towards Goal     Problem: Risk for Spread of Infection  Goal: Prevent transmission of infectious organism to others  Description: Prevent the transmission of infectious organisms to other patients, staff members, and visitors.   Outcome: Progressing Towards Goal     Problem: Patient Education:  Go to Education Activity  Goal: Patient/Family Education  Outcome: Progressing Towards Goal     Problem: Nutrition Deficit  Goal: *Optimize nutritional status  Outcome: Progressing Towards Goal

## 2021-10-08 NOTE — DISCHARGE SUMMARY
Discharge Summary     Patient: Alondra Ramirez MRN: [id number]  SSN: [id number]    YOB: 1950  Age: 70 y.o. Sex: male       Admit Date: 9/22/2021    Discharge Date: 10/8/2021      Admission Diagnoses: Pancreatitis [K85.90]    Discharge Diagnoses:   Problem List as of 10/8/2021 Date Reviewed: 6/5/2018        Codes Class Noted - Resolved    Acute pancreatitis ICD-10-CM: K85.90  ICD-9-CM: 577.0  10/8/2021 - Present        Pancreatitis ICD-10-CM: K85.90  ICD-9-CM: 577.0  9/22/2021 - Present        Combined forms of age-related cataract of right eye ICD-10-CM: H25.811  ICD-9-CM: 366.19  6/5/2018 - Present    Overview Signed 6/5/2018  4:22 PM by Radha Monet DO     E IOL OD             Small bowel obstruction ([facility] [address] ICD-10-CM: D23.337  ICD-9-CM: 560.9  2/8/2015 - Present        Perforated bowel ([facility] [address] ICD-10-CM: K63.1  ICD-9-CM: 569.83  8/2/2011 - Present        Atrial flutter ([facility] [address] ICD-10-CM: N99.65  ICD-9-CM: 427.32  8/1/2011 - Present        Pneumoperitoneum - abdomen ICD-9-CM: 568.89  7/27/2011 - Present        Coronary atherosclerosis of native coronary artery ICD-10-CM: I25.10  ICD-9-CM: 414.01  7/20/2011 - Present               Discharge Condition: Now CMO    Hospital Course: 69 y/o M w hx of CAD, HTN, OA, and kidney stones presents to the hospital with CC of SOB, abd pain, N/V. He was found to have severe pancreatitis and acute renal injury with associated lactic acidosis and elevated LFTs. He was transferred to the CCU for initiation of CRRT given borderline low blood pressures.        10/8 - DMs thinking about transitioning to [address] [place] [address]     10/7 - Oxygenation and hemodynamics improved ON with volume and abx     10/6 - Developed severe hypoxia and hypotension ON - PEE now up to 12 and on 3 pressors     10/4-5 - Remains intubated, on/off pressors     10/3: Switch CRRT to IHD on the second, needed to increase pressor requirement but were able to pull 1.7 L.   Afterward he was tachycardic and that improved after infusion of 250 cc of albumin. Had a bowel movement, no bleeding. Pressors weaning down after IHD completed. Still anuric  10/2: Antibiotics discontinued, still on low-dose pressors, continue to have issues with dialysis access, no fever  10/1: Required going back on pressors [low-dose vasopressin], otherwise remain anuric on CRRT  9/30: No acute event.  Off pressors  9/29: No acute event.  Remains on vasopressin and low-dose epinephrine.  On CRRT.  9/28: CRRT; MV; Pressors  9/27: CRRT; MV; Pressors  9/26: increasing agitation and hypoxia. Intubated. HDL repositioned again. Up on vasopressors  9/25: Juancho Szymanski pulled back - remains positional.   9/24: transferred into ICU.  Parisa Sapna placed and CRRT started    Disposition: Now CMO  Signed By: Sam Riggs MD     October 8, 2021

## 2021-10-08 NOTE — PROGRESS NOTES
Vancomycin monitoring (-10/1; 10/6-present)  Indication:  Septic shock  Current regimen:  1250 mg IV q24h    Recent Labs     10/08/21  0535 10/08/21  0431 10/08/21  0405 10/07/21  2341 10/07/21  1755 10/07/21  0529 10/07/21  0529   WBC  --  17.0* 17.1*  --   --   --  23.0*   CREA 2.16*  --   --  2.30* 2.36*   < > 3.04*   BUN 68*  --   --  70* 76*   < > 86*    < > = values in this interval not displayed. Est CrCl: CRRT  Temp (24hrs), Av.2 °F (35.1 °C), Min:92.7 °F (33.7 °C), Max:97.8 °F (36.6 °C)    Cultures:   10/5 resp - heavy Klebsiella (S-all but Amp)  10/6 blood - NGTD  10/6 c diff - negative    Goal target range Trough 10-15 mcg/mL    Random level today 10/8 @ 0405 = 17.9    Plan: Extrapolated trough = 16. Adjust to 1000 mg IV q24h.

## 2021-10-08 NOTE — PROGRESS NOTES
Nephrology Progress Note  Rene Walters  Date of Admission : 9/22/2021    CC: Follow up for [place]       Assessment and Plan     [place]:  - ATN from severe Pancreatitis. - CT showing B/L non obstructing Kidney stones   - CRRT switched to IHD on 10/2   - Back on CVVHD on 10/6  - add Heparin through filter for frequent clotting (expected w/ Pancreatitis)  - check PTT Q6 hr   - daily labs and I/Os    Hyperkalemia, Acidosis :  - better    Severe Pancreatitis   Acute resp failure : ARDS  - intubated 9/25    Shock   - on Levophed and Midodrine  - off vaso and dopamine   - consider Pan Cx      Anasarca :  - keep CRRT net even. - if hemodynamically stable, start factor of 25     Protein Calorie Malnutrition   - Ordered TPN. . TF on hold   - Insulin in TPN per [facility]    TAA, b/l BEAU aneurysms       Interval History:  Seen and examined. CRRT clotted x 2. Severely edematous after fluid resuscitation for last 36 hrs. Off vaso and dopamine. Family inclined towards comfort care. 300 cc urine        Current Medications: all current  Medications have been eviewed in EPIC  Review of Systems: Review of systems not obtained due to patient factors.     Objective:  Vitals:    Vitals:    10/08/21 0400 10/08/21 0455 10/08/21 0500 10/08/21 0600   BP: (!) 105/50  (!) 92/51 (!) 119/55   Pulse: 81 73 77 83   Resp: 28 28 28 24   Temp: (!) 95.2 °F (35.1 °C)  (!) 95.5 °F (35.3 °C) (!) 95.9 °F (35.5 °C)   TempSrc:       SpO2: 100% 100% 100% 98%   Weight: 124.1 kg (273 lb 9.5 oz)      Height:         Intake and Output:  10/07 1901 - 10/08 0700  In: 2504.5 [I.V.:2454.5]  Out: 4505 [Urine:130; Drains:15]  10/06 0701 - 10/07 1900  In: 5861.7 [I.V.:5591.7]  Out: 2071 [Urine:728; Drains:200]    Physical Examination:  Pt intubated     yes  General: On vent   Neck:  Teddy +  Resp:  Decreased BS b/l  CV:  RRR,  no murmur or rub, 1+ b/l LE edema  GI:  Non distended, reduced bowel sounds  Neurologic:  Sedated on vent   Ext                   Pitting edema +   : scrotal edema, roldan +    []    High complexity decision making was performed  []    Patient is at high-risk of decompensation with multiple organ involvement    Lab Data Personally Reviewed: I have reviewed all the pertinent labs, microbiology data and radiology studies during assessment. Recent Labs     10/07/21  2341 10/07/21  1755 10/07/21  0529 10/07/21  0004 10/07/21  0004   * 135* 133*   < > 132*   K 3.6 4.0 5.3*   < > 6.3*   CL 99 101 102   < > 102   CO2 22 22 17*   < > 19*   * 202* 185*   < > 202*   BUN 70* 76* 86*   < > 102*   CREA 2.30* 2.36* 3.04*   < > 3.78*   CA 8.4* 8.3* 8.8   < > 8.0*   MG  --  2.3 2.6*  --  2.8*   PHOS 5.0* 5.8* 9.1*   < > 10.2*   ALB 3.0* 2.9* 2.7*   < > 2.4*   ALT  --  45 62  --  73    < > = values in this interval not displayed.      Recent Labs     10/08/21  0431 10/08/21  0405 10/07/21  0529   WBC 17.0* 17.1* 23.0*   HGB 7.0* 6.9* 9.2*   HCT 21.0* 21.2* 28.8*    176 279     Lab Results   Component Value Date/Time    Specimen Description: HIP LEFT JOINT 01/17/2012 03:00 PM    Specimen Description: HIP LEFT JOINT 01/17/2012 03:00 PM    Specimen Description: NARES 07/28/2011 12:12 AM     Lab Results   Component Value Date/Time    Culture result: NO GROWTH 2 DAYS 10/06/2021 09:55 AM    Culture result: HEAVY KLEBSIELLA PNEUMONIAE (A) 10/05/2021 06:52 PM    Culture result: LIGHT NORMAL RESPIRATORY LUIS 10/05/2021 06:52 PM     Recent Results (from the past 24 hour(s))   LACTIC ACID    Collection Time: 10/07/21 11:26 AM   Result Value Ref Range    Lactic acid 2.2 (HH) 0.4 - 2.0 MMOL/L   GLUCOSE, POC    Collection Time: 10/07/21 11:28 AM   Result Value Ref Range    Glucose (POC) 186 (H) 65 - 117 mg/dL    Performed by Gallo Barron    EKG, 12 LEAD, INITIAL    Collection Time: 10/07/21 12:00 PM   Result Value Ref Range    Ventricular Rate 78 BPM    Atrial Rate 110 BPM    QRS Duration 120 ms    Q-T Interval 424 ms    QTC Calculation (Bezet) 483 ms Calculated P Axis 80 degrees    Calculated R Axis 48 degrees    Calculated T Axis 11 degrees    Diagnosis       ** Poor data quality, interpretation may be adversely affected Normal sinus   rhythm with frequent premature ventricular complexes  Nonspecific intraventricular conduction delay  Nonspecific ST abnormality  When compared with ECG of 06-OCT-2021 07:27,  Sinus rhythm has replaced Junctional rhythm  Questionable change in QRS duration  Confirmed by Chelsea Mcnair M.D., Indra Gibbs ([id number]) on 10/7/2021 4:22:45 PM     PHOSPHORUS    Collection Time: 10/07/21  5:55 PM   Result Value Ref Range    Phosphorus 5.8 (H) 2.6 - 4.7 MG/DL   METABOLIC PANEL, COMPREHENSIVE    Collection Time: 10/07/21  5:55 PM   Result Value Ref Range    Sodium 135 (L) 136 - 145 mmol/L    Potassium 4.0 3.5 - 5.1 mmol/L    Chloride 101 97 - 108 mmol/L    CO2 22 21 - 32 mmol/L    Anion gap 12 5 - 15 mmol/L    Glucose 202 (H) 65 - 100 mg/dL    BUN 76 (H) 6 - 20 MG/DL    Creatinine 2.36 (H) 0.70 - 1.30 MG/DL    BUN/Creatinine ratio 32 (H) 12 - 20      GFR est AA 33 (L) >60 ml/min/1.73m2    GFR est non-AA 27 (L) >60 ml/min/1.73m2    Calcium 8.3 (L) 8.5 - 10.1 MG/DL    Bilirubin, total 1.4 (H) 0.2 - 1.0 MG/DL    ALT (SGPT) 45 12 - 78 U/L    AST (SGOT) 59 (H) 15 - 37 U/L    Alk.  phosphatase 110 45 - 117 U/L    Protein, total 5.6 (L) 6.4 - 8.2 g/dL    Albumin 2.9 (L) 3.5 - 5.0 g/dL    Globulin 2.7 2.0 - 4.0 g/dL    A-G Ratio 1.1 1.1 - 2.2     MAGNESIUM    Collection Time: 10/07/21  5:55 PM   Result Value Ref Range    Magnesium 2.3 1.6 - 2.4 mg/dL   LACTIC ACID    Collection Time: 10/07/21  5:55 PM   Result Value Ref Range    Lactic acid 2.4 (HH) 0.4 - 2.0 MMOL/L   GLUCOSE, POC    Collection Time: 10/07/21  5:56 PM   Result Value Ref Range    Glucose (POC) 205 (H) 65 - 117 mg/dL    Performed by ARSEN Bravo    Collection Time: 10/07/21 11:39 PM   Result Value Ref Range    Glucose (POC) 304 (H) 65 - 117 mg/dL    Performed by Cindi Aceves RENAL FUNCTION PANEL    Collection Time: 10/07/21 11:41 PM   Result Value Ref Range    Sodium 133 (L) 136 - 145 mmol/L    Potassium 3.6 3.5 - 5.1 mmol/L    Chloride 99 97 - 108 mmol/L    CO2 22 21 - 32 mmol/L    Anion gap 12 5 - 15 mmol/L    Glucose 290 (H) 65 - 100 mg/dL    BUN 70 (H) 6 - 20 MG/DL    Creatinine 2.30 (H) 0.70 - 1.30 MG/DL    BUN/Creatinine ratio 30 (H) 12 - 20      GFR est AA 34 (L) >60 ml/min/1.73m2    GFR est non-AA 28 (L) >60 ml/min/1.73m2    Calcium 8.4 (L) 8.5 - 10.1 MG/DL    Phosphorus 5.0 (H) 2.6 - 4.7 MG/DL    Albumin 3.0 (L) 3.5 - 5.0 g/dL   LACTIC ACID    Collection Time: 10/07/21 11:41 PM   Result Value Ref Range    Lactic acid 2.7 (HH) 0.4 - 2.0 MMOL/L   CBC WITH AUTOMATED DIFF    Collection Time: 10/08/21  4:05 AM   Result Value Ref Range    WBC 17.1 (H) 4.1 - 11.1 K/uL    RBC 2.27 (L) 4.10 - 5.70 M/uL    HGB 6.9 (L) 12.1 - 17.0 g/dL    HCT 21.2 (L) 36.6 - 50.3 %    MCV 93.4 80.0 - 99.0 FL    MCH 30.4 26.0 - 34.0 PG    MCHC 32.5 30.0 - 36.5 g/dL    RDW 14.5 11.5 - 14.5 %    PLATELET 677 918 - 596 K/uL    MPV 12.5 8.9 - 12.9 FL    NRBC 0.2 (H) 0  WBC    ABSOLUTE NRBC 0.04 (H) 0.00 - 0.01 K/uL    NEUTROPHILS 90 (H) 32 - 75 %    LYMPHOCYTES 6 (L) 12 - 49 %    MONOCYTES 2 (L) 5 - 13 %    EOSINOPHILS 0 0 - 7 %    BASOPHILS 0 0 - 1 %    METAMYELOCYTES 2 (H) 0 %    IMMATURE GRANULOCYTES 0 %    ABS. NEUTROPHILS 15.4 (H) 1.8 - 8.0 K/UL    ABS. LYMPHOCYTES 1.0 0.8 - 3.5 K/UL    ABS. MONOCYTES 0.3 0.0 - 1.0 K/UL    ABS. EOSINOPHILS 0.0 0.0 - 0.4 K/UL    ABS. BASOPHILS 0.0 0.0 - 0.1 K/UL    ABS. IMM.  GRANS. 0.0 K/UL    DF MANUAL      PLATELET COMMENTS Large Platelets      RBC COMMENTS NORMOCYTIC, NORMOCHROMIC     VANCOMYCIN, RANDOM    Collection Time: 10/08/21  4:05 AM   Result Value Ref Range    Vancomycin, random 17.9 UG/ML   CBC WITH AUTOMATED DIFF    Collection Time: 10/08/21  4:31 AM   Result Value Ref Range    WBC 17.0 (H) 4.1 - 11.1 K/uL    RBC 2.26 (L) 4.10 - 5.70 M/uL    HGB 7.0 (L) 12.1 - 17.0 g/dL    HCT 21.0 (L) 36.6 - 50.3 %    MCV 92.9 80.0 - 99.0 FL    MCH 31.0 26.0 - 34.0 PG    MCHC 33.3 30.0 - 36.5 g/dL    RDW 14.6 (H) 11.5 - 14.5 %    PLATELET 265 958 - 514 K/uL    MPV 12.3 8.9 - 12.9 FL    NRBC 0.2 (H) 0  WBC    ABSOLUTE NRBC 0.04 (H) 0.00 - 0.01 K/uL    NEUTROPHILS PENDING %    LYMPHOCYTES PENDING %    MONOCYTES PENDING %    EOSINOPHILS PENDING %    BASOPHILS PENDING %    IMMATURE GRANULOCYTES PENDING %    ABS. NEUTROPHILS PENDING K/UL    ABS. LYMPHOCYTES PENDING K/UL    ABS. MONOCYTES PENDING K/UL    ABS. EOSINOPHILS PENDING K/UL    ABS. BASOPHILS PENDING K/UL    ABS. IMM. GRANS. PENDING K/UL    DF PENDING    TYPE & SCREEN    Collection Time: 10/08/21  4:31 AM   Result Value Ref Range    Crossmatch Expiration 10/11/2021,2359     ABO/Rh(D) O NEGATIVE     Antibody screen NEG     Unit number [id number]     Blood component type RC LR,1     Unit division 00     Status of unit ALLOCATED     Crossmatch result Compatible    POC G3 - PUL    Collection Time: 10/08/21  4:55 AM   Result Value Ref Range    FIO2 (POC) 60 %    pH (POC) 7.44 7.35 - 7.45      pCO2 (POC) 34.1 (L) 35.0 - 45.0 MMHG    pO2 (POC) 164 (H) 80 - 100 MMHG    HCO3 (POC) 22.9 22 - 26 MMOL/L    sO2 (POC) 99.5 (H) 92 - 97 %    Base deficit (POC) 1.1 mmol/L    Site DRAWN FROM ARTERIAL LINE      Device: ADULT VENT      Mode ASSIST CONTROL      Set Rate 28 bpm    PEEP/CPAP (POC) 8 cmH2O    Allens test (POC) NOT APPLICABLE      Specimen type (POC) ARTERIAL     RBC, ALLOCATE    Collection Time: 10/08/21  5:15 AM   Result Value Ref Range    HISTORY CHECKED? Historical check performed    GLUCOSE, POC    Collection Time: 10/08/21  5:30 AM   Result Value Ref Range    Glucose (POC) 293 (H) 65 - 117 mg/dL    Performed by Humera Amos MD  [address] [place], [place] [address] [place] [address]  Phone - [phone]   Fax - [phone]  [email or web address]

## 2021-10-08 NOTE — PROGRESS NOTES
Palliative Medicine Consult  Pretty: [phone] ([phone]    Patient Name: Ananda Sultana  YOB: 1950    Date of Initial Consult: 10/6/21  Reason for Consult: Care decisions   Requesting Provider: Love Faulkner   Primary Care Physician: Sheldon Nieto MD     SUMMARY:   Ananda Sultana is a 70 y.o. with a past history of CAD s/p CABG, HTN , hx of cholecystectomy, appendectomy, R colectomy 2011 who was admitted on 9/22/2021 with chest and epigastric pain, N/V. Found to have acute pancreatitis, [place], bradycardia, lactic acoidosis. Tx to [facility] on 9/24/21for hypotension, confusion and CRRT started then changed to [facility] 10/2. Intubated 9/26. On IV abx, BCx NGTD, yeast in resp cx 9/28 possible aspiration PNA, c diff pending. 10/5 worsening w/ incr pressor needs, incr vent requirements, leukocytosis, did not tolerate HD yest.    10/7- CT A/P done yest, no fluid removal w/ dialysis, able to lower vent settings. 10/8- HgB dropped 9.3--> 6.9, hypothermic. Current medical issues leading to Palliative Medicine involvement include: care decisions. Social: Pt  to Gio Jarrell- they have been together for ~ 38 years. His step dtr is Kari Holguin, who is an RN in NP school right now who requested the Palliative consult. 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 Sister Eduard Care involved too. Loved life, always active, independent. PALLIATIVE DIAGNOSES:   1. Septic shock due to pancreatitis   2. Shortness of breath  3. Debility   4. Goals of care       PLAN:   1. Events noted, on dom hugger and transfused PRBC.    2. Wife Gio Jarrell and dtr Kari Holguin talked on their drive over and think they want to proceed w/ compassionate extubation and comfort measures today, but timing to be determined on when other family can come in and they have a family friend who is an ED physician - Dr Love Faulkner speaking w/ him right now to help guide family. 3. Sumanth Pires requesting that Hospice meet w/ them- pt may survive to be transferred to the floor and they could be the attending and would have good follow up for bereavement. 4. Hospice consult placed for them to meet w/ family. 5. Communicated plan of care with: Palliative [facility] Team incl Dr Cain Roland RN     GOALS OF CARE / TREATMENT PREFERENCES:     GOALS OF CARE:  Patient/Health Care Proxy Stated Goals: Other (comment) (Move to comfort)    TREATMENT PREFERENCES:   Code Status: DNR      The patient identifies the following as important for living well: being independent       Advance Care Planning:  [x] The [facility] Interdisciplinary Team has updated the ACP Navigator with Health Care Decision Maker and Patient Capacity      Primary Decision Maker: Mame Camejo - [phone]  Advance Care Planning 9/23/2021   Patient's Healthcare Decision Maker is: -   Primary Decision Maker Name -   Primary Decision Maker Phone Number -   Confirm Advance Directive None       Medical Interventions: Limited additional interventions       Other:    As far as possible, the palliative care team has discussed with patient / health care proxy about goals of care / treatment preferences for patient.      HISTORY:     History obtained from: chart, staff, family     CHIEF COMPLAINT: Cannot obtain due to patient factors      HPI/SUBJECTIVE:    The patient is:   [] Verbal and participatory  [x] Non-participatory due to: sedated on vent    Clinical Pain Assessment (nonverbal scale for severity on nonverbal patients):   Clinical Pain Assessment  Severity: 0     Activity (Movement): Lying quietly, normal position    Duration: for how long has pt been experiencing pain (e.g., 2 days, 1 month, years)  Frequency: how often pain is an issue (e.g., several times per day, once every few days, constant)     FUNCTIONAL ASSESSMENT:     Palliative Performance Scale (PPS):  PPS: 10 PSYCHOSOCIAL/SPIRITUAL SCREENING:     Palliative IDT has assessed this patient for cultural preferences / practices and a referral made as appropriate to needs (Cultural Services, Patient Advocacy, Ethics, etc.)    Any spiritual / Zoroastrianism concerns:  [] Yes /  [x] No    Caregiver Burnout:  [] Yes /  [x] No /  [] No Caregiver Present      Anticipatory grief assessment:   [x] Normal  / [] Maladaptive       ESAS Anxiety:      ESAS Depression:     Cannot obtain due to patient factors       REVIEW OF SYSTEMS:     Positive and pertinent negative findings in ROS are noted above in HPI. The following systems were [x] reviewed / [] unable to be reviewed as noted in HPI  Other findings are noted below. Systems: constitutional, ears/nose/mouth/throat, respiratory, gastrointestinal, genitourinary, musculoskeletal, integumentary, neurologic, psychiatric, endocrine. Positive findings noted below. Modified ESAS Completed by: provider   Fatigue: 10 Drowsiness: 10     Pain: 0           Dyspnea: 0           Stool Occurrence(s): 1        PHYSICAL EXAM:     From RN flowsheet:  Wt Readings from Last 3 Encounters:   10/08/21 273 lb 9.5 oz (124.1 kg)   05/10/19 260 lb (117.9 kg)   06/06/18 252 lb (114.3 kg)     Blood pressure (!) 111/50, pulse 79, temperature (!) 96.3 °F (35.7 °C), resp. rate 28, height 6' 1\" (1.854 m), weight 273 lb 9.5 oz (124.1 kg), SpO2 99 %.     Pain Scale 1: Adult Nonverbal Pain Scale  Pain Intensity 1: 0  Pain Onset 1: chronic  Pain Location 1: Generalized  Pain Orientation 1: Other (comment) (PHILLIP)  Pain Description 1: Other (comment) (PHILLIP)  Pain Intervention(s) 1: Medication (see MAR)  Last bowel movement, if known:     Constitutional: sedated on vent, mult pressors     HISTORY:     Active Problems:    Pancreatitis (9/22/2021)      Past Medical History:   Diagnosis Date    Arthritis     back hips knees and arms    CAD (coronary artery disease)     Hypertension     Kidney stones     Nausea & vomiting after hip surgery    Pancreatitis     In 2/2011, resolved    Psoriasis       Past Surgical History:   Procedure Laterality Date    HX CHOLECYSTECTOMY  7/2009    Dr. Chaim Gilford - Open Cholecystectomy    HX GI      GALLBLADDER 09    HX GI      perforated colon    HX HEENT      LEFT EYE MUSCLE SURGERY    HX HIP REPLACEMENT  1996, 71777    right and left    HX ORTHOPAEDIC      bilat hip replacement, left hip replaced 2 x    HX ORTHOPAEDIC      RIGHT ELBOW  SURGERY ON NERVE    IR INSERT NON TUNL CVC OVER 5 YRS  9/24/2021    IR INSERT NON TUNL CVC OVER 5 YRS  10/4/2021    IR REPLACE CVC NON TUNL W/O PORT/PUMP  9/25/2021    LITHOTRIPSY  ~2001    MD CARDIAC SURG PROCEDURE UNLIST      cardiac cath 7/20/2011    MD CARDIAC SURG PROCEDURE UNLIST      CABG WITH 2 GRAPHS    MD EYE SURG ANT SGMT PROC UNLISTED  as child      Family History   Problem Relation Age of Onset    Eczema Mother     Cancer Sister         breast      History reviewed, no pertinent family history. Social History     Tobacco Use    Smoking status: Former Smoker     Years: 0.00     Types: Cigars    Smokeless tobacco: Never Used    Tobacco comment: used to smoke cigars quit   Substance Use Topics    Alcohol use:  Yes     Alcohol/week: 0.0 standard drinks     Comment: 2 drinks weekly     Allergies   Allergen Reactions    Albumin Products Rash, Itching and Other (comments)     Hypertension, tachycardia    Penicillins Rash      Current Facility-Administered Medications   Medication Dose Route Frequency    peppermint spirit solution   Other ONCE    0.9% sodium chloride infusion 250 mL  250 mL IntraVENous PRN    heparin 25,000 units in D5W 250 ml infusion  700 Units/hr IntraVENous TITRATE    TPN ADULT - CENTRAL   IntraVENous CONTINUOUS    vancomycin (VANCOCIN) 1,000 mg in 0.9% sodium chloride 250 mL (VIAL-MATE)  1,000 mg IntraVENous Q24H    [START ON 10/9/2021] insulin glargine (LANTUS) injection 25 Units  25 Units SubCUTAneous DAILY    albumin human 25% (BUMINATE) solution 25 g  25 g IntraVENous Q6H    TPN ADULT - CENTRAL   IntraVENous CONTINUOUS    DOPamine (INTROPIN) 800 mg in dextrose 5% 250 mL infusion  0-20 mcg/kg/min IntraVENous TITRATE    midazolam in normal saline (VERSED) 1 mg/mL infusion  0-10 mg/hr IntraVENous TITRATE    heparin (porcine) 1,000 unit/mL injection 1,400 Units  1,400 Units InterCATHeter DIALYSIS PRN    And    heparin (porcine) 1,000 unit/mL injection 1,400 Units  1,400 Units InterCATHeter DIALYSIS PRN    hydrocortisone Sod Succ (PF) (SOLU-CORTEF) injection 100 mg  100 mg IntraVENous Q8H    meropenem (MERREM) 500 mg in sterile water (preservative free) 10 mL IV syringe  0.5 g IntraVENous Q6H    Vancomycin Pharmacy Dosing   Other PRN    acetaminophen (TYLENOL) solution 650 mg  650 mg Per NG tube Q6H PRN    PHENYLephrine (ANGELIKA-SYNEPHRINE) 30 mg in 0.9% sodium chloride 250 mL infusion   mcg/min IntraVENous TITRATE    bicarbonate dialysis (PRISMASOL) BG K 2/Ca 3.5 5000 ml solution   Extracorporeal DIALYSIS CONTINUOUS    [Held by provider] OLANZapine (ZyPREXA) tablet 10 mg  10 mg Oral QHS    influenza vaccine 2021-22 (6 mos+)(PF) (FLUARIX/FLULAVAL/FLUZONE QUAD) injection 0.5 mL  1 Each IntraMUSCular PRIOR TO DISCHARGE    albumin human 25% (BUMINATE) solution 12.5 g  12.5 g IntraVENous DIALYSIS PRN    fentaNYL (PF) 1,500 mcg/30 mL (50 mcg/mL) infusion  0-200 mcg/hr IntraVENous TITRATE    cholecalciferol (vitamin D3) 10 mcg/mL (400 unit/mL) oral liquid 100 mcg  100 mcg Oral DAILY    dexmedeTOMidine in 0.9 % NaCl (PRECEDEX) 400 mcg/100 mL (4 mcg/mL) infusion soln  0.2-1.4 mcg/kg/hr IntraVENous TITRATE    heparin (porcine) pf 300 Units  300 Units InterCATHeter PRN    balsam peru-castor oiL (VENELEX) ointment   Topical TID    midodrine (PROAMATINE) tablet 20 mg  20 mg Oral Q8H    0.9% sodium chloride infusion  5 mL/hr IntraVENous CONTINUOUS    0.9% sodium chloride infusion  3 mL/hr IntraVENous CONTINUOUS  vasopressin (VASOSTRICT) 40 Units in 0.9% sodium chloride 40 mL infusion  0-0.04 Units/min IntraVENous TITRATE    NOREPINephrine (LEVOPHED) 32 mg in 5% dextrose 250 mL infusion  0.5-30 mcg/min IntraVENous TITRATE    glucose chewable tablet 16 g  4 Tablet Oral PRN    dextrose (D50W) injection syrg 12.5-25 g  25-50 mL IntraVENous PRN    glucagon (GLUCAGEN) injection 1 mg  1 mg IntraMUSCular PRN    insulin lispro (HUMALOG) injection   SubCUTAneous Q6H    pantoprazole (PROTONIX) 40 mg in 0.9% sodium chloride 10 mL injection  40 mg IntraVENous DAILY    chlorhexidine (ORAL CARE KIT) 0.12 % mouthwash 15 mL  15 mL Oral Q12H    aspirin chewable tablet 81 mg  81 mg Oral DAILY    heparin (porcine) 1,000 unit/mL injection 1,100 Units  1,100 Units Hemodialysis PRN    heparin (porcine) 1,000 unit/mL injection 1,400 Units  1,400 Units Hemodialysis PRN    HYDROmorphone (DILAUDID) injection 1 mg  1 mg IntraVENous Q3H PRN    [Held by provider] atenoloL (TENORMIN) tablet 50 mg  50 mg Oral DAILY    [Held by provider] atorvastatin (LIPITOR) tablet 10 mg  10 mg Oral DAILY    sodium chloride (NS) flush 5-40 mL  5-40 mL IntraVENous Q8H    sodium chloride (NS) flush 5-40 mL  5-40 mL IntraVENous PRN    ondansetron (ZOFRAN) injection 4 mg  4 mg IntraVENous Q4H PRN    heparin (porcine) injection 5,000 Units  5,000 Units SubCUTAneous Q8H          LAB AND IMAGING FINDINGS:     Lab Results   Component Value Date/Time    WBC 17.0 (H) 10/08/2021 04:31 AM    HGB 7.0 (L) 10/08/2021 04:31 AM    PLATELET 484 76/08/4784 04:31 AM     Lab Results   Component Value Date/Time    Sodium 134 (L) 10/08/2021 05:35 AM    Potassium 3.5 10/08/2021 05:35 AM    Chloride 102 10/08/2021 05:35 AM    CO2 25 10/08/2021 05:35 AM    BUN 68 (H) 10/08/2021 05:35 AM    Creatinine 2.16 (H) 10/08/2021 05:35 AM    Calcium 8.2 (L) 10/08/2021 05:35 AM    Magnesium 2.3 10/08/2021 05:35 AM    Phosphorus 3.9 10/08/2021 05:35 AM      Lab Results   Component Value Date/Time    Alk. phosphatase 92 10/08/2021 05:35 AM    Protein, total 5.6 (L) 10/08/2021 05:35 AM    Albumin 2.9 (L) 10/08/2021 05:35 AM    Globulin 2.7 10/08/2021 05:35 AM     Lab Results   Component Value Date/Time    INR 1.2 (H) 09/24/2021 11:24 AM    Prothrombin time 12.9 (H) 09/24/2021 11:24 AM    aPTT 28.9 07/27/2011 06:42 PM      No results found for: IRON, FE, TIBC, IBCT, PSAT, FERR   Lab Results   Component Value Date/Time    pH 7.36 10/02/2021 04:22 AM    PCO2 37 10/02/2021 04:22 AM    PO2 122 (H) 10/02/2021 04:22 AM     No components found for: [place]   Lab Results   Component Value Date/Time     07/29/2011 04:00 AM    CK - MB 2.4 07/29/2011 04:00 AM                Total time:   Counseling / coordination time, spent as noted above:   > 50% counseling / coordination? Prolonged service was provided for  []30 min   []75 min in face to face time in the presence of the patient, spent as noted above. Time Start:   Time End:   Note: this can only be billed with 03884 (initial) or 47522 (follow up). If multiple start / stop times, list each separately.

## 2021-10-08 NOTE — PROGRESS NOTES
ID Progress Note  10/8/2021    Subjective: Intubated it    Review of Systems:            Symptom Y/N Comments   Symptom Y/N Comments   Fever/Chills       Chest Pain        Poor Appetite       Edema        Cough       Abdominal Pain        Sputum       Joint Pain        SOB/COATES       Pruritis/Rash        Nausea/vomit       Tolerating PT/OT        Diarrhea       Tolerating Diet        Constipation       Other           Could NOT obtain due to: Intubated it      Objective:     Vitals:   Visit Vitals  BP (!) 103/51   Pulse 88   Temp (!) 96.3 °F (35.7 °C)   Resp 26   Ht 6' 1\" (1.854 m)   Wt 124.1 kg (273 lb 9.5 oz)   SpO2 100%   BMI 36.10 kg/m²        Tmax:  Temp (24hrs), Av.1 °F (35.1 °C), Min:92.7 °F (33.7 °C), Max:97.8 °F (36.6 °C)      PHYSICAL EXAM:  General: Obese, chronically ill appearing, no acute distress    EENT:  EOMI. Anicteric sclerae. Dry mucous membrane  Resp:  Clear in apex with decreased breath sounds at bases,  No accessory muscle use  CV:  Regular  rhythm,  trace of edema  GI:  Soft, non distended, (+) Bowel sounds  Neurologic:  Intubated it, not following commends   Psych:   Unable to assess insight. Skin:  No rashes.   No jaundice    Labs:   Lab Results   Component Value Date/Time    WBC 17.0 (H) 10/08/2021 04:31 AM    Hemoglobin (POC) 15.0 2016 05:20 PM    HGB 7.0 (L) 10/08/2021 04:31 AM    Hematocrit (POC) 44 2016 05:20 PM    HCT 21.0 (L) 10/08/2021 04:31 AM    PLATELET 744  04:31 AM    MCV 92.9 10/08/2021 04:31 AM     Lab Results   Component Value Date/Time    Sodium 134 (L) 10/08/2021 05:35 AM    Potassium 3.5 10/08/2021 05:35 AM    Chloride 102 10/08/2021 05:35 AM    CO2 25 10/08/2021 05:35 AM    Anion gap 7 10/08/2021 05:35 AM    Glucose 281 (H) 10/08/2021 05:35 AM    BUN 68 (H) 10/08/2021 05:35 AM    Creatinine 2.16 (H) 10/08/2021 05:35 AM    BUN/Creatinine ratio 31 (H) 10/08/2021 05:35 AM    GFR est AA 37 (L) 10/08/2021 05:35 AM    GFR est non-AA 30 (L) 10/08/2021 05:35 AM    Calcium 8.2 (L) 10/08/2021 05:35 AM    Bilirubin, total 1.1 (H) 10/08/2021 05:35 AM    Alk. phosphatase 92 10/08/2021 05:35 AM    Protein, total 5.6 (L) 10/08/2021 05:35 AM    Albumin 2.9 (L) 10/08/2021 05:35 AM    Globulin 2.7 10/08/2021 05:35 AM    A-G Ratio 1.1 10/08/2021 05:35 AM    ALT (SGPT) 35 10/08/2021 05:35 AM     CTA chest, CT of abd/pel: 4.6 cm ascending thoracic aortic aneurysm, without dissection. 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 Pancreas with diffuse peripancreatic fat stranding without discrete fluid  collection, no evidence of necrosis or infection. Prostatomegaly, with findings suspicious for chronic bladder outlet obstruction. US ABD (9/22) Pancreas obscured by bowel gas. Transfer to ICU on 9/24 due to hypotension. Intubated it 9/26  C-line and dialysis catheter placement 9/26, line exchanged on 10/4    Assessment and Plan   Sepsis  Leukocytosis (resolving)  ASP PNA  - afebrile overnight, WBC 21k->23k->17    Blood cx (9/22 & 9/28) no growth     Blood cx (10/6) no growth so far    resp cx (10/5) klebsiella pneumoniae    C-diff (-)    CXR (10/6) Low lung volumes with mild bibasilar opacities that may represent atelectasis,  edema, or infection. CT chest, abd, pel (10/6)   1. No acute pulmonary embolus  2. Patchy airspace infiltrates in left upper lobe and both lower lobes concerning for pneumonia, possibly aspiration with prominent dependent atelectasis. 3. Pancreatitis with newly developed extensive peripancreatic inflammation, and  retroperitoneal and peritoneal free fluid. No complication identified otherwise. 4. 4.6 cm an aortic aneurysm.       Continue with IV merrem    Fever work up if temp >= 100.4    Acute pancreatitis  Elevated LFT (resolved)    [place]  Hx of bilateral nonobstructive calyceal calculi and bilateral renal cysts  - creat 2.1    Nephrology following     Ascending thoracic aortic aneurysm & partially trombosis in left internal iliac artery   - further evaluation per primary team    Above plan d/w and agreed by with Dr. Marion Beavers, NP

## 2021-10-08 NOTE — PROGRESS NOTES
Problem: Falls - Risk of  Goal: *Absence of Falls  Description: Document Yani Tran Fall Risk and appropriate interventions in the flowsheet. Outcome: Progressing Towards Goal  Note: Fall Risk Interventions:                                Problem: Pressure Injury - Risk of  Goal: *Prevention of pressure injury  Description: Document Genaro Scale and appropriate interventions in the flowsheet.   Outcome: Not Progressing Towards Goal  Note: Pressure Injury Interventions:                                            Problem: Infection - Risk of, Central Venous Catheter-Associated Bloodstream Infection  Goal: *Absence of infection signs and symptoms  Outcome: Progressing Towards Goal     Problem: Infection - Risk of, Urinary Catheter-Associated Urinary Tract Infection  Goal: *Absence of infection signs and symptoms  Outcome: Not Progressing Towards Goal     Problem: Breathing Pattern - Ineffective  Goal: *Absence of hypoxia  Outcome: Not Progressing Towards Goal  Goal: *Use of effective breathing techniques  Outcome: Not Progressing Towards Goal     Problem: Pain  Goal: *Control of acute pain  Outcome: Not Progressing Towards Goal

## 2021-10-08 NOTE — HOSPICE
[facility]  [phone]     Patient Name: Jose C Chen  YOB: 1950  Age: 70 y.o. [address] [facility] RN Note:  Hospice consult received, reviewing chart. Will follow up with Unit Nurse and Care Manager to discuss plan of care, patient status and discharge disposition within the hour. Thank you for the opportunity to be of service to this patient.     Em Lugo RN, [place] [address] Nurse Liaison  [phone] Mobile  [phone] Office  Available on Perfect Serve

## 2021-10-08 NOTE — PROGRESS NOTES
Requested by Hospice nurse to provide support to family of Mr Felisa Priest who will be transitioning to comfort care. Patient's wife and stepdaughter were at patient's bedside when  arrived. Offered spiritual presence and active listening as they attempted to process what was taking place with patient. Acknowledged their concerns and offered words of support. With family's permission, had prayer on behalf of patient and family. Reassured them of ongoing  availability for support. : Rev. Ernst Waggoner.  Lauren Zaragoza; [facility], to contact [phone] [address] call: [phone]

## 2021-10-08 NOTE — DIALYSIS
[facility] / [phone]           Orders   Mode: CVVHD Katarina@[place]   Blood Flow Rate: 150ml/min   Prismasol Dose: 2500ml/hr   Prismasol Concentrate: 2K/3.5Ca      Blood Warmer Temp: 37*C   Net Fluid Removal: 0ml/hr            Metrics   BP: 102/50   HR: 78   Access Pressure: -36   Filter Pressure: 88   Return Pressure: 64   TMP: 22   Pressure Drop: 17            Access   Type & Location: R CVC CDI dated 10/07/21   Comments:  Each catheter limb disinfected per p&p, caps removed, hubs disinfected per p&p, +aspiration/flush X2. Lines reversed.                         Labs   HBsAg (Antigen) / date: Neg 09/25/21   HBsAb (Antibody) / date: Susceptible 9/25/21   Source: Intelli[employer]lo            Safety:   Time Out Done:   (Time) 0100   Consent obtained/signed: Remains on file from previously/same admission   Education: Unable/pt intubated & sedated   Primary Nurse Rpt Pre: ROYCE Leon RN   Primary Nurse Rpt Post: ROYCE Leon RN      Comments / Plan:   Pt orders, notes, labs, code status and consent reviewed. Time out complete.      CVVHD restarted following clotted filter alarm. Primary RN unable to return blood; EBL 165ml.      [id number] primed and tested. Lines are visible and secure with blood warmer attached to return line and set at 37*C. Education pre/post with primary RN.

## 2021-10-08 NOTE — HOSPICE
[facility]  [phone]    Patient Name: Gela Yeh  YOB: 1950  [facility] 70 [facility]  [facility] consult noted. Chart reviewed. Plan of care discussed with patients nurse & care manager. In to meet with patient, pt daughter, LINDA [facility] and pt wife, Cara Andrade, general plan of care, levels of care, services and on call procedures. Family information packet provided & reviewed with family bedside. Dr. Janice Francis and hospice social worker, Maris Dumont bedside for support. Family are in agreement with Hospice plan of care. Discussions over stopping aggressive treatments and procedures. Plan to admit patient into hospice services today. Thank you for the opportunity to be of service to this patient. 16:10: Hospice consents have been signed. Perfect Serve message sent to Dr. Keli Manjarrez for discharge order.     Aniya Cuevas RN, [place] [address] Nurse Liaison  [phone] Mobile  [phone] Office  Available on Perfect Serve

## 2021-10-08 NOTE — PROGRESS NOTES
[facility]    ICU TEAM Progress Note    Name: Ruth Valenzuela   : 1950   MRN: [id number]   Date: 10/8/2021      I  Subjective:   Progress Note: 10/8/2021      Reason for ICU Admission: Pancreatitis, acute kidney injury, respiratory failure, septic shock     Brief HPI: 66 y/o M w hx of CAD, HTN, OA, and kidney stones presents to the hospital with CC of SOB, abd pain, N/V. He was found to have severe pancreatitis and acute renal injury with associated lactic acidosis and elevated LFTs. He was transferred to the CCU for initiation of CRRT given borderline low blood pressures.      Overnight Events:     10/8 - DMs thinking about transitioning to [address] [place] [address]    10/7 - Oxygenation and hemodynamics improved ON with volume and abx    10/6 - Developed severe hypoxia and hypotension ON - PEE now up to 12 and on 3 pressors    10/4-5 - Remains intubated, on/off pressors    10/3: Switch CRRT to IHD on the second, needed to increase pressor requirement but were able to pull 1.7 L. Afterward he was tachycardic and that improved after infusion of 250 cc of albumin. Had a bowel movement, no bleeding. Pressors weaning down after IHD completed. Still anuric  10/2: Antibiotics discontinued, still on low-dose pressors, continue to have issues with dialysis access, no fever  10/1: Required going back on pressors [low-dose vasopressin], otherwise remain anuric on CRRT  : No acute event.  Off pressors  : No acute event.  Remains on vasopressin and low-dose epinephrine.  On CRRT.  : CRRT; MV; Pressors  : CRRT; MV; Pressors  : increasing agitation and hypoxia. Intubated. HDL repositioned again. Up on vasopressors  : Pavan Song pulled back - remains positional.   : transferred into ICU.  Rusty Si placed and CRRT started    Objective:   Vital Signs:  Visit Vitals  BP (!) 111/50 (BP 1 Location: Left lower arm, BP Patient Position: At rest)   Pulse 86   Temp (!) 96.1 °F (35.6 °C)   Resp 25   Ht 6' 1\" (1.854 m)   Wt 124.1 kg (273 lb 9.5 oz)   SpO2 98%   BMI 36.10 kg/m²    O2 Flow Rate (L/min): 3 l/min O2 Device: Endotracheal tube, Ventilator Temp (24hrs), Av.1 °F (35.1 °C), Min:92.7 °F (33.7 °C), Max:97.8 °F (36.6 °C)           Intake/Output:     Intake/Output Summary (Last 24 hours) at 10/8/2021 1239  Last data filed at 10/8/2021 1100  Gross per 24 hour   Intake 4592.83 ml   Output 2532 ml   Net 2060.83 ml       Physical Exam:    General:  Intubated and sedated  Eye: No pallor no jaundice pupils equal reactive  Neurologic: sedated  Neck:  RIJ HDL, LIJ CVC  Lungs:  Distant breath sounds  Heart:  regular rate and rhythm  Abdomen:  Soft, distended, could not appreciate tenderness  Skin:  Normal.    LABS AND  DATA: Personally reviewed  Recent Labs     10/08/21  0431 10/08/21  0405   WBC 17.0* 17.1*   HGB 7.0* 6.9*   HCT 21.0* 21.2*    176     Recent Labs     10/08/21  0535 10/07/21  2341 10/07/21  1755 10/07/21  1755   * 133*   < > 135*   K 3.5 3.6   < > 4.0    99   < > 101   CO2 25 22   < > 22   BUN 68* 70*   < > 76*   CREA 2.16* 2.30*   < > 2.36*   * 290*   < > 202*   CA 8.2* 8.4*   < > 8.3*   MG 2.3  --   --  2.3   PHOS 3.9 5.0*   < > 5.8*    < > = values in this interval not displayed. Recent Labs     10/08/21  0535 10/07/21  2341 10/07/21  1755 10/07/21  1755 10/06/21  1648 10/06/21  0229   AP 92  --   --  110   < > 141*   TP 5.6*  --   --  5.6*   < > 5.8*   ALB 2.9* 3.0*   < > 2.9*   < > 2.4*   GLOB 2.7  --   --  2.7   < > 3.4   LPSE  --   --   --   --   --  212    < > = values in this interval not displayed. No results for input(s): INR, PTP, APTT, INREXT, INREXT in the last 72 hours. Recent Labs     10/08/21  0455 10/07/21  0604   PHI 7.44 7.25*   PCO2I 34.1* 47.3*   PO2I 164* 427*   FIO2I 60 100     No results for input(s): CPK, CKMB, TROIQ, BNPP in the last 72 hours.     Hemodynamics:   PAP:   CO:     Wedge:   CI:     CVP:    SVR:       PVR:       Ventilator Settings:  Mode Rate Tidal Volume Pressure FiO2 PEEP   Assist control   500 ml    40 % 8 cm H20     Peak airway pressure: 26 cm H2O    Minute ventilation: 13.7 l/min        MEDS: Reviewed    Chest X-Ray:  CXR Results  (Last 48 hours)               10/07/21 0437  XR CHEST PORT Final result    Impression:      Decreased mild bibasilar atelectasis. Narrative:  EXAM:  XR CHEST PORT       INDICATION: Bibasilar opacities       COMPARISON: 10/6/2021       TECHNIQUE: Portable AP semiupright chest view at 0404 hours       FINDINGS: The endotracheal tube, enteric tube, and bilateral IJ catheters are   stable. The cardiomediastinal contours are stable. The pulmonary vasculature is   within normal limits. There is decreased mild bibasilar opacities. There is no pleural effusion or   pneumothorax. The bones and upper abdomen are stable.                    Assessment and Plan:   - Acute Pancreatitis  - Septic Shock  - Acute Hypoxic Respiratory Failure  - CHINA on RRT  -History of CAD      Neuro - Analgosedation with opioids, propofol, versed as needed    CV - MAP goal > 65, cont midodrine, on levo as needed, cont midodrine, cont stress dose steroids for now, ASA    Pulm - cont lung protective mech vent, SBTs as tolerated, vent day 13 - support improved ON, heading towards needing a trach if decision makers want to continue with aggressive medical therapy    GI - Not tolerating TF, on TPN, occult stool +ve, no overt bleeding, on PPI therapy    Renal - CHINA on HD - back on CRRT due to HD instability, f/u renal recs    Heme - Heparin for DVT ppx    ID - Undulating WBC count - +ve sputum GS - cont Bsabx    Endo - Ensure Vit D supplementation - quite deficient, keep glu 100-180     Dispo - Very guarded at this time - f/u palliative GOC discussions with family    Lines - L JIMI Law, R IJ Teddy, L jacques Cao     NOTE OF PERSONAL INVOLVEMENT IN CARE   This patient has a high probability of imminent, clinically significant deterioration, which requires the highest level of preparedness to intervene urgently. I participated in the decision-making and personally managed or directed the management of the following life and organ supporting interventions that required my frequent assessment to treat or prevent imminent deterioration. I personally spent 40 minutes of critical care time. This does not include any procedural time.     Signed By: Suzanne Palumbo MD     October 8, 2021

## 2021-10-09 NOTE — DEATH NOTE
Called to examine patient who has .  No response to verbal and tactile stimuli.  No respiratory effort.  Absent heart sounds and pulses.  Pupils fixed and dilated. Death pronounced at 21 [phone] on 10/09/21     Patient's family at bedside. Pastoral care aware.     Unit personnel will contact:  MD Danii Crane, MSN, RN, NP-C  [phone] or via [employer]

## 2021-10-09 NOTE — PROGRESS NOTES
1930: Bedside and Verbal shift change report given to John RN (oncoming nurse) by Ann White RN (offgoing nurse). Report included the following information SBAR, Kardex, ED Summary, OR Summary, Procedure Summary, Intake/Output, MAR, Recent Results, Cardiac Rhythm Atrial fib and Alarm Parameters . 2010: PRN Robinul given. 2037: Fentanyl and versed gtt rate verified. 2105: Versed gtt stopped. Pt appears comfortable.

## 2021-10-09 NOTE — HOSPICE
[facility] Help to Those in Need  [phone]    Discharge/Death Nursing Note   Patient Name: Kalee Renee  YOB: 1950  Age: 70 y.o. Date of Death: 10/09/21  Admitted Date: 10/8/2021  Time of Death: José Miguel 60: [facility]  Level of Care: [facility]  Patient Room: [facility]     Hospice Attending: Dorethea Hatchet, MD  Hospice Diagnosis: Acute pancreatitis [K85.90]    Death Pronouncement   Pronouncement of death completed by: Ryan Cuevas NP    Agency staff was not present at the time of death    At the time of death the patient was documented as:    · No response to verbal and tactile stimuli. · No respiratory effort. · Absent heart sounds and pulses. · Pupils fixed and dilated.       The pt  within [address] Unit    The following were notified of the patient's death: family at bedside    Medications were disposed of per facility protocol     Discharge Summary   Discharge Reason: Death    Summary of Care Provided:    [x] Post mortem care provided by Oncology staff  [x] Notification of  home by nursing supervisor  [] Referrals/Community resources provided:   [] Goals completed  [] Durable Medical Equipment vendor notified     Disciplines involved: [x] RN [] SW [x]  [] NGUYỄN [] Vol [] PT [] OT [] ST [] BC    [x] IDT communication/notification    Attending Physician, Dr. Brittany Manning, notified of death    Bereaved   See initial assessment     Advance Care Planning 2021   Patient's [address] is: -   Primary Decision Maker Name -   Primary Decision Maker Phone Number -   Confirm Advance Directive None

## 2021-10-09 NOTE — HOSPICE
Death note-Received call from [place] at [facility] that patient  at 03:23 on 10/9/21, will be pronounced by NP isha Rosen. Family at bedside.   This writer to notify [id number] [facility] team including liaisons and Dr Raul Greene

## 2021-10-09 NOTE — H&P
[address] [facility] Help to Those in Need  [phone]    Patient Name: Mercedes Paige  YOB: 1950    Date of Provider Hospice Visit: 10/08/21    Level of Care:   [x] General Inpatient (GIP)    [] Routine   [] Respite    Current Location of Care:  [x] [facility] [] [facility] [] [facility] [] [facility] [] [facility]    IF [facility], patient referred from:  [] [facility] [] [facility] [] [facility] [] [facility] [] Home [] Other:     Date of Original Hospice Admission: 10/8/2021  Hospice Medical Director at time of admission: [place] Diagnosis: Acute pancreatitis  Diagnoses RELATED to the terminal prognosis: Acute renal failure-dialysis dependent, lactic acidosis, acute hypoxic respiratory failureventilator dependent  Other Diagnoses:      Sean Allen is a 70y.o. year old who was admitted to [facility]. Patient is a 71-year-old male admitted to [facility] with severe acute pancreatitis. Unfortunately, patient suffered multiple complications related to the acute pancreatitis to include acute renal failure requiring CRRT, sepsis, acute respiratory failure requiring intubation. Patient continued to decline despite aggressive therapy with IV fluids, dialysis, broad-spectrum antibiotics and family elected to transition to comfort with the support of hospice. Patient in the ICU on fentanyl infusion at 150 mcg/h as well as Versed 10 mg an hour. He had continued to require pressor support. Patient's pressor support and dialysis subsequently were stopped and he was compassionately extubated.   Patient admitted GIP level care for symptom burden    The patient's principle diagnosis has resulted in failure  Refer to LCD     Functionally, the patient's Karnofsky and/or Palliative Performance Scale has declined over a period of days to weeks and is estimated at 10 the patient is dependent on the following ADLs: All    Objective information that support this patients limited prognosis includes:   CT scan  . IMPRESSION     1. No acute pulmonary embolus  2. Patchy airspace infiltrates in left upper lobe and both lower lobes  concerning for pneumonia, possibly aspiration with prominent dependent  atelectasis. 3. Pancreatitis with newly developed extensive peripancreatic inflammation, and  retroperitoneal and peritoneal free fluid. No complication identified otherwise. 4. 4.6 cm an aortic aneurysm. 5. Nonobstructing nephrolithiasis bilaterally. 6. Enlarged main pulmonary artery suggestive of pulmonary arterial hypertension. 7. Other incidental findings as above.     The patient/family chose comfort measures with the support of Hospice. HOSPICE DIAGNOSES   Active Symptoms:  1. Acute respiratory distress  2. Generalized abdominal pain  3. Restlessness with agitation  4. Severe acute pancreatitis  5. Hospice care     PLAN   1. Patient admitted to [facility] level care as he needs frequent nursing assessment, IV medication management cannot be done in the home setting, not safe to transition home and certainly at risk for rapid decline. 2. Pain managementcontinue fentanyl PCA infusion 150 mcg/h. We have also ordered Dilaudid 2 mg IV every 15 minutes as needed for pain or shortness of breath  3. Restlessness/agitationfor now I continued the Versed drip at 10 mg an hour. I suspect they may not be able to do this on the medical surgical floor even if it is a hospice patient. We could consider transitioning this to scheduled Ativan if needed so that family can visit easier. We just were not sure how quickly he may decline post extubation and pressor support stopping as well as no further dialysis. 4. Comfort meds for all other symptoms  5. Plan reviewed with bedside nursing team as well as hospice liaisonJackie. 6. We met with patient's daughter and spouse prior to this transition and they agree on hospice support.   We discussed end-of-life care and that certainly he is at high risk to have a rapid decline post extubation. 7.  and SW to support family needs  8. Disposition: Likely will die in the hospital  9. Hospice Plan of care was reviewed in detail and agree with current plan of care    Prognosis estimated based on 10/08/21 clinical assessment is:   [x] Hours to Days    [] Days to Weeks    [] Other:    Communicated plan of care with: Hospice Case Manager; Hospice IDT; Care Team     GOALS OF CARE     Patient/Medical POA stated Goal of Care: Hospice care    [x] I have reviewed and/or updated ACP information in the Advance Care Planning Navigator. This information is available in the [address] link in the patient's chart header. Primary Decision [facility] (Health Care Agent):   Primary Decision Maker: Harshad Che - [phone]    Resuscitation Status: DNR  If DNR is there a Durable DNR on file? : [x] Yes [] No (If no, complete Durable DNR)    HISTORY     History obtained from: Chart, spouse, daughter, bedside nursing team    CHIEF COMPLAINT: Abdominal pain  The patient is:   [] Verbal  [] Nonverbal  [x] Unresponsive    HPI/SUBJECTIVE: Patient is unresponsive. He is on the vent with a combination of Versed and fentanyl infusions       REVIEW OF SYSTEMS     The following systems were: [] reviewed  [x] unable to be reviewed    Positive ROS include:  Constitutional: fatigue, weakness, in pain, short of breath  Ears/nose/mouth/throat: increased airway secretions  Respiratory:shortness of breath, wheezing  Gastrointestinal:poor appetite, nausea, vomiting, abdominal pain, constipation, diarrhea  Musculoskeletal:pain, deformities, swelling legs  Neurologic:confusion, hallucinations, weakness  Psychiatric:anxiety, feeling depressed, poor sleep  Endocrine:     Adult Non-Verbal Pain Assessment Score: 10-this is a score without medication.   He currently appears comfortable    Face  [] 0   No particular expression or smile  [] 1   Occasional grimace, tearing, frowning, wrinkled forehead  [x] 2   Frequent grimace, tearing, frowning, wrinkled forehead    Activity (movement)  [] 0   Lying quietly, normal position  [] 1   Seeking attention through movement or slow, cautious movement  [x] 2   Restless, excessive activity and/or withdrawal reflexes    Guarding  [] 0   Lying quietly, no positioning of hands over areas of body  [] 1   Splinting areas of the body, tense  [x] 2   Rigid, stiff    Physiology (vital signs)  [] 0   Stable vital signs  [] 1   Change in any of the following: SBP > 20mm Hg; HR > 20/minute  [x] 2   Change in any of the following: SBP > 30mm Hg; HR > 25/minute    Respiratory  [] 0   Baseline RR/SpO2, compliant with ventilator  [] 1   RR > 10 above baseline, or 5% drop SpO2, mild asynchrony with ventilator  [x] 2   RR > 20 above baseline, or 10% drop SpO2, asynchrony with ventilator     FUNCTIONAL ASSESSMENT     Palliative Performance Scale (PPS): 10       PSYCHOSOCIAL/SPIRITUAL ASSESSMENT     Active Problems:    Acute pancreatitis (10/8/2021)      Past Medical History:   Diagnosis Date    Arthritis     back hips knees and arms    CAD (coronary artery disease)     Hypertension     Kidney stones     Nausea & vomiting     after hip surgery    Pancreatitis     In 2/2011, resolved    Psoriasis       Past Surgical History:   Procedure Laterality Date    HX CHOLECYSTECTOMY  7/2009    Dr. Robles Velia - Open Cholecystectomy    HX GI      GALLBLADDER 09    HX GI      perforated colon    HX HEENT      LEFT EYE MUSCLE SURGERY    HX [address]EMENT  [address] [facility] [address]    right and left    HX ORTHOPAEDIC      bilat hip replacement, left hip replaced 2 x    HX ORTHOPAEDIC      RIGHT ELBOW  SURGERY ON NERVE    IR INSERT NON TUNL CVC OVER 5 YRS  9/24/2021    IR INSERT NON TUNL CVC OVER 5 YRS  10/4/2021    IR REPLACE CVC NON TUNL W/O PORT/PUMP  9/25/2021    LITHOTRIPSY  ~2001    [place] CARDIAC SURG PROCEDURE UNLIST      cardiac cath 7/20/2011    [place] CARDIAC SURG PROCEDURE UNLIST CABG WITH 2 GRAPHS    [place] EYE SURG ANT SGMT PROC UNLISTED  as child      Social History     Tobacco Use    Smoking status: Former Smoker     Years: 0.00     Types: Cigars    Smokeless tobacco: Never Used    Tobacco comment: used to smoke cigars quit   Substance Use Topics    Alcohol use:  Yes     Alcohol/week: 0.0 standard drinks     Comment: 2 drinks weekly     Family History   Problem Relation Age of Onset    Eczema Mother     Cancer Sister         breast      Allergies   Allergen Reactions    Albumin Products Rash, Itching and Other (comments)     Hypertension, tachycardia    Penicillins Rash      Current Facility-Administered Medications   Medication Dose Route Frequency    ondansetron (ZOFRAN) injection 4 mg  4 mg IntraVENous Q4H PRN    prochlorperazine (COMPAZINE) injection 10 mg  10 mg IntraVENous Q4H PRN    LORazepam (ATIVAN) injection 2 mg  2 mg IntraVENous Q15MIN PRN    ketorolac (TORADOL) injection 30 mg  30 mg IntraVENous Q6H PRN    glycopyrrolate (ROBINUL) injection 0.2 mg  0.2 mg IntraVENous Q4H PRN    HYDROmorphone (PF) (DILAUDID) injection 2 mg  2 mg IntraVENous Q15MIN PRN    fentaNYL (PF) 1,500 mcg/30 mL (50 mcg/mL) infusion  150 mcg/hr IntraVENous CONTINUOUS    midazolam in normal saline (VERSED) 1 mg/mL infusion  10 mg/hr IntraVENous CONTINUOUS     Facility-Administered Medications Ordered in Other Encounters   Medication Dose Route Frequency    ketamine (KETALAR) 10 mg/mL injection 200 mg  200 mg IntraVENous ONCE        PHYSICAL EXAM     Wt Readings from Last 3 Encounters:   10/08/21 124 kg (273 lb 5.9 oz)   10/08/21 124.1 kg (273 lb 9.5 oz)   05/10/19 117.9 kg (260 lb)       Visit Vitals  Pulse (!) 122   Resp 22   Ht 6' 1\" (1.854 m)   Wt 124 kg (273 lb 5.9 oz)   SpO2 93%   BMI 36.07 kg/m²       Supplemental O2  [x] Yes  [] NO  Last bowel movement:     Currently this patient has:  [] Peripheral IV [] PICC  [] PORT [] ICD    [] Smith Catheter [] NG Tube   [] PEG Tube    [] Rectal Tube [] Drain  [x] Other: Central line on the left, dialysis catheter in the right chest    Constitutional: Ill-appearing, unresponsive  Eyes: Closed  ENMT: ET tube in place with initial evaluation  Cardiovascular: Distant heart sounds  Respiratory: No work of breathing no accessory muscle use  Gastrointestinal: Soft  Musculoskeletal: Muscle wasting  Skin: Appears to have some mild anasarca  Neurologic: Unresponsive  Psychiatric: Calm at the moment  Other:       Pertinent Lab and or Imaging Tests:  Lab Results   Component Value Date/Time    Sodium 134 (L) 10/08/2021 12:19 PM    Potassium 3.6 10/08/2021 12:19 PM    Chloride 100 10/08/2021 12:19 PM    CO2 24 10/08/2021 12:19 PM    Anion gap 10 10/08/2021 12:19 PM    Glucose 249 (H) 10/08/2021 12:19 PM    BUN 65 (H) 10/08/2021 12:19 PM    Creatinine 2.11 (H) 10/08/2021 12:19 PM    BUN/Creatinine ratio 31 (H) 10/08/2021 12:19 PM    GFR est AA 38 (L) 10/08/2021 12:19 PM    GFR est non-AA 31 (L) 10/08/2021 12:19 PM    Calcium 8.6 10/08/2021 12:19 PM     Lab Results   Component Value Date/Time    Protein, total 5.6 (L) 10/08/2021 05:35 AM    Albumin 3.2 (L) 10/08/2021 12:19 PM           Total time:   Counseling / coordination time:   > 50% counseling / coordination?:

## 2021-10-09 NOTE — PROGRESS NOTES
LCSW visited pt, pts wife Iveth Salter) and pts daughter Claudene Sportsman) for initial psychosocial assessment/hospice admission. The patient's principle diagnosis of Acute Pancreatitis has resulted in Continuous Dialysis, requiring a ventaltor. Pts wife and pts daughter verbalized they only want comfort care at this time and do not want mechanical life support. Wife reports she is at peace, comforted knowing she has done all she can do for her . LCSW affirmed her care and concern for him. LCSW provided emotional support and supportive counseling for wife in processing transition to comfort. Pts and pts wife have been  for 40 yrs and have one daughter. Many family members present and providing support to family. Pts wife reports good support. Pts wife signed consents. Pt is a DNR but does not have a advance directive on file. Pts wife stated she knows pt wants cremation but has not made any plans. LCSW provided  Resource guide with cremation providers was provided. Pts family stated they would like a  to come pray and hospital  came in to provide prayer. LCSW encouraged pts wife to call with any needs. 5:30 PM: LCSW visited pts wife and family. Pts wife reported coping ok and was engaging in some life review with family. LCSW provided support. LCSW called hospice RN to come assess pt as he appeared to be having some secretions and hospice RN came to visit to assess.

## 2021-10-09 NOTE — PROGRESS NOTES
responded to hospice pt death. Family was at bedside and requested prayer. Family has chosen [facility] [employer] ([phone]). Provided pastoral listening, support and prayer. Please contact [phone] [facility] for further support.      [address] Sunshine Hodge, MACE   [phone]

## 2021-10-09 NOTE — PROGRESS NOTES
TRANSFER - IN REPORT:    Verbal report received from Ramandeep Rivera (name) on Ramon Arnold  being received from the ICU (unit) for routine progression of care      Report consisted of patients Situation, Background, Assessment and   Recommendations(SBAR). Information from the following report(s) SBAR and MAR was reviewed with the receiving nurse. Opportunity for questions and clarification was provided.

## 2021-10-09 NOTE — HOSPICE
[facility] LCSW Bereavement call; This LCSW spoke to pts wife Peyman Jc to offer condolences and support. LCSW shared Bere[facility]ment Center information for ongoing support.      Carmen Pradhan LCSW, [address] [place] [address] [place]  [phone]

## 2021-10-09 NOTE — HOSPICE
[facility]  [phone]    Social Work Admission Note  Patient Name: Layla Novoa  YOB: 1950  Age: 70 y.o. Date of Visit: 10/08/21  Facility of Care: [facility]  Patient Room: [address] [place] [address] Attending: Dodie Denton MD  Hospice Diagnosis: Acute pancreatitis [K85.90]    Level of Care:    [x]  GIP    []  Respite   []  Routine    Consents/NCD Documentation:     Consents Reviewed: Yes     Person Reviewed/Signed with: Faustino Jackson    Right to NCD Reviewed: Yes    NCD Requested: No    Admission Nurse/Intake Notified NCD was requested: Bon Rodrigues (hospice RN) know this was not requested. Planned Start of Care Date:  10/8/21    Hospice Witness Representative:Elsa Brown LCSW     NARRATIVE   LCSW visited pt, pts wife Zeke Bourne and pts daughter Yanet Velasco) for initial psychosocial assessment/hospice admission. The patient's principle diagnosis of Acute Pancreatitis has resulted in Continuous Dialysis, requiring a ventaltor. Pts wife and pts daughter verbalized they only want comfort care at this time and do not want mechanical life support. Wife reports she is at peace, comforted knowing she has done all she can do for her . LCSW affirmed her care and concern for him. LCSW provided emotional support and supportive counseling for wife in processing transition to comfort. Pts and pts wife have been  for 40 yrs and have one daughter. Many family members present and providing support to family. Pts wife reports good support. Pts wife signed consents. Pt is a DNR but does not have a advance directive on file. Pts wife stated she knows pt wants cremation but has not made any plans. LCSW provided  Resource guide with cremation providers was provided. Pts family stated they would like a  to come pray and hospital  came in to provide prayer. LCSW encouraged pts wife to call with any needs. 5:30 PM: LCSW visited pts wife and family. Pts wife reported coping ok and was engaging in some life review with family. LCSW provided support. LCSW called hospice RN to come assess pt as he appeared to be having some secretions and hospice RN came to visit to assess. ADVANCE CARE PLANNING    Code Status: DNR   Durable DNR: Rosita Mccabe  _ No  Advance Care Planning 2021   Patient's Healthcare Decision Maker is: -   Primary Decision Maker Name -   Primary Decision Maker Phone Number -   Confirm Advance Directive None       Relationship Status:  []  Single     [x]        []      []  Domestic Partner     []  /  []  Common Law  []    []  Unknown    If in a relationship, name of partner/spouse: Shashank Leigh   Duration of relationship: 37 yrs. Advent: Hindu     Home: Cremation but uncertain what provider. Resources Provided: Resource guide provided with cremation providers in the area. Social Work Initial Assessment     Gender:  male    Race/Ethnicity: (john all that apply)  []  American [place] [facility] or [facility] Native  []    []  Black or [place] American  []   or   []   or [place]  [x]  White  []  Unknown      Service:    []  Yes   []  No       [x]  Unknown  Appropriate for Pinning Ceremony:   []  Yes      [x]  No  Is patient using VA benefits? []  Yes      [x]  No     Primary Language: English  []   Needed  []   utilized during visit    Ability to express thoughts/needs/feelings  []  Expressed thoughts/feelings/needs without difficulty  []  Requires extra time and cuing  []  Speech limited single words  []  Uses only gestures (eye, blinking eye or head movement/pointing)  []  Unable to express thoughts/feelings/needs (speech unintelligible or inappropriate)  [x]  Unresponsive  Notes:  Pt appears to be in the active stage of dying.      Mental Status:  []  Alert-oriented to:     []  Person     []  Place     []  Time  [x]  Comatose-responds to:    [] Verbal stimuli    []  Tactile stimuli    []  Painful stimuli  []  Forgetful  []  Disoriented/Confused  []  Lethargic  []  Agitated  []  Other (specify):    Notes:  Pt appears to be in the active stage of dying. Patients description of Illness/Current Health Status:    [x]  Patient unable to discuss  []  Patient unwilling to discuss  []  (Specify)        Knowledge/Understanding of Disease Process  Patient:    [x]  Demonstrates knowledge/understanding of disease process   [x]  Demonstrates knowledge/understanding of treatment plan   [x]  Demonstrates knowledge/understanding of prognosis   [x]  Demonstrates acceptance of prognosis   [x]  Demonstrates knowledge/understanding of resuscitation status   []  Other (specify)  Caregiver:   [x]  Demonstrates knowledge/understanding of disease process   [x]  Demonstrates knowledge/understanding of treatment plan   []  Demonstrates knowledge/understanding of prognosis   [x]  Demonstrates acceptance of prognosis   [x]  Demonstrates knowledge/understanding of resuscitation status   [x]  Other (specify)  Notes:  Pts wife and daughter report only wanting comfort care at this time. Pt is unresponsive. Patients living arrangement/care setting:  Use the PRIOR COLUMN when the PATIENTS current health status necessitated a change in his/her primary residence.     Prior Current Response              [x]             []    Patients own home/residence              []             []    Home of family member/friend              []             []    Boarding home              []             []    Assisted living facility/long-term center              []             []    Hospital/Acute care facility              []             []    Skilled nursing facility              []             []    Long term care facility/Nursing home              []             [x]    Hospice in Patient      Primary Caregiver:  []  No Primary Caregiver  Name of Primary Caregiver: [facility]  Relationship or Primary Caregiver:    []  Spouse/Significant other       []  Natural Child        []  Step child       []  Sibling   []  Parent   []  Friend/Neighbor   []  Community/Buddhist Volunteer   []  Paid help   [x]  Other (specify):_GIP at [facility]__________  Notes:  Pt was previously at home with pts wife. Family members/Significant others:  Name: Blaine Kaufman  Relationship: Wife   Phone Number: [phone]  Actively involved in care? [x]  Yes  []  No    Name: Arik Souza   Relationship:Daughter   Phone Number:  Actively involved in care? [x]  Yes  []  No    Name:  Relationship:  Phone Number:  Actively involved in care? []  Yes  []  No    Social support systems: (select ONE best description)  [x]  Excellent social support system which includes three or more family members or friends  []  Good social support system which includes two or less members or friends  []  [address] support which includes one family member or friend  []  Poor social support; no family members or friends; basically ALONE  Notes:   Many family members at bedside and providing support to pts wife and daughter,     Emotional Status: (john all that apply)    Patient Caregiver Response                 []                []    Mood/Affect stable and appropriate                   []                []    Angry                 []                []    Anxious                 []                []    Apprehensive                 []                []    Avoidant                 []                []    Clinging                 []                []    Depressed                 []                []    Distraught                 []                []    Elated                 []                []    Euphoric                 []                []    Fearful                 []                []    Flat Affect                 []                []    Helpless                 []                []    Hostile                 []                []    Impulsive                 [] []    Irritable                 []                []    Labile                 []                []    Manic                 []                []    Restlessness                 []                []    Sad                 []                []    Suspicious                 []                [x]    Tearful                 [x]                []    Withdrawn     Notes: Pts wife is tearful but accepting. Coping Skills (strengths/weakness):    Patient: Coping Skills (strength/weakness): Unable to assess. Family/caregiver (strength/weakness): Pts wife is tearful but accepting. Pts daughter appears accepting.      [place] of care (john all that apply):     [x]  No burden evident   []  Family must administer medications   []  Illness causing financial strain   []  Family/Support feels overwhelmed   []  Family/Support sleep disturbed with patients care   []  Patients care causes extra physical stress  of death  []  Illness causes changes in family lifestyle  []  Illness impacting family/support employment  []  Family experiencing increased time demands  []  Patients behavior endangers family  []  Denial of patients illness  []  Concern over outcome of illness/fear  []  Patients behavior embarrassing to family   Notes:      Risk Factors: (john all that apply):    [x]  No burden evident   []  Alcohol abuse  []  Financial resources inadequate to meet basic needs (food/house/etc)  []  Financial resources inadequate to meet health care needs (supplies/equipment/medications)  []  Food/nutrition resources inadequate  []  Home environment unsafe/inadequate for home care  []  Homicidal risk  []  Lives alone or without concerned relatives  []  Multiple medications/complex schedule  []  Physical limitations increase likelihood of falls  []  Plan of care/treatments complicated  []  Substance use/abuse  []  Suicidal risk  []  Visual impairment threatens safety/ability to perform self-care  []  Other (specify): Abuse/Neglect (actual/potential risks):  [x]  No signs of abuse/neglect  []  History of abuse/neglect                 []  Physical          []  Sexual  []  History of domestic violence  []  Lacks adequate physical care  []  Lacks emotional nurturing/support  []  Lacks appropriate stimulation/cognitive experiences  []  Left alone inappropriately  []  Lacks necessary supervision  []  Inadequate or delayed medical care  []  Unsafe environment (i.e guns/drug use/history of violence in the home/etc.)  []  Bruising or other physical signs of injury present  []  Other (specify):  Notes:   []  Refer to child/adult protective services      Current Sources of Stress (in Addition to Current Illness):   [x]  None reported  []  Bills/Debt    []  Career/Job change    []   (short term)    []   (long term)    []  Death of a child (recent)    []  Death of a parent (recent)   []  Death of a spouse (recent)   []  Employment status changed   []  Family discord    []  Financial loss/Inadequate inther (specify):come  []  Job loss  []  Legal issues unresolved  []  Lifestyle change  []  Marital discord  []  Marriage within the last year  []  Paperwork (insurance/legal/etc) overwhelming  []  Separation/Divorce  []  Other (specify):  Notes:      Current [employer] Being Utilized     1. LCSW provided resource guide and cremation providers. Interventions/Plan of Care     1. Assess social and emotional factors related to coping with end of life issues  2. Community resource planning/referral   3. Relocation to different care setting if/when symptoms stabilize    Discharge Planning     1. [facility] if pt stabilizes.     MSW Assessment Completed by: Su Love LCSW  10/08/21    Time In:1:00 PM       Time Out: 2:30 PM

## 2021-10-10 ENCOUNTER — HOME CARE VISIT (OUTPATIENT)
Dept: HOSPICE | Facility: HOSPICE | Age: 71
End: 2021-10-10
Payer: MEDICARE

## 2021-10-11 LAB
BACTERIA SPEC CULT: NORMAL
SERVICE CMNT-IMP: NORMAL

## 2021-11-21 NOTE — PROGRESS NOTES
Nephrology Progress Note  Layla Novoa  Date of Admission : 9/22/2021    CC: Follow up for [place]       Assessment and Plan     [place]:  - multifactorial: ATN from hypotension/severe pancreatitis + IV contrast on admission  - CRRT switched to IHD on 10/2   - s/p Line exchange on 10/4  - HD today and tomorrow   - making urine : start Bumex 2 mg IV daily  - strict I.O , place purewick   - Levophed for BP support to achieve UF goals   - daily labs and I/Os    Nutrition:  -Off TPN, tolerating TF    Shock   - on Levophed   - On high dose Midodrine     Acute resp failure :  - intubated 9/25    Pancreatitis     Bradycardia  TAA, b/l BEAU aneurysms  Leukocytosis       Interval History:  Seen and examined. Remains sedated on vent. TPN was discontinued yesterday and tube feeds are advanced and are at goal.  Having plenty of stools. IR did a wire exchange of Teddy catheter yesterday and he had dialysis. Making urine now. Current Medications: all current  Medications have been eviewed in EPIC  Review of Systems: Review of systems not obtained due to patient factors. Objective:  Vitals:    Vitals:    10/05/21 0415 10/05/21 0500 10/05/21 0600 10/05/21 0700   BP:  (!) 98/58 91/63    Pulse: 73 70 87 68   Resp: 30 28 (!) 32 28   Temp:       SpO2: 95% 97% 97% 98%   Weight:       Height:         Intake and Output:  No intake/output data recorded.   10/03 1901 - 10/05 0700  In: 3366.5 [I.V.:2731.5]  Out: 4706 [Urine:325; Drains:100]    Physical Examination:  Pt intubated     yes  General: On vent   Neck:  Teddy +  Resp:  Decreased BS b/l  CV:  RRR,  no murmur or rub, 1+ b/l LE edema  GI:  Non distended, reduced bowel sounds  Neurologic:  Sedated on vent   Ext                   Pitting edema +    []    High complexity decision making was performed  []    Patient is at high-risk of decompensation with multiple organ involvement    Lab Data Personally Reviewed: I have reviewed all the pertinent labs, microbiology data and radiology studies during assessment. Recent Labs     10/05/21  0354 10/04/21  1751 10/04/21  0351 10/03/21  1617 10/03/21  1617   * 134* 132*   < > 134*   K 4.7 3.9 4.8   < > 4.6    103 100   < > 100   CO2 22 20* 20*   < > 25   * 201* 240*   < > 262*   * 89* 121*   < > 101*   CREA 4.46* 3.36* 4.53*   < > 3.96*   CA 8.2* 8.0* 7.9*   < > 7.9*   MG 3.0* 2.6* 3.1*   < > 3.0*   PHOS 8.2* 5.9* 8.9*   < > 7.0*   ALB 2.1* 2.0* 1.9*   < > 2.0*   ALT  --  77 47  --  32    < > = values in this interval not displayed.      Recent Labs     10/05/21  0354 10/04/21  0351 10/03/21  0356   WBC 25.3* 21.5* 18.4*   HGB 10.4* 9.8* 9.5*   HCT 32.8* 30.6* 29.1*    257 203     Lab Results   Component Value Date/Time    Specimen Description: HIP LEFT JOINT 01/17/2012 03:00 PM    Specimen Description: HIP LEFT JOINT 01/17/2012 03:00 PM    Specimen Description: NARES 07/28/2011 12:12 AM     Lab Results   Component Value Date/Time    Culture result: NO GROWTH 5 DAYS 09/29/2021 09:05 AM    Culture result: MODERATE YEAST, (APPARENT CANDIDA ALBICANS) (A) 09/28/2021 08:22 AM    Culture result: LIGHT NORMAL RESPIRATORY LUIS 09/28/2021 08:22 AM     Recent Results (from the past 24 hour(s))   POC FECAL OCCULT BLOOD    Collection Time: 10/04/21  8:29 AM   Result Value Ref Range    Occult blood, stool (POC) Positive (A) NEG     GLUCOSE, POC    Collection Time: 10/04/21 11:53 AM   Result Value Ref Range    Glucose (POC) 267 (H) 65 - 117 mg/dL    Performed by Graciela Hurtado    PHOSPHORUS    Collection Time: 10/04/21  5:51 PM   Result Value Ref Range    Phosphorus 5.9 (H) 2.6 - 4.7 MG/DL   METABOLIC PANEL, COMPREHENSIVE    Collection Time: 10/04/21  5:51 PM   Result Value Ref Range    Sodium 134 (L) 136 - 145 mmol/L    Potassium 3.9 3.5 - 5.1 mmol/L    Chloride 103 97 - 108 mmol/L    CO2 20 (L) 21 - 32 mmol/L    Anion gap 11 5 - 15 mmol/L    Glucose 201 (H) 65 - 100 mg/dL    BUN 89 (H) 6 - 20 MG/DL    Creatinine 3.36 (H) 0.70 - 1.30 MG/DL    BUN/Creatinine ratio 26 (H) 12 - 20      GFR est AA 22 (L) >60 ml/min/1.73m2    GFR est non-AA 18 (L) >60 ml/min/1.73m2    Calcium 8.0 (L) 8.5 - 10.1 MG/DL    Bilirubin, total 0.6 0.2 - 1.0 MG/DL    ALT (SGPT) 77 12 - 78 U/L    AST (SGOT) 117 (H) 15 - 37 U/L    Alk.  phosphatase 192 (H) 45 - 117 U/L    Protein, total 6.2 (L) 6.4 - 8.2 g/dL    Albumin 2.0 (L) 3.5 - 5.0 g/dL    Globulin 4.2 (H) 2.0 - 4.0 g/dL    A-G Ratio 0.5 (L) 1.1 - 2.2     MAGNESIUM    Collection Time: 10/04/21  5:51 PM   Result Value Ref Range    Magnesium 2.6 (H) 1.6 - 2.4 mg/dL   GLUCOSE, POC    Collection Time: 10/04/21  6:06 PM   Result Value Ref Range    Glucose (POC) 197 (H) 65 - 117 mg/dL    Performed by [address], POC    Collection Time: 10/04/21 11:38 PM   Result Value Ref Range    Glucose (POC) 165 (H) 65 - 117 mg/dL    Performed by Sandeep Montrose    RENAL FUNCTION PANEL    Collection Time: 10/05/21  3:54 AM   Result Value Ref Range    Sodium 135 (L) 136 - 145 mmol/L    Potassium 4.7 3.5 - 5.1 mmol/L    Chloride 101 97 - 108 mmol/L    CO2 22 21 - 32 mmol/L    Anion gap 12 5 - 15 mmol/L    Glucose 190 (H) 65 - 100 mg/dL     (H) 6 - 20 MG/DL    Creatinine 4.46 (H) 0.70 - 1.30 MG/DL    BUN/Creatinine ratio 26 (H) 12 - 20      GFR est AA 16 (L) >60 ml/min/1.73m2    GFR est non-AA 13 (L) >60 ml/min/1.73m2    Calcium 8.2 (L) 8.5 - 10.1 MG/DL    Phosphorus 8.2 (H) 2.6 - 4.7 MG/DL    Albumin 2.1 (L) 3.5 - 5.0 g/dL   LACTIC ACID    Collection Time: 10/05/21  3:54 AM   Result Value Ref Range    Lactic acid 1.8 0.4 - 2.0 MMOL/L   CBC WITH AUTOMATED DIFF    Collection Time: 10/05/21  3:54 AM   Result Value Ref Range    WBC 25.3 (H) 4.1 - 11.1 K/uL    RBC 3.41 (L) 4.10 - 5.70 M/uL    HGB 10.4 (L) 12.1 - 17.0 g/dL    HCT 32.8 (L) 36.6 - 50.3 %    MCV 96.2 80.0 - 99.0 FL    MCH 30.5 26.0 - 34.0 PG    MCHC 31.7 30.0 - 36.5 g/dL    RDW 14.9 (H) 11.5 - 14.5 %    PLATELET 821 951 - 044 K/uL    MPV 12.4 8.9 - 12.9 FL    NRBC 0.1 (H) 0  WBC    ABSOLUTE NRBC 0.02 (H) 0.00 - 0.01 K/uL    NEUTROPHILS 87 (H) 32 - 75 %    BAND NEUTROPHILS 6 0 - 6 %    LYMPHOCYTES 2 (L) 12 - 49 %    MONOCYTES 5 5 - 13 %    EOSINOPHILS 0 0 - 7 %    BASOPHILS 0 0 - 1 %    IMMATURE GRANULOCYTES 0 %    ABS. NEUTROPHILS 23.5 (H) 1.8 - 8.0 K/UL    ABS. LYMPHOCYTES 0.5 (L) 0.8 - 3.5 K/UL    ABS. MONOCYTES 1.3 (H) 0.0 - 1.0 K/UL    ABS. EOSINOPHILS 0.0 0.0 - 0.4 K/UL    ABS. BASOPHILS 0.0 0.0 - 0.1 K/UL    ABS. IMM. GRANS. 0.0 K/UL    DF MANUAL      PLATELET COMMENTS Large Platelets      RBC COMMENTS ANISOCYTOSIS  1+       MAGNESIUM    Collection Time: 10/05/21  3:54 AM   Result Value Ref Range    Magnesium 3.0 (H) 1.6 - 2.4 mg/dL   POC G3 - PUL    Collection Time: 10/05/21  4:20 AM   Result Value Ref Range    FIO2 (POC) 40 %    pH (POC) 7.36 7.35 - 7.45      pCO2 (POC) 35.6 35.0 - 45.0 MMHG    pO2 (POC) 95 80 - 100 MMHG    HCO3 (POC) 19.9 (L) 22 - 26 MMOL/L    sO2 (POC) 97.1 (H) 92 - 97 %    Base deficit (POC) 5.0 mmol/L    Site DRAWN FROM ARTERIAL LINE      Device: ADULT VENT      Mode ASSIST CONTROL      Tidal volume 480 ml    Set Rate 15 bpm    PEEP/CPAP (POC) 5 cmH2O    Allens test (POC) NOT APPLICABLE      Specimen type (POC) ARTERIAL     GLUCOSE, POC    Collection Time: 10/05/21  5:45 AM   Result Value Ref Range    Glucose (POC) 194 (H) 65 - 117 mg/dL    Performed by Ladan Springer MD  [address]  Phone - [phone]   Fax - [phone]  www. [facility]

## (undated) DEVICE — PERCLOSE PROGLIDE™ SUTURE-MEDIATED CLOSURE SYSTEM: Brand: PERCLOSE PROGLIDE™

## (undated) DEVICE — SYR 5ML 1/5 GRAD LL NSAF LF --

## (undated) DEVICE — KIT HND CTRL 3 W STPCOCK ROT END 54IN PREM HI PRSS TBNG AT

## (undated) DEVICE — SOLUTION IV STRL H2O 500 ML AQUALITE POUR BTL

## (undated) DEVICE — CATH DIAG LCB IMPLS 5FRX100CM -- IMPULSE 16391-195

## (undated) DEVICE — NDL FLTR TIP 5 MIC 18GX1.5IN --

## (undated) DEVICE — SYR 3ML LL TIP 1/10ML GRAD --

## (undated) DEVICE — ANGIOGRAPHY KIT

## (undated) DEVICE — HIGH FLOW RATE EXTENSION SET, LUER LOCK ADAPTERS

## (undated) DEVICE — STERILE POLYISOPRENE POWDER-FREE SURGICAL GLOVES: Brand: PROTEXIS

## (undated) DEVICE — EYE PADSSTERILENOT MADE WITH NATURAL RUBBER LATEXSINGLE USE ONLYDO NOT USE IF PACKAGE OPENED OR DAMAGED: Brand: CARDINAL HEALTH

## (undated) DEVICE — THE MONARCH® "D" CARTRIDGE IS A SINGLE-USE POLYPROPYLENE CARTRIDGE FOR POSTERIOR CHAMBER IOL DELIVERY: Brand: MONARCH® III

## (undated) DEVICE — PINNACLE PRECISION ACCESS SYSTEM INTRODUCER SHEATH: Brand: PINNACLE PRECISION ACCESS SYSTEM

## (undated) DEVICE — KENDALL DL ECG CABLE AND LEAD WIRE SYSTEM, 3-LEAD, SINGLE PATIENT USE: Brand: KENDALL

## (undated) DEVICE — ANGIOGRAPHIC CATHETER: Brand: IMPULSE™

## (undated) DEVICE — GUIDEWIRE VASC 0DEG L180CM DIA0018IN TIP L2 NIT W TORQ DEV

## (undated) DEVICE — SURGICAL PROCEDURE PACK CATRCT CUST

## (undated) DEVICE — PACK PROCEDURE SURG HRT CATH

## (undated) DEVICE — 3M™ TEGADERM™ TRANSPARENT FILM DRESSING FRAME STYLE, 1624W, 2-3/8 IN X 2-3/4 IN (6 CM X 7 CM), 100/CT 4CT/CASE: Brand: 3M™ TEGADERM™

## (undated) DEVICE — KIT MFLD ISOLATN NACL CNTRST PRT TBNG SPIK W/ PRSS TRNSDUC

## (undated) DEVICE — Device

## (undated) DEVICE — SYRINGE INSULIN 1ML LUERSLIP TIP W/O SFTY U100 BLISTER PK

## (undated) DEVICE — SYR 10ML LUER LOK 1/5ML GRAD --

## (undated) DEVICE — SOLUTION IV 250ML 0.9% SOD CHL CLR INJ FLX BG CONT PRT CLSR